# Patient Record
Sex: FEMALE | Race: WHITE | Employment: PART TIME | URBAN - METROPOLITAN AREA
[De-identification: names, ages, dates, MRNs, and addresses within clinical notes are randomized per-mention and may not be internally consistent; named-entity substitution may affect disease eponyms.]

---

## 2017-04-17 PROBLEM — R41.3 MEMORY CHANGE: Status: ACTIVE | Noted: 2017-04-17

## 2017-04-17 PROBLEM — M19.041 OA (OSTEOARTHRITIS) OF FINGER, RIGHT: Status: ACTIVE | Noted: 2017-04-17

## 2017-04-17 PROBLEM — J44.9 CHRONIC OBSTRUCTIVE PULMONARY DISEASE, UNSPECIFIED COPD TYPE (HCC): Status: ACTIVE | Noted: 2017-04-17

## 2017-04-17 PROCEDURE — 82607 VITAMIN B-12: CPT | Performed by: INTERNAL MEDICINE

## 2017-04-17 PROCEDURE — 82746 ASSAY OF FOLIC ACID SERUM: CPT | Performed by: INTERNAL MEDICINE

## 2017-04-17 PROCEDURE — 86780 TREPONEMA PALLIDUM: CPT | Performed by: INTERNAL MEDICINE

## 2017-04-25 PROBLEM — M19.041 DEGENERATIVE ARTHRITIS OF FINGER, RIGHT: Status: ACTIVE | Noted: 2017-04-25

## 2017-08-22 PROBLEM — R93.89 ABNORMAL CT OF THE CHEST: Status: ACTIVE | Noted: 2017-08-22

## 2017-08-22 PROBLEM — J18.1 LOBAR PNEUMONIA (HCC): Status: ACTIVE | Noted: 2017-08-22

## 2017-11-09 PROCEDURE — 84165 PROTEIN E-PHORESIS SERUM: CPT | Performed by: OTHER

## 2017-11-09 PROCEDURE — 82746 ASSAY OF FOLIC ACID SERUM: CPT | Performed by: OTHER

## 2017-11-09 PROCEDURE — 82607 VITAMIN B-12: CPT | Performed by: OTHER

## 2017-11-09 PROCEDURE — 86334 IMMUNOFIX E-PHORESIS SERUM: CPT | Performed by: OTHER

## 2017-11-09 PROCEDURE — 83883 ASSAY NEPHELOMETRY NOT SPEC: CPT | Performed by: OTHER

## 2017-11-09 PROCEDURE — 83090 ASSAY OF HOMOCYSTEINE: CPT | Performed by: OTHER

## 2017-11-09 PROCEDURE — 83921 ORGANIC ACID SINGLE QUANT: CPT | Performed by: OTHER

## 2017-12-19 PROBLEM — J45.21 MILD INTERMITTENT ASTHMA WITH ACUTE EXACERBATION: Status: ACTIVE | Noted: 2017-12-19

## 2017-12-19 PROBLEM — J45.21 MILD INTERMITTENT ASTHMA WITH ACUTE EXACERBATION (HCC): Status: ACTIVE | Noted: 2017-12-19

## 2018-01-20 PROCEDURE — 82784 ASSAY IGA/IGD/IGG/IGM EACH: CPT | Performed by: ALLERGY & IMMUNOLOGY

## 2018-01-20 PROCEDURE — 86317 IMMUNOASSAY INFECTIOUS AGENT: CPT | Performed by: ALLERGY & IMMUNOLOGY

## 2018-01-20 PROCEDURE — 36415 COLL VENOUS BLD VENIPUNCTURE: CPT | Performed by: ALLERGY & IMMUNOLOGY

## 2018-04-16 PROBLEM — I70.0 AORTIC ATHEROSCLEROSIS (HCC): Status: ACTIVE | Noted: 2018-04-16

## 2018-04-16 PROBLEM — D80.2 IMMUNOGLOBULIN A DEFICIENCY (HCC): Status: ACTIVE | Noted: 2018-04-16

## 2018-04-17 PROBLEM — J18.9 PNEUMONIA OF RIGHT MIDDLE LOBE DUE TO INFECTIOUS ORGANISM: Status: ACTIVE | Noted: 2018-04-17

## 2018-04-17 PROBLEM — M19.041 DEGENERATIVE ARTHRITIS OF FINGER, RIGHT: Status: RESOLVED | Noted: 2017-04-25 | Resolved: 2018-04-17

## 2018-04-17 PROBLEM — R93.89 ABNORMAL CT OF THE CHEST: Status: RESOLVED | Noted: 2017-08-22 | Resolved: 2018-04-17

## 2018-04-17 PROBLEM — M81.0 AGE-RELATED OSTEOPOROSIS WITHOUT CURRENT PATHOLOGICAL FRACTURE: Status: ACTIVE | Noted: 2018-04-17

## 2018-04-17 PROBLEM — J18.1 LOBAR PNEUMONIA (HCC): Status: RESOLVED | Noted: 2017-08-22 | Resolved: 2018-04-17

## 2018-04-17 PROBLEM — R41.3 MEMORY CHANGE: Status: RESOLVED | Noted: 2017-04-17 | Resolved: 2018-04-17

## 2018-04-20 PROBLEM — E78.5 DYSLIPIDEMIA, GOAL LDL BELOW 160: Status: ACTIVE | Noted: 2018-04-20

## 2018-05-10 PROCEDURE — 88305 TISSUE EXAM BY PATHOLOGIST: CPT | Performed by: INTERNAL MEDICINE

## 2018-05-22 PROBLEM — G25.0 ESSENTIAL TREMOR: Status: ACTIVE | Noted: 2018-05-22

## 2018-06-28 PROBLEM — D50.0 IRON DEFICIENCY ANEMIA DUE TO CHRONIC BLOOD LOSS: Status: ACTIVE | Noted: 2018-06-28

## 2018-06-28 PROCEDURE — 81001 URINALYSIS AUTO W/SCOPE: CPT | Performed by: INTERNAL MEDICINE

## 2018-07-23 PROBLEM — J44.1 ACUTE EXACERBATION OF CHRONIC OBSTRUCTIVE PULMONARY DISEASE (COPD) (HCC): Status: ACTIVE | Noted: 2017-04-17

## 2018-08-22 PROCEDURE — 83883 ASSAY NEPHELOMETRY NOT SPEC: CPT | Performed by: INTERNAL MEDICINE

## 2018-08-22 PROCEDURE — 83010 ASSAY OF HAPTOGLOBIN QUANT: CPT | Performed by: INTERNAL MEDICINE

## 2018-08-22 PROCEDURE — 86334 IMMUNOFIX E-PHORESIS SERUM: CPT | Performed by: INTERNAL MEDICINE

## 2018-08-22 PROCEDURE — 84165 PROTEIN E-PHORESIS SERUM: CPT | Performed by: INTERNAL MEDICINE

## 2018-09-24 PROBLEM — G62.9 NEUROPATHY: Status: ACTIVE | Noted: 2018-09-24

## 2018-09-24 PROBLEM — H81.10 BENIGN PAROXYSMAL POSITIONAL VERTIGO, UNSPECIFIED LATERALITY: Status: ACTIVE | Noted: 2018-09-24

## 2018-09-25 PROBLEM — J44.1 ACUTE EXACERBATION OF CHRONIC OBSTRUCTIVE PULMONARY DISEASE (COPD) (HCC): Status: RESOLVED | Noted: 2017-04-17 | Resolved: 2018-09-25

## 2018-09-25 PROBLEM — D63.8 ANEMIA, CHRONIC DISEASE: Status: ACTIVE | Noted: 2018-09-25

## 2019-05-19 PROBLEM — J18.9 PNEUMONIA OF RIGHT MIDDLE LOBE DUE TO INFECTIOUS ORGANISM: Status: RESOLVED | Noted: 2018-04-17 | Resolved: 2019-05-19

## 2019-05-21 PROCEDURE — 80156 ASSAY CARBAMAZEPINE TOTAL: CPT | Performed by: INTERNAL MEDICINE

## 2019-12-13 PROBLEM — S92.514A CLOSED NONDISPLACED FRACTURE OF PROXIMAL PHALANX OF LESSER TOE OF RIGHT FOOT, INITIAL ENCOUNTER: Status: ACTIVE | Noted: 2019-12-13

## 2020-03-05 PROBLEM — S92.514A CLOSED NONDISPLACED FRACTURE OF PROXIMAL PHALANX OF LESSER TOE OF RIGHT FOOT, INITIAL ENCOUNTER: Status: RESOLVED | Noted: 2019-12-13 | Resolved: 2020-03-05

## 2020-03-05 PROBLEM — J41.8 MIXED SIMPLE AND MUCOPURULENT CHRONIC BRONCHITIS (HCC): Status: ACTIVE | Noted: 2020-03-05

## 2020-03-05 PROBLEM — J45.21 MILD INTERMITTENT ASTHMA WITH ACUTE EXACERBATION: Status: RESOLVED | Noted: 2017-12-19 | Resolved: 2020-03-05

## 2020-03-05 PROBLEM — I47.10 SUPRAVENTRICULAR TACHYCARDIA: Status: ACTIVE | Noted: 2020-03-05

## 2020-03-05 PROBLEM — M19.049 HAND ARTHRITIS: Status: ACTIVE | Noted: 2017-04-17

## 2020-03-05 PROBLEM — H81.10 BENIGN PAROXYSMAL POSITIONAL VERTIGO, UNSPECIFIED LATERALITY: Status: RESOLVED | Noted: 2018-09-24 | Resolved: 2020-03-05

## 2020-03-05 PROBLEM — J45.21 MILD INTERMITTENT ASTHMA WITH ACUTE EXACERBATION (HCC): Status: RESOLVED | Noted: 2017-12-19 | Resolved: 2020-03-05

## 2020-03-05 PROBLEM — I47.1 SUPRAVENTRICULAR TACHYCARDIA (HCC): Status: ACTIVE | Noted: 2020-03-05

## 2020-03-05 PROBLEM — I47.10 SUPRAVENTRICULAR TACHYCARDIA (HCC): Status: ACTIVE | Noted: 2020-03-05

## 2021-04-12 DIAGNOSIS — Z23 NEED FOR VACCINATION: ICD-10-CM

## 2021-06-10 PROBLEM — R63.4 WEIGHT LOSS: Status: ACTIVE | Noted: 2021-06-10

## 2021-06-10 PROBLEM — F51.05 MOOD INSOMNIA (HCC): Status: ACTIVE | Noted: 2021-06-10

## 2021-06-10 PROBLEM — F39 MOOD INSOMNIA (HCC): Status: ACTIVE | Noted: 2021-06-10

## 2021-06-10 PROBLEM — M15.2 BOUCHARD NODES (DJD HAND): Status: ACTIVE | Noted: 2021-06-10

## 2021-07-11 PROBLEM — R53.82 CHRONIC FATIGUE: Status: ACTIVE | Noted: 2021-07-11

## 2024-02-03 ENCOUNTER — APPOINTMENT (OUTPATIENT)
Dept: GENERAL RADIOLOGY | Facility: HOSPITAL | Age: 79
End: 2024-02-03
Attending: NURSE PRACTITIONER
Payer: MEDICARE

## 2024-02-03 ENCOUNTER — ANESTHESIA EVENT (OUTPATIENT)
Dept: SURGERY | Facility: HOSPITAL | Age: 79
End: 2024-02-03
Payer: MEDICARE

## 2024-02-03 ENCOUNTER — HOSPITAL ENCOUNTER (INPATIENT)
Facility: HOSPITAL | Age: 79
LOS: 14 days | Discharge: HOME | End: 2024-02-17
Attending: SURGERY | Admitting: HOSPITALIST
Payer: MEDICARE

## 2024-02-03 ENCOUNTER — ANESTHESIA (OUTPATIENT)
Dept: SURGERY | Facility: HOSPITAL | Age: 79
End: 2024-02-03
Payer: MEDICARE

## 2024-02-03 ENCOUNTER — APPOINTMENT (OUTPATIENT)
Dept: CT IMAGING | Facility: HOSPITAL | Age: 79
End: 2024-02-03
Attending: NURSE PRACTITIONER
Payer: MEDICARE

## 2024-02-03 ENCOUNTER — HOSPITAL ENCOUNTER (INPATIENT)
Facility: HOSPITAL | Age: 79
LOS: 14 days | Discharge: HOME HEALTH | End: 2024-02-17
Attending: SURGERY | Admitting: HOSPITALIST
Payer: MEDICARE

## 2024-02-03 DIAGNOSIS — K56.609 BOWEL OBSTRUCTION (HCC): Primary | ICD-10-CM

## 2024-02-03 DIAGNOSIS — K56.609 BOWEL OBSTRUCTION (HCC): ICD-10-CM

## 2024-02-03 DIAGNOSIS — K56.2 CECAL VOLVULUS (HCC): Primary | ICD-10-CM

## 2024-02-03 LAB
ALBUMIN SERPL-MCNC: 4.5 G/DL (ref 3.2–4.8)
ALBUMIN/GLOB SERPL: 1.7 {RATIO} (ref 1–2)
ALP LIVER SERPL-CCNC: 103 U/L
ALT SERPL-CCNC: 19 U/L
ANION GAP SERPL CALC-SCNC: 10 MMOL/L (ref 0–18)
ANTIBODY SCREEN: NEGATIVE
AST SERPL-CCNC: 21 U/L (ref ?–34)
ATRIAL RATE: 78 BPM
ATRIAL RATE: 80 BPM
BASOPHILS # BLD AUTO: 0.03 X10(3) UL (ref 0–0.2)
BASOPHILS NFR BLD AUTO: 0.2 %
BILIRUB SERPL-MCNC: 0.5 MG/DL (ref 0.2–1.1)
BUN BLD-MCNC: 23 MG/DL (ref 9–23)
BUN/CREAT SERPL: 26.1 (ref 10–20)
CALCIUM BLD-MCNC: 9.7 MG/DL (ref 8.7–10.4)
CHLORIDE SERPL-SCNC: 105 MMOL/L (ref 98–112)
CO2 SERPL-SCNC: 25 MMOL/L (ref 21–32)
CREAT BLD-MCNC: 0.88 MG/DL
DEPRECATED RDW RBC AUTO: 45.7 FL (ref 35.1–46.3)
EGFRCR SERPLBLD CKD-EPI 2021: 67 ML/MIN/1.73M2 (ref 60–?)
EOSINOPHIL # BLD AUTO: 0 X10(3) UL (ref 0–0.7)
EOSINOPHIL NFR BLD AUTO: 0 %
ERYTHROCYTE [DISTWIDTH] IN BLOOD BY AUTOMATED COUNT: 14.1 % (ref 11–15)
FLUAV + FLUBV RNA SPEC NAA+PROBE: NEGATIVE
FLUAV + FLUBV RNA SPEC NAA+PROBE: NEGATIVE
GLOBULIN PLAS-MCNC: 2.6 G/DL (ref 2.8–4.4)
GLUCOSE BLD-MCNC: 163 MG/DL (ref 70–99)
HCT VFR BLD AUTO: 37.7 %
HGB BLD-MCNC: 12.6 G/DL
IMM GRANULOCYTES # BLD AUTO: 0.06 X10(3) UL (ref 0–1)
IMM GRANULOCYTES NFR BLD: 0.5 %
LACTATE SERPL-SCNC: 1.5 MMOL/L (ref 0.5–2)
LIPASE SERPL-CCNC: 29 U/L (ref 13–75)
LYMPHOCYTES # BLD AUTO: 0.61 X10(3) UL (ref 1–4)
LYMPHOCYTES NFR BLD AUTO: 4.6 %
MCH RBC QN AUTO: 29 PG (ref 26–34)
MCHC RBC AUTO-ENTMCNC: 33.4 G/DL (ref 31–37)
MCV RBC AUTO: 86.7 FL
MONOCYTES # BLD AUTO: 0.39 X10(3) UL (ref 0.1–1)
MONOCYTES NFR BLD AUTO: 2.9 %
NEUTROPHILS # BLD AUTO: 12.15 X10 (3) UL (ref 1.5–7.7)
NEUTROPHILS # BLD AUTO: 12.15 X10(3) UL (ref 1.5–7.7)
NEUTROPHILS NFR BLD AUTO: 91.8 %
OSMOLALITY SERPL CALC.SUM OF ELEC: 297 MOSM/KG (ref 275–295)
P AXIS: 75 DEGREES
P-R INTERVAL: 130 MS
P-R INTERVAL: 168 MS
PLATELET # BLD AUTO: 272 10(3)UL (ref 150–450)
POTASSIUM SERPL-SCNC: 4.4 MMOL/L (ref 3.5–5.1)
PROT SERPL-MCNC: 7.1 G/DL (ref 5.7–8.2)
Q-T INTERVAL: 326 MS
Q-T INTERVAL: 372 MS
QRS DURATION: 92 MS
QRS DURATION: 94 MS
QTC CALCULATION (BEZET): 371 MS
QTC CALCULATION (BEZET): 429 MS
R AXIS: -23 DEGREES
R AXIS: -32 DEGREES
RBC # BLD AUTO: 4.35 X10(6)UL
RH BLOOD TYPE: POSITIVE
RH BLOOD TYPE: POSITIVE
RSV RNA SPEC NAA+PROBE: NEGATIVE
SARS-COV-2 RNA RESP QL NAA+PROBE: NOT DETECTED
SODIUM SERPL-SCNC: 140 MMOL/L (ref 136–145)
T AXIS: -7 DEGREES
T AXIS: 60 DEGREES
TROPONIN I SERPL HS-MCNC: 6 NG/L
VENTRICULAR RATE: 78 BPM
VENTRICULAR RATE: 80 BPM
WBC # BLD AUTO: 13.2 X10(3) UL (ref 4–11)

## 2024-02-03 PROCEDURE — 93010 ELECTROCARDIOGRAM REPORT: CPT | Performed by: INTERNAL MEDICINE

## 2024-02-03 PROCEDURE — 99285 EMERGENCY DEPT VISIT HI MDM: CPT

## 2024-02-03 PROCEDURE — 96375 TX/PRO/DX INJ NEW DRUG ADDON: CPT

## 2024-02-03 PROCEDURE — 0WUF07Z SUPPLEMENT ABDOMINAL WALL WITH AUTOLOGOUS TISSUE SUBSTITUTE, OPEN APPROACH: ICD-10-PCS | Performed by: SURGERY

## 2024-02-03 PROCEDURE — 84484 ASSAY OF TROPONIN QUANT: CPT | Performed by: NURSE PRACTITIONER

## 2024-02-03 PROCEDURE — 80053 COMPREHEN METABOLIC PANEL: CPT | Performed by: NURSE PRACTITIONER

## 2024-02-03 PROCEDURE — 83690 ASSAY OF LIPASE: CPT | Performed by: NURSE PRACTITIONER

## 2024-02-03 PROCEDURE — 86900 BLOOD TYPING SEROLOGIC ABO: CPT | Performed by: NURSE PRACTITIONER

## 2024-02-03 PROCEDURE — 96374 THER/PROPH/DIAG INJ IV PUSH: CPT

## 2024-02-03 PROCEDURE — 86850 RBC ANTIBODY SCREEN: CPT | Performed by: NURSE PRACTITIONER

## 2024-02-03 PROCEDURE — 93010 ELECTROCARDIOGRAM REPORT: CPT

## 2024-02-03 PROCEDURE — 0241U SARS-COV-2/FLU A AND B/RSV BY PCR (GENEXPERT): CPT | Performed by: NURSE PRACTITIONER

## 2024-02-03 PROCEDURE — 83605 ASSAY OF LACTIC ACID: CPT | Performed by: NURSE PRACTITIONER

## 2024-02-03 PROCEDURE — 74177 CT ABD & PELVIS W/CONTRAST: CPT | Performed by: NURSE PRACTITIONER

## 2024-02-03 PROCEDURE — 93005 ELECTROCARDIOGRAM TRACING: CPT

## 2024-02-03 PROCEDURE — 96361 HYDRATE IV INFUSION ADD-ON: CPT

## 2024-02-03 PROCEDURE — 0DTF0ZZ RESECTION OF RIGHT LARGE INTESTINE, OPEN APPROACH: ICD-10-PCS | Performed by: SURGERY

## 2024-02-03 PROCEDURE — 85025 COMPLETE CBC W/AUTO DIFF WBC: CPT | Performed by: NURSE PRACTITIONER

## 2024-02-03 PROCEDURE — 86901 BLOOD TYPING SEROLOGIC RH(D): CPT | Performed by: NURSE PRACTITIONER

## 2024-02-03 PROCEDURE — 88307 TISSUE EXAM BY PATHOLOGIST: CPT | Performed by: SURGERY

## 2024-02-03 RX ORDER — MORPHINE SULFATE 10 MG/ML
6 INJECTION, SOLUTION INTRAMUSCULAR; INTRAVENOUS EVERY 10 MIN PRN
Status: DISCONTINUED | OUTPATIENT
Start: 2024-02-03 | End: 2024-02-03 | Stop reason: HOSPADM

## 2024-02-03 RX ORDER — HYDROMORPHONE HYDROCHLORIDE 1 MG/ML
0.4 INJECTION, SOLUTION INTRAMUSCULAR; INTRAVENOUS; SUBCUTANEOUS EVERY 5 MIN PRN
Status: DISCONTINUED | OUTPATIENT
Start: 2024-02-03 | End: 2024-02-03 | Stop reason: HOSPADM

## 2024-02-03 RX ORDER — DIPHENHYDRAMINE HYDROCHLORIDE 50 MG/ML
12.5 INJECTION INTRAMUSCULAR; INTRAVENOUS EVERY 4 HOURS PRN
Status: DISCONTINUED | OUTPATIENT
Start: 2024-02-03 | End: 2024-02-06

## 2024-02-03 RX ORDER — PHENYLEPHRINE HCL 10 MG/ML
VIAL (ML) INJECTION AS NEEDED
Status: DISCONTINUED | OUTPATIENT
Start: 2024-02-03 | End: 2024-02-03 | Stop reason: SURG

## 2024-02-03 RX ORDER — MORPHINE SULFATE 4 MG/ML
4 INJECTION, SOLUTION INTRAMUSCULAR; INTRAVENOUS ONCE
Status: COMPLETED | OUTPATIENT
Start: 2024-02-03 | End: 2024-02-03

## 2024-02-03 RX ORDER — ROCURONIUM BROMIDE 10 MG/ML
INJECTION, SOLUTION INTRAVENOUS AS NEEDED
Status: DISCONTINUED | OUTPATIENT
Start: 2024-02-03 | End: 2024-02-03 | Stop reason: SURG

## 2024-02-03 RX ORDER — NALOXONE HYDROCHLORIDE 0.4 MG/ML
0.08 INJECTION, SOLUTION INTRAMUSCULAR; INTRAVENOUS; SUBCUTANEOUS
Status: DISCONTINUED | OUTPATIENT
Start: 2024-02-03 | End: 2024-02-06

## 2024-02-03 RX ORDER — BUPIVACAINE HYDROCHLORIDE AND EPINEPHRINE 5; 5 MG/ML; UG/ML
INJECTION, SOLUTION PERINEURAL AS NEEDED
Status: DISCONTINUED | OUTPATIENT
Start: 2024-02-03 | End: 2024-02-03 | Stop reason: HOSPADM

## 2024-02-03 RX ORDER — MORPHINE SULFATE 2 MG/ML
1 INJECTION, SOLUTION INTRAMUSCULAR; INTRAVENOUS EVERY 2 HOUR PRN
Status: CANCELLED | OUTPATIENT
Start: 2024-02-03

## 2024-02-03 RX ORDER — MORPHINE SULFATE 4 MG/ML
4 INJECTION, SOLUTION INTRAMUSCULAR; INTRAVENOUS EVERY 2 HOUR PRN
Status: CANCELLED | OUTPATIENT
Start: 2024-02-03

## 2024-02-03 RX ORDER — MORPHINE SULFATE 4 MG/ML
2 INJECTION, SOLUTION INTRAMUSCULAR; INTRAVENOUS EVERY 10 MIN PRN
Status: DISCONTINUED | OUTPATIENT
Start: 2024-02-03 | End: 2024-02-03 | Stop reason: HOSPADM

## 2024-02-03 RX ORDER — MORPHINE SULFATE 4 MG/ML
4 INJECTION, SOLUTION INTRAMUSCULAR; INTRAVENOUS EVERY 10 MIN PRN
Status: DISCONTINUED | OUTPATIENT
Start: 2024-02-03 | End: 2024-02-03 | Stop reason: HOSPADM

## 2024-02-03 RX ORDER — HYDROMORPHONE HYDROCHLORIDE 1 MG/ML
0.6 INJECTION, SOLUTION INTRAMUSCULAR; INTRAVENOUS; SUBCUTANEOUS EVERY 5 MIN PRN
Status: DISCONTINUED | OUTPATIENT
Start: 2024-02-03 | End: 2024-02-03 | Stop reason: HOSPADM

## 2024-02-03 RX ORDER — METOPROLOL TARTRATE 1 MG/ML
5 INJECTION, SOLUTION INTRAVENOUS NIGHTLY
Status: DISCONTINUED | OUTPATIENT
Start: 2024-02-03 | End: 2024-02-04

## 2024-02-03 RX ORDER — MORPHINE SULFATE 2 MG/ML
2 INJECTION, SOLUTION INTRAMUSCULAR; INTRAVENOUS EVERY 2 HOUR PRN
Status: CANCELLED | OUTPATIENT
Start: 2024-02-03

## 2024-02-03 RX ORDER — HYDROMORPHONE HYDROCHLORIDE 1 MG/ML
0.2 INJECTION, SOLUTION INTRAMUSCULAR; INTRAVENOUS; SUBCUTANEOUS EVERY 5 MIN PRN
Status: DISCONTINUED | OUTPATIENT
Start: 2024-02-03 | End: 2024-02-03 | Stop reason: HOSPADM

## 2024-02-03 RX ORDER — ONDANSETRON 2 MG/ML
4 INJECTION INTRAMUSCULAR; INTRAVENOUS EVERY 6 HOURS PRN
Status: DISCONTINUED | OUTPATIENT
Start: 2024-02-03 | End: 2024-02-03 | Stop reason: HOSPADM

## 2024-02-03 RX ORDER — SODIUM CHLORIDE 9 MG/ML
INJECTION, SOLUTION INTRAVENOUS CONTINUOUS
Status: DISCONTINUED | OUTPATIENT
Start: 2024-02-03 | End: 2024-02-05

## 2024-02-03 RX ORDER — SODIUM CHLORIDE 9 MG/ML
INJECTION, SOLUTION INTRAVENOUS CONTINUOUS PRN
Status: DISCONTINUED | OUTPATIENT
Start: 2024-02-03 | End: 2024-02-03 | Stop reason: SURG

## 2024-02-03 RX ORDER — LIDOCAINE HYDROCHLORIDE 10 MG/ML
INJECTION, SOLUTION EPIDURAL; INFILTRATION; INTRACAUDAL; PERINEURAL AS NEEDED
Status: DISCONTINUED | OUTPATIENT
Start: 2024-02-03 | End: 2024-02-03 | Stop reason: SURG

## 2024-02-03 RX ORDER — METOCLOPRAMIDE HYDROCHLORIDE 5 MG/ML
5 INJECTION INTRAMUSCULAR; INTRAVENOUS EVERY 8 HOURS PRN
Status: DISCONTINUED | OUTPATIENT
Start: 2024-02-03 | End: 2024-02-17

## 2024-02-03 RX ORDER — ASPIRIN 81 MG/1
81 TABLET ORAL NIGHTLY
COMMUNITY
Start: 2023-09-03

## 2024-02-03 RX ORDER — ERGOCALCIFEROL 1.25 MG/1
50000 CAPSULE ORAL
COMMUNITY
Start: 2023-03-15 | End: 2024-07-19

## 2024-02-03 RX ORDER — DEXAMETHASONE SODIUM PHOSPHATE 4 MG/ML
VIAL (ML) INJECTION AS NEEDED
Status: DISCONTINUED | OUTPATIENT
Start: 2024-02-03 | End: 2024-02-03 | Stop reason: SURG

## 2024-02-03 RX ORDER — NALOXONE HYDROCHLORIDE 0.4 MG/ML
80 INJECTION, SOLUTION INTRAMUSCULAR; INTRAVENOUS; SUBCUTANEOUS AS NEEDED
Status: DISCONTINUED | OUTPATIENT
Start: 2024-02-03 | End: 2024-02-03 | Stop reason: HOSPADM

## 2024-02-03 RX ORDER — ONDANSETRON 2 MG/ML
4 INJECTION INTRAMUSCULAR; INTRAVENOUS EVERY 6 HOURS PRN
Status: DISCONTINUED | OUTPATIENT
Start: 2024-02-03 | End: 2024-02-17

## 2024-02-03 RX ORDER — SODIUM CHLORIDE, SODIUM LACTATE, POTASSIUM CHLORIDE, CALCIUM CHLORIDE 600; 310; 30; 20 MG/100ML; MG/100ML; MG/100ML; MG/100ML
INJECTION, SOLUTION INTRAVENOUS CONTINUOUS
Status: DISCONTINUED | OUTPATIENT
Start: 2024-02-03 | End: 2024-02-03 | Stop reason: HOSPADM

## 2024-02-03 RX ORDER — SODIUM CHLORIDE, SODIUM LACTATE, POTASSIUM CHLORIDE, CALCIUM CHLORIDE 600; 310; 30; 20 MG/100ML; MG/100ML; MG/100ML; MG/100ML
INJECTION, SOLUTION INTRAVENOUS CONTINUOUS PRN
Status: DISCONTINUED | OUTPATIENT
Start: 2024-02-03 | End: 2024-02-03 | Stop reason: SURG

## 2024-02-03 RX ORDER — METOCLOPRAMIDE HYDROCHLORIDE 5 MG/ML
5 INJECTION INTRAMUSCULAR; INTRAVENOUS EVERY 8 HOURS PRN
Status: DISCONTINUED | OUTPATIENT
Start: 2024-02-03 | End: 2024-02-03 | Stop reason: HOSPADM

## 2024-02-03 RX ADMIN — LIDOCAINE HYDROCHLORIDE 50 MG: 10 INJECTION, SOLUTION EPIDURAL; INFILTRATION; INTRACAUDAL; PERINEURAL at 05:43:00

## 2024-02-03 RX ADMIN — SODIUM CHLORIDE: 9 INJECTION, SOLUTION INTRAVENOUS at 05:46:00

## 2024-02-03 RX ADMIN — ROCURONIUM BROMIDE 60 MG: 10 INJECTION, SOLUTION INTRAVENOUS at 05:43:00

## 2024-02-03 RX ADMIN — PHENYLEPHRINE HCL 300 MCG: 10 MG/ML VIAL (ML) INJECTION at 06:46:00

## 2024-02-03 RX ADMIN — PHENYLEPHRINE HCL 300 MCG: 10 MG/ML VIAL (ML) INJECTION at 05:52:00

## 2024-02-03 RX ADMIN — SODIUM CHLORIDE, SODIUM LACTATE, POTASSIUM CHLORIDE, CALCIUM CHLORIDE: 600; 310; 30; 20 INJECTION, SOLUTION INTRAVENOUS at 05:35:00

## 2024-02-03 RX ADMIN — ROCURONIUM BROMIDE 20 MG: 10 INJECTION, SOLUTION INTRAVENOUS at 06:46:00

## 2024-02-03 RX ADMIN — DEXAMETHASONE SODIUM PHOSPHATE 4 MG: 4 MG/ML VIAL (ML) INJECTION at 05:43:00

## 2024-02-03 NOTE — ANESTHESIA PROCEDURE NOTES
Peripheral IV  Date/Time: 2/3/2024 5:46 AM  Inserted by: Meng Burdick MD    Placement  Needle size: 16 G  Laterality: right  Location: forearm  Site prep: alcohol  Technique: anatomical landmarks  Attempts: 1

## 2024-02-03 NOTE — ANESTHESIA POSTPROCEDURE EVALUATION
Patient: Mayte Lucas    Procedure Summary       Date: 02/03/24 Room / Location: [facility] MAIN OR 01 / [facility] MAIN OR    Anesthesia Start: 0535 Anesthesia Stop:     Procedure: EXPLORATORY LAPAROTOMY, RIGHT HEMICOLECTOMY, omentopexy anastomosis (Right: Abdomen) Diagnosis:       Bowel obstruction (HCC)      (Bowel obstruction (HCC) [K56.609])    Surgeons: Yvan Griggs MD Anesthesiologist: Amee Bullard MD    Anesthesia Type: general ASA Status: 3 - Emergent            Anesthesia Type: general    Vitals Value Taken Time   /68 02/03/24 0726   Temp 97.8 °F (36.6 °C) 02/03/24 0726   Pulse 109 02/03/24 0726   Resp 14 02/03/24 0726   SpO2 93 % 02/03/24 0726   Vitals shown include unfiled device data.    [facility] AN Post Evaluation:   Patient Evaluated in PACU  Patient Participation: complete - patient participated  Level of Consciousness: awake and alert  Pain Score: 0  Pain Management: adequate  Airway Patency:patent  Dental exam unchanged from preop  Yes    Cardiovascular Status: stable  Respiratory Status: room air  Postoperative Hydration stable      AMEE BULLARD MD  2/3/2024 7:26 AM

## 2024-02-03 NOTE — CONSULTS
[facility]                                                                             GENERAL SURGERY CONSULTATION    Mayte Lucas  :1945  CSN:[id number]  LOS:0    Date of Admission:  2/3/2024  Date of Consult:  2/3/2024     Reason for Consultation: cecal volvulus     History of Present Illness:  Mayte Lucas is a a(n) 78 year old female who was well up until 2 PM yesterday when she started developing burning pain in her stomach.  She also developed involuntary twitches throughout her whole body in the evening.  The pain became worse and the patient presented to the emergency department.  She had nausea but no significant emesis.  She threw up a small amount of water that she drank.  The patient had a colonoscopy a couple years ago per the patient and about a week after that has developed constipation and has related that she has been taken over-the-counter stool softener.  She takes baby aspirin last taken about 48 hours ago and also has recently started metoprolol for \"flutter\".  No history of atrial fibrillation.  She is much more comfortable now after taking morphine.  I reviewed CT scan images and indeed looks like cecal volvulus.  There is ascites fluid present.  No evidence of small bowel obstruction or gastric dilatation.     History:  Past Medical History:   Diagnosis Date    Acute exacerbation of chronic obstructive pulmonary disease (COPD) (formerly [facility]) 2017    ALLERGIC RHINITIS     OTHER DISEASES     TMJ      Past Surgical History:   Procedure Laterality Date    CATARACT      COLONOSCOPY  2013    Procedure: COLONOSCOPY, POSSIBLE BIOPSY, POSSIBLE POLYPECTOMY 33471;  Surgeon: Jakub Barr MD;  Location: [facility]    PATIENT DOCUMENTED NOT TO HAVE EXPERIENCED ANY OF THE FOLLOWING EVENTS  2012    Procedure: LEFT PHACOEMULSIFICATION OF CATARACT WITH INTRAOCULAR LENS IMPLANT [id number];  Surgeon: Siddhartha Vinson MD;  Location: [facility]     PATIENT DOCUMENTED NOT TO HAVE EXPERIENCED ANY OF THE FOLLOWING EVENTS  11/28/2012    Procedure: RIGHT PHACOEMULSIFICATION OF CATARACT WITH INTRAOCULAR LENS IMPLANT [id number];  Surgeon: Siddhartha Vinson MD;  Location: [facility]    PATIENT DOCUMENTED NOT TO HAVE EXPERIENCED ANY OF THE FOLLOWING EVENTS  2/12/2013    Procedure: COLONOSCOPY, POSSIBLE BIOPSY, POSSIBLE POLYPECTOMY 68693;  Surgeon: Jakub Barr MD;  Location: [facility]    PATIENT WITHOUGH PREOPERATIVE ORDER FOR IV ANTIBIOTIC SURGICAL SITE INFECTION PROPHYLAXIS.  12/12/2012    Procedure: LEFT PHACOEMULSIFICATION OF CATARACT WITH INTRAOCULAR LENS IMPLANT [id number];  Surgeon: Siddhartha Vinson MD;  Location: [facility]    PATIENT WITHOUGH PREOPERATIVE ORDER FOR IV ANTIBIOTIC SURGICAL SITE INFECTION PROPHYLAXIS.  11/28/2012    Procedure: RIGHT PHACOEMULSIFICATION OF CATARACT WITH INTRAOCULAR LENS IMPLANT [id number];  Surgeon: Siddhartha Vinson MD;  Location: [facility]    PATIENT WITHOUGH PREOPERATIVE ORDER FOR IV ANTIBIOTIC SURGICAL SITE INFECTION PROPHYLAXIS.  2/12/2013    Procedure: COLONOSCOPY, POSSIBLE BIOPSY, POSSIBLE POLYPECTOMY 70663;  Surgeon: Jakub Barr MD;  Location: [facility]    REMV CATARACT EXTRACAP,INSERT LENS  12/12/2012    Procedure: LEFT PHACOEMULSIFICATION OF CATARACT WITH INTRAOCULAR LENS IMPLANT [id number];  Surgeon: Siddhartha Vinson MD;  Location: [facility]    REMV CATARACT EXTRACAP,INSERT LENS  11/28/2012    Procedure: RIGHT PHACOEMULSIFICATION OF CATARACT WITH INTRAOCULAR LENS IMPLANT [id number];  Surgeon: Siddhartha Vinson MD;  Location: [facility]    TONSILLECTOMY        Family History   Problem Relation Age of Onset    Other (Other) Father         PD    Other (Other) Sister         cramps in body    Cancer Sister       reports that she has never smoked. She has never used smokeless tobacco. She reports current alcohol use of about 1.0 standard drink of  alcohol per week. She reports that she does not use drugs.     Allergies:  Allergies   Allergen Reactions    Bactrim [Sulfamethoxazole W/Trimethoprim] FEVER     X 4 days    Bicillin L-A      unknown    Mucinex Coughing       Medications:   No current facility-administered medications for this encounter.    Review of Systems:   Pertinent items are noted in HPI.  Constitutional: positive for malaise  Eyes: negative  Ears, nose, mouth, throat, and face: negative  Respiratory: negative  Cardiovascular: positive for palpitations  Gastrointestinal: positive for abdominal pain and nausea  Genitourinary:negative  Integument/breast: negative  Musculoskeletal:negative  Neurological: negative  Behavioral/Psych: negative    Physical Exam:   Vitals:    02/03/24 0140 02/03/24 0215   BP: 127/84 133/62   Pulse: 85 70   Resp: 16    Temp: 97.7 °F (36.5 °C)    TempSrc: Oral    SpO2: 99% 97%   Weight: 104 lb (47.2 kg)    Height: 5' 5\" (1.651 m)       Body mass index is 17.31 kg/m².    General: no acute distress and comfortable  Pulmonary:lungs clear to auscultation bilaterally  Cardiac: nl S1,S2, no S3, no S4, irregular ectopy, split S2, and  no murmurs  Abdomen: soft, nontender, and moderately distended, no guarding  Rectal exam: deferred  Extremities: calves nontender, no edema, motor intact, and sensory intact    Laboratory Data:  Recent Labs   Lab 02/03/24 0207   RBC 4.35   HGB 12.6   HCT 37.7   MCV 86.7   MCH 29.0   MCHC 33.4   RDW 14.1   NEPRELIM 12.15*   WBC 13.2*   .0       Recent Labs   Lab 02/03/24 0207   *   BUN 23   CREATSERUM 0.88   CA 9.7      K 4.4      CO2 25.0       Lab Results   Component Value Date    WBC 13.2 02/03/2024    HGB 12.6 02/03/2024    HCT 37.7 02/03/2024    .0 02/03/2024     02/03/2024    K 4.4 02/03/2024     02/03/2024    CO2 25.0 02/03/2024    BUN 23 02/03/2024    CREATSERUM 0.88 02/03/2024     02/03/2024    BILT 0.5 02/03/2024    AST 21 02/03/2024     ALT 19 02/03/2024    LIP 29 02/03/2024    CA 9.7 02/03/2024    ALB 4.5 02/03/2024    TP 7.1 02/03/2024       No results found.    Impression and Plan:   Patient Active Problem List   Diagnosis    RLS (restless legs syndrome)    Pseudophakia of both eyes    Hand arthritis    Immunoglobulin A deficiency (HCC)    Aortic atherosclerosis (HCC)    Age-related osteoporosis without current pathological fracture    Dyslipidemia, goal LDL below 160    Essential tremor    Iron deficiency anemia due to chronic blood loss    Neuropathy    Anemia, chronic disease    Mixed simple and mucopurulent chronic bronchitis (HCC)    Supraventricular tachycardia    Mood insomnia (HCC)    Quinton nodes (DJD hand)    Weight loss    Chronic fatigue    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HCC)     Cecal volvulus    I had a detailed discussion with the patient and her .  The patient has cecal volvulus but clinically does not have ischemia.  CT scan shows raises possibility of ischemia due to the presence of ascites and thickening of the cecum.  I recommend that we proceed with exploratory laparotomy possible detorsion of cecum and right colon, possible right hemicolectomy.  The patient and her  understood the indications, details, potential risks and complications including bleeding, infection, anastomotic leak, sepsis, DVT/PE, arrhythmias, cardiac complications, possible need for return to operating room, etc.  They consented to this operation.      Yvan Griggs MD   2/3/2024  4:40 AM                Time spent on counseling/coordination of care:  45 Minutes  Total time spent with patient:  1 Hour 15 Minutes

## 2024-02-03 NOTE — H&P
[facility] Hospitalist H&P       CC:   Chief Complaint   Patient presents with    Abdominal Pain        PCP: Abi Ohara MD    History of Present Illness: Patient is a 78 year old female with PMH sig for SVT, RLS, COPD, chronic pain, HLD, IgA deficiency who presented to the hospital with abdominal pain. Patient says that her abdominal pain had started the day prior. Initially in her epigastric region but started to spread without. She also said she was having whole body muscle spasms from how bad the pain was. She had one episode of associated nausea and vomiting. No fevers or chills. Upon arrival to the ED, initial vitals were stable. Labwork showed a leukocytosis of 13.2 but no lactic acidosis. CT A/P showed cecal volvulus with severe mesenteric ischemia. She was evaluated by general surgery and underwent exploratory laparotomy, R hemicolectomy, omentoplasty of anastomosis. She was seen and examined post operatively. During my examination she was resting comfortably in bed. She was having abdominal pain but tolerable. Some nausea earlier that had resolved. Otherwise no other complaints.      PMH  Past Medical History:   Diagnosis Date    Acute exacerbation of chronic obstructive pulmonary disease (COPD) ([facility]) 4/17/2017    ALLERGIC RHINITIS     OTHER DISEASES     TMJ        PSH  Past Surgical History:   Procedure Laterality Date    CATARACT      COLONOSCOPY  2/12/2013    Procedure: COLONOSCOPY, POSSIBLE BIOPSY, POSSIBLE POLYPECTOMY 94943;  Surgeon: Jakub Barr MD;  Location: [facility]    PATIENT DOCUMENTED NOT TO HAVE EXPERIENCED ANY OF THE FOLLOWING EVENTS  12/12/2012    Procedure: LEFT PHACOEMULSIFICATION OF CATARACT WITH INTRAOCULAR LENS IMPLANT [id number];  Surgeon: Siddhartha Vinson MD;  Location: [facility]    PATIENT DOCUMENTED NOT TO HAVE EXPERIENCED ANY OF THE FOLLOWING EVENTS  11/28/2012    Procedure: RIGHT PHACOEMULSIFICATION OF CATARACT WITH INTRAOCULAR LENS  IMPLANT [id number];  Surgeon: Siddhartha Vinson MD;  Location: [facility]    PATIENT DOCUMENTED NOT TO HAVE EXPERIENCED ANY OF THE FOLLOWING EVENTS  2/12/2013    Procedure: COLONOSCOPY, POSSIBLE BIOPSY, POSSIBLE POLYPECTOMY 03449;  Surgeon: Jakub Barr MD;  Location: [facility]    PATIENT WITHOUGH PREOPERATIVE ORDER FOR IV ANTIBIOTIC SURGICAL SITE INFECTION PROPHYLAXIS.  12/12/2012    Procedure: LEFT PHACOEMULSIFICATION OF CATARACT WITH INTRAOCULAR LENS IMPLANT [id number];  Surgeon: Siddhartha Vinson MD;  Location: [facility]    PATIENT WITHOUGH PREOPERATIVE ORDER FOR IV ANTIBIOTIC SURGICAL SITE INFECTION PROPHYLAXIS.  11/28/2012    Procedure: RIGHT PHACOEMULSIFICATION OF CATARACT WITH INTRAOCULAR LENS IMPLANT [id number];  Surgeon: Siddhartha Vinson MD;  Location: [facility]    PATIENT WITHOUGH PREOPERATIVE ORDER FOR IV ANTIBIOTIC SURGICAL SITE INFECTION PROPHYLAXIS.  2/12/2013    Procedure: COLONOSCOPY, POSSIBLE BIOPSY, POSSIBLE POLYPECTOMY 01043;  Surgeon: Jakub Barr MD;  Location: [facility]    REMV CATARACT EXTRACAP,INSERT LENS  12/12/2012    Procedure: LEFT PHACOEMULSIFICATION OF CATARACT WITH INTRAOCULAR LENS IMPLANT [id number];  Surgeon: Siddhartha Vinson MD;  Location: [facility]    REMV CATARACT EXTRACAP,INSERT LENS  11/28/2012    Procedure: RIGHT PHACOEMULSIFICATION OF CATARACT WITH INTRAOCULAR LENS IMPLANT [id number];  Surgeon: Siddhartha Vinson MD;  Location: [facility]    TONSILLECTOMY          ALL:  Allergies   Allergen Reactions    Bactrim [Sulfamethoxazole W/Trimethoprim] FEVER     X 4 days    Bicillin L-A      unknown    Mucinex Coughing        Home Medications:  Outpatient Medications Marked as Taking for the 2/3/24 encounter (Hospital Encounter)   Medication Sig Dispense Refill    aspirin (ASPIRIN LOW DOSE) 81 MG Oral Tab EC Take 1 tablet (81 mg total) by mouth at bedtime.      metoprolol succinate 12.5 mg Oral Tab Take 1  Partial Tablet (12.5 mg total) by mouth nightly.      ergocalciferol 1.25 MG (69969 UT) Oral Cap Take 1 capsule (50,000 Units total) by mouth every 14 (fourteen) days.      OXcarbazepine 150 MG Oral Tab Take 2 tablets (300 mg total) by mouth 2 (two) times daily. 360 tablet 3         Soc Hx  Social History     Tobacco Use    Smoking status: Never    Smokeless tobacco: Never   Substance Use Topics    Alcohol use: Yes     Alcohol/week: 1.0 standard drink of alcohol     Types: 1 Standard drinks or equivalent per week     Comment: occasionally        Fam Hx  Family History   Problem Relation Age of Onset    Other (Other) Father         PD    Other (Other) Sister         cramps in body    Cancer Sister        Review of Systems  Comprehensive ROS reviewed and negative except for what's stated above.     OBJECTIVE:  /58 (BP Location: Left arm)   Pulse 94   Temp 99.9 °F (37.7 °C) (Oral)   Resp 18   Ht 5' 5\" (1.651 m)   Wt 104 lb (47.2 kg)   SpO2 99%   BMI 17.31 kg/m²   General:  Alert, no distress   Head:  Normocephalic               Neck: Supple   Lungs:   Clear to auscultation bilaterally       Heart:  Regular rate and rhythm, S1, S2 normal   Abdomen:   Soft, appropriately tender   Extremities: Extremities normal             Diagnostic Data:    CBC/Chem  Recent Labs   Lab 02/03/24  0207   WBC 13.2*   HGB 12.6   MCV 86.7   .0       Recent Labs   Lab 02/03/24  0207      K 4.4      CO2 25.0   BUN 23   CREATSERUM 0.88   *   CA 9.7       Recent Labs   Lab 02/03/24  0207   ALT 19   AST 21   ALB 4.5       No results for input(s): \"TROP\" in the last 168 hours.    Radiology: CT ABDOMEN PELVIS IV CONTRAST, NO ORAL (ER)    Result Date: 2/3/2024  CONCLUSION:  1. Imaging findings of cecal volvulus (either type II or type III) with severe associated bowel wall thickening, concerning for bowel ischemia. Surgical consultation and correlation with lactate levels are advised.  2. Severe mesenteric  edema with extensive apparent mesenteric venous congestion.  3. Moderate volume intraperitoneal ascites.  4. Uncomplicated distal colonic diverticulosis.  5. Chronic-appearing mild superior endplate compression fracture deformity of T12.  6. Left-sided calyceal possible staghorn calculus formation conforming to the pelvocalyceal contours, raising the possibility of struvite stone formation in the setting of chronic recurrent urinary tract infections with urease-producing organisms (most typically Proteus, Klebsiella, Pseudomonas, and/or Enterobacter spp.).  7. Lesser incidental findings as above.    A preliminary report was issued by the [facility] teleradiology service. There are no major discrepancies.   Dictated by (CST): Jeremy Covarrubias MD on 2/03/2024 at 6:55 AM     Finalized by (CST): Jeremy Covarrubias MD on 2/03/2024 at 7:09 AM             ASSESSMENT / PLAN:   Patient is a 78 year old female with PMH sig for SVT, RLS, COPD, chronic pain, HLD, IgA deficiency who presented to the hospital with abdominal pain.    Cecal volvulus with extensive mesenteric ischemia  - s/p ex lap, R hemicolectomy, omentoplasty of anastomosis POD # 0   - prn pain medication   - monitor respiratory status  - encouraged IS use  - prn anti emetics  - CBC in the morning  - NPO, diet per general surgery  - on PCA pump per general surgery  - dvt ppx: SCD    Possible afib in PACU?   pSVT  - reported afib in PACU but 12 lead EKG with NSR  - tele monitoring to assess if truly afib as does have a history of SVT  - hold off on AC until/if atrial fibrillation is confirmed  - convert PO metoprolol to IV while NPO    COPD  - resume inhalers    RLS  - unfortunately no IV conversion for patients oxcarbazepine, will resume when able    Chronic pain  - outpatient f/u    IgA deficiency  - outpatient f/u    FN:  - IVF: NS  - Diet: NPO    DVT Prophy: SCD  Lines: PIV    Dispo: pending clinical course    Outpatient records or previous hospital records  reviewed.     Further recommendations pending patient's clinical course.  [facility] hospitalist to continue to follow patient while in house    Patient and/or patient's family given opportunity to ask questions and note understanding and agreeing with therapeutic plan as outlined    Thank You,  Flower Bain DO    Hospitalist with [facility] Service number: [phone]

## 2024-02-03 NOTE — ED PROVIDER NOTES
Patient Seen in: [facility] Emergency Department      History     Chief Complaint   Patient presents with    Abdominal Pain     Stated Complaint: Abdominal pain    Subjective:   77yo/f w hx of COPD reports to the ED w c/o abdominal pain. Sharp, intermittent and in waves. Felt like her abd was a \"rock\" and then resolved on/off since yesterday. Saw Lev at 1230pm for unrelated visit and felt well. Started several hours later. One small, non bloody bowel movement. No urinary symptoms. No rashes. Felt shaky all over.             Objective:   Past Medical History:   Diagnosis Date    Acute exacerbation of chronic obstructive pulmonary disease (COPD) (Formerly [facility]) 4/17/2017    ALLERGIC RHINITIS     OTHER DISEASES     TMJ              Past Surgical History:   Procedure Laterality Date    CATARACT      COLONOSCOPY  2/12/2013    Procedure: COLONOSCOPY, POSSIBLE BIOPSY, POSSIBLE POLYPECTOMY 98218;  Surgeon: Jakub Barr MD;  Location: [facility]    PATIENT DOCUMENTED NOT TO HAVE EXPERIENCED ANY OF THE FOLLOWING EVENTS  12/12/2012    Procedure: LEFT PHACOEMULSIFICATION OF CATARACT WITH INTRAOCULAR LENS IMPLANT [id number];  Surgeon: Siddhartha Vinson MD;  Location: [facility]    PATIENT DOCUMENTED NOT TO HAVE EXPERIENCED ANY OF THE FOLLOWING EVENTS  11/28/2012    Procedure: RIGHT PHACOEMULSIFICATION OF CATARACT WITH INTRAOCULAR LENS IMPLANT 17736;  Surgeon: Siddhartha Vinson MD;  Location: [facility]    PATIENT DOCUMENTED NOT TO HAVE EXPERIENCED ANY OF THE FOLLOWING EVENTS  2/12/2013    Procedure: COLONOSCOPY, POSSIBLE BIOPSY, POSSIBLE POLYPECTOMY 52346;  Surgeon: Jakub Barr MD;  Location: [facility]    PATIENT WITHOUGH PREOPERATIVE ORDER FOR IV ANTIBIOTIC SURGICAL SITE INFECTION PROPHYLAXIS.  12/12/2012    Procedure: LEFT PHACOEMULSIFICATION OF CATARACT WITH INTRAOCULAR LENS IMPLANT [id number];  Surgeon: Siddhartha Vinson MD;  Location: [facility]    PATIENT  WITHOUGH PREOPERATIVE ORDER FOR IV ANTIBIOTIC SURGICAL SITE INFECTION PROPHYLAXIS.  11/28/2012    Procedure: RIGHT PHACOEMULSIFICATION OF CATARACT WITH INTRAOCULAR LENS IMPLANT 29020;  Surgeon: Siddhartha Vinson MD;  Location: [facility]    PATIENT WITHOUGH PREOPERATIVE ORDER FOR IV ANTIBIOTIC SURGICAL SITE INFECTION PROPHYLAXIS.  2/12/2013    Procedure: COLONOSCOPY, POSSIBLE BIOPSY, POSSIBLE POLYPECTOMY 49491;  Surgeon: Jakub Barr MD;  Location: [facility]    REMV CATARACT EXTRACAP,INSERT LENS  12/12/2012    Procedure: LEFT PHACOEMULSIFICATION OF CATARACT WITH INTRAOCULAR LENS IMPLANT 64981;  Surgeon: Siddhartha Vinson MD;  Location: [facility]    REMV CATARACT EXTRACAP,INSERT LENS  11/28/2012    Procedure: RIGHT PHACOEMULSIFICATION OF CATARACT WITH INTRAOCULAR LENS IMPLANT 74315;  Surgeon: Siddhartha Vinson MD;  Location: [facility]    TONSILLECTOMY                  Social History     Socioeconomic History    Marital status:    Tobacco Use    Smoking status: Never    Smokeless tobacco: Never   Substance and Sexual Activity    Alcohol use: Yes     Alcohol/week: 1.0 standard drink of alcohol     Types: 1 Standard drinks or equivalent per week     Comment: occasionally    Drug use: No              Review of Systems   All other systems reviewed and are negative.      Positive for stated complaint: Abdominal pain  Other systems are as noted in HPI.  Constitutional and vital signs reviewed.      All other systems reviewed and negative except as noted above.    Physical Exam     ED Triage Vitals [02/03/24 0140]   /84   Pulse 85   Resp 16   Temp 97.7 °F (36.5 °C)   Temp src Oral   SpO2 99 %   O2 Device None (Room air)       Current:/62   Pulse 70   Temp 97.7 °F (36.5 °C) (Oral)   Resp 16   Ht 165.1 cm (5' 5\")   Wt 47.2 kg   SpO2 97%   BMI 17.31 kg/m²         Physical Exam  Vitals and nursing note reviewed.   Constitutional:       General:  She is not in acute distress.     Appearance: She is well-developed.   HENT:      Head: Normocephalic and atraumatic.      Nose: Nose normal.      Mouth/Throat:      Mouth: Mucous membranes are moist.   Eyes:      Conjunctiva/sclera: Conjunctivae normal.      Pupils: Pupils are equal, round, and reactive to light.   Cardiovascular:      Rate and Rhythm: Normal rate and regular rhythm.      Heart sounds: Normal heart sounds.   Pulmonary:      Effort: Pulmonary effort is normal.      Breath sounds: Normal breath sounds.   Abdominal:      General: Bowel sounds are normal.      Palpations: Abdomen is soft.      Tenderness: There is abdominal tenderness.   Musculoskeletal:         General: No tenderness or deformity. Normal range of motion.      Cervical back: Normal range of motion and neck supple.   Skin:     General: Skin is warm and dry.      Capillary Refill: Capillary refill takes less than 2 seconds.      Findings: No rash.      Comments: Normal color   Neurological:      General: No focal deficit present.      Mental Status: She is alert and oriented to person, place, and time.      GCS: GCS eye subscore is 4. GCS verbal subscore is 5. GCS motor subscore is 6.      Cranial Nerves: No cranial nerve deficit.      Gait: Gait normal.             ED Course     Labs Reviewed   COMP METABOLIC PANEL (14) - Abnormal; Notable for the following components:       Result Value    Glucose 163 (*)     BUN/CREA Ratio 26.1 (*)     Calculated Osmolality 297 (*)     Globulin  2.6 (*)     All other components within normal limits   CBC W/ DIFFERENTIAL - Abnormal; Notable for the following components:    WBC 13.2 (*)     Neutrophil Absolute Prelim 12.15 (*)     Neutrophil Absolute 12.15 (*)     Lymphocyte Absolute 0.61 (*)     All other components within normal limits   LIPASE - Normal   TROPONIN I HIGH SENSITIVITY - Normal   SARS-COV-2/FLU A AND B/RSV BY PCR (GENEXPERT) - Normal    Narrative:     This test is intended for the  qualitative detection and differentiation of SARS-CoV-2, influenza A, influenza B, and respiratory syncytial virus (RSV) viral RNA in nasopharyngeal or nares swabs from individuals suspected of respiratory viral infection consistent with COVID-19 by their healthcare provider. Signs and symptoms of respiratory viral infection due to SARS-CoV-2, influenza, and RSV can be similar.    Test performed using the Xpert Xpress SARS-CoV-2/FLU/RSV (real time RT-PCR)  assay on the GeneXpert instrument, [employer], [place] [address].   This test is being used under the Food and Drug Administration's Emergency Use Authorization.    The authorized Fact Sheet for Healthcare Providers for this assay is available upon request from the laboratory.   CBC WITH DIFFERENTIAL WITH PLATELET    Narrative:     The following orders were created for panel order CBC With Differential With Platelet.  Procedure                               Abnormality         Status                     ---------                               -----------         ------                     CBC W/ DIFFERENTIAL[[id number]]          Abnormal            Final result                 Please view results for these tests on the individual orders.   LACTIC ACID, PLASMA        IMPRESSION:  Findings compatible with cecal volvulus and possible bowel ischemia.  The cecum is abnormally twisted to the left abdomen.  There is extensive bowel wall thickening at the base of the cecum and there is a large amount of mesenteric edema and free fluid.  These findings are worrisome for bowel ischemia.  Recommend surgical consultation and correlation with lactate levels.    Small bowel loops are normal in caliber.    Atherosclerosis.  No AAA.  Chronic appearing mild T12 compression fracture.              MDM             Medical Decision Making  79yo/f w hx and exam as stated, abd pain    Acute onset yesterday  Worsening with time  Nausea, no vomiting, wretching  No flank pain  No back  pain  No no urinary symptoms  No chest pain    CT w large cecal volvulus with ischemia    Discussed with Dr. Griggs who will take to OR, NG placement in ED  Consult with [facility] hospitalist    Amount and/or Complexity of Data Reviewed  Labs:  Decision-making details documented in ED Course.  Radiology:  Decision-making details documented in ED Course.  Discussion of management or test interpretation with external provider(s): Spoke with Dr. Griggs who will consult  Spoke with Dr. Chang who will admit    Risk  OTC drugs.  Prescription drug management.  Parenteral controlled substances.  Decision regarding hospitalization.  Emergency major surgery.    Critical Care  Total time providing critical care: 35 minutes        Disposition and Plan     Clinical Impression:  1. 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HCC)         Disposition:  Admit  2/3/2024  3:58 am    Follow-up:  No follow-up provider specified.  We recommend that you schedule follow up care with a primary care provider within the next three months to obtain basic health screening including reassessment of your blood pressure.      Medications Prescribed:  Current Discharge Medication List                            Hospital Problems       Present on Admission  Date Reviewed: 3/29/2022            ICD-10-CM Noted POA    * (Principal) 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HCC) K56.2 2/3/2024 Unknown

## 2024-02-03 NOTE — ED INITIAL ASSESSMENT (HPI)
Mayte arrives with complaints of abdominal burning that started around 2 pm yesterday. She had a BM around 7 pm and 1 episode of vomiting.   Patient took 2 gas x tablets . Patient feels like vomiting but nothing will come up.

## 2024-02-03 NOTE — ANESTHESIA PROCEDURE NOTES
Airway  Date/Time: 2/3/2024 5:44 AM  Urgency: Elective      General Information and Staff    Patient location during procedure: OR  Anesthesiologist: Meng Burdick MD  Performed: anesthesiologist   Performed by: Meng Burdick MD  Authorized by: Meng Burdick MD      Indications and Patient Condition  Indications for airway management: anesthesia  Sedation level: deep  Preoxygenated: yes  Patient position: sniffing  Mask difficulty assessment: 0 - not attempted    Final Airway Details  Final airway type: endotracheal airway      Successful airway: ETT  Cuffed: yes   Successful intubation technique: Video laryngoscopy  Facilitating devices/methods: intubating stylet  Endotracheal tube insertion site: oral  Blade type: Irizarry 3.  Blade size: #3  ETT size (mm): 6.5    Cormack-Lehane Classification: grade I - full view of glottis  Placement verified by: capnometry   Inital cuff pressure (cm H2O): 6  Measured from: teeth  ETT to teeth (cm): 20  Number of attempts at approach: 1

## 2024-02-03 NOTE — OPERATIVE REPORT
[facility] EMERGENCY DEPARTMENT OPERATIVE REPORT:     PATIENT NAME: Mayte Lucas  : 1945   MRN: [id number]    DATE OF OPERATION:   24    PREOPERATIVE DIAGNOSIS: Cecal volvulus     POSTOPERATIVE DIAGNOSIS: Same     PROCEDURE PERFORMED: Exploratory laparotomy, right hemicolectomy, omentoplasty of anastomosis    SURGEON:  Yvan Griggs MD    ASST: Katarina Cordova CSA (Assistant helped position patient and helped with positioning, intraoperative retraction, suturing, wound closure, etc)    ANESTHESIA: General anesthesia     ESTIMATED BLOOD LOSS:   30 ml     The patient and her  understood the indications, details, potential risks and complications of the procedure including bleeding, infection, hematoma, leak, sepsis, DVT/PE, return to the operating room, etc. The patient and her  consented to the procedure    The patient was brought to the operating room and was induced under anesthesia. The abdomen was prepped and draped in the usual sterile fashion. Ioban was utilized.  A midline incision was made and dissection was carried down to the fascia which was incised.   Findings included cecal volvulus with hemorrhagic serosa of cecum but no full-thickness necrosis.  The cecum right colon was detorsed and there was pulsation of the arteries in the mesentery.  However there was no room in the abdomen to be able to do a cecopexy in the proper location.  Furthermore the cecum was a markedly dilated that it would likely be dysfunctional.  The patient has had recent history of severe constipation.  Therefore, it was decided to do a resection.  The mesentery was scored and vessels were ligated with 2-0 Vicryl.  A right hemicolectomy was performed with an antegrade anastomosis that was stapled side-to-side.  The common channel was stapled with a TA stapler as was the end of the colon.  Then omentoplasty was done of the stapled common channel and the apex and anastomosis also sutured using  2-0 silk interrupted sutures.  The incision was protected using Betadine soaked laps but there was some contamination when the colotomy was first made.  There was no small bowel dilatation. the abdomen was copiously irrigated and suctioned and careful hemostasis was ensured.    The fascia was closed using 0 PDS looped with interrupted #1 Vicryl sutures.  Then the wound was copiously irrigated with saline and careful hemostasis was obtained.   The skin was stapled with interrupted saline soaked reagan  The patient went to recovery room in stable condition.    Yvan Griggs MD

## 2024-02-03 NOTE — ANESTHESIA PREPROCEDURE EVALUATION
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    Weight loss 06/10/2021    Mixed simple and mucopurulent chronic bronchitis (HCC) 03/05/2020    Supraventricular tachycardia 03/05/2020    Anemia, chronic disease 09/25/2018    Neuropathy 09/24/2018    Iron deficiency anemia due to chronic blood loss 06/28/2018    Essential tremor 05/22/2018    Dyslipidemia, goal LDL below 160 04/20/2018    Age-related osteoporosis without current pathological fracture 04/17/2018    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    Aortic atherosclerosis (HCC) 04/16/2018    Hand arthritis 04/17/2017    Pseudophakia of both eyes 03/22/2016    RLS (restless legs syndrome) 12/17/2013       Past Medical History:   Diagnosis Date    Acute exacerbation of chronic obstructive pulmonary disease (COPD) (HCC) 4/17/2017    ALLERGIC RHINITIS     OTHER DISEASES     TMJ       Past Surgical History:   Procedure Laterality Date    CATARACT      COLONOSCOPY  2/12/2013    Procedure: COLONOSCOPY, POSSIBLE BIOPSY, POSSIBLE POLYPECTOMY 94993;  Surgeon: Jakub Barr MD;  Location: [facility]    PATIENT DOCUMENTED NOT TO HAVE EXPERIENCED ANY OF THE FOLLOWING EVENTS  12/12/2012    Procedure: LEFT PHACOEMULSIFICATION OF CATARACT WITH INTRAOCULAR LENS IMPLANT 32144;  Surgeon: Siddhartha Vinson MD;  Location: [facility]    PATIENT DOCUMENTED NOT TO HAVE EXPERIENCED ANY OF THE FOLLOWING EVENTS   11/28/2012    Procedure: RIGHT PHACOEMULSIFICATION OF CATARACT WITH INTRAOCULAR LENS IMPLANT 85002;  Surgeon: Siddhartha Vinson MD;  Location: [facility]    PATIENT DOCUMENTED NOT TO HAVE EXPERIENCED ANY OF THE FOLLOWING EVENTS  2/12/2013    Procedure: COLONOSCOPY, POSSIBLE BIOPSY, POSSIBLE POLYPECTOMY 51528;  Surgeon: Jakub Barr MD;  Location: [facility]    PATIENT WITHOUGH PREOPERATIVE ORDER FOR IV ANTIBIOTIC SURGICAL SITE INFECTION PROPHYLAXIS.  12/12/2012    Procedure: LEFT PHACOEMULSIFICATION OF CATARACT WITH INTRAOCULAR LENS IMPLANT 99921;  Surgeon: Siddhartha Vinson MD;  Location: [facility]    PATIENT WITHOUGH PREOPERATIVE ORDER FOR IV ANTIBIOTIC SURGICAL SITE INFECTION PROPHYLAXIS.  11/28/2012    Procedure: RIGHT PHACOEMULSIFICATION OF CATARACT WITH INTRAOCULAR LENS IMPLANT 68940;  Surgeon: Siddhartha Vinson MD;  Location: [facility]    PATIENT WITHOUGH PREOPERATIVE ORDER FOR IV ANTIBIOTIC SURGICAL SITE INFECTION PROPHYLAXIS.  2/12/2013    Procedure: COLONOSCOPY, POSSIBLE BIOPSY, POSSIBLE POLYPECTOMY 44132;  Surgeon: Jakub Barr MD;  Location: [facility]    REMV CATARACT EXTRACAP,INSERT LENS  12/12/2012    Procedure: LEFT PHACOEMULSIFICATION OF CATARACT WITH INTRAOCULAR LENS IMPLANT 23944;  Surgeon: Siddhartha Vinson MD;  Location: [facility]    REMV CATARACT EXTRACAP,INSERT LENS  11/28/2012    Procedure: RIGHT PHACOEMULSIFICATION OF CATARACT WITH INTRAOCULAR LENS IMPLANT 74971;  Surgeon: Siddhartha Vinson MD;  Location: [facility]    TONSILLECTOMY         Medications Prior to Admission   Medication Sig Dispense Refill Last Dose    clonazePAM 0.5 MG Oral Tab Take 1 tablet (0.5 mg total) by mouth nightly. 90 tablet 0     Azelastine HCl 0.1 % Nasal Solution 2 sprays by Nasal route 2 (two) times daily. 1 each 0     Doxycycline Hyclate 100 MG Oral Tab Take 1 tablet (100 mg total) by mouth 2 (two) times daily. 14  tablet 0     benzonatate (TESSALON PERLES) 100 MG Oral Cap Take 1 capsule (100 mg total) by mouth 3 (three) times daily as needed for cough. 30 capsule 0     Capsaicin-Menthol (SALONPAS GEL-PATCH HOT) 0.025-1.25 % External Patch Apply 1 each topically daily. 10 patch 0     Budesonide-Formoterol Fumarate (SYMBICORT) 80-4.5 MCG/ACT Inhalation Aerosol Inhale 1-2 puffs into the lungs 2 (two) times daily as needed. 3 each 3     OXcarbazepine 150 MG Oral Tab Take 2 tablets (300 mg total) by mouth 2 (two) times daily. 360 tablet 3     Albuterol Sulfate  (90 Base) MCG/ACT Inhalation Aero Soln Inhale 1-2 puffs into the lungs every 6 (six) hours as needed for Wheezing or Shortness of Breath. 3 each 3     Denosumab 60 MG/ML Subcutaneous Solution Prefilled Syringe Inject 1 mL (60 mg total) into the skin once. Historical documentation - see Epic Immunization Activity for administration details 1 mL 0     LORATADINE 10 MG OR CAPS daily        Current Facility-Administered Medications Ordered in Epic   Medication Dose Route Frequency Provider Last Rate Last Admin    meropenem (Merrem) 1 g in sodium chloride 0.9% 100 mL IVPB-MBP  1 g Intravenous Once Jared Ram APRN 200 mL/hr at 02/03/24 0453 1 g at 02/03/24 0453     No current [facility]-ordered outpatient medications on file.       Allergies   Allergen Reactions    Bactrim [Sulfamethoxazole W/Trimethoprim] FEVER     X 4 days    Bicillin L-A      unknown    Mucinex Coughing       Family History   Problem Relation Age of Onset    Other (Other) Father         PD    Other (Other) Sister         cramps in body    Cancer Sister      Social History     Socioeconomic History    Marital status:    Tobacco Use    Smoking status: Never    Smokeless tobacco: Never   Substance and Sexual Activity    Alcohol use: Yes     Alcohol/week: 1.0 standard drink of alcohol     Types: 1 Standard drinks or equivalent per week     Comment: occasionally    Drug use: No       Available  pre-op labs reviewed.  Lab Results   Component Value Date    WBC 13.2 (H) 02/03/2024    RBC 4.35 02/03/2024    HGB 12.6 02/03/2024    HCT 37.7 02/03/2024    MCV 86.7 02/03/2024    MCH 29.0 02/03/2024    MCHC 33.4 02/03/2024    RDW 14.1 02/03/2024    .0 02/03/2024     Lab Results   Component Value Date     02/03/2024    K 4.4 02/03/2024     02/03/2024    CO2 25.0 02/03/2024    BUN 23 02/03/2024    CREATSERUM 0.88 02/03/2024     (H) 02/03/2024    CA 9.7 02/03/2024          Vital Signs:  Body mass index is 17.31 kg/m².   height is 1.651 m (5' 5\") and weight is 47.2 kg (104 lb). Her oral temperature is 97.7 °F (36.5 °C). Her blood pressure is 124/51 and her pulse is 71. Her respiration is 16 and oxygen saturation is 97%.   Vitals:    02/03/24 0140 02/03/24 0215 02/03/24 0500   BP: 127/84 133/62 124/51   Pulse: 85 70 71   Resp: 16  16   Temp: 97.7 °F (36.5 °C)     TempSrc: Oral     SpO2: 99% 97% 97%   Weight: 47.2 kg (104 lb)     Height: 1.651 m (5' 5\")          Anesthesia Evaluation     Patient summary reviewed and Nursing notes reviewed    No history of anesthetic complications   Airway   Mallampati: II  TM distance: >3 FB  Neck ROM: full  Dental      Pulmonary     breath sounds clear to auscultation  (+) COPD mild  (-) asthma, sleep apnea    ROS comment: Endorses PRN inhaler use, has not used recently  Cardiovascular   (-) hypertension, CAD, CHF    Rhythm: regular  Rate: normal    Neuro/Psych - negative ROS   (-) CVA    GI/Hepatic/Renal - negative ROS   (-) GERD, liver disease, renal disease    Endo/Other - negative ROS   (-) diabetes mellitus, hypothyroidism  Abdominal                  Anesthesia Plan:   ASA:  3  Emergent    Plan:   General  Airway:  ETT  Post-op Pain Management: IV analgesics and Oral pain medication  Informed Consent Plan and Risks Discussed With:  Patient      I have informed Mayte Lucas and/or legal guardian or family member of the nature of the anesthetic plan,  benefits, risks including possible dental damage if relevant, major complications, and any alternative forms of anesthetic management.   All of the patient's questions were answered to the best of my ability. The patient desires the anesthetic management as planned.  Meng Burdick MD  2/3/2024 5:17 AM  Present on Admission:  **None**

## 2024-02-03 NOTE — ED QUICK NOTES
Patient reports intermittent \"quivering\" episodes where she randomly \"shakes\". This is new as of yesterday.

## 2024-02-04 LAB
ANION GAP SERPL CALC-SCNC: 7 MMOL/L (ref 0–18)
BASOPHILS # BLD AUTO: 0.02 X10(3) UL (ref 0–0.2)
BASOPHILS NFR BLD AUTO: 0.2 %
BUN BLD-MCNC: 12 MG/DL (ref 9–23)
BUN/CREAT SERPL: 18.2 (ref 10–20)
CALCIUM BLD-MCNC: 8.1 MG/DL (ref 8.7–10.4)
CHLORIDE SERPL-SCNC: 111 MMOL/L (ref 98–112)
CO2 SERPL-SCNC: 24 MMOL/L (ref 21–32)
CREAT BLD-MCNC: 0.66 MG/DL
DEPRECATED RDW RBC AUTO: 47.5 FL (ref 35.1–46.3)
EGFRCR SERPLBLD CKD-EPI 2021: 89 ML/MIN/1.73M2 (ref 60–?)
EOSINOPHIL # BLD AUTO: 0.01 X10(3) UL (ref 0–0.7)
EOSINOPHIL NFR BLD AUTO: 0.1 %
ERYTHROCYTE [DISTWIDTH] IN BLOOD BY AUTOMATED COUNT: 14.8 % (ref 11–15)
GLUCOSE BLD-MCNC: 89 MG/DL (ref 70–99)
HCT VFR BLD AUTO: 30.1 %
HGB BLD-MCNC: 9.6 G/DL
IMM GRANULOCYTES # BLD AUTO: 0.04 X10(3) UL (ref 0–1)
IMM GRANULOCYTES NFR BLD: 0.4 %
LYMPHOCYTES # BLD AUTO: 0.86 X10(3) UL (ref 1–4)
LYMPHOCYTES NFR BLD AUTO: 9.2 %
MAGNESIUM SERPL-MCNC: 1.6 MG/DL (ref 1.6–2.6)
MCH RBC QN AUTO: 28.2 PG (ref 26–34)
MCHC RBC AUTO-ENTMCNC: 31.9 G/DL (ref 31–37)
MCV RBC AUTO: 88.3 FL
MONOCYTES # BLD AUTO: 0.56 X10(3) UL (ref 0.1–1)
MONOCYTES NFR BLD AUTO: 6 %
NEUTROPHILS # BLD AUTO: 7.9 X10 (3) UL (ref 1.5–7.7)
NEUTROPHILS # BLD AUTO: 7.9 X10(3) UL (ref 1.5–7.7)
NEUTROPHILS NFR BLD AUTO: 84.1 %
OSMOLALITY SERPL CALC.SUM OF ELEC: 293 MOSM/KG (ref 275–295)
PLATELET # BLD AUTO: 168 10(3)UL (ref 150–450)
POTASSIUM SERPL-SCNC: 3.6 MMOL/L (ref 3.5–5.1)
RBC # BLD AUTO: 3.41 X10(6)UL
SODIUM SERPL-SCNC: 142 MMOL/L (ref 136–145)
WBC # BLD AUTO: 9.4 X10(3) UL (ref 4–11)

## 2024-02-04 PROCEDURE — 85025 COMPLETE CBC W/AUTO DIFF WBC: CPT | Performed by: HOSPITALIST

## 2024-02-04 PROCEDURE — 80048 BASIC METABOLIC PNL TOTAL CA: CPT | Performed by: HOSPITALIST

## 2024-02-04 PROCEDURE — 83735 ASSAY OF MAGNESIUM: CPT | Performed by: HOSPITALIST

## 2024-02-04 RX ORDER — OXCARBAZEPINE 150 MG/1
300 TABLET, FILM COATED ORAL 2 TIMES DAILY
Status: DISCONTINUED | OUTPATIENT
Start: 2024-02-04 | End: 2024-02-17

## 2024-02-04 RX ORDER — CLONAZEPAM 0.5 MG/1
0.25 TABLET ORAL 2 TIMES DAILY PRN
Status: DISCONTINUED | OUTPATIENT
Start: 2024-02-04 | End: 2024-02-10

## 2024-02-04 NOTE — PROGRESS NOTES
[facility] Hospitalist Progress Note     CC: Hospital Follow up    PCP: Abi Ohara MD       Assessment/Plan:     Principal Problem:    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HCC)    Patient is a 78 year old female with PMH sig for SVT, RLS, COPD, chronic pain, HLD, IgA deficiency who presented to the hospital with abdominal pain.     Cecal volvulus with extensive mesenteric ischemia  - s/p ex lap, R hemicolectomy, omentoplasty of anastomosis POD # 1   - prn pain medication   - monitor respiratory status  - encouraged IS use  - prn anti emetics  - post op hgb of 9.6, was 12.6 prior to surgery  - CLD, diet per general surgery  - on PCA pump per general surgery  - dvt ppx: SCD     Possible afib in PACU?   pSVT  - reported afib in PACU but 12 lead EKG with NSR  - tele monitoring to assess if truly afib as does have a history of SVT  - hold off on AC until/if atrial fibrillation is confirmed  - resume PO metoprolol  - tele monitor reviewed, has PACs/PVCs but no afib     COPD  - resume inhalers     RLS  - resume home medication     Chronic pain  - outpatient f/u     IgA deficiency  - outpatient f/u    Right ear  - resume PRN benzo     FN:  - IVF: NS stopped  - Diet: CLD     DVT Prophy: SCD  Lines: PIV     Dispo: pending clinical course    Thank You,  Flower Bain DO    Hospitalist with [facility] and [facility]     Subjective:     Patient feels better today. Pain more tolerable. On CLD    OBJECTIVE:    Blood pressure 116/48, pulse 97, temperature 99.2 °F (37.3 °C), temperature source Oral, resp. rate 18, height 5' 5\" (1.651 m), weight 104 lb (47.2 kg), SpO2 98%, not currently breastfeeding.    Temp:  [99.2 °F (37.3 °C)-100.5 °F (38.1 °C)] 99.2 °F (37.3 °C)  Pulse:  [96-98] 97  Resp:  [18] 18  BP: (114-123)/(44-48) 116/48  SpO2:  [97 %-98 %] 98 %      Intake/Output:    Intake/Output Summary (Last 24 hours) at 2/4/2024 1300  Last data filed at 2/4/2024 0711  Gross per 24 hour   Intake 8.6 ml   Output 250 ml   Net -241.4 ml        Last 3 Weights   02/03/24 0948 104 lb (47.2 kg)   02/03/24 0140 104 lb (47.2 kg)   01/04/22 1442 105 lb (47.6 kg)   12/10/21 1435 104 lb (47.2 kg)       Exam   Gen: No acute distress  Heent: NC AT, neck supple  Pulm: Lungs clear, normal respiratory effort  CV: Heart with regular rate and rhythm, no peripheral edema  Abd: Abdomen soft, appropriately tender  MSK: no clubbing, no cyanosis  Skin: no rashes or lesions        Data Review:       Labs:     Recent Labs   Lab 02/03/24  0207 02/04/24  0551   RBC 4.35 3.41*   HGB 12.6 9.6*   HCT 37.7 30.1*   MCV 86.7 88.3   MCH 29.0 28.2   MCHC 33.4 31.9   RDW 14.1 14.8   NEPRELIM 12.15* 7.90*   WBC 13.2* 9.4   .0 168.0         Recent Labs   Lab 02/03/24  0207 02/04/24  0551   * 89   BUN 23 12   CREATSERUM 0.88 0.66   EGFRCR 67 89   CA 9.7 8.1*    142   K 4.4 3.6    111   CO2 25.0 24.0       Recent Labs   Lab 02/03/24  0207   ALT 19   AST 21   ALB 4.5         Imaging:  CT ABDOMEN PELVIS IV CONTRAST, NO ORAL (ER)    Result Date: 2/3/2024  CONCLUSION:  1. Imaging findings of cecal volvulus (either type II or type III) with severe associated bowel wall thickening, concerning for bowel ischemia. Surgical consultation and correlation with lactate levels are advised.  2. Severe mesenteric edema with extensive apparent mesenteric venous congestion.  3. Moderate volume intraperitoneal ascites.  4. Uncomplicated distal colonic diverticulosis.  5. Chronic-appearing mild superior endplate compression fracture deformity of T12.  6. Left-sided calyceal possible staghorn calculus formation conforming to the pelvocalyceal contours, raising the possibility of struvite stone formation in the setting of chronic recurrent urinary tract infections with urease-producing organisms (most typically Proteus, Klebsiella, Pseudomonas, and/or Enterobacter spp.).  7. Lesser incidental findings as above.    A preliminary report was issued by the [employer] [facility] [employer]  service. There are no major discrepancies.   Dictated by (CST): Jeremy Covarrubias MD on 2/03/2024 at 6:55 AM     Finalized by (CST): Jeremy Covarrubias MD on 2/03/2024 at 7:09 AM             Meds:      meropenem  1 g Intravenous Q8H    metoprolol succinate  12.5 mg Oral Nightly    OXcarbazepine  300 mg Oral BID      morphINE in sodium chloride 0.9%      sodium chloride 75 mL/hr at 02/03/24 1720     clonazePAM, ondansetron, metoclopramide, naloxone, diphenhydrAMINE, morphINE

## 2024-02-04 NOTE — PROGRESS NOTES
[facility]    General Surgery Progress Note  Mayte Lucas  : 1945  CSN: [id number]  HD# 1    Subjective:   POD#1 right hemicolectomy for acute cecal volvulus  No unusual complaints mild incisional pain as expected and denies N/V  Passing no flatus or BM     Exam:     General: awake and alert, in no acute distress, comfortable, and in good spirits  Pulmonary:lungs clear to auscultation bilaterally  Cardiac: RRR, nl S1,S2, no S3, no S4, and no murmur  Abdomen: normal BS, nondistended, and nontender except mild tenderness at incision   Extremities: calves nontender, no edema, SCD's on, motor intact, and sensory intact  Wounds: clean, dry and intact and partially open with reagan     Assessment and Plan:   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HCC)  S/p right hemicolectomy    Doing well  Diet: Clear liquid diet  IVF: continue current rate  Antibiotics: no need for further antibiotics beyond perioperative doses    Activity: OOB to chair, more ambulation encouraged, and Ambulation in halls at least TID  DVT prophylaxis: SCDs and chemoprophylaxis as ordered    Discharge disposition: pending clinical course       Objective:     Vitals:    24 1900 24 2203 24 0354   BP:  114/44 123/45 116/48   Pulse: 98  96 97   Resp:  18 18    Temp:  100.1 °F (37.8 °C) (!) 100.5 °F (38.1 °C) 99.2 °F (37.3 °C)   TempSrc:  Oral Oral Oral   SpO2:  97% 98% 98%   Weight:       Height:         Body mass index is 17.31 kg/m².    Intake/Output Summary (Last 24 hours) at 2024 0935  Last data filed at 2024 0711  Gross per 24 hour   Intake 208.6 ml   Output 250 ml   Net -41.4 ml       Results:     Recent Labs   Lab 24  0207 24  0551   RBC 4.35 3.41*   HGB 12.6 9.6*   HCT 37.7 30.1*   MCV 86.7 88.3   MCH 29.0 28.2   MCHC 33.4 31.9   RDW 14.1 14.8   NEPRELIM 12.15* 7.90*   WBC 13.2* 9.4   .0 168.0       Recent Labs   Lab 24  0207 24  0551   * 89   BUN 23 12   CREATSERUM 0.88  0.66   CA 9.7 8.1*    142   K 4.4 3.6    111   CO2 25.0 24.0       Lab Results   Component Value Date    WBC 9.4 02/04/2024    HGB 9.6 02/04/2024    HCT 30.1 02/04/2024    .0 02/04/2024     02/04/2024    K 3.6 02/04/2024     02/04/2024    CO2 24.0 02/04/2024    BUN 12 02/04/2024    CREATSERUM 0.66 02/04/2024    GLU 89 02/04/2024    MG 1.6 02/04/2024    CA 8.1 02/04/2024       CT ABDOMEN PELVIS IV CONTRAST, NO ORAL (ER)    Result Date: 2/3/2024  CONCLUSION:  1. Imaging findings of cecal volvulus (either type II or type III) with severe associated bowel wall thickening, concerning for bowel ischemia. Surgical consultation and correlation with lactate levels are advised.  2. Severe mesenteric edema with extensive apparent mesenteric venous congestion.  3. Moderate volume intraperitoneal ascites.  4. Uncomplicated distal colonic diverticulosis.  5. Chronic-appearing mild superior endplate compression fracture deformity of T12.  6. Left-sided calyceal possible staghorn calculus formation conforming to the pelvocalyceal contours, raising the possibility of struvite stone formation in the setting of chronic recurrent urinary tract infections with urease-producing organisms (most typically Proteus, Klebsiella, Pseudomonas, and/or Enterobacter spp.).  7. Lesser incidental findings as above.    A preliminary report was issued by the [facility] service. There are no major discrepancies.   Dictated by (CST): Jeremy Covarrubias MD on 2/03/2024 at 6:55 AM     Finalized by (CST): Jeremy Covarrubias MD on 2/03/2024 at 7:09 AM         EKG 12 Lead    Result Date: 2/3/2024  Sinus rhythm with Premature supraventricular complexes Left axis deviation Nonspecific ST and T wave abnormality Abnormal ECG When compared with ECG of 03-FEB-2024 04:06, QT has shortened Confirmed by VIVEK URIOSTEGUI ([id number]) on 2/3/2024 3:38:41 PM    EKG    Result Date: 2/3/2024  Sinus rhythm with Premature atrial  complexes Otherwise normal ECG No previous ECGs found in Muse Confirmed by VIVEK URIOSTEGUI ([id number]) on 2/3/2024 11:59:15 AM       Yvan Griggs MD [facility]  02/04/24  9:35 AM

## 2024-02-05 ENCOUNTER — APPOINTMENT (OUTPATIENT)
Dept: CT IMAGING | Facility: HOSPITAL | Age: 79
End: 2024-02-05
Attending: STUDENT IN AN ORGANIZED HEALTH CARE EDUCATION/TRAINING PROGRAM
Payer: MEDICARE

## 2024-02-05 LAB
ANION GAP SERPL CALC-SCNC: 10 MMOL/L (ref 0–18)
BASOPHILS # BLD AUTO: 0.05 X10(3) UL (ref 0–0.2)
BASOPHILS NFR BLD AUTO: 0.5 %
BUN BLD-MCNC: 14 MG/DL (ref 9–23)
BUN/CREAT SERPL: 18.9 (ref 10–20)
CALCIUM BLD-MCNC: 8.2 MG/DL (ref 8.7–10.4)
CHLORIDE SERPL-SCNC: 111 MMOL/L (ref 98–112)
CO2 SERPL-SCNC: 21 MMOL/L (ref 21–32)
CREAT BLD-MCNC: 0.74 MG/DL
DEPRECATED RDW RBC AUTO: 47.2 FL (ref 35.1–46.3)
EGFRCR SERPLBLD CKD-EPI 2021: 82 ML/MIN/1.73M2 (ref 60–?)
EOSINOPHIL # BLD AUTO: 0.06 X10(3) UL (ref 0–0.7)
EOSINOPHIL NFR BLD AUTO: 0.6 %
ERYTHROCYTE [DISTWIDTH] IN BLOOD BY AUTOMATED COUNT: 14.6 % (ref 11–15)
GLUCOSE BLD-MCNC: 69 MG/DL (ref 70–99)
HCT VFR BLD AUTO: 28.6 %
HGB BLD-MCNC: 9.6 G/DL
IMM GRANULOCYTES # BLD AUTO: 0.07 X10(3) UL (ref 0–1)
IMM GRANULOCYTES NFR BLD: 0.7 %
LYMPHOCYTES # BLD AUTO: 0.8 X10(3) UL (ref 1–4)
LYMPHOCYTES NFR BLD AUTO: 7.7 %
MAGNESIUM SERPL-MCNC: 1.6 MG/DL (ref 1.6–2.6)
MCH RBC QN AUTO: 29.4 PG (ref 26–34)
MCHC RBC AUTO-ENTMCNC: 33.6 G/DL (ref 31–37)
MCV RBC AUTO: 87.5 FL
MONOCYTES # BLD AUTO: 0.54 X10(3) UL (ref 0.1–1)
MONOCYTES NFR BLD AUTO: 5.2 %
NEUTROPHILS # BLD AUTO: 8.93 X10 (3) UL (ref 1.5–7.7)
NEUTROPHILS # BLD AUTO: 8.93 X10(3) UL (ref 1.5–7.7)
NEUTROPHILS NFR BLD AUTO: 85.3 %
OSMOLALITY SERPL CALC.SUM OF ELEC: 293 MOSM/KG (ref 275–295)
PHOSPHATE SERPL-MCNC: 2.1 MG/DL (ref 2.4–5.1)
PLATELET # BLD AUTO: 159 10(3)UL (ref 150–450)
POTASSIUM SERPL-SCNC: 3.1 MMOL/L (ref 3.5–5.1)
POTASSIUM SERPL-SCNC: 3.9 MMOL/L (ref 3.5–5.1)
RBC # BLD AUTO: 3.27 X10(6)UL
SODIUM SERPL-SCNC: 142 MMOL/L (ref 136–145)
WBC # BLD AUTO: 10.5 X10(3) UL (ref 4–11)

## 2024-02-05 PROCEDURE — 83735 ASSAY OF MAGNESIUM: CPT | Performed by: SURGERY

## 2024-02-05 PROCEDURE — 84132 ASSAY OF SERUM POTASSIUM: CPT | Performed by: STUDENT IN AN ORGANIZED HEALTH CARE EDUCATION/TRAINING PROGRAM

## 2024-02-05 PROCEDURE — 74177 CT ABD & PELVIS W/CONTRAST: CPT | Performed by: STUDENT IN AN ORGANIZED HEALTH CARE EDUCATION/TRAINING PROGRAM

## 2024-02-05 PROCEDURE — 80048 BASIC METABOLIC PNL TOTAL CA: CPT | Performed by: SURGERY

## 2024-02-05 PROCEDURE — 84100 ASSAY OF PHOSPHORUS: CPT | Performed by: SURGERY

## 2024-02-05 PROCEDURE — 85025 COMPLETE CBC W/AUTO DIFF WBC: CPT | Performed by: SURGERY

## 2024-02-05 RX ORDER — HEPARIN SODIUM 5000 [USP'U]/ML
5000 INJECTION, SOLUTION INTRAVENOUS; SUBCUTANEOUS EVERY 12 HOURS
Status: DISCONTINUED | OUTPATIENT
Start: 2024-02-05 | End: 2024-02-17

## 2024-02-05 RX ORDER — SODIUM CHLORIDE 9 MG/ML
INJECTION, SOLUTION INTRAVENOUS CONTINUOUS
Status: DISCONTINUED | OUTPATIENT
Start: 2024-02-05 | End: 2024-02-06

## 2024-02-05 RX ORDER — ALVIMOPAN 12 MG/1
12 CAPSULE ORAL 2 TIMES DAILY
Status: DISCONTINUED | OUTPATIENT
Start: 2024-02-05 | End: 2024-02-06

## 2024-02-05 RX ORDER — BISACODYL 10 MG
10 SUPPOSITORY, RECTAL RECTAL ONCE
Status: COMPLETED | OUTPATIENT
Start: 2024-02-05 | End: 2024-02-05

## 2024-02-05 RX ORDER — MAGNESIUM OXIDE 400 MG/1
400 TABLET ORAL ONCE
Status: COMPLETED | OUTPATIENT
Start: 2024-02-05 | End: 2024-02-05

## 2024-02-05 NOTE — PAYOR COMM NOTE
--------------  ADMISSION REVIEW     Payor: [employer]  Subscriber #:  [id number]  Authorization Number: [id number]    Admit date: 2/3/24  Admit time:  9:42 AM       Patient Seen in: [facility] Emergency Department    History   Stated Complaint: Abdominal pain    77yo/f w hx of COPD reports to the ED w c/o abdominal pain. Sharp, intermittent and in waves. Felt like her abd was a \"rock\" and then resolved on/off since yesterday. Saw Lev at 1230pm for unrelated visit and felt well. Started several hours later. One small, non bloody bowel movement. No urinary symptoms. No rashes. Felt shaky all over.   Objective:   Past Medical History:   Diagnosis Date    Acute exacerbation of chronic obstructive pulmonary disease (COPD) ([facility]) 4/17/2017    ALLERGIC RHINITIS     OTHER DISEASES     TMJ     Past Surgical History:   Procedure Laterality Date    CATARACT      COLONOSCOPY  2/12/2013    Procedure: COLONOSCOPY, POSSIBLE BIOPSY, POSSIBLE POLYPECTOMY 03940;  Surgeon: Jakub Barr MD;  Location: [facility]    PATIENT DOCUMENTED NOT TO HAVE EXPERIENCED ANY OF THE FOLLOWING EVENTS  12/12/2012    Procedure: LEFT PHACOEMULSIFICATION OF CATARACT WITH INTRAOCULAR LENS IMPLANT [id number];  Surgeon: Siddhartha Vinson MD;  Location: [facility]    PATIENT DOCUMENTED NOT TO HAVE EXPERIENCED ANY OF THE FOLLOWING EVENTS  11/28/2012    Procedure: RIGHT PHACOEMULSIFICATION OF CATARACT WITH INTRAOCULAR LENS IMPLANT [id number];  Surgeon: Siddhartha Vinson MD;  Location: [facility]    PATIENT DOCUMENTED NOT TO HAVE EXPERIENCED ANY OF THE FOLLOWING EVENTS  2/12/2013    Procedure: COLONOSCOPY, POSSIBLE BIOPSY, POSSIBLE POLYPECTOMY 07272;  Surgeon: Jakub Barr MD;  Location: [facility]    PATIENT WITHOUGH PREOPERATIVE ORDER FOR IV ANTIBIOTIC SURGICAL SITE INFECTION PROPHYLAXIS.  12/12/2012    Procedure: LEFT PHACOEMULSIFICATION OF CATARACT WITH INTRAOCULAR LENS IMPLANT [id number];   Surgeon: Siddhartha Vinson MD;  Location: [facility]    PATIENT WITHOUGH PREOPERATIVE ORDER FOR IV ANTIBIOTIC SURGICAL SITE INFECTION PROPHYLAXIS.  11/28/2012    Procedure: RIGHT PHACOEMULSIFICATION OF CATARACT WITH INTRAOCULAR LENS IMPLANT [id number];  Surgeon: Siddhartha Vinson MD;  Location: [facility]    PATIENT WITHOUGH PREOPERATIVE ORDER FOR IV ANTIBIOTIC SURGICAL SITE INFECTION PROPHYLAXIS.  2/12/2013    Procedure: COLONOSCOPY, POSSIBLE BIOPSY, POSSIBLE POLYPECTOMY 16135;  Surgeon: Jakub Barr MD;  Location: [facility]       Physical Exam     ED Triage Vitals [02/03/24 0140]   /84   Pulse 85   Resp 16   Temp 97.7 °F (36.5 °C)   Temp src Oral   SpO2 99 %   O2 Device None (Room air)     Current:/62   Pulse 70   Temp 97.7 °F (36.5 °C) (Oral)   Resp 16   Ht 165.1 cm (5' 5\")   Wt 47.2 kg   SpO2 97%   BMI 17.31 kg/m²     Physical Exam  Vitals and nursing note reviewed.   Constitutional:       General: She is not in acute distress.     Appearance: She is well-developed.   HENT:      Head: Normocephalic and atraumatic.      Nose: Nose normal.      Mouth/Throat:      Mouth: Mucous membranes are moist.   Eyes:      Conjunctiva/sclera: Conjunctivae normal.      Pupils: Pupils are equal, round, and reactive to light.   Cardiovascular:      Rate and Rhythm: Normal rate and regular rhythm.      Heart sounds: Normal heart sounds.   Pulmonary:      Effort: Pulmonary effort is normal.      Breath sounds: Normal breath sounds.   Abdominal:      General: Bowel sounds are normal.      Palpations: Abdomen is soft.      Tenderness: There is abdominal tenderness.     Labs Reviewed   COMP METABOLIC PANEL (14) - Abnormal; Notable for the following components:       Result Value    Glucose 163 (*)     BUN/CREA Ratio 26.1 (*)     Calculated Osmolality 297 (*)     Globulin  2.6 (*)     All other components within normal limits   CBC W/ DIFFERENTIAL - Abnormal; Notable for the  following components:    WBC 13.2 (*)     Neutrophil Absolute Prelim 12.15 (*)     Neutrophil Absolute 12.15 (*)     Lymphocyte Absolute 0.61 (*)     All other components within normal limits   LIPASE - Normal   TROPONIN I HIGH SENSITIVITY - Normal   SARS-COV-2/FLU A AND B/RSV BY PCR (GENEXPERT) - Normal      IMPRESSION:  Findings compatible with cecal volvulus and possible bowel ischemia.  The cecum is abnormally twisted to the left abdomen.  There is extensive bowel wall thickening at the base of the cecum and there is a large amount of mesenteric edema and free fluid.  These findings are worrisome for bowel ischemia.  Recommend surgical consultation and correlation with lactate levels.    Small bowel loops are normal in caliber.    Medical Decision Making  77yo/f w hx and exam as stated, abd pain    Acute onset yesterday  Worsening with time  Nausea, no vomiting, wretching  No flank pain  No back pain  No no urinary symptoms  No chest pain    CT w large cecal volvulus with ischemia    Discussed with Dr. Griggs who will take to OR, NG placement in ED  Consult with [facility] hospitalist    Disposition and Plan     Clinical Impression:  1. 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HCC)       Hospital Problems       Present on Admission  Date Reviewed: 3/29/2022            ICD-10-CM Noted POA    * (Principal) 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HCC) K56.2 2/3/2024 Unknown           OPERATIVE REPORT:       DATE OF OPERATION:   02/03/24     PREOPERATIVE DIAGNOSIS: Cecal volvulus     POSTOPERATIVE DIAGNOSIS: Same     PROCEDURE PERFORMED: Exploratory laparotomy, right hemicolectomy, omentoplasty of anastomosis     ANESTHESIA: General anesthesia      The patient was brought to the operating room and was induced under anesthesia. The abdomen was prepped and draped in the usual sterile fashion. Ioban was utilized.  A midline incision was made and dissection was carried down to the fascia which was incised.   Findings included cecal volvulus with hemorrhagic serosa of  cecum but no full-thickness necrosis.  The cecum right colon was detorsed and there was pulsation of the arteries in the mesentery.  However there was no room in the abdomen to be able to do a cecopexy in the proper location.  Furthermore the cecum was a markedly dilated that it would likely be dysfunctional.  The patient has had recent history of severe constipation.  Therefore, it was decided to do a resection.  The mesentery was scored and vessels were ligated with 2-0 Vicryl.  A right hemicolectomy was performed with an antegrade anastomosis that was stapled side-to-side.  The common channel was stapled with a TA stapler as was the end of the colon.  Then omentoplasty was done of the stapled common channel and the apex and anastomosis also sutured using 2-0 silk interrupted sutures.  The incision was protected using Betadine soaked laps but there was some contamination when the colotomy was first made.  There was no small bowel dilatation. the abdomen was copiously irrigated and suctioned and careful hemostasis was ensured.     The fascia was closed using 0 PDS looped with interrupted #1 Vicryl sutures.  Then the wound was copiously irrigated with saline and careful hemostasis was obtained.   The skin was stapled with interrupted saline soaked reagan  The patient went to recovery room in stable condition.      2/4:    SURGERY:  POD#1 right hemicolectomy for acute cecal volvulus  No unusual complaints mild incisional pain as expected and denies N/V  Passing no flatus or BM      Exam:      General: awake and alert, in no acute distress, comfortable, and in good spirits  Pulmonary:lungs clear to auscultation bilaterally  Cardiac: RRR, nl S1,S2, no S3, no S4, and no murmur  Abdomen: normal BS, nondistended, and nontender except mild tenderness at incision   Extremities: calves nontender, no edema, SCD's on, motor intact, and sensory intact  Wounds: clean, dry and intact and partially open with reagan      Assessment  and Plan:   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HCC)  S/p right hemicolectomy     Doing well  Diet: Clear liquid diet  IVF: continue current rate  Antibiotics: no need for further antibiotics beyond perioperative doses     Activity: OOB to chair, more ambulation encouraged, and Ambulation in halls at least TID  DVT prophylaxis: SCDs and chemoprophylaxis as ordered     Discharge disposition: pending clinical course      Vitals:     02/03/24 1900 02/03/24 2011 02/03/24 2203 02/04/24 0354   BP:   114/44 123/45 116/48   Pulse: 98   96 97   Resp:   18 18     Temp:   100.1 °F (37.8 °C) (!) 100.5 °F (38.1 °C) 99.2 °F (37.3 °C)   TempSrc:   Oral Oral Oral   SpO2:   97% 98% 98%   Weight:           Height:                 Lab 02/03/24  0207 02/04/24  0551   RBC 4.35 3.41*   HGB 12.6 9.6*   HCT 37.7 30.1*   MCV 86.7 88.3   MCH 29.0 28.2   MCHC 33.4 31.9   RDW 14.1 14.8   NEPRELIM 12.15* 7.90*   WBC 13.2* 9.4   .0 168.0      * 89   BUN 23 12   CREATSERUM 0.88 0.66   CA 9.7 8.1*    142   K 4.4 3.6    111   CO2 25.0 24.0         MEDICATIONS ADMINISTERED IN LAST 1 DAY:    meropenem (Merrem) 1 g in sodium chloride 0.9% 100 mL IVPB-MBP       Date Action Dose Route User    2/4/2024 1818 New Bag 1 g Intravenous Mackenzie Granado RN          potassium phosphate dibasic 15 mmol in sodium chloride 0.9% 250 mL IVPB       Date Action Dose Route User    2/5/2024 0730 New Bag 15 mmol Intravenous Jakub Gray, TL

## 2024-02-05 NOTE — PLAN OF CARE
Problem: Patient Centered Care  Goal: Patient preferences are identified and integrated in the patient's plan of care  Description: Interventions:  - What would you like us to know as we care for you?   - Provide timely, complete, and accurate information to patient/family  - Incorporate patient and family knowledge, values, beliefs, and cultural backgrounds into the planning and delivery of care  - Encourage patient/family to participate in care and decision-making at the level they choose  - Honor patient and family perspectives and choices  Outcome: Progressing     Problem: Patient/Family Goals  Goal: Patient/Family Long Term Goal  Description: Patient's Long Term Goal:     Interventions:  -   - See additional Care Plan goals for specific interventions  Outcome: Progressing  Goal: Patient/Family Short Term Goal  Description: Patient's Short Term Goal:     Interventions:   -   - See additional Care Plan goals for specific interventions  Outcome: Progressing     Problem: PAIN - ADULT  Goal: Verbalizes/displays adequate comfort level or patient's stated pain goal  Description: INTERVENTIONS:  - Encourage pt to monitor pain and request assistance  - Assess pain using appropriate pain scale  - Administer analgesics based on type and severity of pain and evaluate response  - Implement non-pharmacological measures as appropriate and evaluate response  - Consider cultural and social influences on pain and pain management  - Manage/alleviate anxiety  - Utilize distraction and/or relaxation techniques  - Monitor for opioid side effects  - Notify MD/LIP if interventions unsuccessful or patient reports new pain  - Anticipate increased pain with activity and pre-medicate as appropriate  Outcome: Progressing     Alert and oriented x4, anxious at times. Reports severe pain to surgical abdomen. Abdominal CT this afternoon, results pending. Pain managed with PCA morphine. Concepcion cath discontinued this morning, voiding freely  now. Tolerating full-liquids. Poor appetite. Electrolytes covered per protocol. Discharge plans pending medical/surgical clearance

## 2024-02-05 NOTE — PROGRESS NOTES
[facility]    General Surgery Progress Note  Mayte Lucas  : 1945  CSN: [id number]  HD# 2    Subjective:   POD#2 right hemicolectomy for acute cecal volvulus  Complains of  bloating and LLQ abdominal pain and denies N/V  Passing flatus, no BM     Exam:     General: awake and alert, in no acute distress, comfortable, and in good spirits  Pulmonary:lungs clear to auscultation bilaterally  Cardiac: RRR, nl S1,S2, no S3, no S4, and no murmur  Abdomen: normal BS, moderately distended, and moderate tenderness diffusely but mostly LLQ, no guarding    Extremities: calves nontender, no edema, SCD's on, motor intact, and sensory intact  Wounds: clean, dry and intact and partially open with reagan removed    Assessment and Plan:   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HCC)  S/p right hemicolectomy    Stable  Diet: cont NPO  IVF: continue current rate  Antibiotics: cont Meropenem for now due to abdominal pain  Dulcolax supp  Entereg    CT abdomen images reviewed - ileus but no evidence of leak    Activity: OOB to chair, more ambulation encouraged, and Ambulation in halls at least TID  DVT prophylaxis: SCDs and chemoprophylaxis as ordered    Discharge disposition: pending clinical course       Objective:     Vitals:    24 1900 24 0122 24 0556 24 1111   BP:  110/43 98/46 110/56   Pulse: 100 104 80 82   Resp:  17 18 16   Temp:  98.2 °F (36.8 °C) 98.7 °F (37.1 °C) 97.9 °F (36.6 °C)   TempSrc:  Oral Oral Oral   SpO2:  96% 96% 99%   Weight:       Height:         Body mass index is 17.31 kg/m².    Intake/Output Summary (Last 24 hours) at 2024 1617  Last data filed at 2024 1440  Gross per 24 hour   Intake 10 ml   Output 550 ml   Net -540 ml       Results:     Recent Labs   Lab 24  0207 24  0551 24  0541   RBC 4.35 3.41* 3.27*   HGB 12.6 9.6* 9.6*   HCT 37.7 30.1* 28.6*   MCV 86.7 88.3 87.5   MCH 29.0 28.2 29.4   MCHC 33.4 31.9 33.6   RDW 14.1 14.8 14.6   NEPRELIM 12.15* 7.90* 8.93*    WBC 13.2* 9.4 10.5   .0 168.0 159.0       Recent Labs   Lab 02/03/24  0207 02/04/24  0551 02/05/24  0541   * 89 69*   BUN 23 12 14   CREATSERUM 0.88 0.66 0.74   CA 9.7 8.1* 8.2*    142 142   K 4.4 3.6 3.1*    111 111   CO2 25.0 24.0 21.0       Lab Results   Component Value Date    WBC 10.5 02/05/2024    HGB 9.6 02/05/2024    HCT 28.6 02/05/2024    .0 02/05/2024     02/05/2024    K 3.1 02/05/2024     02/05/2024    CO2 21.0 02/05/2024    BUN 14 02/05/2024    CREATSERUM 0.74 02/05/2024    GLU 69 02/05/2024    MG 1.6 02/05/2024    PHOS 2.1 02/05/2024    CA 8.2 02/05/2024       CT ABDOMEN+PELVIS(CONTRAST ONLY)(CPT=74177)    Result Date: 2/5/2024  CONCLUSION:   Diffuse small bowel dilation, without a transition point.  This is likely postoperative ileus  No evidence of bowel ischemia.  No abscess.    Dictated by (CST): Macho Garcia MD on 2/05/2024 at 2:18 PM     Finalized by (CST): Macho Garcia MD on 2/05/2024 at 2:32 PM                 Yvan Griggs MD [facility]  02/05/24  4:21 PM

## 2024-02-05 NOTE — PROGRESS NOTES
[facility] Hospitalist Progress Note     CC: Hospital Follow up    PCP: Abi Ohara MD       Assessment/Plan:     Principal Problem:    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HCC)    Patient is a 78 year old female with PMH sig for SVT, RLS, COPD, chronic pain, HLD, IgA deficiency who presented to the hospital with abdominal pain.     Cecal volvulus with extensive mesenteric ischemia  - s/p ex lap, R hemicolectomy, omentoplasty of anastomosis POD # 1   - prn pain medication   - monitor respiratory status  - encouraged IS use  - prn anti emetics  - post op hgb of 9.6, was 12.6 prior to surgery  - FLD, diet per general surgery  - on PCA pump per general surgery  - dvt ppx: SCD  - severe LLQ ABP today, STAT CT A/P ordered, general surgery notified     Possible afib in PACU?   pSVT  - reported afib in PACU but 12 lead EKG with NSR  - tele monitoring to assess if truly afib as does have a history of SVT  - hold off on AC until/if atrial fibrillation is confirmed  - resume PO metoprolol  - tele monitor reviewed, has PACs/PVCs but no afib     COPD  - resume inhalers     RLS  - resume home medication     Chronic pain  - outpatient f/u     IgA deficiency  - outpatient f/u    Right ear  - resume PRN benzo     FN:  - IVF: NS stopped  - Diet: FLD     DVT Prophy: SCD  Lines: PIV     Dispo: pending clinical course    Thank You,  Flower Bain DO    Hospitalist with [facility]     Subjective:     Having a lof of LLQ abdominal pain. Morphine not helping    OBJECTIVE:    Blood pressure 98/46, pulse 80, temperature 98.7 °F (37.1 °C), temperature source Oral, resp. rate 18, height 5' 5\" (1.651 m), weight 104 lb (47.2 kg), SpO2 96%, not currently breastfeeding.    Temp:  [98.2 °F (36.8 °C)-99.5 °F (37.5 °C)] 98.7 °F (37.1 °C)  Pulse:  [] 80  Resp:  [17-18] 18  BP: ()/(43-65) 98/46  SpO2:  [96 %-97 %] 96 %      Intake/Output:    Intake/Output Summary (Last 24 hours) at 2/5/2024 1107  Last data filed at 2/5/2024  0735  Gross per 24 hour   Intake 10 ml   Output 450 ml   Net -440 ml       Last 3 Weights   02/03/24 0948 104 lb (47.2 kg)   02/03/24 0140 104 lb (47.2 kg)   01/04/22 1442 105 lb (47.6 kg)   12/10/21 1435 104 lb (47.2 kg)       Exam   Gen: No acute distress  Heent: NC AT, neck supple  Pulm: Lungs clear, normal respiratory effort  CV: Heart with regular rate and rhythm, no peripheral edema  Abd: Abdomen soft, tenderness to palpation diffusely most severe in LLQ  MSK: no clubbing, no cyanosis    Data Review:       Labs:     Recent Labs   Lab 02/03/24 0207 02/04/24  0551 02/05/24  0541   RBC 4.35 3.41* 3.27*   HGB 12.6 9.6* 9.6*   HCT 37.7 30.1* 28.6*   MCV 86.7 88.3 87.5   MCH 29.0 28.2 29.4   MCHC 33.4 31.9 33.6   RDW 14.1 14.8 14.6   NEPRELIM 12.15* 7.90* 8.93*   WBC 13.2* 9.4 10.5   .0 168.0 159.0         Recent Labs   Lab 02/03/24 0207 02/04/24  0551 02/05/24  0541   * 89 69*   BUN 23 12 14   CREATSERUM 0.88 0.66 0.74   EGFRCR 67 89 82   CA 9.7 8.1* 8.2*    142 142   K 4.4 3.6 3.1*    111 111   CO2 25.0 24.0 21.0       Recent Labs   Lab 02/03/24 0207   ALT 19   AST 21   ALB 4.5         Imaging:  CT ABDOMEN PELVIS IV CONTRAST, NO ORAL (ER)    Result Date: 2/3/2024  CONCLUSION:  1. Imaging findings of cecal volvulus (either type II or type III) with severe associated bowel wall thickening, concerning for bowel ischemia. Surgical consultation and correlation with lactate levels are advised.  2. Severe mesenteric edema with extensive apparent mesenteric venous congestion.  3. Moderate volume intraperitoneal ascites.  4. Uncomplicated distal colonic diverticulosis.  5. Chronic-appearing mild superior endplate compression fracture deformity of T12.  6. Left-sided calyceal possible staghorn calculus formation conforming to the pelvocalyceal contours, raising the possibility of struvite stone formation in the setting of chronic recurrent urinary tract infections with urease-producing organisms  (most typically Proteus, Klebsiella, Pseudomonas, and/or Enterobacter spp.).  7. Lesser incidental findings as above.    A preliminary report was issued by the [facility] teleradiology service. There are no major discrepancies.   Dictated by (CST): Jeremy Covarrubias MD on 2/03/2024 at 6:55 AM     Finalized by (CST): Jeremy Covarrubias MD on 2/03/2024 at 7:09 AM             Meds:      potassium phosphate dibasic 15 mmol in sodium chloride 0.9% 250 mL IVPB  15 mmol Intravenous Once    Followed by    potassium chloride  40 mEq Intravenous Once    metoprolol succinate  12.5 mg Oral Nightly    OXcarbazepine  300 mg Oral BID      morphINE in sodium chloride 0.9%       clonazePAM, ondansetron, metoclopramide, naloxone, diphenhydrAMINE, morphINE

## 2024-02-05 NOTE — PLAN OF CARE
Patient alert and orientated by 4.  Patient post surgery has midline incision, PCA pump for pain.  Patient up to chair and ambulating today.  Patient on clears, states she feels much better this evening.  Patient has personal items and call light within reach.  Safety measures in place.  Problem: Patient Centered Care  Goal: Patient preferences are identified and integrated in the patient's plan of care  Description: Interventions:  - What would you like us to know as we care for you? Today was my birthday  - Provide timely, complete, and accurate information to patient/family  - Incorporate patient and family knowledge, values, beliefs, and cultural backgrounds into the planning and delivery of care  - Encourage patient/family to participate in care and decision-making at the level they choose  - Honor patient and family perspectives and choices  Outcome: Progressing     Problem: Patient/Family Goals  Goal: Patient/Family Long Term Goal  Description: Patient's Long Term Goal: To go back home with     Interventions:  - Continue plan of care  - See additional Care Plan goals for specific interventions  Outcome: Progressing  Goal: Patient/Family Short Term Goal  Description: Patient's Short Term Goal: To get moving again    Interventions:   -   - See additional Care Plan goals for specific interventions  Outcome: Progressing     Problem: PAIN - ADULT  Goal: Verbalizes/displays adequate comfort level or patient's stated pain goal  Description: INTERVENTIONS:  - Encourage pt to monitor pain and request assistance  - Assess pain using appropriate pain scale  - Administer analgesics based on type and severity of pain and evaluate response  - Implement non-pharmacological measures as appropriate and evaluate response  - Consider cultural and social influences on pain and pain management  - Manage/alleviate anxiety  - Utilize distraction and/or relaxation techniques  - Monitor for opioid side effects  - Notify MD/LIP  if interventions unsuccessful or patient reports new pain  - Anticipate increased pain with activity and pre-medicate as appropriate  Outcome: Progressing

## 2024-02-06 ENCOUNTER — APPOINTMENT (OUTPATIENT)
Dept: GENERAL RADIOLOGY | Facility: HOSPITAL | Age: 79
End: 2024-02-06
Attending: SURGERY
Payer: MEDICARE

## 2024-02-06 LAB
ANION GAP SERPL CALC-SCNC: 11 MMOL/L (ref 0–18)
BASOPHILS # BLD AUTO: 0.03 X10(3) UL (ref 0–0.2)
BASOPHILS NFR BLD AUTO: 0.3 %
BUN BLD-MCNC: 17 MG/DL (ref 9–23)
BUN/CREAT SERPL: 24.3 (ref 10–20)
CALCIUM BLD-MCNC: 8.5 MG/DL (ref 8.7–10.4)
CHLORIDE SERPL-SCNC: 111 MMOL/L (ref 98–112)
CO2 SERPL-SCNC: 18 MMOL/L (ref 21–32)
CREAT BLD-MCNC: 0.7 MG/DL
DEPRECATED RDW RBC AUTO: 47.2 FL (ref 35.1–46.3)
EGFRCR SERPLBLD CKD-EPI 2021: 87 ML/MIN/1.73M2 (ref 60–?)
EOSINOPHIL # BLD AUTO: 0.04 X10(3) UL (ref 0–0.7)
EOSINOPHIL NFR BLD AUTO: 0.4 %
ERYTHROCYTE [DISTWIDTH] IN BLOOD BY AUTOMATED COUNT: 14.7 % (ref 11–15)
GLUCOSE BLD-MCNC: 94 MG/DL (ref 70–99)
HCT VFR BLD AUTO: 34 %
HGB BLD-MCNC: 11.4 G/DL
IMM GRANULOCYTES # BLD AUTO: 0.06 X10(3) UL (ref 0–1)
IMM GRANULOCYTES NFR BLD: 0.6 %
LYMPHOCYTES # BLD AUTO: 0.68 X10(3) UL (ref 1–4)
LYMPHOCYTES NFR BLD AUTO: 7.3 %
MAGNESIUM SERPL-MCNC: 1.8 MG/DL (ref 1.6–2.6)
MCH RBC QN AUTO: 29.1 PG (ref 26–34)
MCHC RBC AUTO-ENTMCNC: 33.5 G/DL (ref 31–37)
MCV RBC AUTO: 86.7 FL
MONOCYTES # BLD AUTO: 0.58 X10(3) UL (ref 0.1–1)
MONOCYTES NFR BLD AUTO: 6.2 %
NEUTROPHILS # BLD AUTO: 7.97 X10 (3) UL (ref 1.5–7.7)
NEUTROPHILS # BLD AUTO: 7.97 X10(3) UL (ref 1.5–7.7)
NEUTROPHILS NFR BLD AUTO: 85.2 %
OSMOLALITY SERPL CALC.SUM OF ELEC: 291 MOSM/KG (ref 275–295)
PHOSPHATE SERPL-MCNC: 2.7 MG/DL (ref 2.4–5.1)
PLATELET # BLD AUTO: 239 10(3)UL (ref 150–450)
POTASSIUM SERPL-SCNC: 3.6 MMOL/L (ref 3.5–5.1)
RBC # BLD AUTO: 3.92 X10(6)UL
SODIUM SERPL-SCNC: 140 MMOL/L (ref 136–145)
WBC # BLD AUTO: 9.4 X10(3) UL (ref 4–11)

## 2024-02-06 PROCEDURE — 85025 COMPLETE CBC W/AUTO DIFF WBC: CPT | Performed by: SURGERY

## 2024-02-06 PROCEDURE — 74021 RADEX ABDOMEN 3+ VIEWS: CPT | Performed by: SURGERY

## 2024-02-06 PROCEDURE — 83735 ASSAY OF MAGNESIUM: CPT | Performed by: STUDENT IN AN ORGANIZED HEALTH CARE EDUCATION/TRAINING PROGRAM

## 2024-02-06 PROCEDURE — 80048 BASIC METABOLIC PNL TOTAL CA: CPT | Performed by: SURGERY

## 2024-02-06 PROCEDURE — 84100 ASSAY OF PHOSPHORUS: CPT | Performed by: STUDENT IN AN ORGANIZED HEALTH CARE EDUCATION/TRAINING PROGRAM

## 2024-02-06 PROCEDURE — 71045 X-RAY EXAM CHEST 1 VIEW: CPT | Performed by: SURGERY

## 2024-02-06 PROCEDURE — 0D9670Z DRAINAGE OF STOMACH WITH DRAINAGE DEVICE, VIA NATURAL OR ARTIFICIAL OPENING: ICD-10-PCS | Performed by: INTERNAL MEDICINE

## 2024-02-06 RX ORDER — SODIUM CHLORIDE 450 MG/100ML
INJECTION, SOLUTION INTRAVENOUS CONTINUOUS
Status: DISCONTINUED | OUTPATIENT
Start: 2024-02-06 | End: 2024-02-07

## 2024-02-06 RX ORDER — MAGNESIUM OXIDE 400 MG/1
400 TABLET ORAL ONCE
Status: COMPLETED | OUTPATIENT
Start: 2024-02-06 | End: 2024-02-06

## 2024-02-06 RX ORDER — ACETAMINOPHEN 650 MG/1
650 SUPPOSITORY RECTAL EVERY 6 HOURS PRN
Status: DISCONTINUED | OUTPATIENT
Start: 2024-02-06 | End: 2024-02-07

## 2024-02-06 RX ORDER — ALVIMOPAN 12 MG/1
12 CAPSULE ORAL 2 TIMES DAILY
Status: DISCONTINUED | OUTPATIENT
Start: 2024-02-06 | End: 2024-02-16

## 2024-02-06 RX ORDER — POTASSIUM CHLORIDE 20 MEQ/1
40 TABLET, EXTENDED RELEASE ORAL DAILY
Status: DISCONTINUED | OUTPATIENT
Start: 2024-02-06 | End: 2024-02-07

## 2024-02-06 RX ORDER — HYDROMORPHONE HYDROCHLORIDE 1 MG/ML
0.2 INJECTION, SOLUTION INTRAMUSCULAR; INTRAVENOUS; SUBCUTANEOUS EVERY 2 HOUR PRN
Status: DISCONTINUED | OUTPATIENT
Start: 2024-02-06 | End: 2024-02-07

## 2024-02-06 RX ORDER — SIMETHICONE 80 MG
80 TABLET,CHEWABLE ORAL 3 TIMES DAILY
Status: DISCONTINUED | OUTPATIENT
Start: 2024-02-06 | End: 2024-02-17

## 2024-02-06 NOTE — DIETARY NOTE
ADULT NUTRITION INITIAL ASSESSMENT    Pt is at moderate nutrition risk.  Pt does not meet malnutrition criteria.      RECOMMENDATIONS TO MD:  Consider nutrition support via PN feeds if prolonged NPO anticipated (>5-7 days).    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 Pt presented to hospital with c/o abdominal pain. POD#3 s/p right hemicolectomy for acute cecal volvulus. S/p obstructive series this AM - dilated small bowel loops with differential fluid levels c/w early SBO. Pt sitting up in bed at time of visit awaiting NGT reinsertion - first attempt coiled. Pt reports wt stable prior to admission with normal appetite/intake. Typically consumes 2 meals per day. Pt and  share meal prep responsibility however pt prefers to \"graze\". Pt reports she is very active, walking at least 10,000 steps daily.    FOOD/NUTRITION RELATED HISTORY:  Appetite: NPO  Intake:  NPO/Clear liquid x3 days  Intake Meeting Needs: NPO  Percent Meals Eaten (last 3 days)       None        Food Allergies: No Known Food Allergies (NKFA)  Cultural/Ethnic/Buddhist Preferences: Not Obtained    GASTROINTESTINAL: +BM hard, green with blood in toilet x2 / 24 hrs, +flatus , bloating, NGT to LIS, and abdomen rounded, tender, hypoactive bowel sounds    MEDICATIONS: reviewed; Noted cardiac electrolyte replacement protocol ordered;IVF provides 1800 ml daily;     sodium chloride 75 mL/hr at 02/06/24 0941      potassium chloride  40 mEq Oral Daily    simethicone  80 mg Oral TID    alvimopan  12 mg Oral BID    heparin  5,000 Units Subcutaneous Q12H    metoprolol succinate  12.5 mg Oral Nightly    OXcarbazepine  300 mg Oral BID     LABS: reviewed;  noted low-normal potasium and magnesium with replacement ordered today per protocol  Recent Labs     02/04/24  0551 02/05/24  0541 02/05/24  1916 02/06/24  0643   GLU 89 69*  --  94   BUN 12 14  --  17   CREATSERUM 0.66 0.74  --  0.70   CA 8.1* 8.2*  --  8.5*   MG 1.6 1.6  --  1.8    142  --  140   K 3.6 3.1* 3.9 3.6    111  --  111   CO2 24.0 21.0  --  18.0*   PHOS  --  2.1*  --  2.7   OSMOCALC 293 293  --  291       NUTRITION RELATED PHYSICAL FINDINGS:  - Nutrition Focused Physical Exam (NFPE): no wasting noted and appears well nourished  per visual exam; not appropriate at this time for full NFPE as awaiting NGT replacement   - Fluid Accumulation: none  see RN documentation for details  - Skin Integrity: surgical wound(s) see RN documentation for details    ANTHROPOMETRICS:  HT: 165.1 cm (5' 5\")  WT: 47.2 kg (104 lb)   BMI: Body mass index is 17.31 kg/m².  BMI CLASSIFICATION: <22 considered underweight for advanced age  IBW: 125 lbs        83% IBW  Usual Body Wt: 105 lbs per pt report      99% UBW    WEIGHT HISTORY:  Patient Weight(s) for the past 336 hrs:   Weight   02/03/24 0948 47.2 kg (104 lb)   02/03/24 0140 47.2 kg (104 lb)     Wt Readings from Last 10 Encounters:   02/03/24 47.2 kg (104 lb)   01/04/22 47.6 kg (105 lb)   12/10/21 47.2 kg (104 lb)   12/01/21 48.5 kg (107 lb)   08/05/21 47.3 kg (104 lb 4 oz)   06/24/21 46.7 kg (103 lb)   06/10/21 47.2 kg (104 lb)   10/15/20 47.6 kg (105 lb)   10/08/20 47.6 kg (105 lb)   03/05/20 49.4 kg (109 lb)     NUTRITION DIAGNOSIS/PROBLEM:   Inadequate protein energy intake related to Decreased ability to consume sufficient energy  as evidenced by NPO/CL day 3.     NUTRITION INTERVENTION:     NUTRITION PRESCRIPTION:   Estimated Nutrition needs: --dosing wt of 47 kg - wt taken on 2/3/24  Calories: 8584-4647 calories/day (MSJ REE = 950 kcal x 1.1(AF) x1.2-1.3(AF) or 27-29 calories per kg Dosing wt)  Protein: 56-71 g protein/day (1.2-1.5 g protein/kg Dosing  wt)  Fluid Needs: 6657-7371 ml/day (1 ml/kcal)    - Diet:       Procedures    NPO      - RD Care Plan: Monitor need for ONS (oral nutritional supplements) and recommend nutrition support via PN if prolonged NPO anticipated (>5-7 days)  - Meals and snacks: NPO  - Medical Food Supplements: RD added None at this time due to NPO.  - Vitamin and mineral supplements: NPO  - Feeding assistance: NPO  - Nutrition education: assess education needs   - Coordination of nutrition care: collaboration with other providers - discussed with RN on unit  - Discharge and transfer of nutrition care to new setting or provider: monitor plans - from home with spouse    MONITOR AND EVALUATE/NUTRITION GOALS:  - Food and Nutrient Intake:      Monitor: for PO initiation and for PO diet advancement  - Food and Nutrient Administration:      Monitor: need for temporary nutrition support  - Anthropometric Measurement:    Monitor weight  - Nutrition Goals:      diet initiation vs nutrition support within 2-4 days hrs, labs within acceptable limits, minimize lean body mass loss, maintain true wt within 5%, and improved GI status    DIETITIAN FOLLOW UP: RD to follow and monitor nutrition status    Abida De Santiago MS, RD, LDN, [facility]  [id number]

## 2024-02-06 NOTE — PLAN OF CARE
Patient alert and orientated by 4.  Patient has no pain, CT indicates a possible illeus.  Patient was given a suppository today and medication to help with passing stool.  Patient voiding in toilet and had a small BM this evening.  Patient continues to be NPO ice chips, sips with med's.  Patient has personal items and call light within reach.  Safety measures in place.  Problem: Patient Centered Care  Goal: Patient preferences are identified and integrated in the patient's plan of care  Description: Interventions:  - What would you like us to know as we care for you? I life at home with my .  - Provide timely, complete, and accurate information to patient/family  - Incorporate patient and family knowledge, values, beliefs, and cultural backgrounds into the planning and delivery of care  - Encourage patient/family to participate in care and decision-making at the level they choose  - Honor patient and family perspectives and choices  Outcome: Progressing     Problem: Patient/Family Goals  Goal: Patient/Family Long Term Goal  Description: Patient's Long Term Goal: To Go home.    Interventions:  -   - See additional Care Plan goals for specific interventions  Outcome: Progressing  Goal: Patient/Family Short Term Goal  Description: Patient's Short Term Goal:     Interventions:   -   - See additional Care Plan goals for specific interventions  Outcome: Progressing     Problem: PAIN - ADULT  Goal: Verbalizes/displays adequate comfort level or patient's stated pain goal  Description: INTERVENTIONS:  - Encourage pt to monitor pain and request assistance  - Assess pain using appropriate pain scale  - Administer analgesics based on type and severity of pain and evaluate response  - Implement non-pharmacological measures as appropriate and evaluate response  - Consider cultural and social influences on pain and pain management  - Manage/alleviate anxiety  - Utilize distraction and/or relaxation techniques  - Monitor for  opioid side effects  - Notify MD/LIP if interventions unsuccessful or patient reports new pain  - Anticipate increased pain with activity and pre-medicate as appropriate  Outcome: Progressing     Problem: SAFETY ADULT - FALL  Goal: Free from fall injury  Description: INTERVENTIONS:  - Assess pt frequently for physical needs  - Identify cognitive and physical deficits and behaviors that affect risk of falls.  - [place] fall precautions as indicated by assessment.  - Educate pt/family on patient safety including physical limitations  - Instruct pt to call for assistance with activity based on assessment  - Modify environment to reduce risk of injury  - Provide assistive devices as appropriate  - Consider OT/PT consult to assist with strengthening/mobility  - Encourage toileting schedule  Outcome: Progressing

## 2024-02-06 NOTE — PROGRESS NOTES
[facility]    General Surgery Progress Note  Mayte Lucas  : 1945  CSN: [id number]  HD# 3    Subjective:   POD#3 right hemicolectomy for acute cecal volvulus  Complains of  bloating and intermittent  abdominal pain and denies N/V  LLQ and lower abdominal pain markedly improved  Passing flatus and BM(s)     Exam:     General: awake and alert, in no acute distress, comfortable, and in good spirits  Pulmonary:lungs clear to auscultation bilaterally  Cardiac: RRR, nl S1,S2, no S3, no S4, and no murmur  Abdomen:  hypoactive, moderately distended, and mild tenderness diffusely, no guarding    Extremities: calves nontender, no edema, SCD's on, motor intact, and sensory intact  Wounds: clean, dry and intact and partially open with reagan removed    Assessment and Plan:   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HCC)  S/p right hemicolectomy    CT abdomen images reviewed - ileus but no evidence of leak  Obstructive series 24 shows dilated small bowel loops w differential fluid levels c/w early sbo.     Stable  Diet: NGT/NPO x ice chips and sips of liquids  IVF: continue current rate  Antibiotics: cont Meropenem for now due to abdominal pain  Cont Entereg  Stop PCA - morphine prn  Tylenol    Activity: OOB to chair, more ambulation encouraged, and Ambulation in halls at least TID  DVT prophylaxis: SCDs and chemoprophylaxis as ordered    Discharge disposition: pending clinical course       Objective:     Vitals:    24 1111 24 0210 24 0700   BP: 110/56 120/53 123/57    Pulse: 82 96  96   Resp: 16 19 18    Temp: 97.9 °F (36.6 °C) 98.4 °F (36.9 °C) 98.3 °F (36.8 °C)    TempSrc: Oral Oral Oral    SpO2: 99% 100% 98%    Weight:       Height:         Body mass index is 17.31 kg/m².    Intake/Output Summary (Last 24 hours) at 2024 1055  Last data filed at 2024 1000  Gross per 24 hour   Intake 181 ml   Output 450 ml   Net -269 ml       Results:     Recent Labs   Lab 24  0551 24  0541  02/06/24  0643   RBC 3.41* 3.27* 3.92   HGB 9.6* 9.6* 11.4*   HCT 30.1* 28.6* 34.0*   MCV 88.3 87.5 86.7   MCH 28.2 29.4 29.1   MCHC 31.9 33.6 33.5   RDW 14.8 14.6 14.7   NEPRELIM 7.90* 8.93* 7.97*   WBC 9.4 10.5 9.4   .0 159.0 239.0       Recent Labs   Lab 02/04/24  0551 02/05/24  0541 02/05/24  1916 02/06/24  0643   GLU 89 69*  --  94   BUN 12 14  --  17   CREATSERUM 0.66 0.74  --  0.70   CA 8.1* 8.2*  --  8.5*    142  --  140   K 3.6 3.1* 3.9 3.6    111  --  111   CO2 24.0 21.0  --  18.0*       Lab Results   Component Value Date    WBC 9.4 02/06/2024    HGB 11.4 02/06/2024    HCT 34.0 02/06/2024    .0 02/06/2024     02/06/2024    K 3.6 02/06/2024     02/06/2024    CO2 18.0 02/06/2024    BUN 17 02/06/2024    CREATSERUM 0.70 02/06/2024    GLU 94 02/06/2024    MG 1.8 02/06/2024    PHOS 2.7 02/06/2024    CA 8.5 02/06/2024       XR ABDOMEN, OBSTRUCTIVE SERIES 3 VIEWS(CPT=74021)    Result Date: 2/6/2024  CONCLUSION: Diffuse small bowel dilation with differential air-fluid levels     Dictated by (CST): Macho Garcia MD on 2/06/2024 at 9:45 AM     Finalized by (CST): Macho Garcia MD on 2/06/2024 at 9:48 AM          CT ABDOMEN+PELVIS(CONTRAST ONLY)(CPT=74177)    Result Date: 2/5/2024  CONCLUSION:   Diffuse small bowel dilation, without a transition point.  This is likely postoperative ileus  No evidence of bowel ischemia.  No abscess.    Dictated by (CST): Macho Garcia MD on 2/05/2024 at 2:18 PM     Finalized by (CST): Macho Garcia MD on 2/05/2024 at 2:32 PM                 At all points during my encounter with the patient my collaborating physician Dr. Yvan Griggs  was available to answer questions and update the plan as necessary. The plan as stated previously is in agreement with the team.       Darin Jaimes PA-C  General Surgery  [facility]  02/06/24  10:55 AM    The patient was seen and examined by myself.  The above note was modified accordingly    Yvan  MD [facility]  02/06/24  2:39 PM

## 2024-02-06 NOTE — PROGRESS NOTES
[facility] Hospitalist Progress Note     CC: Hospital Follow up    PCP: Abi Ohara MD       Assessment/Plan:     Principal Problem:    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HCC)    Patient is a 78 year old female with PMH sig for SVT, RLS, COPD, chronic pain, HLD, IgA deficiency who presented to the hospital with abdominal pain.     Cecal volvulus with extensive mesenteric ischemia  - s/p ex lap, R hemicolectomy, omentoplasty of anastomosis POD # 1   - prn pain medication   - monitor respiratory status  - encouraged IS use  - prn anti emetics  - post op hgb of 9.6, was 12.6 prior to surgery  - FLD, diet per general surgery  - on PCA pump per general surgery  - dvt ppx: SCD  - CT A/P on 2/5 for severe LLQ pain showed ileus, no anastomic leak  - having BM this morning but streak with blood. Abdominal pain improved, hgb is stable. Colonoscopy report from 2018 reviewed and showed diverticulosis. Will hold SQH and monitor, Dr. Griggs notified     Possible afib in PACU?   pSVT  - reported afib in PACU but 12 lead EKG with NSR  - tele monitoring to assess if truly afib as does have a history of SVT  - hold off on AC until/if atrial fibrillation is confirmed  - resume PO metoprolol  - tele monitor reviewed, has PACs/PVCs but no afib     COPD  - resume inhalers     RLS  - resume home medication     Chronic pain  - outpatient f/u     IgA deficiency  - outpatient f/u    Right ear  - resume PRN benzo     FN:  - IVF: .45  - Diet: NPO     DVT Prophy: SCD  Lines: PIV     Dispo: pending clinical course    Thank You,  Flower Bain, DO    Hospitalist with [facility]     Subjective:     LLQ pain better. Having a lot of burping. Had Bms this morning with red streaks    OBJECTIVE:    Blood pressure 123/57, pulse 96, temperature 98.3 °F (36.8 °C), temperature source Oral, resp. rate 18, height 5' 5\" (1.651 m), weight 104 lb (47.2 kg), SpO2 98%, not currently breastfeeding.    Temp:  [97.9 °F (36.6 °C)-98.4 °F (36.9 °C)] 98.3  °F (36.8 °C)  Pulse:  [82-96] 96  Resp:  [16-19] 18  BP: (110-123)/(53-57) 123/57  SpO2:  [98 %-100 %] 98 %      Intake/Output:    Intake/Output Summary (Last 24 hours) at 2/6/2024 1009  Last data filed at 2/6/2024 0808  Gross per 24 hour   Intake 6 ml   Output 450 ml   Net -444 ml       Last 3 Weights   02/03/24 0948 104 lb (47.2 kg)   02/03/24 0140 104 lb (47.2 kg)   01/04/22 1442 105 lb (47.6 kg)   12/10/21 1435 104 lb (47.2 kg)       Exam   Gen: No acute distress  Heent: NC AT, neck supple  Pulm: Lungs clear, normal respiratory effort  CV: Heart with regular rate and rhythm, no peripheral edema  Abd: Abdomen soft, tenderness to palpation diffusely improved from yesterday  MSK: no clubbing, no cyanosis    Data Review:       Labs:     Recent Labs   Lab 02/04/24  0551 02/05/24  0541 02/06/24  0643   RBC 3.41* 3.27* 3.92   HGB 9.6* 9.6* 11.4*   HCT 30.1* 28.6* 34.0*   MCV 88.3 87.5 86.7   MCH 28.2 29.4 29.1   MCHC 31.9 33.6 33.5   RDW 14.8 14.6 14.7   NEPRELIM 7.90* 8.93* 7.97*   WBC 9.4 10.5 9.4   .0 159.0 239.0         Recent Labs   Lab 02/04/24  0551 02/05/24  0541 02/05/24  1916 02/06/24  0643   GLU 89 69*  --  94   BUN 12 14  --  17   CREATSERUM 0.66 0.74  --  0.70   EGFRCR 89 82  --  87   CA 8.1* 8.2*  --  8.5*    142  --  140   K 3.6 3.1* 3.9 3.6    111  --  111   CO2 24.0 21.0  --  18.0*       Recent Labs   Lab 02/03/24  0207   ALT 19   AST 21   ALB 4.5         Imaging:  XR ABDOMEN, OBSTRUCTIVE SERIES 3 VIEWS(CPT=74021)    Result Date: 2/6/2024  CONCLUSION: Diffuse small bowel dilation with differential air-fluid levels     Dictated by (CST): Macho Garcia MD on 2/06/2024 at 9:45 AM     Finalized by (CST): Macho Garcia MD on 2/06/2024 at 9:48 AM          CT ABDOMEN+PELVIS(CONTRAST ONLY)(CPT=74177)    Result Date: 2/5/2024  CONCLUSION:   Diffuse small bowel dilation, without a transition point.  This is likely postoperative ileus  No evidence of bowel ischemia.  No abscess.    Dictated by  (CST): Macho Garcia MD on 2/05/2024 at 2:18 PM     Finalized by (CST): Macho Garcia MD on 2/05/2024 at 2:32 PM             Meds:      potassium chloride  40 mEq Oral Daily    alvimopan  12 mg Oral BID    heparin  5,000 Units Subcutaneous Q12H    metoprolol succinate  12.5 mg Oral Nightly    OXcarbazepine  300 mg Oral BID      sodium chloride 75 mL/hr at 02/06/24 0941    morphINE in sodium chloride 0.9%       clonazePAM, ondansetron, metoclopramide, naloxone, diphenhydrAMINE, morphINE

## 2024-02-06 NOTE — PROGRESS NOTES
NGT placed at bedside by Dr. Griggs. CXR verification placement showing coil/incorrect placement. MD aware. NG replaced 18F and secured at 65cm. Second CXR ordered to verify placement. Tube to LIS per order once verified.

## 2024-02-07 LAB
ANION GAP SERPL CALC-SCNC: 10 MMOL/L (ref 0–18)
BASOPHILS # BLD AUTO: 0.02 X10(3) UL (ref 0–0.2)
BASOPHILS NFR BLD AUTO: 0.3 %
BUN BLD-MCNC: 18 MG/DL (ref 9–23)
BUN/CREAT SERPL: 30 (ref 10–20)
CALCIUM BLD-MCNC: 8.1 MG/DL (ref 8.7–10.4)
CHLORIDE SERPL-SCNC: 109 MMOL/L (ref 98–112)
CO2 SERPL-SCNC: 21 MMOL/L (ref 21–32)
CREAT BLD-MCNC: 0.6 MG/DL
DEPRECATED RDW RBC AUTO: 45.5 FL (ref 35.1–46.3)
EGFRCR SERPLBLD CKD-EPI 2021: 91 ML/MIN/1.73M2 (ref 60–?)
EOSINOPHIL # BLD AUTO: 0.1 X10(3) UL (ref 0–0.7)
EOSINOPHIL NFR BLD AUTO: 1.4 %
ERYTHROCYTE [DISTWIDTH] IN BLOOD BY AUTOMATED COUNT: 14.5 % (ref 11–15)
GLUCOSE BLD-MCNC: 89 MG/DL (ref 70–99)
HCT VFR BLD AUTO: 29.6 %
HGB BLD-MCNC: 9.9 G/DL
IMM GRANULOCYTES # BLD AUTO: 0.03 X10(3) UL (ref 0–1)
IMM GRANULOCYTES NFR BLD: 0.4 %
LYMPHOCYTES # BLD AUTO: 0.98 X10(3) UL (ref 1–4)
LYMPHOCYTES NFR BLD AUTO: 14.1 %
MAGNESIUM SERPL-MCNC: 1.8 MG/DL (ref 1.6–2.6)
MCH RBC QN AUTO: 28.8 PG (ref 26–34)
MCHC RBC AUTO-ENTMCNC: 33.4 G/DL (ref 31–37)
MCV RBC AUTO: 86 FL
MONOCYTES # BLD AUTO: 0.71 X10(3) UL (ref 0.1–1)
MONOCYTES NFR BLD AUTO: 10.2 %
NEUTROPHILS # BLD AUTO: 5.12 X10 (3) UL (ref 1.5–7.7)
NEUTROPHILS # BLD AUTO: 5.12 X10(3) UL (ref 1.5–7.7)
NEUTROPHILS NFR BLD AUTO: 73.6 %
OSMOLALITY SERPL CALC.SUM OF ELEC: 291 MOSM/KG (ref 275–295)
PLATELET # BLD AUTO: 241 10(3)UL (ref 150–450)
POTASSIUM SERPL-SCNC: 3.8 MMOL/L (ref 3.5–5.1)
POTASSIUM SERPL-SCNC: 3.8 MMOL/L (ref 3.5–5.1)
RBC # BLD AUTO: 3.44 X10(6)UL
SODIUM SERPL-SCNC: 140 MMOL/L (ref 136–145)
WBC # BLD AUTO: 7 X10(3) UL (ref 4–11)

## 2024-02-07 PROCEDURE — 3E0336Z INTRODUCTION OF NUTRITIONAL SUBSTANCE INTO PERIPHERAL VEIN, PERCUTANEOUS APPROACH: ICD-10-PCS | Performed by: INTERNAL MEDICINE

## 2024-02-07 PROCEDURE — 83735 ASSAY OF MAGNESIUM: CPT | Performed by: NURSE PRACTITIONER

## 2024-02-07 PROCEDURE — 85025 COMPLETE CBC W/AUTO DIFF WBC: CPT | Performed by: SURGERY

## 2024-02-07 PROCEDURE — 84132 ASSAY OF SERUM POTASSIUM: CPT | Performed by: NURSE PRACTITIONER

## 2024-02-07 PROCEDURE — 80048 BASIC METABOLIC PNL TOTAL CA: CPT | Performed by: SURGERY

## 2024-02-07 RX ORDER — ACETAMINOPHEN 10 MG/ML
750 INJECTION, SOLUTION INTRAVENOUS EVERY 6 HOURS PRN
Status: DISCONTINUED | OUTPATIENT
Start: 2024-02-07 | End: 2024-02-15

## 2024-02-07 RX ORDER — POTASSIUM CHLORIDE 14.9 MG/ML
20 INJECTION INTRAVENOUS ONCE
Status: COMPLETED | OUTPATIENT
Start: 2024-02-07 | End: 2024-02-07

## 2024-02-07 RX ORDER — MAGNESIUM OXIDE 400 MG/1
400 TABLET ORAL ONCE
Status: COMPLETED | OUTPATIENT
Start: 2024-02-07 | End: 2024-02-07

## 2024-02-07 RX ORDER — HYDROMORPHONE HYDROCHLORIDE 1 MG/ML
0.2 INJECTION, SOLUTION INTRAMUSCULAR; INTRAVENOUS; SUBCUTANEOUS EVERY 2 HOUR PRN
Status: DISCONTINUED | OUTPATIENT
Start: 2024-02-07 | End: 2024-02-15

## 2024-02-07 NOTE — PLAN OF CARE
Problem: Patient Centered Care  Goal: Patient preferences are identified and integrated in the patient's plan of care  Description: Interventions:  - What would you like us to know as we care for you?   - Provide timely, complete, and accurate information to patient/family  - Incorporate patient and family knowledge, values, beliefs, and cultural backgrounds into the planning and delivery of care  - Encourage patient/family to participate in care and decision-making at the level they choose  - Honor patient and family perspectives and choices  Outcome: Progressing     Problem: Patient/Family Goals  Goal: Patient/Family Long Term Goal  Description: Patient's Long Term Goal:     Interventions:  -   - See additional Care Plan goals for specific interventions  Outcome: Progressing  Goal: Patient/Family Short Term Goal  Description: Patient's Short Term Goal:     Interventions:   -   - See additional Care Plan goals for specific interventions  Outcome: Progressing     Problem: PAIN - ADULT  Goal: Verbalizes/displays adequate comfort level or patient's stated pain goal  Description: INTERVENTIONS:  - Encourage pt to monitor pain and request assistance  - Assess pain using appropriate pain scale  - Administer analgesics based on type and severity of pain and evaluate response  - Implement non-pharmacological measures as appropriate and evaluate response  - Consider cultural and social influences on pain and pain management  - Manage/alleviate anxiety  - Utilize distraction and/or relaxation techniques  - Monitor for opioid side effects  - Notify MD/LIP if interventions unsuccessful or patient reports new pain  - Anticipate increased pain with activity and pre-medicate as appropriate  Outcome: Progressing     Problem: SAFETY ADULT - FALL  Goal: Free from fall injury  Description: INTERVENTIONS:  - Assess pt frequently for physical needs  - Identify cognitive and physical deficits and behaviors that affect risk of  falls.  - [place] fall precautions as indicated by assessment.  - Educate pt/family on patient safety including physical limitations  - Instruct pt to call for assistance with activity based on assessment  - Modify environment to reduce risk of injury  - Provide assistive devices as appropriate  - Consider OT/PT consult to assist with strengthening/mobility  - Encourage toileting schedule  Outcome: Progressing     Problem: GASTROINTESTINAL - ADULT  Goal: Minimal or absence of nausea and vomiting  Description: INTERVENTIONS:  - Maintain adequate hydration with IV or PO as ordered and tolerated  - Nasogastric tube to low intermittent suction as ordered  - Evaluate effectiveness of ordered antiemetic medications  - Provide nonpharmacologic comfort measures as appropriate  - Advance diet as tolerated, if ordered  - Obtain nutritional consult as needed  - Evaluate fluid balance  Outcome: Progressing  Goal: Maintains or returns to baseline bowel function  Description: INTERVENTIONS:  - Assess bowel function  - Maintain adequate hydration with IV or PO as ordered and tolerated  - Evaluate effectiveness of GI medications  - Encourage mobilization and activity  - Obtain nutritional consult as needed  - Establish a toileting routine/schedule  - Consider collaborating with pharmacy to review patient's medication profile  Outcome: Progressing     Problem: SKIN/TISSUE INTEGRITY - ADULT  Goal: Skin integrity remains intact  Description: INTERVENTIONS  - Assess and document risk factors for pressure ulcer development  - Assess and document skin integrity  - Monitor for areas of redness and/or skin breakdown  - Initiate interventions, skin care algorithm/standards of care as needed  Outcome: Progressing  Goal: Incision(s), wounds(s) or drain site(s) healing without S/S of infection  Description: INTERVENTIONS:  - Assess and document risk factors for pressure ulcer development  - Assess and document skin integrity  - Assess and  document dressing/incision, wound bed, drain sites and surrounding tissue  - Implement wound care per orders  - Initiate isolation precautions as appropriate  - Initiate Pressure Ulcer prevention bundle as indicated  Outcome: Progressing     Alert and oriented x4, anxious at times. Reports mild surgical abdominal pain. Denies medication. NG to LIS, + bile output. Denies nausea/vomiting. BM this morning. Hypoactive bowel sounds. IV fluids infusing @ 75 ml/hr. Per surgery will begin PPN tonight. Plan for abdominal obstructive series tomorrow.

## 2024-02-07 NOTE — PROGRESS NOTES
[facility]    General Surgery Progress Note  Mayte Lucas  : 1945  CSN: [id number]  HD# 4    Subjective:   POD#4 right hemicolectomy for acute cecal volvulus  Feels well mild incisional pain as expected, denies abdominal pain, and denies N/V - LLQ and lower abdominal pain markedly improved  Passing flatus and BM(s)     Exam:     General: awake and alert, in no acute distress, comfortable, and in good spirits  Pulmonary:lungs clear to auscultation bilaterally  Cardiac: RRR, nl S1,S2, no S3, no S4, and no murmur  Abdomen:  hypoactive, moderately distended, and mild tenderness diffusely, no guarding    Extremities: calves nontender, no edema, SCD's on, motor intact, and sensory intact  Wounds: clean, dry and intact and partially open with reagan removed    Assessment and Plan:   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HCC)  S/p right hemicolectomy    CT abdomen images reviewed - ileus but no evidence of leak  Obstructive series . shows dilated small bowel loops w differential fluid levels c/w early sbo.     Stable  Diet: NGT/NPO x ice chips and sips of liquids  IVF: continue current rate  Antibiotics: no need for further antibiotics beyond perioperative doses  Cont Entereg  Cont Dilaudid for breakthrough pain prn  Tylenol IV    Activity: OOB to chair, more ambulation encouraged, and Ambulation in halls at least TID  DVT prophylaxis: SCDs and chemoprophylaxis as ordered    Discharge disposition: pending clinical course       Objective:     Vitals:    24 1150 24 1730 24 2035 24 0436   BP: 122/63  138/67 132/56   Pulse: 81 84 89 62   Resp: 18  18 18   Temp: 97.7 °F (36.5 °C)  99.8 °F (37.7 °C)    TempSrc: Oral  Oral Oral   SpO2: 90%  98% 97%   Weight:       Height:         Body mass index is 17.31 kg/m².    Intake/Output Summary (Last 24 hours) at 2024 1013  Last data filed at 2024 0749  Gross per 24 hour   Intake 1526.95 ml   Output 825 ml   Net 701.95 ml       Results:     Recent Labs    Lab 02/05/24  0541 02/06/24  0643 02/07/24 0615   RBC 3.27* 3.92 3.44*   HGB 9.6* 11.4* 9.9*   HCT 28.6* 34.0* 29.6*   MCV 87.5 86.7 86.0   MCH 29.4 29.1 28.8   MCHC 33.6 33.5 33.4   RDW 14.6 14.7 14.5   NEPRELIM 8.93* 7.97* 5.12   WBC 10.5 9.4 7.0   .0 239.0 241.0       Recent Labs   Lab 02/05/24  0541 02/05/24  1916 02/06/24  0643 02/07/24  0615   GLU 69*  --  94 89   BUN 14  --  17 18   CREATSERUM 0.74  --  0.70 0.60   CA 8.2*  --  8.5* 8.1*     --  140 140   K 3.1* 3.9 3.6 3.8  3.8     --  111 109   CO2 21.0  --  18.0* 21.0       Lab Results   Component Value Date    WBC 7.0 02/07/2024    HGB 9.9 02/07/2024    HCT 29.6 02/07/2024    .0 02/07/2024     02/07/2024    K 3.8 02/07/2024    K 3.8 02/07/2024     02/07/2024    CO2 21.0 02/07/2024    BUN 18 02/07/2024    CREATSERUM 0.60 02/07/2024    GLU 89 02/07/2024    MG 1.8 02/07/2024    CA 8.1 02/07/2024       XR CHEST AP PORTABLE  (CPT=71045)    Result Date: 2/6/2024  CONCLUSION:  1. Feeding tube tip in gastric fundus. 2. Biapical pleural parenchymal scarring with apical pleural calcification.    Dictated by (CST): Victor Manuel Freire MD on 2/06/2024 at 3:16 PM     Finalized by (CST): Victor Manuel Freire MD on 2/06/2024 at 3:18 PM          XR CHEST AP PORTABLE  (CPT=71045)    Result Date: 2/6/2024  CONCLUSION:   Malpositioned enteric tube, which is coiled in the distal esophagus with tip directed upward at the level of the thoracic inlet.  Repositioning advised.  Findings discussed with the patient's RN by Dr. Willson at the time of dictation (tube has been replaced and repeat radiograph is being performed).  Biapical pleural-parenchymal scarring.  No other focal airspace disease or significant pleural effusion.   elm-remote  Dictated by (CST): Osmani Willson MD on 2/06/2024 at 2:47 PM     Finalized by (CST): Osmani Willson MD on 2/06/2024 at 2:52 PM          XR ABDOMEN, OBSTRUCTIVE SERIES 3 VIEWS(CPT=74021)    Result Date:  2/6/2024  CONCLUSION: Diffuse small bowel dilation with differential air-fluid levels     Dictated by (CST): Macho Garcia MD on 2/06/2024 at 9:45 AM     Finalized by (CST): Macho Garcia MD on 2/06/2024 at 9:48 AM          CT ABDOMEN+PELVIS(CONTRAST ONLY)(CPT=74177)    Result Date: 2/5/2024  CONCLUSION:   Diffuse small bowel dilation, without a transition point.  This is likely postoperative ileus  No evidence of bowel ischemia.  No abscess.    Dictated by (CST): Macho Garcia MD on 2/05/2024 at 2:18 PM     Finalized by (CST): Macho Garcia MD on 2/05/2024 at 2:32 PM               Yavn Griggs MD [facility]  02/07/24  10:14 AM

## 2024-02-07 NOTE — PROGRESS NOTES
[facility] Hospitalist Progress Note     CC: Hospital Follow up    PCP: Abi Ohara MD       Assessment/Plan:     Principal Problem:    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HCC)    Patient is a 78 year old female with PMH sig for SVT, RLS, COPD, chronic pain, HLD, IgA deficiency who presented to the hospital with abdominal pain.     Cecal volvulus with extensive mesenteric ischemia  - s/p ex lap, R hemicolectomy, omentoplasty of anastomosis  - prn pain medication   - monitor respiratory status  - encouraged IS use  - prn anti emetics  - post op hgb of 9.6, was 12.6 prior to surgery  - FLD, diet per general surgery  - on PCA pump per general surgery  - dvt ppx: SCD  - CT A/P on 2/5 for severe LLQ pain showed ileus, no anastomic leak  - had 1 BM with streak blood, now normal  - now with NG to LIS, feeling better     Possible afib in PACU?   pSVT  - reported afib in PACU but 12 lead EKG with NSR  - tele monitoring to assess if truly afib as does have a history of SVT  - hold off on AC until/if atrial fibrillation is confirmed  - resume PO metoprolol  - tele monitor reviewed, has PACs/PVCs but no afib     COPD  - resume inhalers     RLS  - resume home medication     Chronic pain  - outpatient f/u     IgA deficiency  - outpatient f/u    QUE  - resume PRN benzo     FN:  - IVF: .45  - Diet: NPO     DVT Prophy: SCD  Lines: PIV     Dispo: pending clinical course    Thank You,  Flower Bain,     Hospitalist with [facility]     Subjective:   Pain improving. Family at beside. Having BM with no blood    OBJECTIVE:    Blood pressure 147/72, pulse 62, temperature 97.9 °F (36.6 °C), temperature source Oral, resp. rate 18, height 5' 5\" (1.651 m), weight 104 lb (47.2 kg), SpO2 93%, not currently breastfeeding.    Temp:  [97.9 °F (36.6 °C)-99.8 °F (37.7 °C)] 97.9 °F (36.6 °C)  Pulse:  [62-89] 62  Resp:  [18] 18  BP: (132-147)/(56-72) 147/72  SpO2:  [93 %-98 %] 93 %      Intake/Output:    Intake/Output Summary (Last 24  hours) at 2/7/2024 1317  Last data filed at 2/7/2024 0749  Gross per 24 hour   Intake 1526.95 ml   Output 825 ml   Net 701.95 ml       Last 3 Weights   02/03/24 0948 104 lb (47.2 kg)   02/03/24 0140 104 lb (47.2 kg)   01/04/22 1442 105 lb (47.6 kg)   12/10/21 1435 104 lb (47.2 kg)       Exam   Gen: No acute distress  Heent: NC AT, neck supple  Pulm: Lungs clear, normal respiratory effort  CV: Heart with regular rate and rhythm, no peripheral edema  Abd: Abdomen soft, tenderness to palpation diffusely improved from yesterday  MSK: no clubbing, no cyanosis    Data Review:       Labs:     Recent Labs   Lab 02/05/24  0541 02/06/24  0643 02/07/24  0615   RBC 3.27* 3.92 3.44*   HGB 9.6* 11.4* 9.9*   HCT 28.6* 34.0* 29.6*   MCV 87.5 86.7 86.0   MCH 29.4 29.1 28.8   MCHC 33.6 33.5 33.4   RDW 14.6 14.7 14.5   NEPRELIM 8.93* 7.97* 5.12   WBC 10.5 9.4 7.0   .0 239.0 241.0         Recent Labs   Lab 02/05/24  0541 02/05/24  1916 02/06/24  0643 02/07/24  0615   GLU 69*  --  94 89   BUN 14  --  17 18   CREATSERUM 0.74  --  0.70 0.60   EGFRCR 82  --  87 91   CA 8.2*  --  8.5* 8.1*     --  140 140   K 3.1* 3.9 3.6 3.8  3.8     --  111 109   CO2 21.0  --  18.0* 21.0       Recent Labs   Lab 02/03/24  0207   ALT 19   AST 21   ALB 4.5         Imaging:  XR CHEST AP PORTABLE  (CPT=71045)    Result Date: 2/6/2024  CONCLUSION:  1. Feeding tube tip in gastric fundus. 2. Biapical pleural parenchymal scarring with apical pleural calcification.    Dictated by (CST): Victor Manuel Freire MD on 2/06/2024 at 3:16 PM     Finalized by (CST): Victor Manuel Freire MD on 2/06/2024 at 3:18 PM          XR CHEST AP PORTABLE  (CPT=71045)    Result Date: 2/6/2024  CONCLUSION:   Malpositioned enteric tube, which is coiled in the distal esophagus with tip directed upward at the level of the thoracic inlet.  Repositioning advised.  Findings discussed with the patient's RN by Dr. Willson at the time of dictation (tube has been replaced and repeat  radiograph is being performed).  Biapical pleural-parenchymal scarring.  No other focal airspace disease or significant pleural effusion.   elm-remote  Dictated by (CST): Osmani Willson MD on 2/06/2024 at 2:47 PM     Finalized by (CST): Osmani Willson MD on 2/06/2024 at 2:52 PM          XR ABDOMEN, OBSTRUCTIVE SERIES 3 VIEWS(CPT=74021)    Result Date: 2/6/2024  CONCLUSION: Diffuse small bowel dilation with differential air-fluid levels     Dictated by (CST): Macho Garcia MD on 2/06/2024 at 9:45 AM     Finalized by (CST): Macho Garcia MD on 2/06/2024 at 9:48 AM          CT ABDOMEN+PELVIS(CONTRAST ONLY)(CPT=74177)    Result Date: 2/5/2024  CONCLUSION:   Diffuse small bowel dilation, without a transition point.  This is likely postoperative ileus  No evidence of bowel ischemia.  No abscess.    Dictated by (CST): Macho Garcia MD on 2/05/2024 at 2:18 PM     Finalized by (CST): Macho Garcia MD on 2/05/2024 at 2:32 PM             Meds:      potassium chloride  20 mEq Intravenous Once    potassium chloride  40 mEq Intravenous Once    simethicone  80 mg Oral TID    alvimopan  12 mg Oral BID    heparin  5,000 Units Subcutaneous Q12H    metoprolol succinate  12.5 mg Oral Nightly    OXcarbazepine  300 mg Oral BID      dextrose 10%      adult 3 in 1 TPN      sodium chloride 75 mL/hr at 02/06/24 0941     HYDROmorphone, acetaminophen, dextrose 10%, clonazePAM, ondansetron, metoclopramide

## 2024-02-07 NOTE — PLAN OF CARE
NG tube placed today; to LIS. Pt reports feeling better after tube placement. Morphine PCA was discontinued. Remains NPO. Walking halls today. Voiding freely. Reports of BMs having red streaks. MD notified. IVF as ordered.    Problem: Patient Centered Care  Goal: Patient preferences are identified and integrated in the patient's plan of care  Description: Interventions:  - Provide timely, complete, and accurate information to patient/family  - Incorporate patient and family knowledge, values, beliefs, and cultural backgrounds into the planning and delivery of care  - Encourage patient/family to participate in care and decision-making at the level they choose  - Honor patient and family perspectives and choices  2/6/2024 1815 by Thomas Lin RN  Outcome: Progressing  2/6/2024 1814 by Thomas Lin RN  Outcome: Progressing     Problem: PAIN - ADULT  Goal: Verbalizes/displays adequate comfort level or patient's stated pain goal  Description: INTERVENTIONS:  - Encourage pt to monitor pain and request assistance  - Assess pain using appropriate pain scale  - Administer analgesics based on type and severity of pain and evaluate response  - Implement non-pharmacological measures as appropriate and evaluate response  - Consider cultural and social influences on pain and pain management  - Manage/alleviate anxiety  - Utilize distraction and/or relaxation techniques  - Monitor for opioid side effects  - Notify MD/LIP if interventions unsuccessful or patient reports new pain  - Anticipate increased pain with activity and pre-medicate as appropriate  2/6/2024 1815 by Thomas Lin RN  Outcome: Progressing  2/6/2024 1814 by Thomas Lin RN  Outcome: Progressing     Problem: SAFETY ADULT - FALL  Goal: Free from fall injury  Description: INTERVENTIONS:  - Assess pt frequently for physical needs  - Identify cognitive and physical deficits and behaviors that affect risk of falls.  - [place] fall precautions as indicated  by assessment.  - Educate pt/family on patient safety including physical limitations  - Instruct pt to call for assistance with activity based on assessment  - Modify environment to reduce risk of injury  - Provide assistive devices as appropriate  - Consider OT/PT consult to assist with strengthening/mobility  - Encourage toileting schedule  2/6/2024 1815 by Thomas Lin, RN  Outcome: Progressing  2/6/2024 1814 by Thomas Lin, RN  Outcome: Progressing     Problem: GASTROINTESTINAL - ADULT  Goal: Minimal or absence of nausea and vomiting  Description: INTERVENTIONS:  - Maintain adequate hydration with IV or PO as ordered and tolerated  - Nasogastric tube to low intermittent suction as ordered  - Evaluate effectiveness of ordered antiemetic medications  - Provide nonpharmacologic comfort measures as appropriate  - Advance diet as tolerated, if ordered  - Obtain nutritional consult as needed  - Evaluate fluid balance  Outcome: Progressing  Goal: Maintains or returns to baseline bowel function  Description: INTERVENTIONS:  - Assess bowel function  - Maintain adequate hydration with IV or PO as ordered and tolerated  - Evaluate effectiveness of GI medications  - Encourage mobilization and activity  - Obtain nutritional consult as needed  - Establish a toileting routine/schedule  - Consider collaborating with pharmacy to review patient's medication profile  Outcome: Progressing     Problem: SKIN/TISSUE INTEGRITY - ADULT  Goal: Skin integrity remains intact  Description: INTERVENTIONS  - Assess and document risk factors for pressure ulcer development  - Assess and document skin integrity  - Monitor for areas of redness and/or skin breakdown  - Initiate interventions, skin care algorithm/standards of care as needed  Outcome: Progressing  Goal: Incision(s), wounds(s) or drain site(s) healing without S/S of infection  Description: INTERVENTIONS:  - Assess and document risk factors for pressure ulcer development  -  Assess and document skin integrity  - Assess and document dressing/incision, wound bed, drain sites and surrounding tissue  - Implement wound care per orders  - Initiate isolation precautions as appropriate  - Initiate Pressure Ulcer prevention bundle as indicated  Outcome: Progressing

## 2024-02-07 NOTE — DIETARY NOTE
ADULT NUTRITION REASSESSMENT    Pt is at high nutrition risk.  Pt does not meet malnutrition criteria.      RECOMMENDATIONS TO MD:  Received MD order to initiate PPN. PPN Ordered.   RN made aware to request orders to discontinue IVF once PPN begins.   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 Pt presented to hospital with c/o abdominal pain. POD#3 s/p right hemicolectomy for acute cecal volvulus. S/p obstructive series this AM - dilated small bowel loops with differential fluid levels c/w early SBO. Pt sitting up in bed at time of visit awaiting NGT reinsertion - first attempt coiled. Pt reports wt stable prior to admission with normal appetite/intake. Typically consumes 2 meals per day. Pt and  share meal prep responsibility however pt prefers to \"graze\". Pt reports she is very active, walking at least 10,000 steps daily.    2/7/24: MD order to initiate PPN. PPN ordered and discussed with RN and patient.   Patient with NGT + NPO (Ice Chips + Sips of Liquids).     FOOD/NUTRITION RELATED HISTORY:  Appetite: NPO  Intake:  NPO/Clear liquid x3 days  Intake Meeting Needs: NPO  Percent Meals Eaten (last 3 days)       None        Food Allergies: No Known Food Allergies (NKFA)  Cultural/Ethnic/Restorationist Preferences: None    GASTROINTESTINAL: +BM hard, green with blood in toilet x2 / 24 hrs, +flatus , bloating, NGT to LIS, and abdomen rounded, tender, hypoactive bowel sounds    2/7 Small, Loose Stool Documented.     MEDICATIONS: reviewed; Noted cardiac electrolyte replacement protocol ordered;IVF provides 1800 ml daily.     dextrose 10%      adult 3 in 1 TPN      sodium chloride 75 mL/hr at 02/06/24 0999       potassium chloride  20 mEq Intravenous Once    potassium chloride  40 mEq Intravenous Once    simethicone  80 mg Oral TID    alvimopan  12 mg Oral BID    heparin  5,000 Units Subcutaneous Q12H    metoprolol succinate  12.5 mg Oral Nightly    OXcarbazepine  300 mg Oral BID     LABS: reviewed; noted low-normal potasium and magnesium with replacement ordered today per protocol  Recent Labs     02/05/24  0541 02/05/24  1916 02/06/24  0643 02/07/24  0615   GLU 69*  --  94 89   BUN 14  --  17 18   CREATSERUM 0.74  --  0.70 0.60   CA 8.2*  --  8.5* 8.1*   MG 1.6  --  1.8 1.8     --  140 140   K 3.1* 3.9 3.6 3.8  3.8     --  111 109   CO2 21.0  --  18.0* 21.0   PHOS 2.1*  --  2.7  --    OSMOCALC 293  --  291 291     NUTRITION RELATED PHYSICAL FINDINGS:  - Nutrition Focused Physical Exam (NFPE): no wasting noted and appears well nourished  per visual exam.   2/7: NFPE at visit. Appears to have mild muscle mass depletion to dorsal vasques region, clavicle and shoulder regions + thigh region.   - Fluid Accumulation: none  see RN documentation for details  - Skin Integrity: surgical wound(s) see RN documentation for details    ANTHROPOMETRICS:  HT: 165.1 cm (5' 5\")  WT: 47.2 kg (104 lb)   BMI: Body mass index is 17.31 kg/m².  BMI CLASSIFICATION: <22 considered underweight for advanced age  IBW: 125 lbs        83% IBW  Usual Body Wt: 105 lbs per pt report      99% UBW    WEIGHT HISTORY:  Patient Weight(s) for the past 336 hrs:   Weight   02/03/24 0948 47.2 kg (104 lb)   02/03/24 0140 47.2 kg (104 lb)     Wt Readings from Last 10 Encounters:   02/03/24 47.2 kg (104 lb)   01/04/22 47.6 kg (105 lb)   12/10/21 47.2 kg (104 lb)   12/01/21 48.5 kg (107 lb)   08/05/21 47.3 kg (104 lb 4 oz)   06/24/21 46.7 kg (103 lb)   06/10/21 47.2 kg (104 lb)   10/15/20 47.6 kg (105 lb)   10/08/20 47.6 kg (105 lb)   03/05/20 49.4 kg (109 lb)     NUTRITION DIAGNOSIS/PROBLEM:   Inadequate protein energy intake related to Decreased ability  to consume sufficient energy  as evidenced by NPO/CL day 3.     Nutrition Diagnosis Progress: PPN Initiated for Nutrition. (Making Progress).     NUTRITION INTERVENTION:     NUTRITION PRESCRIPTION:   Estimated Nutrition needs: --dosing wt of 47 kg - wt taken on 2/3/24  Calories: 4981-4292 calories/day (MSJ REE = 950 kcal x 1.1(AF) x1.2-1.3(AF) or 27-29 calories per kg Dosing wt)  Protein: 56-71 g protein/day (1.2-1.5 g protein/kg Dosing wt)  Fluid Needs: 6954-1168 ml/day (1 ml/kcal)    - Diet:       Procedures    NPO      PPN Bag #1 (2/7): 1500 ml, 40 g protein, 255 dextrose calories, 470 lipid calories.   Provides 885 total calories (70% calorie and 71% protein needs).     - RD Care Plan: Monitor need for ONS (oral nutritional supplements). PPN Initiated.   - Meals and snacks: NPO  - Medical Food Supplements: RD added None at this time due to NPO.  - Vitamin and mineral supplements: NPO  - Feeding assistance: NPO  - Nutrition education: assess education needs   - Coordination of nutrition care: collaboration with other providers - discussed with RN on unit  - Discharge and transfer of nutrition care to new setting or provider: monitor plans - from home with spouse    MONITOR AND EVALUATE/NUTRITION GOALS:  - Food and Nutrient Intake:      Monitor: for PO initiation and for PO diet advancement  - Food and Nutrient Administration:      Monitor: need for temporary nutrition support  PPN to start tonight 2/7.   - Anthropometric Measurement:    Monitor weight  - Nutrition Goals:      labs within acceptable limits, minimize lean body mass loss, maintain true wt within 5%, and improved GI status  PPN to start tonight 2/7 for Nutrition. Patient to tolerate PPN.     DIETITIAN FOLLOW UP: RD to follow and monitor nutrition status    Hawa White RDN, LDN, CDE   Clinical Nutrition  Ext [phone]

## 2024-02-08 ENCOUNTER — APPOINTMENT (OUTPATIENT)
Dept: PICC SERVICES | Facility: HOSPITAL | Age: 79
End: 2024-02-08
Attending: SURGERY
Payer: MEDICARE

## 2024-02-08 ENCOUNTER — APPOINTMENT (OUTPATIENT)
Dept: GENERAL RADIOLOGY | Facility: HOSPITAL | Age: 79
End: 2024-02-08
Attending: SURGERY
Payer: MEDICARE

## 2024-02-08 LAB
ALBUMIN SERPL-MCNC: 3 G/DL (ref 3.2–4.8)
ALBUMIN/GLOB SERPL: 1.7 {RATIO} (ref 1–2)
ALP LIVER SERPL-CCNC: 51 U/L
ALT SERPL-CCNC: 11 U/L
ANION GAP SERPL CALC-SCNC: 6 MMOL/L (ref 0–18)
AST SERPL-CCNC: 17 U/L (ref ?–34)
BILIRUB SERPL-MCNC: 0.3 MG/DL (ref 0.2–1.1)
BUN BLD-MCNC: 13 MG/DL (ref 9–23)
BUN/CREAT SERPL: 27.1 (ref 10–20)
CALCIUM BLD-MCNC: 7.9 MG/DL (ref 8.7–10.4)
CHLORIDE SERPL-SCNC: 107 MMOL/L (ref 98–112)
CO2 SERPL-SCNC: 24 MMOL/L (ref 21–32)
CREAT BLD-MCNC: 0.48 MG/DL
EGFRCR SERPLBLD CKD-EPI 2021: 96 ML/MIN/1.73M2 (ref 60–?)
GLOBULIN PLAS-MCNC: 1.8 G/DL (ref 2.8–4.4)
GLUCOSE BLD-MCNC: 109 MG/DL (ref 70–99)
GLUCOSE BLDC GLUCOMTR-MCNC: 110 MG/DL (ref 70–99)
GLUCOSE BLDC GLUCOMTR-MCNC: 120 MG/DL (ref 70–99)
GLUCOSE BLDC GLUCOMTR-MCNC: 129 MG/DL (ref 70–99)
GLUCOSE BLDC GLUCOMTR-MCNC: 89 MG/DL (ref 70–99)
MAGNESIUM SERPL-MCNC: 1.8 MG/DL (ref 1.6–2.6)
OSMOLALITY SERPL CALC.SUM OF ELEC: 285 MOSM/KG (ref 275–295)
PHOSPHATE SERPL-MCNC: 1.9 MG/DL (ref 2.4–5.1)
POTASSIUM SERPL-SCNC: 3.6 MMOL/L (ref 3.5–5.1)
POTASSIUM SERPL-SCNC: 3.6 MMOL/L (ref 3.5–5.1)
PROT SERPL-MCNC: 4.8 G/DL (ref 5.7–8.2)
SODIUM SERPL-SCNC: 137 MMOL/L (ref 136–145)
TRIGL SERPL-MCNC: 117 MG/DL (ref 30–149)

## 2024-02-08 PROCEDURE — 74021 RADEX ABDOMEN 3+ VIEWS: CPT | Performed by: SURGERY

## 2024-02-08 PROCEDURE — 84100 ASSAY OF PHOSPHORUS: CPT | Performed by: SURGERY

## 2024-02-08 PROCEDURE — 36569 INSJ PICC 5 YR+ W/O IMAGING: CPT

## 2024-02-08 PROCEDURE — C9113 INJ PANTOPRAZOLE SODIUM, VIA: HCPCS | Performed by: SURGERY

## 2024-02-08 PROCEDURE — 84132 ASSAY OF SERUM POTASSIUM: CPT | Performed by: STUDENT IN AN ORGANIZED HEALTH CARE EDUCATION/TRAINING PROGRAM

## 2024-02-08 PROCEDURE — 80053 COMPREHEN METABOLIC PANEL: CPT | Performed by: SURGERY

## 2024-02-08 PROCEDURE — 84478 ASSAY OF TRIGLYCERIDES: CPT | Performed by: SURGERY

## 2024-02-08 PROCEDURE — 82962 GLUCOSE BLOOD TEST: CPT

## 2024-02-08 PROCEDURE — 02HV33Z INSERTION OF INFUSION DEVICE INTO SUPERIOR VENA CAVA, PERCUTANEOUS APPROACH: ICD-10-PCS | Performed by: INTERNAL MEDICINE

## 2024-02-08 PROCEDURE — 83735 ASSAY OF MAGNESIUM: CPT | Performed by: SURGERY

## 2024-02-08 RX ORDER — POTASSIUM CHLORIDE 14.9 MG/ML
20 INJECTION INTRAVENOUS ONCE
Status: DISCONTINUED | OUTPATIENT
Start: 2024-02-08 | End: 2024-02-08

## 2024-02-08 RX ORDER — LIDOCAINE HYDROCHLORIDE 10 MG/ML
5 INJECTION, SOLUTION EPIDURAL; INFILTRATION; INTRACAUDAL; PERINEURAL
Status: COMPLETED | OUTPATIENT
Start: 2024-02-08 | End: 2024-02-08

## 2024-02-08 RX ORDER — MAGNESIUM OXIDE 400 MG/1
400 TABLET ORAL ONCE
Status: COMPLETED | OUTPATIENT
Start: 2024-02-08 | End: 2024-02-08

## 2024-02-08 RX ORDER — POTASSIUM CHLORIDE 14.9 MG/ML
20 INJECTION INTRAVENOUS ONCE
Status: COMPLETED | OUTPATIENT
Start: 2024-02-08 | End: 2024-02-08

## 2024-02-08 NOTE — PROGRESS NOTES
[facility] Hospitalist Progress Note     CC: Hospital Follow up    PCP: Abi Ohara MD       Assessment/Plan:     Principal Problem:    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HCC)    Patient is a 78 year old female with PMH sig for SVT, RLS, COPD, chronic pain, HLD, IgA deficiency who presented to the hospital with abdominal pain.     Cecal volvulus with extensive mesenteric ischemia  - s/p ex lap, R hemicolectomy, omentoplasty of anastomosis  - prn pain medication   - monitor respiratory status  - encouraged IS use  - prn anti emetics  - post op hgb of 9.6, was 12.6 prior to surgery  - FLD, diet per general surgery  - on PCA pump per general surgery  - dvt ppx: SCD  - CT A/P on 2/5 for severe LLQ pain showed ileus, no anastomic leak  - had 1 BM with streak blood, now normal  - now with NG to LIS, feeling better  - obstructive series pending  - on PPN     Possible afib in PACU?   pSVT  - reported afib in PACU but 12 lead EKG with NSR  - tele monitoring to assess if truly afib as does have a history of SVT  - hold off on AC until/if atrial fibrillation is confirmed  - resume PO metoprolol  - tele monitor reviewed, has PACs/PVCs but no afib     COPD  - resume inhalers     RLS  - resume home medication     Chronic pain  - outpatient f/u     IgA deficiency  - outpatient f/u    QUE  - resume PRN benzo     FN:  - IVF: .45  - Diet: NPO     DVT Prophy: SCD  Lines: PIV     Dispo: pending clinical course    Thank You,  Flower Bain, DO    Hospitalist with [facility]     Subjective:   Pain improving. Family at beside. Ambulating more    OBJECTIVE:    Blood pressure 145/71, pulse 97, temperature 99.6 °F (37.6 °C), temperature source Oral, resp. rate 18, height 5' 5\" (1.651 m), weight 104 lb (47.2 kg), SpO2 96%, not currently breastfeeding.    Temp:  [99.2 °F (37.3 °C)-99.6 °F (37.6 °C)] 99.6 °F (37.6 °C)  Pulse:  [80-98] 97  Resp:  [18-19] 18  BP: (133-145)/(58-72) 145/71  SpO2:  [96 %-100 %] 96  %      Intake/Output:    Intake/Output Summary (Last 24 hours) at 2/8/2024 1254  Last data filed at 2/8/2024 1142  Gross per 24 hour   Intake --   Output 1525 ml   Net -1525 ml       Last 3 Weights   02/03/24 0948 104 lb (47.2 kg)   02/03/24 0140 104 lb (47.2 kg)   01/04/22 1442 105 lb (47.6 kg)   12/10/21 1435 104 lb (47.2 kg)       Exam   Gen: No acute distress  Heent: NG in place  Pulm: Lungs clear, normal respiratory effort  CV: Heart with regular rate and rhythm, no peripheral edema  Abd: Abdomen soft, mild TTP  MSK: no clubbing, no cyanosis    Data Review:       Labs:     Recent Labs   Lab 02/05/24  0541 02/06/24  0643 02/07/24  0615   RBC 3.27* 3.92 3.44*   HGB 9.6* 11.4* 9.9*   HCT 28.6* 34.0* 29.6*   MCV 87.5 86.7 86.0   MCH 29.4 29.1 28.8   MCHC 33.6 33.5 33.4   RDW 14.6 14.7 14.5   NEPRELIM 8.93* 7.97* 5.12   WBC 10.5 9.4 7.0   .0 239.0 241.0         Recent Labs   Lab 02/06/24  0643 02/07/24  0615 02/08/24  0620   GLU 94 89 109*   BUN 17 18 13   CREATSERUM 0.70 0.60 0.48*   EGFRCR 87 91 96   CA 8.5* 8.1* 7.9*    140 137   K 3.6 3.8  3.8 3.6  3.6    109 107   CO2 18.0* 21.0 24.0       Recent Labs   Lab 02/03/24  0207 02/08/24  0620   ALT 19 11   AST 21 17   ALB 4.5 3.0*         Imaging:  XR ABDOMEN, OBSTRUCTIVE SERIES 3 VIEWS(CPT=74021)    Result Date: 2/8/2024  CONCLUSION: Increasing diffuse small bowel dilation now measuring up to 5.8 centimeter.  This could be obstruction or ileus     Dictated by (CST): Macho Garcia MD on 2/08/2024 at 11:23 AM     Finalized by (CST): Macho Garcia MD on 2/08/2024 at 11:25 AM          XR CHEST AP PORTABLE  (CPT=71045)    Result Date: 2/6/2024  CONCLUSION:  1. Feeding tube tip in gastric fundus. 2. Biapical pleural parenchymal scarring with apical pleural calcification.    Dictated by (CST): Victor Manuel Freire MD on 2/06/2024 at 3:16 PM     Finalized by (CST): Victor Manuel Freire MD on 2/06/2024 at 3:18 PM          XR CHEST AP PORTABLE  (CPT=71045)    Result  Date: 2/6/2024  CONCLUSION:   Malpositioned enteric tube, which is coiled in the distal esophagus with tip directed upward at the level of the thoracic inlet.  Repositioning advised.  Findings discussed with the patient's RN by Dr. Willson at the time of dictation (tube has been replaced and repeat radiograph is being performed).  Biapical pleural-parenchymal scarring.  No other focal airspace disease or significant pleural effusion.   elm-remote  Dictated by (CST): Osmani Willson MD on 2/06/2024 at 2:47 PM     Finalized by (CST): Osmani Willson MD on 2/06/2024 at 2:52 PM          XR ABDOMEN, OBSTRUCTIVE SERIES 3 VIEWS(CPT=74021)    Result Date: 2/6/2024  CONCLUSION: Diffuse small bowel dilation with differential air-fluid levels     Dictated by (CST): Macho Garcia MD on 2/06/2024 at 9:45 AM     Finalized by (CST): Macho Garcia MD on 2/06/2024 at 9:48 AM          CT ABDOMEN+PELVIS(CONTRAST ONLY)(CPT=74177)    Result Date: 2/5/2024  CONCLUSION:   Diffuse small bowel dilation, without a transition point.  This is likely postoperative ileus  No evidence of bowel ischemia.  No abscess.    Dictated by (CST): Macho Garcia MD on 2/05/2024 at 2:18 PM     Finalized by (CST): Macho Garcia MD on 2/05/2024 at 2:32 PM             Meds:      potassium phosphate dibasic 15 mmol in sodium chloride 0.9% 250 mL IVPB  15 mmol Intravenous Once    Followed by    potassium chloride  20 mEq Intravenous Once    magnesium oxide  400 mg Oral Once    pantoprazole  40 mg Intravenous Daily    simethicone  80 mg Oral TID    alvimopan  12 mg Oral BID    heparin  5,000 Units Subcutaneous Q12H    metoprolol succinate  12.5 mg Oral Nightly    OXcarbazepine  300 mg Oral BID      adult 3 in 1 TPN      dextrose 10%      adult 3 in 1 TPN 62.5 mL/hr at 02/07/24 2221     HYDROmorphone, acetaminophen, dextrose 10%, phenol, clonazePAM, ondansetron, metoclopramide

## 2024-02-08 NOTE — PROGRESS NOTES
[facility]    General Surgery Progress Note  Mayte Lucas  : 1945  CSN: [id number]  HD# [id number]    Subjective:   POD#5 right hemicolectomy for acute cecal volvulus  Feels well denies abdominal pain and denies N/V - LLQ and lower abdominal pain basically resolved  Passing flatus and BM(s) sm amt    Exam:     General: awake and alert, in no acute distress, comfortable, and in good spirits  Pulmonary:lungs clear to auscultation bilaterally  Cardiac: RRR, nl S1,S2, no S3, no S4, and no murmur  Abdomen:  hypoactive, moderately distended, and mild tenderness diffusely, no guarding    Extremities: calves nontender, no edema, SCD's on, motor intact, and sensory intact  Wounds: clean, dry and intact     Assessment and Plan:   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HCC)  S/p right hemicolectomy    CT abdomen images reviewed - ileus but no evidence of leak  Obstructive series . and today shows dilated small bowel loops w differential fluid levels c/w early sbo vs ileus     Stable  Diet: NGT/NPO x ice chips and sips of liquids  IVF: continue current rate  Antibiotics: no need for further antibiotics beyond perioperative doses  Cont Entereg  Cont Dilaudid for breakthrough pain prn  Tylenol IV  Cont PPN  PICC line for TPN    Activity: OOB to chair, more ambulation encouraged, and Ambulation in halls at least TID  DVT prophylaxis: SCDs and chemoprophylaxis as ordered    Discharge disposition: pending clinical course       Objective:     Vitals:    24 1140 24 0321 24 1148   BP: 147/72 133/58 145/72 145/71   Pulse:  80 98 97   Resp: 18 19 18 18   Temp: 97.9 °F (36.6 °C) 99.2 °F (37.3 °C) 99.6 °F (37.6 °C)    TempSrc: Oral Oral Oral Oral   SpO2: 93% 100% 96% 96%   Weight:       Height:         Body mass index is 17.31 kg/m².    Intake/Output Summary (Last 24 hours) at 2024 1245  Last data filed at 2024 1142  Gross per 24 hour   Intake --   Output 1525 ml   Net -1525 ml       Results:     Recent  Labs   Lab 02/05/24  0541 02/06/24  0643 02/07/24  0615   RBC 3.27* 3.92 3.44*   HGB 9.6* 11.4* 9.9*   HCT 28.6* 34.0* 29.6*   MCV 87.5 86.7 86.0   MCH 29.4 29.1 28.8   MCHC 33.6 33.5 33.4   RDW 14.6 14.7 14.5   NEPRELIM 8.93* 7.97* 5.12   WBC 10.5 9.4 7.0   .0 239.0 241.0       Recent Labs   Lab 02/06/24  0643 02/07/24  0615 02/08/24  0620   GLU 94 89 109*   BUN 17 18 13   CREATSERUM 0.70 0.60 0.48*   CA 8.5* 8.1* 7.9*    140 137   K 3.6 3.8  3.8 3.6  3.6    109 107   CO2 18.0* 21.0 24.0       Lab Results   Component Value Date     02/08/2024    K 3.6 02/08/2024    K 3.6 02/08/2024     02/08/2024    CO2 24.0 02/08/2024    BUN 13 02/08/2024    CREATSERUM 0.48 02/08/2024     02/08/2024    MG 1.8 02/08/2024    PHOS 1.9 02/08/2024    BILT 0.3 02/08/2024    AST 17 02/08/2024    ALT 11 02/08/2024    CA 7.9 02/08/2024    ALB 3.0 02/08/2024    TP 4.8 02/08/2024       XR ABDOMEN, OBSTRUCTIVE SERIES 3 VIEWS(CPT=74021)    Result Date: 2/8/2024  CONCLUSION: Increasing diffuse small bowel dilation now measuring up to 5.8 centimeter.  This could be obstruction or ileus     Dictated by (CST): Macho Garcia MD on 2/08/2024 at 11:23 AM     Finalized by (CST): Macho Garcia MD on 2/08/2024 at 11:25 AM          XR CHEST AP PORTABLE  (CPT=71045)    Result Date: 2/6/2024  CONCLUSION:  1. Feeding tube tip in gastric fundus. 2. Biapical pleural parenchymal scarring with apical pleural calcification.    Dictated by (CST): Victor Manuel Freire MD on 2/06/2024 at 3:16 PM     Finalized by (CST): Victor Manuel Freire MD on 2/06/2024 at 3:18 PM          XR CHEST AP PORTABLE  (CPT=71045)    Result Date: 2/6/2024  CONCLUSION:   Malpositioned enteric tube, which is coiled in the distal esophagus with tip directed upward at the level of the thoracic inlet.  Repositioning advised.  Findings discussed with the patient's RN by Dr. Willson at the time of dictation (tube has been replaced and repeat radiograph is being  performed).  Biapical pleural-parenchymal scarring.  No other focal airspace disease or significant pleural effusion.   elm-remote  Dictated by (CST): Osmani Willson MD on 2/06/2024 at 2:47 PM     Finalized by (CST): Osmani Willson MD on 2/06/2024 at 2:52 PM               Yvan Griggs MD [facility]  02/08/24  12:46 PM

## 2024-02-08 NOTE — PLAN OF CARE
Problem: Patient Centered Care  Goal: Patient preferences are identified and integrated in the patient's plan of care  Description: Interventions:  - What would you like us to know as we care for you?   - Provide timely, complete, and accurate information to patient/family  - Incorporate patient and family knowledge, values, beliefs, and cultural backgrounds into the planning and delivery of care  - Encourage patient/family to participate in care and decision-making at the level they choose  - Honor patient and family perspectives and choices  Outcome: Progressing     Problem: Patient/Family Goals  Goal: Patient/Family Long Term Goal  Description: Patient's Long Term Goal:    Interventions:  - See additional Care Plan goals for specific interventions  Outcome: Progressing  Goal: Patient/Family Short Term Goal  Description: Patient's Short Term Goal:     Interventions:   - See additional Care Plan goals for specific interventions  Outcome: Progressing     Problem: PAIN - ADULT  Goal: Verbalizes/displays adequate comfort level or patient's stated pain goal  Description: INTERVENTIONS:  - Encourage pt to monitor pain and request assistance  - Assess pain using appropriate pain scale  - Administer analgesics based on type and severity of pain and evaluate response  - Implement non-pharmacological measures as appropriate and evaluate response  - Consider cultural and social influences on pain and pain management  - Manage/alleviate anxiety  - Utilize distraction and/or relaxation techniques  - Monitor for opioid side effects  - Notify MD/LIP if interventions unsuccessful or patient reports new pain  - Anticipate increased pain with activity and pre-medicate as appropriate  Outcome: Progressing     Problem: SAFETY ADULT - FALL  Goal: Free from fall injury  Description: INTERVENTIONS:  - Assess pt frequently for physical needs  - Identify cognitive and physical deficits and behaviors that affect risk of falls.  -  [place] fall precautions as indicated by assessment.  - Educate pt/family on patient safety including physical limitations  - Instruct pt to call for assistance with activity based on assessment  - Modify environment to reduce risk of injury  - Provide assistive devices as appropriate  - Consider OT/PT consult to assist with strengthening/mobility  - Encourage toileting schedule  Outcome: Progressing     Problem: GASTROINTESTINAL - ADULT  Goal: Minimal or absence of nausea and vomiting  Description: INTERVENTIONS:  - Maintain adequate hydration with IV or PO as ordered and tolerated  - Nasogastric tube to low intermittent suction as ordered  - Evaluate effectiveness of ordered antiemetic medications  - Provide nonpharmacologic comfort measures as appropriate  - Advance diet as tolerated, if ordered  - Obtain nutritional consult as needed  - Evaluate fluid balance  Outcome: Progressing  Goal: Maintains or returns to baseline bowel function  Description: INTERVENTIONS:  - Assess bowel function  - Maintain adequate hydration with IV or PO as ordered and tolerated  - Evaluate effectiveness of GI medications  - Encourage mobilization and activity  - Obtain nutritional consult as needed  - Establish a toileting routine/schedule  - Consider collaborating with pharmacy to review patient's medication profile  Outcome: Progressing     Problem: SKIN/TISSUE INTEGRITY - ADULT  Goal: Skin integrity remains intact  Description: INTERVENTIONS  - Assess and document risk factors for pressure ulcer development  - Assess and document skin integrity  - Monitor for areas of redness and/or skin breakdown  - Initiate interventions, skin care algorithm/standards of care as needed  Outcome: Progressing  Goal: Incision(s), wounds(s) or drain site(s) healing without S/S of infection  Description: INTERVENTIONS:  - Assess and document risk factors for pressure ulcer development  - Assess and document skin integrity  - Assess and document  dressing/incision, wound bed, drain sites and surrounding tissue  - Implement wound care per orders  - Initiate isolation precautions as appropriate  - Initiate Pressure Ulcer prevention bundle as indicated  Outcome: Progressing  No acute changes overnight.  Mayte is A/Ox4. VSS on room air. Remote tele in place. Heparin subcutaneous for DVT prophylaxis. NG to LIS, NPO. PPN infusing, accucheck Q6. Denies nausea/vomiting. Tylenol for pain management. Ambulating with 1 assist and a walker. Surgical incision C/D/I. Plan for obstructive series in the morning. Fall and safety precautions maintained. Bed locked in the lowest position, call light and personal belongings within reach, frequent rounding done.

## 2024-02-09 LAB
ANION GAP SERPL CALC-SCNC: 3 MMOL/L (ref 0–18)
BASOPHILS # BLD AUTO: 0.04 X10(3) UL (ref 0–0.2)
BASOPHILS NFR BLD AUTO: 0.5 %
BUN BLD-MCNC: 10 MG/DL (ref 9–23)
BUN/CREAT SERPL: 22.7 (ref 10–20)
CALCIUM BLD-MCNC: 8.1 MG/DL (ref 8.7–10.4)
CHLORIDE SERPL-SCNC: 105 MMOL/L (ref 98–112)
CO2 SERPL-SCNC: 30 MMOL/L (ref 21–32)
CREAT BLD-MCNC: 0.44 MG/DL
DEPRECATED RDW RBC AUTO: 44.5 FL (ref 35.1–46.3)
EGFRCR SERPLBLD CKD-EPI 2021: 98 ML/MIN/1.73M2 (ref 60–?)
EOSINOPHIL # BLD AUTO: 0.22 X10(3) UL (ref 0–0.7)
EOSINOPHIL NFR BLD AUTO: 2.7 %
ERYTHROCYTE [DISTWIDTH] IN BLOOD BY AUTOMATED COUNT: 14.4 % (ref 11–15)
GLUCOSE BLD-MCNC: 115 MG/DL (ref 70–99)
GLUCOSE BLDC GLUCOMTR-MCNC: 112 MG/DL (ref 70–99)
GLUCOSE BLDC GLUCOMTR-MCNC: 118 MG/DL (ref 70–99)
GLUCOSE BLDC GLUCOMTR-MCNC: 119 MG/DL (ref 70–99)
GLUCOSE BLDC GLUCOMTR-MCNC: 121 MG/DL (ref 70–99)
HCT VFR BLD AUTO: 25.2 %
HGB BLD-MCNC: 8.8 G/DL
IMM GRANULOCYTES # BLD AUTO: 0.14 X10(3) UL (ref 0–1)
IMM GRANULOCYTES NFR BLD: 1.7 %
LYMPHOCYTES # BLD AUTO: 1.07 X10(3) UL (ref 1–4)
LYMPHOCYTES NFR BLD AUTO: 13.1 %
MAGNESIUM SERPL-MCNC: 1.9 MG/DL (ref 1.6–2.6)
MCH RBC QN AUTO: 29.3 PG (ref 26–34)
MCHC RBC AUTO-ENTMCNC: 34.9 G/DL (ref 31–37)
MCV RBC AUTO: 84 FL
MONOCYTES # BLD AUTO: 0.94 X10(3) UL (ref 0.1–1)
MONOCYTES NFR BLD AUTO: 11.5 %
NEUTROPHILS # BLD AUTO: 5.76 X10 (3) UL (ref 1.5–7.7)
NEUTROPHILS # BLD AUTO: 5.76 X10(3) UL (ref 1.5–7.7)
NEUTROPHILS NFR BLD AUTO: 70.5 %
OSMOLALITY SERPL CALC.SUM OF ELEC: 286 MOSM/KG (ref 275–295)
PHOSPHATE SERPL-MCNC: 3.1 MG/DL (ref 2.4–5.1)
PHOSPHATE SERPL-MCNC: 3.1 MG/DL (ref 2.4–5.1)
PLATELET # BLD AUTO: 206 10(3)UL (ref 150–450)
POTASSIUM SERPL-SCNC: 4 MMOL/L (ref 3.5–5.1)
POTASSIUM SERPL-SCNC: 4 MMOL/L (ref 3.5–5.1)
RBC # BLD AUTO: 3 X10(6)UL
SODIUM SERPL-SCNC: 138 MMOL/L (ref 136–145)
WBC # BLD AUTO: 8.2 X10(3) UL (ref 4–11)

## 2024-02-09 PROCEDURE — 82962 GLUCOSE BLOOD TEST: CPT

## 2024-02-09 PROCEDURE — 80048 BASIC METABOLIC PNL TOTAL CA: CPT | Performed by: SURGERY

## 2024-02-09 PROCEDURE — 84100 ASSAY OF PHOSPHORUS: CPT | Performed by: STUDENT IN AN ORGANIZED HEALTH CARE EDUCATION/TRAINING PROGRAM

## 2024-02-09 PROCEDURE — C9113 INJ PANTOPRAZOLE SODIUM, VIA: HCPCS | Performed by: SURGERY

## 2024-02-09 PROCEDURE — 84132 ASSAY OF SERUM POTASSIUM: CPT | Performed by: STUDENT IN AN ORGANIZED HEALTH CARE EDUCATION/TRAINING PROGRAM

## 2024-02-09 PROCEDURE — 85025 COMPLETE CBC W/AUTO DIFF WBC: CPT | Performed by: SURGERY

## 2024-02-09 PROCEDURE — 83735 ASSAY OF MAGNESIUM: CPT | Performed by: SURGERY

## 2024-02-09 PROCEDURE — 84100 ASSAY OF PHOSPHORUS: CPT | Performed by: SURGERY

## 2024-02-09 NOTE — PROGRESS NOTES
[facility] Hospitalist Progress Note     CC: Hospital Follow up    PCP: Abi Ohara MD       Assessment/Plan:     Principal Problem:    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HCC)    Patient is a 78 year old female with PMH sig for SVT, RLS, COPD, chronic pain, HLD, IgA deficiency who presented to the hospital with abdominal pain.     Cecal volvulus with extensive mesenteric ischemia  - s/p ex lap, R hemicolectomy, omentoplasty of anastomosis  - prn pain medication   - monitor respiratory status  - encouraged IS use  - prn anti emetics  - post op hgb of 9.6, was 12.6 prior to surgery  - on PCA pump per general surgery, now off  - dvt ppx: SCD  - CT A/P on 2/5 for severe LLQ pain showed ileus, no anastomic leak  - had 1 BM with streak blood, now normal  - now with NG to LIS and on CLD per general surgery  - obstructive series showed SI is more distended  - on PPN now to TPN     Possible afib in PACU?   pSVT  - reported afib in PACU but 12 lead EKG with NSR  - tele monitoring to assess if truly afib as does have a history of SVT  - hold off on AC until/if atrial fibrillation is confirmed  - resume PO metoprolol  - tele monitor reviewed, has PACs/PVCs but no afib     COPD  - resume inhalers     RLS  - resume home medication     Chronic pain  - outpatient f/u     IgA deficiency  - outpatient f/u    QUE  - resume PRN benzo     FN:  - IVF: .45  - Diet: NPO     DVT Prophy: SCD  Lines: PIV     Dispo: pending clinical course    Thank You,  Flower Bain,     Hospitalist with [facility]     Subjective:   Had bowel movement and a lot of gas yesterday. Otherwise no other big change    OBJECTIVE:    Blood pressure 110/60, pulse 92, temperature 99 °F (37.2 °C), temperature source Oral, resp. rate 18, height 5' 5\" (1.651 m), weight 104 lb (47.2 kg), SpO2 96%, not currently breastfeeding.    Temp:  [99 °F (37.2 °C)-99.7 °F (37.6 °C)] 99 °F (37.2 °C)  Pulse:  [] 92  Resp:  [16-18] 18  BP: (110-146)/(60-70)  110/60  SpO2:  [95 %-96 %] 96 %      Intake/Output:    Intake/Output Summary (Last 24 hours) at 2/9/2024 1324  Last data filed at 2/9/2024 1117  Gross per 24 hour   Intake 480 ml   Output 2800 ml   Net -2320 ml       Last 3 Weights   02/03/24 0948 104 lb (47.2 kg)   02/03/24 0140 104 lb (47.2 kg)   01/04/22 1442 105 lb (47.6 kg)   12/10/21 1435 104 lb (47.2 kg)       Exam   Gen: No acute distress  Heent: NG in place  Pulm: Lungs clear, normal respiratory effort  CV: Heart with regular rate and rhythm, no peripheral edema  Abd: Abdomen soft, mild TTP  MSK: no clubbing, no cyanosis    Data Review:       Labs:     Recent Labs   Lab 02/06/24  0643 02/07/24  0615 02/09/24  0630   RBC 3.92 3.44* 3.00*   HGB 11.4* 9.9* 8.8*   HCT 34.0* 29.6* 25.2*   MCV 86.7 86.0 84.0   MCH 29.1 28.8 29.3   MCHC 33.5 33.4 34.9   RDW 14.7 14.5 14.4   NEPRELIM 7.97* 5.12 5.76   WBC 9.4 7.0 8.2   .0 241.0 206.0         Recent Labs   Lab 02/07/24  0615 02/08/24  0620 02/09/24  0630   GLU 89 109* 115*   BUN 18 13 10   CREATSERUM 0.60 0.48* 0.44*   EGFRCR 91 96 98   CA 8.1* 7.9* 8.1*    137 138   K 3.8  3.8 3.6  3.6 4.0  4.0    107 105   CO2 21.0 24.0 30.0       Recent Labs   Lab 02/03/24  0207 02/08/24  0620   ALT 19 11   AST 21 17   ALB 4.5 3.0*         Imaging:  XR ABDOMEN, OBSTRUCTIVE SERIES 3 VIEWS(CPT=74021)    Result Date: 2/8/2024  CONCLUSION: Increasing diffuse small bowel dilation now measuring up to 5.8 centimeter.  This could be obstruction or ileus     Dictated by (CST): Macho Garcia MD on 2/08/2024 at 11:23 AM     Finalized by (CST): Macho Garcia MD on 2/08/2024 at 11:25 AM          XR CHEST AP PORTABLE  (CPT=71045)    Result Date: 2/6/2024  CONCLUSION:  1. Feeding tube tip in gastric fundus. 2. Biapical pleural parenchymal scarring with apical pleural calcification.    Dictated by (CST): Victor Manuel Freire MD on 2/06/2024 at 3:16 PM     Finalized by (CST): Victor Manuel Freire MD on 2/06/2024 at 3:18 PM          XR  CHEST AP PORTABLE  (CPT=71045)    Result Date: 2/6/2024  CONCLUSION:   Malpositioned enteric tube, which is coiled in the distal esophagus with tip directed upward at the level of the thoracic inlet.  Repositioning advised.  Findings discussed with the patient's RN by Dr. Willson at the time of dictation (tube has been replaced and repeat radiograph is being performed).  Biapical pleural-parenchymal scarring.  No other focal airspace disease or significant pleural effusion.   elm-remote  Dictated by (CST): Osmani Willson MD on 2/06/2024 at 2:47 PM     Finalized by (CST): Osmani Willson MD on 2/06/2024 at 2:52 PM             Meds:      pantoprazole  40 mg Intravenous Daily    simethicone  80 mg Oral TID    alvimopan  12 mg Oral BID    heparin  5,000 Units Subcutaneous Q12H    metoprolol succinate  12.5 mg Oral Nightly    OXcarbazepine  300 mg Oral BID      adult 3 in 1 TPN      adult 3 in 1 TPN 62.5 mL/hr at 02/08/24 2034    dextrose 10%       HYDROmorphone, acetaminophen, dextrose 10%, phenol, clonazePAM, ondansetron, metoclopramide

## 2024-02-09 NOTE — PLAN OF CARE
Problem: Patient Centered Care  Goal: Patient preferences are identified and integrated in the patient's plan of care  Description: Interventions:  - What would you like us to know as we care for you?  - Provide timely, complete, and accurate information to patient/family  - Incorporate patient and family knowledge, values, beliefs, and cultural backgrounds into the planning and delivery of care  - Encourage patient/family to participate in care and decision-making at the level they choose  - Honor patient and family perspectives and choices  Outcome: Progressing     Problem: Patient/Family Goals  Goal: Patient/Family Long Term Goal  Description: Patient's Long Term Goal:     Interventions:  - See additional Care Plan goals for specific interventions  Outcome: Progressing  Goal: Patient/Family Short Term Goal  Description: Patient's Short Term Goal:     Interventions:   - See additional Care Plan goals for specific interventions  Outcome: Progressing     Problem: PAIN - ADULT  Goal: Verbalizes/displays adequate comfort level or patient's stated pain goal  Description: INTERVENTIONS:  - Encourage pt to monitor pain and request assistance  - Assess pain using appropriate pain scale  - Administer analgesics based on type and severity of pain and evaluate response  - Implement non-pharmacological measures as appropriate and evaluate response  - Consider cultural and social influences on pain and pain management  - Manage/alleviate anxiety  - Utilize distraction and/or relaxation techniques  - Monitor for opioid side effects  - Notify MD/LIP if interventions unsuccessful or patient reports new pain  - Anticipate increased pain with activity and pre-medicate as appropriate  Outcome: Progressing     Problem: SAFETY ADULT - FALL  Goal: Free from fall injury  Description: INTERVENTIONS:  - Assess pt frequently for physical needs  - Identify cognitive and physical deficits and behaviors that affect risk of falls.  -  [place] fall precautions as indicated by assessment.  - Educate pt/family on patient safety including physical limitations  - Instruct pt to call for assistance with activity based on assessment  - Modify environment to reduce risk of injury  - Provide assistive devices as appropriate  - Consider OT/PT consult to assist with strengthening/mobility  - Encourage toileting schedule  Outcome: Progressing     Problem: GASTROINTESTINAL - ADULT  Goal: Minimal or absence of nausea and vomiting  Description: INTERVENTIONS:  - Maintain adequate hydration with IV or PO as ordered and tolerated  - Nasogastric tube to low intermittent suction as ordered  - Evaluate effectiveness of ordered antiemetic medications  - Provide nonpharmacologic comfort measures as appropriate  - Advance diet as tolerated, if ordered  - Obtain nutritional consult as needed  - Evaluate fluid balance  Outcome: Progressing  Goal: Maintains or returns to baseline bowel function  Description: INTERVENTIONS:  - Assess bowel function  - Maintain adequate hydration with IV or PO as ordered and tolerated  - Evaluate effectiveness of GI medications  - Encourage mobilization and activity  - Obtain nutritional consult as needed  - Establish a toileting routine/schedule  - Consider collaborating with pharmacy to review patient's medication profile  Outcome: Progressing     Problem: SKIN/TISSUE INTEGRITY - ADULT  Goal: Skin integrity remains intact  Description: INTERVENTIONS  - Assess and document risk factors for pressure ulcer development  - Assess and document skin integrity  - Monitor for areas of redness and/or skin breakdown  - Initiate interventions, skin care algorithm/standards of care as needed  Outcome: Progressing  Goal: Incision(s), wounds(s) or drain site(s) healing without S/S of infection  Description: INTERVENTIONS:  - Assess and document risk factors for pressure ulcer development  - Assess and document skin integrity  - Assess and document  dressing/incision, wound bed, drain sites and surrounding tissue  - Implement wound care per orders  - Initiate isolation precautions as appropriate  - Initiate Pressure Ulcer prevention bundle as indicated  Outcome: Progressing   No acute changes overnight. A/Ox4, VSS on room air. Remote tele in place. NG to LIS, NPO. Denies nausea. PPN infusing, accucheck Q6. Midline surgical dressing C/D/I. Ambulating with 1 assist and a walker. Heparin subcutaneous for dvt prophylaxis. Fall and safety precautions maintained. Bed locked in the lowest position. Call light and personal belongings within reach, frequent rounding done.

## 2024-02-09 NOTE — PLAN OF CARE
Problem: Patient Centered Care  Goal: Patient preferences are identified and integrated in the patient's plan of care  Description: Interventions:  - What would you like us to know as we care for you?   - Provide timely, complete, and accurate information to patient/family  - Incorporate patient and family knowledge, values, beliefs, and cultural backgrounds into the planning and delivery of care  - Encourage patient/family to participate in care and decision-making at the level they choose  - Honor patient and family perspectives and choices  Outcome: Progressing     Problem: Patient/Family Goals  Goal: Patient/Family Long Term Goal  Description: Patient's Long Term Goal:     Interventions:  - See additional Care Plan goals for specific interventions  Outcome: Progressing  Goal: Patient/Family Short Term Goal  Description: Patient's Short Term Goal:     Interventions:   - See additional Care Plan goals for specific interventions  Outcome: Progressing     Problem: PAIN - ADULT  Goal: Verbalizes/displays adequate comfort level or patient's stated pain goal  Description: INTERVENTIONS:  - Encourage pt to monitor pain and request assistance  - Assess pain using appropriate pain scale  - Administer analgesics based on type and severity of pain and evaluate response  - Implement non-pharmacological measures as appropriate and evaluate response  - Consider cultural and social influences on pain and pain management  - Manage/alleviate anxiety  - Utilize distraction and/or relaxation techniques  - Monitor for opioid side effects  - Notify MD/LIP if interventions unsuccessful or patient reports new pain  - Anticipate increased pain with activity and pre-medicate as appropriate  Outcome: Progressing     Problem: SAFETY ADULT - FALL  Goal: Free from fall injury  Description: INTERVENTIONS:  - Assess pt frequently for physical needs  - Identify cognitive and physical deficits and behaviors that affect risk of falls.  -  [place] fall precautions as indicated by assessment.  - Educate pt/family on patient safety including physical limitations  - Instruct pt to call for assistance with activity based on assessment  - Modify environment to reduce risk of injury  - Provide assistive devices as appropriate  - Consider OT/PT consult to assist with strengthening/mobility  - Encourage toileting schedule  Outcome: Progressing     Problem: GASTROINTESTINAL - ADULT  Goal: Minimal or absence of nausea and vomiting  Description: INTERVENTIONS:  - Maintain adequate hydration with IV or PO as ordered and tolerated  - Nasogastric tube to low intermittent suction as ordered  - Evaluate effectiveness of ordered antiemetic medications  - Provide nonpharmacologic comfort measures as appropriate  - Advance diet as tolerated, if ordered  - Obtain nutritional consult as needed  - Evaluate fluid balance  Outcome: Progressing  Goal: Maintains or returns to baseline bowel function  Description: INTERVENTIONS:  - Assess bowel function  - Maintain adequate hydration with IV or PO as ordered and tolerated  - Evaluate effectiveness of GI medications  - Encourage mobilization and activity  - Obtain nutritional consult as needed  - Establish a toileting routine/schedule  - Consider collaborating with pharmacy to review patient's medication profile  Outcome: Progressing     Problem: SKIN/TISSUE INTEGRITY - ADULT  Goal: Skin integrity remains intact  Description: INTERVENTIONS  - Assess and document risk factors for pressure ulcer development  - Assess and document skin integrity  - Monitor for areas of redness and/or skin breakdown  - Initiate interventions, skin care algorithm/standards of care as needed  Outcome: Progressing  Goal: Incision(s), wounds(s) or drain site(s) healing without S/S of infection  Description: INTERVENTIONS:  - Assess and document risk factors for pressure ulcer development  - Assess and document skin integrity  - Assess and document  dressing/incision, wound bed, drain sites and surrounding tissue  - Implement wound care per orders  - Initiate isolation precautions as appropriate  - Initiate Pressure Ulcer prevention bundle as indicated  Outcome: Progressing     No acute changes today. NG to LIS, tolerating ice chips and sips with meds. Reports having bms. Went down for obstructive series this morning. Abdominal dressing c/d/I. No calls from tele. PICC placed by vascular team, plans to transition to tpn tomorrow. Dilaudid x1 for complaints of severe abdominal pain. Electrolytes replaced per protocol. Call light within reach, frequent rounding.

## 2024-02-09 NOTE — PROGRESS NOTES
[facility]    General Surgery Progress Note  Mayte Lucas  : 1945  CSN: [id number]  HD# [id number]    Subjective:   POD#6 right hemicolectomy for acute cecal volvulus  Feels well denies abdominal pain and denies N/V - LLQ and lower abdominal pain resolved  Passing flatus and BM(s)     Exam:     General: awake and alert, in no acute distress, comfortable, and in good spirits  Pulmonary:lungs clear to auscultation bilaterally  Cardiac: RRR, nl S1,S2, no S3, no S4, and no murmur  Abdomen:  hypoactive, nontender except mild tenderness at incision, and mildly distended   Extremities: calves nontender, no edema, SCD's on, motor intact, and sensory intact  Wounds: clean, dry and intact     Assessment and Plan:   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HCC)  S/p right hemicolectomy    CT abdomen images reviewed - ileus but no evidence of leak  Obstructive series . and today shows dilated small bowel loops w differential fluid levels c/w early sbo vs ileus     Stable  Diet: NGT/NPO x ice chips and clear liquids  IVF: continue current rate  Antibiotics: no need for further antibiotics beyond perioperative doses  Cont Entereg  Cont Dilaudid for breakthrough pain prn  Cont Tylenol IV  Cont PPN  PICC line for TPN    Activity: OOB to chair, more ambulation encouraged, and Ambulation in halls at least TID  DVT prophylaxis: SCDs and chemoprophylaxis as ordered    Discharge disposition: pending clinical course       Objective:     Vitals:    24 1944 24 0317 24 0329 24 1205   BP: 146/69  133/70 110/60   Pulse:    92   Resp: 18  16 18   Temp: 99.7 °F (37.6 °C)  99.3 °F (37.4 °C) 99 °F (37.2 °C)   TempSrc: Oral  Axillary Oral   SpO2: 96% 96% 95% 96%   Weight:       Height:         Body mass index is 17.31 kg/m².    Intake/Output Summary (Last 24 hours) at 2024 1301  Last data filed at 2024 1117  Gross per 24 hour   Intake 480 ml   Output 2800 ml   Net -2320 ml       Results:     Recent Labs   Lab  02/06/24  0643 02/07/24  0615 02/09/24  0630   RBC 3.92 3.44* 3.00*   HGB 11.4* 9.9* 8.8*   HCT 34.0* 29.6* 25.2*   MCV 86.7 86.0 84.0   MCH 29.1 28.8 29.3   MCHC 33.5 33.4 34.9   RDW 14.7 14.5 14.4   NEPRELIM 7.97* 5.12 5.76   WBC 9.4 7.0 8.2   .0 241.0 206.0       Recent Labs   Lab 02/07/24  0615 02/08/24  0620 02/09/24  0630   GLU 89 109* 115*   BUN 18 13 10   CREATSERUM 0.60 0.48* 0.44*   CA 8.1* 7.9* 8.1*    137 138   K 3.8  3.8 3.6  3.6 4.0  4.0    107 105   CO2 21.0 24.0 30.0       Lab Results   Component Value Date    WBC 8.2 02/09/2024    HGB 8.8 02/09/2024    HCT 25.2 02/09/2024    .0 02/09/2024     02/09/2024    K 4.0 02/09/2024    K 4.0 02/09/2024     02/09/2024    CO2 30.0 02/09/2024    BUN 10 02/09/2024    CREATSERUM 0.44 02/09/2024     02/09/2024    MG 1.9 02/09/2024    PHOS 3.1 02/09/2024    PHOS 3.1 02/09/2024    CA 8.1 02/09/2024       XR ABDOMEN, OBSTRUCTIVE SERIES 3 VIEWS(CPT=74021)    Result Date: 2/8/2024  CONCLUSION: Increasing diffuse small bowel dilation now measuring up to 5.8 centimeter.  This could be obstruction or ileus     Dictated by (CST): Macho Garcia MD on 2/08/2024 at 11:23 AM     Finalized by (CST): Macho Garcia MD on 2/08/2024 at 11:25 AM               Yvan Griggs MD [facility]  02/09/24  1:03 PM

## 2024-02-09 NOTE — DIETARY NOTE
ADULT NUTRITION UPDATE NOTE    Pt is at high nutrition risk.  Pt does not meet malnutrition criteria.      RECOMMENDATIONS TO MD:  Received MD order to switch from PPN to TPN. PICC Line Placed and Verified per RN.     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 Pt presented to hospital with c/o abdominal pain. POD#3 s/p right hemicolectomy for acute cecal volvulus. S/p obstructive series this AM - dilated small bowel loops with differential fluid levels c/w early SBO. Pt sitting up in bed at time of visit awaiting NGT reinsertion - first attempt coiled. Pt reports wt stable prior to admission with normal appetite/intake. Typically consumes 2 meals per day. Pt and  share meal prep responsibility however pt prefers to \"graze\". Pt reports she is very active, walking at least 10,000 steps daily.    2/7/24: MD order to initiate PPN. PPN ordered and discussed with RN and patient.   Patient with NGT + NPO (Ice Chips + Sips of Liquids).     2/9/24: PICC Line Placement. MD order to start TPN. NG to LIS. Taking some ice chips and sips of clear liquids from floor stock. Minimal intake per RN. Passing flatus + small amount of BM.   Discussed Abdomen Obstructive Series (2/8) with RN. Noted increasing diffuse small bowel dilation (could be obstruction or ileus). Monitor medical plans.     FOOD/NUTRITION RELATED HISTORY:  Appetite: NPO  Intake:  NPO/Clear liquid x3 days  Intake Meeting Needs: NPO  Percent Meals Eaten (last 3 days)       None        Food Allergies: No Known Food Allergies (NKFA)  Cultural/Ethnic/Latter-day Preferences: None    GASTROINTESTINAL: +BM hard, green with blood in toilet x2 / 24 hrs, +flatus ,  bloating, NGT to LIS, and abdomen rounded, tender, hypoactive bowel sounds    2/7 Small, Loose Stool Documented.     MEDICATIONS: reviewed; Noted cardiac electrolyte replacement protocol ordered;IVF provides 1800 ml daily.     adult 3 in 1 TPN 62.5 mL/hr at 02/08/24 2034    dextrose 10%        pantoprazole  40 mg Intravenous Daily    simethicone  80 mg Oral TID    alvimopan  12 mg Oral BID    heparin  5,000 Units Subcutaneous Q12H    metoprolol succinate  12.5 mg Oral Nightly    OXcarbazepine  300 mg Oral BID     LABS: reviewed; noted low-normal potasium and magnesium with replacement ordered today per protocol  Recent Labs     02/06/24  0643 02/07/24  0615 02/08/24  0620 02/09/24  0630   GLU 94 89 109* 115*   BUN 17 18 13 10   CREATSERUM 0.70 0.60 0.48* 0.44*   CA 8.5* 8.1* 7.9* 8.1*   MG 1.8 1.8 1.8 1.9    140 137 138   K 3.6 3.8  3.8 3.6  3.6 4.0  4.0    109 107 105   CO2 18.0* 21.0 24.0 30.0   PHOS 2.7  --  1.9* 3.1  3.1   OSMOCALC 291 291 285 286     NUTRITION RELATED PHYSICAL FINDINGS:  - Nutrition Focused Physical Exam (NFPE): no wasting noted and appears well nourished  per visual exam.   2/7: NFPE at visit. Appears to have mild muscle mass depletion to dorsal vasques region, clavicle and shoulder regions + thigh region.   - Fluid Accumulation: none  see RN documentation for details  - Skin Integrity: surgical wound(s) see RN documentation for details    ANTHROPOMETRICS:  HT: 165.1 cm (5' 5\")  WT: 47.2 kg (104 lb)   BMI: Body mass index is 17.31 kg/m².  BMI CLASSIFICATION: <22 considered underweight for advanced age  IBW: 125 lbs        83% IBW  Usual Body Wt: 105 lbs per pt report      99% UBW    WEIGHT HISTORY:  Patient Weight(s) for the past 336 hrs:   Weight   02/03/24 0948 47.2 kg (104 lb)   02/03/24 0140 47.2 kg (104 lb)     Wt Readings from Last 10 Encounters:   02/03/24 47.2 kg (104 lb)   01/04/22 47.6 kg (105 lb)   12/10/21 47.2 kg (104 lb)   12/01/21 48.5 kg (107 lb)   08/05/21 47.3  kg (104 lb 4 oz)   06/24/21 46.7 kg (103 lb)   06/10/21 47.2 kg (104 lb)   10/15/20 47.6 kg (105 lb)   10/08/20 47.6 kg (105 lb)   03/05/20 49.4 kg (109 lb)     NUTRITION DIAGNOSIS/PROBLEM:   Inadequate protein energy intake related to Decreased ability to consume sufficient energy  as evidenced by NPO/CL day 3.     Nutrition Diagnosis Progress: TPN Initiated for Nutrition. (Making Progress).     NUTRITION INTERVENTION:     NUTRITION PRESCRIPTION:   Estimated Nutrition needs: --dosing wt of 47 kg - wt taken on 2/3/24  Calories: 0073-1849 calories/day (MSJ REE = 950 kcal x 1.1(AF) x1.2-1.3(AF) or 27-29 calories per kg Dosing wt)  Protein: 56-71 g protein/day (1.2-1.5 g protein/kg Dosing wt)  Fluid Needs: 6412-2326 ml/day (1 ml/kcal)    - Diet:       Procedures    NPO      TPN Bag #1 (2/9): 1500 ml, 60 g protein, 500 dextrose calories, 450 lipid calories.   Provides 1190 total calories (95% calorie and 100% protein needs).     - RD Care Plan: Monitor need for ONS (oral nutritional supplements). PPN Initiated.   - Meals and snacks: NPO  - Medical Food Supplements: RD added None at this time due to NPO.  - Vitamin and mineral supplements: NPO  - Feeding assistance: NPO  - Nutrition education: assess education needs   - Coordination of nutrition care: collaboration with other providers - discussed with RN on unit  - Discharge and transfer of nutrition care to new setting or provider: monitor plans - from home with spouse    MONITOR AND EVALUATE/NUTRITION GOALS:  - Food and Nutrient Intake:      Monitor: for PO initiation and for PO diet advancement  - Food and Nutrient Administration:      Monitor: need for temporary nutrition support  TPN to start tonight 2/9.   - Anthropometric Measurement:    Monitor weight  - Nutrition Goals:      labs within acceptable limits, minimize lean body mass loss, maintain true wt within 5%, and improved GI status  TPN to start tonight 2/9 for Nutrition. Patient to tolerate TPN.      DIETITIAN FOLLOW UP: RD to follow and monitor nutrition status    Hawa White RDN, LDN, CDE   Clinical Nutrition  Ext [phone]

## 2024-02-10 ENCOUNTER — APPOINTMENT (OUTPATIENT)
Dept: GENERAL RADIOLOGY | Facility: HOSPITAL | Age: 79
End: 2024-02-10
Attending: SURGERY
Payer: MEDICARE

## 2024-02-10 ENCOUNTER — APPOINTMENT (OUTPATIENT)
Dept: ULTRASOUND IMAGING | Facility: HOSPITAL | Age: 79
End: 2024-02-10
Attending: STUDENT IN AN ORGANIZED HEALTH CARE EDUCATION/TRAINING PROGRAM
Payer: MEDICARE

## 2024-02-10 ENCOUNTER — APPOINTMENT (OUTPATIENT)
Dept: ULTRASOUND IMAGING | Facility: HOSPITAL | Age: 79
End: 2024-02-10
Attending: HOSPITALIST
Payer: MEDICARE

## 2024-02-10 LAB
ANION GAP SERPL CALC-SCNC: 6 MMOL/L (ref 0–18)
BUN BLD-MCNC: 13 MG/DL (ref 9–23)
BUN/CREAT SERPL: 28.3 (ref 10–20)
CALCIUM BLD-MCNC: 8.3 MG/DL (ref 8.7–10.4)
CHLORIDE SERPL-SCNC: 105 MMOL/L (ref 98–112)
CO2 SERPL-SCNC: 30 MMOL/L (ref 21–32)
CREAT BLD-MCNC: 0.46 MG/DL
EGFRCR SERPLBLD CKD-EPI 2021: 97 ML/MIN/1.73M2 (ref 60–?)
GLUCOSE BLD-MCNC: 109 MG/DL (ref 70–99)
GLUCOSE BLDC GLUCOMTR-MCNC: 119 MG/DL (ref 70–99)
GLUCOSE BLDC GLUCOMTR-MCNC: 120 MG/DL (ref 70–99)
GLUCOSE BLDC GLUCOMTR-MCNC: 143 MG/DL (ref 70–99)
GLUCOSE BLDC GLUCOMTR-MCNC: 95 MG/DL (ref 70–99)
MAGNESIUM SERPL-MCNC: 2 MG/DL (ref 1.6–2.6)
OSMOLALITY SERPL CALC.SUM OF ELEC: 293 MOSM/KG (ref 275–295)
PHOSPHATE SERPL-MCNC: 4.2 MG/DL (ref 2.4–5.1)
POTASSIUM SERPL-SCNC: 4.1 MMOL/L (ref 3.5–5.1)
SODIUM SERPL-SCNC: 141 MMOL/L (ref 136–145)

## 2024-02-10 PROCEDURE — 80048 BASIC METABOLIC PNL TOTAL CA: CPT | Performed by: SURGERY

## 2024-02-10 PROCEDURE — 82962 GLUCOSE BLOOD TEST: CPT

## 2024-02-10 PROCEDURE — 84100 ASSAY OF PHOSPHORUS: CPT | Performed by: SURGERY

## 2024-02-10 PROCEDURE — 93970 EXTREMITY STUDY: CPT | Performed by: HOSPITALIST

## 2024-02-10 PROCEDURE — 74021 RADEX ABDOMEN 3+ VIEWS: CPT | Performed by: SURGERY

## 2024-02-10 PROCEDURE — 83735 ASSAY OF MAGNESIUM: CPT | Performed by: SURGERY

## 2024-02-10 PROCEDURE — C9113 INJ PANTOPRAZOLE SODIUM, VIA: HCPCS | Performed by: SURGERY

## 2024-02-10 RX ORDER — CLONAZEPAM 0.25 MG/1
0.25 TABLET, ORALLY DISINTEGRATING ORAL NIGHTLY PRN
Status: DISCONTINUED | OUTPATIENT
Start: 2024-02-10 | End: 2024-02-10

## 2024-02-10 RX ORDER — CLONAZEPAM 0.25 MG/1
0.5 TABLET, ORALLY DISINTEGRATING ORAL NIGHTLY PRN
Status: DISCONTINUED | OUTPATIENT
Start: 2024-02-10 | End: 2024-02-10

## 2024-02-10 RX ORDER — CLONAZEPAM 0.5 MG/1
0.5 TABLET ORAL NIGHTLY PRN
Status: DISCONTINUED | OUTPATIENT
Start: 2024-02-10 | End: 2024-02-10

## 2024-02-10 RX ORDER — CLONAZEPAM 0.25 MG/1
0.25 TABLET, ORALLY DISINTEGRATING ORAL EVERY 6 HOURS PRN
Status: DISCONTINUED | OUTPATIENT
Start: 2024-02-10 | End: 2024-02-17

## 2024-02-10 NOTE — PLAN OF CARE
Mayte is alert/oriented. Vitals stable. Incision with dressing clean/dry/intact. Ambulating in halls with standby assist - up in chair frequently. TPN infusing. Q6 accuchecks stable. Pain control with tylenol for back pain. Heparin for DVT prophylaxis. Voiding adequately. Passing gas, 1 loose BM this afternoon. Clamp trial this afternoon. No nausea or vomiting. Leave clamped overnight. US BLE negative for DVT, swelling present. Bed low, locked, all safety measures in place.    Problem: Patient Centered Care  Goal: Patient preferences are identified and integrated in the patient's plan of care  Description: Interventions:  - What would you like us to know as we care for you?   - Provide timely, complete, and accurate information to patient/family  - Incorporate patient and family knowledge, values, beliefs, and cultural backgrounds into the planning and delivery of care  - Encourage patient/family to participate in care and decision-making at the level they choose  - Honor patient and family perspectives and choices  Outcome: Progressing     Problem: Patient/Family Goals  Goal: Patient/Family Long Term Goal  Description: Patient's Long Term Goal:     Interventions:  -   - See additional Care Plan goals for specific interventions  Outcome: Progressing  Goal: Patient/Family Short Term Goal  Description: Patient's Short Term Goal:     Interventions:   -   - See additional Care Plan goals for specific interventions  Outcome: Progressing     Problem: PAIN - ADULT  Goal: Verbalizes/displays adequate comfort level or patient's stated pain goal  Description: INTERVENTIONS:  - Encourage pt to monitor pain and request assistance  - Assess pain using appropriate pain scale  - Administer analgesics based on type and severity of pain and evaluate response  - Implement non-pharmacological measures as appropriate and evaluate response  - Consider cultural and social influences on pain and pain management  - Manage/alleviate  anxiety  - Utilize distraction and/or relaxation techniques  - Monitor for opioid side effects  - Notify MD/LIP if interventions unsuccessful or patient reports new pain  - Anticipate increased pain with activity and pre-medicate as appropriate  Outcome: Progressing     Problem: SAFETY ADULT - FALL  Goal: Free from fall injury  Description: INTERVENTIONS:  - Assess pt frequently for physical needs  - Identify cognitive and physical deficits and behaviors that affect risk of falls.  - [place] fall precautions as indicated by assessment.  - Educate pt/family on patient safety including physical limitations  - Instruct pt to call for assistance with activity based on assessment  - Modify environment to reduce risk of injury  - Provide assistive devices as appropriate  - Consider OT/PT consult to assist with strengthening/mobility  - Encourage toileting schedule  Outcome: Progressing     Problem: GASTROINTESTINAL - ADULT  Goal: Minimal or absence of nausea and vomiting  Description: INTERVENTIONS:  - Maintain adequate hydration with IV or PO as ordered and tolerated  - Nasogastric tube to low intermittent suction as ordered  - Evaluate effectiveness of ordered antiemetic medications  - Provide nonpharmacologic comfort measures as appropriate  - Advance diet as tolerated, if ordered  - Obtain nutritional consult as needed  - Evaluate fluid balance  Outcome: Progressing  Goal: Maintains or returns to baseline bowel function  Description: INTERVENTIONS:  - Assess bowel function  - Maintain adequate hydration with IV or PO as ordered and tolerated  - Evaluate effectiveness of GI medications  - Encourage mobilization and activity  - Obtain nutritional consult as needed  - Establish a toileting routine/schedule  - Consider collaborating with pharmacy to review patient's medication profile  Outcome: Progressing     Problem: SKIN/TISSUE INTEGRITY - ADULT  Goal: Skin integrity remains intact  Description: INTERVENTIONS  -  Assess and document risk factors for pressure ulcer development  - Assess and document skin integrity  - Monitor for areas of redness and/or skin breakdown  - Initiate interventions, skin care algorithm/standards of care as needed  Outcome: Progressing  Goal: Incision(s), wounds(s) or drain site(s) healing without S/S of infection  Description: INTERVENTIONS:  - Assess and document risk factors for pressure ulcer development  - Assess and document skin integrity  - Assess and document dressing/incision, wound bed, drain sites and surrounding tissue  - Implement wound care per orders  - Initiate isolation precautions as appropriate  - Initiate Pressure Ulcer prevention bundle as indicated  Outcome: Progressing      71 yo male, Pmhx Htn, Hld, Dm, Alcoholic cirrhosis, Esrd on HD(tu,th,sat), Hx gi bleed s/p  avm cauterized, followed with Dr. Amin of nephrology, had HD yesterday, presents to ed for sob. per ems, pt acutely woke up this am sob with + productive cough, change from yesterday. Per ems, no vomiting, falls.

## 2024-02-10 NOTE — PLAN OF CARE
No acute changes over night. Pt is alert and oriented on RA. Remote tele in place. Pt is having BM and passing gas. NG to LIS. TPN running as ordered. Q6 accu checks. Tolerating CLD. Pain controlled with IV Tylenol and IV dilaudid. Pt is up with a standby assist with walker. Plan for repeat of obstructive series in the morning. Call light is within reach and safety measures are in place.    Problem: PAIN - ADULT  Goal: Verbalizes/displays adequate comfort level or patient's stated pain goal  Description: INTERVENTIONS:  - Encourage pt to monitor pain and request assistance  - Assess pain using appropriate pain scale  - Administer analgesics based on type and severity of pain and evaluate response  - Implement non-pharmacological measures as appropriate and evaluate response  - Consider cultural and social influences on pain and pain management  - Manage/alleviate anxiety  - Utilize distraction and/or relaxation techniques  - Monitor for opioid side effects  - Notify MD/LIP if interventions unsuccessful or patient reports new pain  - Anticipate increased pain with activity and pre-medicate as appropriate  Outcome: Progressing     Problem: SAFETY ADULT - FALL  Goal: Free from fall injury  Description: INTERVENTIONS:  - Assess pt frequently for physical needs  - Identify cognitive and physical deficits and behaviors that affect risk of falls.  - [place] fall precautions as indicated by assessment.  - Educate pt/family on patient safety including physical limitations  - Instruct pt to call for assistance with activity based on assessment  - Modify environment to reduce risk of injury  - Provide assistive devices as appropriate  - Consider OT/PT consult to assist with strengthening/mobility  - Encourage toileting schedule  Outcome: Progressing     Problem: GASTROINTESTINAL - ADULT  Goal: Minimal or absence of nausea and vomiting  Description: INTERVENTIONS:  - Maintain adequate hydration with IV or PO as ordered and  tolerated  - Nasogastric tube to low intermittent suction as ordered  - Evaluate effectiveness of ordered antiemetic medications  - Provide nonpharmacologic comfort measures as appropriate  - Advance diet as tolerated, if ordered  - Obtain nutritional consult as needed  - Evaluate fluid balance  Outcome: Progressing  Goal: Maintains or returns to baseline bowel function  Description: INTERVENTIONS:  - Assess bowel function  - Maintain adequate hydration with IV or PO as ordered and tolerated  - Evaluate effectiveness of GI medications  - Encourage mobilization and activity  - Obtain nutritional consult as needed  - Establish a toileting routine/schedule  - Consider collaborating with pharmacy to review patient's medication profile  Outcome: Progressing     Problem: SKIN/TISSUE INTEGRITY - ADULT  Goal: Skin integrity remains intact  Description: INTERVENTIONS  - Assess and document risk factors for pressure ulcer development  - Assess and document skin integrity  - Monitor for areas of redness and/or skin breakdown  - Initiate interventions, skin care algorithm/standards of care as needed  Outcome: Progressing  Goal: Incision(s), wounds(s) or drain site(s) healing without S/S of infection  Description: INTERVENTIONS:  - Assess and document risk factors for pressure ulcer development  - Assess and document skin integrity  - Assess and document dressing/incision, wound bed, drain sites and surrounding tissue  - Implement wound care per orders  - Initiate isolation precautions as appropriate  - Initiate Pressure Ulcer prevention bundle as indicated  Outcome: Progressing

## 2024-02-10 NOTE — PROGRESS NOTES
[facility] Hospitalist Progress Note     CC: Hospital Follow up    PCP: Abi Ohara MD       Assessment/Plan:     Principal Problem:    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HCC)    Patient is a 78 year old female with PMH sig for SVT, RLS, COPD, chronic pain, HLD, IgA deficiency who presented to the hospital with abdominal pain.     Cecal volvulus with extensive mesenteric ischemia  - s/p ex lap, R hemicolectomy, omentoplasty of anastomosis  - prn pain medication   - monitor respiratory status  - encouraged IS use  - prn anti emetics  - post op hgb of 9.6, was 12.6 prior to surgery  - on PCA pump per general surgery, now off  - dvt ppx: SCD  - CT A/P on 2/5 for severe LLQ pain showed ileus, no anastomic leak  - had 1 BM with streak blood, now normal  - now with NG to LIS and on CLD per general surgery  - obstructive series showed SI is more distended  - on PPN now to TPN     Possible afib in PACU?   pSVT  - reported afib in PACU but 12 lead EKG with NSR  - tele monitoring to assess if truly afib as does have a history of SVT  - hold off on AC until/if atrial fibrillation is confirmed  - resume PO metoprolol  - tele monitor reviewed, has PACs/PVCs but no afib     COPD  - resume inhalers     RLS  - resume home medication     Chronic pain  - outpatient f/u     IgA deficiency  - outpatient f/u    QUE  - resume PRN benzo     FN:  - IVF: .45  - Diet: NPO     DVT Prophy: SCD  Lines: PIV     Dispo: pending clinical course    Thank You,  Rebecca Boyce MD  Hospitalist  [facility]   Answering service: [phone]       Subjective:   Clinically tolerating clears, denies any nausea vomiting, NG tube currently clamped, discussed with patient and she would like the ability to be able to do another dose of 0.25 of clonazepam in the early morning if she wakes up and cannot go back to sleep.  Meds modified    OBJECTIVE:    Blood pressure 118/69, pulse 95, temperature 98.2 °F (36.8 °C), temperature source Oral, resp. rate 18,  height 5' 5\" (1.651 m), weight 104 lb (47.2 kg), SpO2 98%, not currently breastfeeding.    Temp:  [98.2 °F (36.8 °C)-98.8 °F (37.1 °C)] 98.2 °F (36.8 °C)  Pulse:  [94-95] 95  Resp:  [18] 18  BP: (118-136)/(56-70) 118/69  SpO2:  [96 %-98 %] 98 %      Intake/Output:    Intake/Output Summary (Last 24 hours) at 2/10/2024 1402  Last data filed at 2/10/2024 1345  Gross per 24 hour   Intake 662.77 ml   Output 1850 ml   Net -1187.23 ml       Last 3 Weights   02/03/24 0948 104 lb (47.2 kg)   02/03/24 0140 104 lb (47.2 kg)   01/04/22 1442 105 lb (47.6 kg)   12/10/21 1435 104 lb (47.2 kg)       Exam   Gen: No acute distress  Heent: NG in place  Pulm: Lungs clear, normal respiratory effort  CV: Heart with regular rate and rhythm, no peripheral edema  -Patient with pain around the lead underneath her breasts, no erythema warmth or other visible irritation noted.  Pain is superficial and not within the abdomen but on the skin, continue to monitor, no signs of shingles  Abd: Abdomen soft, NT, hypoactive BS   MSK: no clubbing, no cyanosis    Data Review:       Labs:     Recent Labs   Lab 02/06/24  0643 02/07/24  0615 02/09/24  0630   RBC 3.92 3.44* 3.00*   HGB 11.4* 9.9* 8.8*   HCT 34.0* 29.6* 25.2*   MCV 86.7 86.0 84.0   MCH 29.1 28.8 29.3   MCHC 33.5 33.4 34.9   RDW 14.7 14.5 14.4   NEPRELIM 7.97* 5.12 5.76   WBC 9.4 7.0 8.2   .0 241.0 206.0         Recent Labs   Lab 02/08/24  0620 02/09/24  0630 02/10/24  0646   * 115* 109*   BUN 13 10 13   CREATSERUM 0.48* 0.44* 0.46*   EGFRCR 96 98 97   CA 7.9* 8.1* 8.3*    138 141   K 3.6  3.6 4.0  4.0 4.1    105 105   CO2 24.0 30.0 30.0       Recent Labs   Lab 02/08/24  0620   ALT 11   AST 17   ALB 3.0*         Imaging:  US VENOUS DOPPLER LEG BILAT - DIAG IMG (CPT=93970)    Result Date: 2/10/2024  CONCLUSION:   No bilateral lower extremity DVT.    Dictated by (CST): Colby Nuno MD on 2/10/2024 at 11:37 AM     Finalized by (CST): Colby Nuno MD on  2/10/2024 at 11:38 AM          XR ABDOMEN, OBSTRUCTIVE SERIES 3 VIEWS(CPT=74021)    Result Date: 2/10/2024  CONCLUSION:  Multiple air-filled dilated small bowel loops measuring up to 4.9 cm, previously 5.9 cm.  Finding is suspicious for ongoing small bowel obstruction or ileus.  Continued serial radiographic and clinical abdominal examinations is recommended.  Esophagogastric tube tip terminates at the expected location of the gastric antrum.     Dictated by (CST): Shannon Bui MD on 2/10/2024 at 11:26 AM     Finalized by (CST): Shannon Bui MD on 2/10/2024 at 11:29 AM          XR ABDOMEN, OBSTRUCTIVE SERIES 3 VIEWS(CPT=74021)    Result Date: 2/8/2024  CONCLUSION: Increasing diffuse small bowel dilation now measuring up to 5.8 centimeter.  This could be obstruction or ileus     Dictated by (CST): Macho Garcia MD on 2/08/2024 at 11:23 AM     Finalized by (CST): Macho Garcia MD on 2/08/2024 at 11:25 AM             Meds:      pantoprazole  40 mg Intravenous Daily    simethicone  80 mg Oral TID    alvimopan  12 mg Oral BID    heparin  5,000 Units Subcutaneous Q12H    metoprolol succinate  12.5 mg Oral Nightly    OXcarbazepine  300 mg Oral BID      adult 3 in 1 TPN      adult 3 in 1 TPN 62.5 mL/hr at 02/10/24 1200    dextrose 10%       clonazePAM, HYDROmorphone, acetaminophen, dextrose 10%, phenol, ondansetron, metoclopramide

## 2024-02-10 NOTE — PLAN OF CARE
Problem: Patient Centered Care  Goal: Patient preferences are identified and integrated in the patient's plan of care  Description: Interventions:  - What would you like us to know as we care for you?   - Provide timely, complete, and accurate information to patient/family  - Incorporate patient and family knowledge, values, beliefs, and cultural backgrounds into the planning and delivery of care  - Encourage patient/family to participate in care and decision-making at the level they choose  - Honor patient and family perspectives and choices  Outcome: Progressing     Problem: Patient/Family Goals  Goal: Patient/Family Long Term Goal  Description: Patient's Long Term Goal:    Interventions:    - See additional Care Plan goals for specific interventions  Outcome: Progressing  Goal: Patient/Family Short Term Goal  Description: Patient's Short Term Goal:     Interventions:   - See additional Care Plan goals for specific interventions  Outcome: Progressing     Problem: PAIN - ADULT  Goal: Verbalizes/displays adequate comfort level or patient's stated pain goal  Description: INTERVENTIONS:  - Encourage pt to monitor pain and request assistance  - Assess pain using appropriate pain scale  - Administer analgesics based on type and severity of pain and evaluate response  - Implement non-pharmacological measures as appropriate and evaluate response  - Consider cultural and social influences on pain and pain management  - Manage/alleviate anxiety  - Utilize distraction and/or relaxation techniques  - Monitor for opioid side effects  - Notify MD/LIP if interventions unsuccessful or patient reports new pain  - Anticipate increased pain with activity and pre-medicate as appropriate  Outcome: Progressing     Problem: SAFETY ADULT - FALL  Goal: Free from fall injury  Description: INTERVENTIONS:  - Assess pt frequently for physical needs  - Identify cognitive and physical deficits and behaviors that affect risk of falls.  -  [place] fall precautions as indicated by assessment.  - Educate pt/family on patient safety including physical limitations  - Instruct pt to call for assistance with activity based on assessment  - Modify environment to reduce risk of injury  - Provide assistive devices as appropriate  - Consider OT/PT consult to assist with strengthening/mobility  - Encourage toileting schedule  Outcome: Progressing     Problem: GASTROINTESTINAL - ADULT  Goal: Minimal or absence of nausea and vomiting  Description: INTERVENTIONS:  - Maintain adequate hydration with IV or PO as ordered and tolerated  - Nasogastric tube to low intermittent suction as ordered  - Evaluate effectiveness of ordered antiemetic medications  - Provide nonpharmacologic comfort measures as appropriate  - Advance diet as tolerated, if ordered  - Obtain nutritional consult as needed  - Evaluate fluid balance  Outcome: Progressing  Goal: Maintains or returns to baseline bowel function  Description: INTERVENTIONS:  - Assess bowel function  - Maintain adequate hydration with IV or PO as ordered and tolerated  - Evaluate effectiveness of GI medications  - Encourage mobilization and activity  - Obtain nutritional consult as needed  - Establish a toileting routine/schedule  - Consider collaborating with pharmacy to review patient's medication profile  Outcome: Progressing     Problem: SKIN/TISSUE INTEGRITY - ADULT  Goal: Skin integrity remains intact  Description: INTERVENTIONS  - Assess and document risk factors for pressure ulcer development  - Assess and document skin integrity  - Monitor for areas of redness and/or skin breakdown  - Initiate interventions, skin care algorithm/standards of care as needed  Outcome: Progressing  Goal: Incision(s), wounds(s) or drain site(s) healing without S/S of infection  Description: INTERVENTIONS:  - Assess and document risk factors for pressure ulcer development  - Assess and document skin integrity  - Assess and document  dressing/incision, wound bed, drain sites and surrounding tissue  - Implement wound care per orders  - Initiate isolation precautions as appropriate  - Initiate Pressure Ulcer prevention bundle as indicated  Outcome: Progressing     No acute changes today. Surgical dressing c/d/I. NG to LIS. Started on minimal amounts of clears per surgery. On tele no calls. Ofirmev managing pain. TPN to start tonight. Complaints of  increased leg swelling, R leg noted to be slightly more swollen than L. No redness, pt reports baseline numbness. MD paged with no response as of yet. Heparin given for dvt prophylaxis. Up ambulating throughout the halls with a walker. Plans for repeat obstructive series tomorrow. Call light within reach, frequent rounding.

## 2024-02-10 NOTE — PROGRESS NOTES
[facility]    General Surgery Progress Note  Mayte Lucas  : 1945  CSN: [id number]  HD# [id number]    Subjective:   POD#7 right hemicolectomy for acute cecal volvulus  Feels well denies abdominal pain and denies N/V   Passing flatus and BM(s) - last BM day before yesterday    Exam:     General: awake and alert, in no acute distress, comfortable, and in good spirits  Pulmonary:lungs clear to auscultation bilaterally  Cardiac: RRR, nl S1,S2, no S3, no S4, and no murmur  Abdomen:  hypoactive, nontender except mild tenderness at incision, and moderately distended   Extremities: calves nontender, no edema, SCD's on, motor intact, and sensory intact  Wounds: clean, dry and intact     Assessment and Plan:   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HCC)  S/p right hemicolectomy    CT abdomen images reviewed - ileus but no evidence of leak  Obstructive series . and today shows dilated small bowel loops w differential fluid levels c/w early sbo vs ileus   Repeat obstr series today    Stable  Diet: NGT/NPO x ice chips and clear liquids, clamp NGT today  IVF: continue current rate  Antibiotics: no need for further antibiotics beyond perioperative doses  Cont Entereg    Cont Tylenol IV - Dilaudid for breakthrough pain only  Cont TPN    Activity: OOB to chair, more ambulation encouraged, and Ambulation in halls at least TID  DVT prophylaxis: SCDs and chemoprophylaxis as ordered    Discharge disposition: pending clinical course       Objective:     Vitals:    24 1205 24 1903 02/09/24 2021 02/10/24 0624   BP: 110/60  136/70 123/56   Pulse: 92 94     Resp: 18  18 18   Temp: 99 °F (37.2 °C)  98.8 °F (37.1 °C) 98.8 °F (37.1 °C)   TempSrc: Oral  Oral Oral   SpO2: 96%  97% 96%   Weight:       Height:         Body mass index is 17.31 kg/m².    Intake/Output Summary (Last 24 hours) at 2/10/2024 0832  Last data filed at 2/10/2024 0300  Gross per 24 hour   Intake 1142.77 ml   Output 2050 ml   Net -907.23 ml       Results:     Recent  Labs   Lab 02/06/24  0643 02/07/24  0615 02/09/24  0630   RBC 3.92 3.44* 3.00*   HGB 11.4* 9.9* 8.8*   HCT 34.0* 29.6* 25.2*   MCV 86.7 86.0 84.0   MCH 29.1 28.8 29.3   MCHC 33.5 33.4 34.9   RDW 14.7 14.5 14.4   NEPRELIM 7.97* 5.12 5.76   WBC 9.4 7.0 8.2   .0 241.0 206.0       Recent Labs   Lab 02/08/24  0620 02/09/24  0630 02/10/24  0646   * 115* 109*   BUN 13 10 13   CREATSERUM 0.48* 0.44* 0.46*   CA 7.9* 8.1* 8.3*    138 141   K 3.6  3.6 4.0  4.0 4.1    105 105   CO2 24.0 30.0 30.0       Lab Results   Component Value Date     02/10/2024    K 4.1 02/10/2024     02/10/2024    CO2 30.0 02/10/2024    BUN 13 02/10/2024    CREATSERUM 0.46 02/10/2024     02/10/2024    MG 2.0 02/10/2024    PHOS 4.2 02/10/2024    CA 8.3 02/10/2024       XR ABDOMEN, OBSTRUCTIVE SERIES 3 VIEWS(CPT=74021)    Result Date: 2/8/2024  CONCLUSION: Increasing diffuse small bowel dilation now measuring up to 5.8 centimeter.  This could be obstruction or ileus     Dictated by (CST): Macho Garcia MD on 2/08/2024 at 11:23 AM     Finalized by (CST): Macho Garcia MD on 2/08/2024 at 11:25 AM               Yvan Griggs MD [facility]  02/10/24  8:34 AM

## 2024-02-11 ENCOUNTER — APPOINTMENT (OUTPATIENT)
Dept: GENERAL RADIOLOGY | Facility: HOSPITAL | Age: 79
End: 2024-02-11
Attending: HOSPITALIST
Payer: MEDICARE

## 2024-02-11 LAB
ANION GAP SERPL CALC-SCNC: 4 MMOL/L (ref 0–18)
BASOPHILS # BLD AUTO: 0.06 X10(3) UL (ref 0–0.2)
BASOPHILS NFR BLD AUTO: 0.7 %
BUN BLD-MCNC: 15 MG/DL (ref 9–23)
BUN/CREAT SERPL: 29.4 (ref 10–20)
CALCIUM BLD-MCNC: 8.8 MG/DL (ref 8.7–10.4)
CHLORIDE SERPL-SCNC: 105 MMOL/L (ref 98–112)
CO2 SERPL-SCNC: 28 MMOL/L (ref 21–32)
CREAT BLD-MCNC: 0.51 MG/DL
DEPRECATED RDW RBC AUTO: 45.8 FL (ref 35.1–46.3)
EGFRCR SERPLBLD CKD-EPI 2021: 94 ML/MIN/1.73M2 (ref 60–?)
EOSINOPHIL # BLD AUTO: 0.2 X10(3) UL (ref 0–0.7)
EOSINOPHIL NFR BLD AUTO: 2.3 %
ERYTHROCYTE [DISTWIDTH] IN BLOOD BY AUTOMATED COUNT: 14.7 % (ref 11–15)
GLUCOSE BLD-MCNC: 107 MG/DL (ref 70–99)
GLUCOSE BLDC GLUCOMTR-MCNC: 116 MG/DL (ref 70–99)
GLUCOSE BLDC GLUCOMTR-MCNC: 134 MG/DL (ref 70–99)
GLUCOSE BLDC GLUCOMTR-MCNC: 137 MG/DL (ref 70–99)
HCT VFR BLD AUTO: 27.5 %
HGB BLD-MCNC: 9.3 G/DL
IMM GRANULOCYTES # BLD AUTO: 0.2 X10(3) UL (ref 0–1)
IMM GRANULOCYTES NFR BLD: 2.3 %
LYMPHOCYTES # BLD AUTO: 1.14 X10(3) UL (ref 1–4)
LYMPHOCYTES NFR BLD AUTO: 12.8 %
MAGNESIUM SERPL-MCNC: 2 MG/DL (ref 1.6–2.6)
MCH RBC QN AUTO: 28.7 PG (ref 26–34)
MCHC RBC AUTO-ENTMCNC: 33.8 G/DL (ref 31–37)
MCV RBC AUTO: 84.9 FL
MONOCYTES # BLD AUTO: 0.91 X10(3) UL (ref 0.1–1)
MONOCYTES NFR BLD AUTO: 10.2 %
NEUTROPHILS # BLD AUTO: 6.37 X10 (3) UL (ref 1.5–7.7)
NEUTROPHILS # BLD AUTO: 6.37 X10(3) UL (ref 1.5–7.7)
NEUTROPHILS NFR BLD AUTO: 71.7 %
OSMOLALITY SERPL CALC.SUM OF ELEC: 285 MOSM/KG (ref 275–295)
PHOSPHATE SERPL-MCNC: 4.3 MG/DL (ref 2.4–5.1)
PLATELET # BLD AUTO: 334 10(3)UL (ref 150–450)
POTASSIUM SERPL-SCNC: 4.2 MMOL/L (ref 3.5–5.1)
RBC # BLD AUTO: 3.24 X10(6)UL
SODIUM SERPL-SCNC: 137 MMOL/L (ref 136–145)
WBC # BLD AUTO: 8.9 X10(3) UL (ref 4–11)

## 2024-02-11 PROCEDURE — 80048 BASIC METABOLIC PNL TOTAL CA: CPT | Performed by: SURGERY

## 2024-02-11 PROCEDURE — 85025 COMPLETE CBC W/AUTO DIFF WBC: CPT | Performed by: HOSPITALIST

## 2024-02-11 PROCEDURE — 83735 ASSAY OF MAGNESIUM: CPT | Performed by: SURGERY

## 2024-02-11 PROCEDURE — 82962 GLUCOSE BLOOD TEST: CPT

## 2024-02-11 PROCEDURE — C9113 INJ PANTOPRAZOLE SODIUM, VIA: HCPCS | Performed by: SURGERY

## 2024-02-11 PROCEDURE — 74019 RADEX ABDOMEN 2 VIEWS: CPT | Performed by: HOSPITALIST

## 2024-02-11 PROCEDURE — 84100 ASSAY OF PHOSPHORUS: CPT | Performed by: SURGERY

## 2024-02-11 RX ORDER — BISACODYL 10 MG
10 SUPPOSITORY, RECTAL RECTAL
Status: DISCONTINUED | OUTPATIENT
Start: 2024-02-11 | End: 2024-02-17

## 2024-02-11 NOTE — PROGRESS NOTES
[facility] Hospitalist Progress Note     CC: Hospital Follow up    PCP: Abi Ohara MD       Assessment/Plan:     Principal Problem:    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HCC)    Patient is a 78 year old female with PMH sig for SVT, RLS, COPD, chronic pain, HLD, IgA deficiency who presented to the hospital with abdominal pain.  NG tube clamped on 2/10/2024.  However on 2/11/2024 with increased bloating and nausea.  NG tube placed back on suction and will get obstructive series.     Cecal volvulus with extensive mesenteric ischemia  - s/p ex lap, R hemicolectomy, omentoplasty of anastomosis  - prn pain medication   - monitor respiratory status  - encouraged IS use  - prn anti emetics  - post op hgb of 9.6, was 12.6 prior to surgery  - on PCA pump per general surgery, now off  - dvt ppx: SCD  - CT A/P on 2/5 for severe LLQ pain showed ileus, no anastomic leak  - had 1 BM with streak blood, now normal  - NG to LIS and on CLD per general surgery-> NG tube was clamped later on to 2/10/24-> increase bloating and nausea, NG tube unclamped, plan for obstructive series  - on PPN now to TPN  -Discussed with surgery, see plan above     Possible afib in PACU?   pSVT  - reported afib in PACU but 12 lead EKG with NSR  - tele monitoring to assess if truly afib as does have a history of SVT  - hold off on AC until/if atrial fibrillation is confirmed  - resume PO metoprolol  - tele monitor reviewed, has PACs/PVCs but no afib     COPD  - resume inhalers     RLS  - resume home medication     Chronic pain  - outpatient f/u     IgA deficiency  - outpatient f/u    QUE  - resume PRN benzo     FN:  - IVF: 0.45  - Diet: NPO     DVT Prophy: SCD  Lines: PIV     Dispo: pending clinical course    Note: This chart was prepared using voice recognition software and may contain unintended word substitution errors.     Discussed with family members at bedside    Discussed with surgery, x-ray, unclamp NG tube, monitor clinical course, continue TPN and  repeat labs in the morning    Thank You,  Rebecca Boyce MD 2/11/24  Hospitalist  [facility]   Answering service: [phone]       Subjective:   G-tube was clamped overnight, patient now much more distended and feels unwell.  She states been a rough morning and just feels off.  Upon further evaluation she does admit to feeling nauseous.  No vomiting at this point but does appear like she may need to in the near future.  We discussed either unclamping the NG tube and placing back on suction versus trying a suppository.  She states that she would like NG tube to be placed back on suction.  Advised that she take her walk after she feels better and not before the NG tube was placed back on suction    OBJECTIVE:    Blood pressure 137/67, pulse 76, temperature 98.3 °F (36.8 °C), temperature source Oral, resp. rate 18, height 5' 5\" (1.651 m), weight 104 lb (47.2 kg), SpO2 95%, not currently breastfeeding.    Temp:  [98.2 °F (36.8 °C)-98.6 °F (37 °C)] 98.3 °F (36.8 °C)  Pulse:  [76-95] 76  Resp:  [18] 18  BP: (113-137)/(63-69) 137/67  SpO2:  [95 %-98 %] 95 %      Intake/Output:    Intake/Output Summary (Last 24 hours) at 2/11/2024 0941  Last data filed at 2/11/2024 0533  Gross per 24 hour   Intake 502.08 ml   Output 420 ml   Net 82.08 ml       Last 3 Weights   02/03/24 0948 104 lb (47.2 kg)   02/03/24 0140 104 lb (47.2 kg)   01/04/22 1442 105 lb (47.6 kg)   12/10/21 1435 104 lb (47.2 kg)       Exam   Gen: appears nauseated   Heent: NG in place  Pulm: Lungs clear, normal respiratory effort  CV: Heart with regular rate and rhythm, no peripheral edema  Abd: Abdomen soft, tender as expected post op but much more distended than yesterday , hypoactive BS   MSK: no clubbing, no cyanosis    Data Review:       Labs:     Recent Labs   Lab 02/07/24  0615 02/09/24  0630 02/11/24  0636   RBC 3.44* 3.00* 3.24*   HGB 9.9* 8.8* 9.3*   HCT 29.6* 25.2* 27.5*   MCV 86.0 84.0 84.9   MCH 28.8 29.3 28.7   MCHC 33.4 34.9 33.8   RDW  14.5 14.4 14.7   NEPRELIM 5.12 5.76 6.37   WBC 7.0 8.2 8.9   .0 206.0 334.0         Recent Labs   Lab 02/09/24  0630 02/10/24  0646 02/11/24  0636   * 109* 107*   BUN 10 13 15   CREATSERUM 0.44* 0.46* 0.51*   EGFRCR 98 97 94   CA 8.1* 8.3* 8.8    141 137   K 4.0  4.0 4.1 4.2    105 105   CO2 30.0 30.0 28.0       Recent Labs   Lab 02/08/24  0620   ALT 11   AST 17   ALB 3.0*         Imaging:  US VENOUS DOPPLER LEG BILAT - DIAG IMG (CPT=93970)    Result Date: 2/10/2024  CONCLUSION:   No bilateral lower extremity DVT.    Dictated by (CST): Colby Nuno MD on 2/10/2024 at 11:37 AM     Finalized by (CST): Colby Nuno MD on 2/10/2024 at 11:38 AM          XR ABDOMEN, OBSTRUCTIVE SERIES 3 VIEWS(CPT=74021)    Result Date: 2/10/2024  CONCLUSION:  Multiple air-filled dilated small bowel loops measuring up to 4.9 cm, previously 5.9 cm.  Finding is suspicious for ongoing small bowel obstruction or ileus.  Continued serial radiographic and clinical abdominal examinations is recommended.  Esophagogastric tube tip terminates at the expected location of the gastric antrum.     Dictated by (CST): Shannon Bui MD on 2/10/2024 at 11:26 AM     Finalized by (CST): Shannon Bui MD on 2/10/2024 at 11:29 AM          XR ABDOMEN, OBSTRUCTIVE SERIES 3 VIEWS(CPT=74021)    Result Date: 2/8/2024  CONCLUSION: Increasing diffuse small bowel dilation now measuring up to 5.8 centimeter.  This could be obstruction or ileus     Dictated by (CST): Macho Garcia MD on 2/08/2024 at 11:23 AM     Finalized by (CST): Macho Garcia MD on 2/08/2024 at 11:25 AM             Meds:      pantoprazole  40 mg Intravenous Daily    simethicone  80 mg Oral TID    alvimopan  12 mg Oral BID    heparin  5,000 Units Subcutaneous Q12H    metoprolol succinate  12.5 mg Oral Nightly    OXcarbazepine  300 mg Oral BID      adult 3 in 1 TPN      adult 3 in 1 TPN 62.5 mL/hr at 02/10/24 2110    dextrose 10%       clonazePAM,  HYDROmorphone, acetaminophen, dextrose 10%, phenol, ondansetron, metoclopramide

## 2024-02-11 NOTE — PROGRESS NOTES
[facility]    General Surgery Progress Note  Mayte Lucas  : 1945  CSN: [id number]  HD# [id number]    Subjective:   POD#9 right hemicolectomy for acute cecal volvulus  Patient with nausea      Exam:     General: awake and alert, in no acute distress, comfortable, and in good spirits  Pulmonary:lungs clear to auscultation bilaterally  Cardiac: RRR, nl S1,S2, no S3, no S4, and no murmur  Abdomen:  hypoactive, nontender except mild tenderness at incision, and moderately distended   Extremities: calves nontender, no edema, SCD's on, motor intact, and sensory intact  Wounds: clean, dry and intact     Assessment and Plan:   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HCC)  S/p right hemicolectomy    CT abdomen images reviewed - ileus but no evidence of leak  Obstructive series 24 shows dilated small bowel loops unchanged.  Repeat obstr series tomorrow  Patient improved with NG unclamped  Continue NG to low intermittent suction    Stable  Diet: NGT/NPO x ice chips and clear liquids, NGT LIS  IVF: continue current rate on hyperalimentation  Antibiotics: no need for further antibiotics beyond perioperative doses  Cont Entereg    Cont Tylenol IV - Dilaudid for breakthrough pain only  Cont TPN    Activity: OOB to chair, more ambulation encouraged, and Ambulation in halls at least TID  DVT prophylaxis: SCDs and chemoprophylaxis as ordered    Discharge disposition: pending clinical course       Objective:     Vitals:    02/10/24 1216 02/10/24 1813 02/10/24 2138 24 0533   BP: 118/69 127/69 113/63 137/67   Pulse: 95 76     Resp: 18  18 18   Temp: 98.2 °F (36.8 °C)  98.6 °F (37 °C) 98.3 °F (36.8 °C)   TempSrc: Oral  Oral Oral   SpO2: 98% 96% 97% 95%   Weight:       Height:         Body mass index is 17.31 kg/m².    Intake/Output Summary (Last 24 hours) at 2024 1416  Last data filed at 2024 1100  Gross per 24 hour   Intake 502.08 ml   Output 1145 ml   Net -642.92 ml       Results:     Recent Labs   Lab 24  0615  02/09/24  0630 02/11/24  0636   RBC 3.44* 3.00* 3.24*   HGB 9.9* 8.8* 9.3*   HCT 29.6* 25.2* 27.5*   MCV 86.0 84.0 84.9   MCH 28.8 29.3 28.7   MCHC 33.4 34.9 33.8   RDW 14.5 14.4 14.7   NEPRELIM 5.12 5.76 6.37   WBC 7.0 8.2 8.9   .0 206.0 334.0       Recent Labs   Lab 02/09/24  0630 02/10/24  0646 02/11/24  0636   * 109* 107*   BUN 10 13 15   CREATSERUM 0.44* 0.46* 0.51*   CA 8.1* 8.3* 8.8    141 137   K 4.0  4.0 4.1 4.2    105 105   CO2 30.0 30.0 28.0       Lab Results   Component Value Date    WBC 8.9 02/11/2024    HGB 9.3 02/11/2024    HCT 27.5 02/11/2024    .0 02/11/2024     02/11/2024    K 4.2 02/11/2024     02/11/2024    CO2 28.0 02/11/2024    BUN 15 02/11/2024    CREATSERUM 0.51 02/11/2024     02/11/2024    MG 2.0 02/11/2024    PHOS 4.3 02/11/2024    CA 8.8 02/11/2024       XR ABDOMEN 2 VIEWS(CPT=74019)    Result Date: 2/11/2024  CONCLUSION:   Multiple dilated small bowel loops grossly unchanged since the prior study which again could relate to obstruction or possibly ileus.  NG tube within stomach.    Dictated by (CST): Colby Nuno MD on 2/11/2024 at 12:10 PM     Finalized by (CST): Colby Nuno MD on 2/11/2024 at 12:12 PM          US VENOUS DOPPLER LEG BILAT - DIAG IMG (CPT=93970)    Result Date: 2/10/2024  CONCLUSION:   No bilateral lower extremity DVT.    Dictated by (CST): Colby Nuno MD on 2/10/2024 at 11:37 AM     Finalized by (CST): Colby Nuno MD on 2/10/2024 at 11:38 AM          XR ABDOMEN, OBSTRUCTIVE SERIES 3 VIEWS(CPT=74021)    Result Date: 2/10/2024  CONCLUSION:  Multiple air-filled dilated small bowel loops measuring up to 4.9 cm, previously 5.9 cm.  Finding is suspicious for ongoing small bowel obstruction or ileus.  Continued serial radiographic and clinical abdominal examinations is recommended.  Esophagogastric tube tip terminates at the expected location of the gastric antrum.     Dictated by (CST): Hao  MD Shannon on 2/10/2024 at 11:26 AM     Finalized by (CST): Shannon Bui MD on 2/10/2024 at 11:29 AM               TWAN PENNY JR., MD. [facility]  GENERAL SURGERY  [facility]    2/11/2024  2:16 PM

## 2024-02-11 NOTE — PLAN OF CARE
No acute changes over night. Pt is alert and oriented on RA. Remote tele in place. Pt is having BM and passing gas. NG clamped with no complaints of nausea. TPN running as ordered. Q6 accu checks. Tolerating CLD. Pain controlled with IV Tylenol. Pt is up with a standby assist with walker. Call light is within reach and safety measures are in place.     Problem: PAIN - ADULT  Goal: Verbalizes/displays adequate comfort level or patient's stated pain goal  Description: INTERVENTIONS:  - Encourage pt to monitor pain and request assistance  - Assess pain using appropriate pain scale  - Administer analgesics based on type and severity of pain and evaluate response  - Implement non-pharmacological measures as appropriate and evaluate response  - Consider cultural and social influences on pain and pain management  - Manage/alleviate anxiety  - Utilize distraction and/or relaxation techniques  - Monitor for opioid side effects  - Notify MD/LIP if interventions unsuccessful or patient reports new pain  - Anticipate increased pain with activity and pre-medicate as appropriate  Outcome: Progressing     Problem: SAFETY ADULT - FALL  Goal: Free from fall injury  Description: INTERVENTIONS:  - Assess pt frequently for physical needs  - Identify cognitive and physical deficits and behaviors that affect risk of falls.  - Taberg fall precautions as indicated by assessment.  - Educate pt/family on patient safety including physical limitations  - Instruct pt to call for assistance with activity based on assessment  - Modify environment to reduce risk of injury  - Provide assistive devices as appropriate  - Consider OT/PT consult to assist with strengthening/mobility  - Encourage toileting schedule  Outcome: Progressing     Problem: GASTROINTESTINAL - ADULT  Goal: Minimal or absence of nausea and vomiting  Description: INTERVENTIONS:  - Maintain adequate hydration with IV or PO as ordered and tolerated  - Nasogastric tube to low  intermittent suction as ordered  - Evaluate effectiveness of ordered antiemetic medications  - Provide nonpharmacologic comfort measures as appropriate  - Advance diet as tolerated, if ordered  - Obtain nutritional consult as needed  - Evaluate fluid balance  Outcome: Progressing  Goal: Maintains or returns to baseline bowel function  Description: INTERVENTIONS:  - Assess bowel function  - Maintain adequate hydration with IV or PO as ordered and tolerated  - Evaluate effectiveness of GI medications  - Encourage mobilization and activity  - Obtain nutritional consult as needed  - Establish a toileting routine/schedule  - Consider collaborating with pharmacy to review patient's medication profile  Outcome: Progressing     Problem: SKIN/TISSUE INTEGRITY - ADULT  Goal: Skin integrity remains intact  Description: INTERVENTIONS  - Assess and document risk factors for pressure ulcer development  - Assess and document skin integrity  - Monitor for areas of redness and/or skin breakdown  - Initiate interventions, skin care algorithm/standards of care as needed  Outcome: Progressing  Goal: Incision(s), wounds(s) or drain site(s) healing without S/S of infection  Description: INTERVENTIONS:  - Assess and document risk factors for pressure ulcer development  - Assess and document skin integrity  - Assess and document dressing/incision, wound bed, drain sites and surrounding tissue  - Implement wound care per orders  - Initiate isolation precautions as appropriate  - Initiate Pressure Ulcer prevention bundle as indicated  Outcome: Progressing

## 2024-02-12 ENCOUNTER — APPOINTMENT (OUTPATIENT)
Dept: GENERAL RADIOLOGY | Facility: HOSPITAL | Age: 79
End: 2024-02-12
Attending: SURGERY
Payer: MEDICARE

## 2024-02-12 LAB
ANION GAP SERPL CALC-SCNC: 3 MMOL/L (ref 0–18)
BASOPHILS # BLD AUTO: 0.06 X10(3) UL (ref 0–0.2)
BASOPHILS NFR BLD AUTO: 0.6 %
BUN BLD-MCNC: 20 MG/DL (ref 9–23)
BUN/CREAT SERPL: 36.4 (ref 10–20)
CALCIUM BLD-MCNC: 9 MG/DL (ref 8.7–10.4)
CHLORIDE SERPL-SCNC: 106 MMOL/L (ref 98–112)
CO2 SERPL-SCNC: 29 MMOL/L (ref 21–32)
CREAT BLD-MCNC: 0.55 MG/DL
DEPRECATED RDW RBC AUTO: 45.4 FL (ref 35.1–46.3)
EGFRCR SERPLBLD CKD-EPI 2021: 93 ML/MIN/1.73M2 (ref 60–?)
EOSINOPHIL # BLD AUTO: 0.15 X10(3) UL (ref 0–0.7)
EOSINOPHIL NFR BLD AUTO: 1.4 %
ERYTHROCYTE [DISTWIDTH] IN BLOOD BY AUTOMATED COUNT: 14.7 % (ref 11–15)
GLUCOSE BLD-MCNC: 92 MG/DL (ref 70–99)
HCT VFR BLD AUTO: 26.9 %
HGB BLD-MCNC: 9.1 G/DL
IMM GRANULOCYTES # BLD AUTO: 0.11 X10(3) UL (ref 0–1)
IMM GRANULOCYTES NFR BLD: 1.1 %
LYMPHOCYTES # BLD AUTO: 1.45 X10(3) UL (ref 1–4)
LYMPHOCYTES NFR BLD AUTO: 14 %
MAGNESIUM SERPL-MCNC: 2 MG/DL (ref 1.6–2.6)
MCH RBC QN AUTO: 28.7 PG (ref 26–34)
MCHC RBC AUTO-ENTMCNC: 33.8 G/DL (ref 31–37)
MCV RBC AUTO: 84.9 FL
MONOCYTES # BLD AUTO: 1.13 X10(3) UL (ref 0.1–1)
MONOCYTES NFR BLD AUTO: 10.9 %
NEUTROPHILS # BLD AUTO: 7.48 X10 (3) UL (ref 1.5–7.7)
NEUTROPHILS # BLD AUTO: 7.48 X10(3) UL (ref 1.5–7.7)
NEUTROPHILS NFR BLD AUTO: 72 %
OSMOLALITY SERPL CALC.SUM OF ELEC: 288 MOSM/KG (ref 275–295)
PHOSPHATE SERPL-MCNC: 4.3 MG/DL (ref 2.4–5.1)
PLATELET # BLD AUTO: 392 10(3)UL (ref 150–450)
POTASSIUM SERPL-SCNC: 4.4 MMOL/L (ref 3.5–5.1)
RBC # BLD AUTO: 3.17 X10(6)UL
SODIUM SERPL-SCNC: 138 MMOL/L (ref 136–145)
WBC # BLD AUTO: 10.4 X10(3) UL (ref 4–11)

## 2024-02-12 PROCEDURE — 74019 RADEX ABDOMEN 2 VIEWS: CPT | Performed by: SURGERY

## 2024-02-12 PROCEDURE — 84100 ASSAY OF PHOSPHORUS: CPT | Performed by: SURGERY

## 2024-02-12 PROCEDURE — 85025 COMPLETE CBC W/AUTO DIFF WBC: CPT | Performed by: SURGERY

## 2024-02-12 PROCEDURE — 83735 ASSAY OF MAGNESIUM: CPT | Performed by: SURGERY

## 2024-02-12 PROCEDURE — 80048 BASIC METABOLIC PNL TOTAL CA: CPT | Performed by: SURGERY

## 2024-02-12 PROCEDURE — C9113 INJ PANTOPRAZOLE SODIUM, VIA: HCPCS | Performed by: SURGERY

## 2024-02-12 RX ORDER — LANSOPRAZOLE 30 MG/1
30 TABLET, ORALLY DISINTEGRATING, DELAYED RELEASE ORAL
Status: DISCONTINUED | OUTPATIENT
Start: 2024-02-13 | End: 2024-02-17

## 2024-02-12 NOTE — PROGRESS NOTES
[facility] Hospitalist Progress Note     CC: Hospital Follow up    PCP: Abi Ohara MD       Assessment/Plan:     Principal Problem:    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HCC)    Patient is a 78 year old female with PMH sig for SVT, RLS, COPD, chronic pain, HLD, IgA deficiency who presented to the hospital with abdominal pain.  NG tube clamped on 2/10/2024.  However on 2/11/2024 with increased bloating and nausea.  NG tube placed back on suction and will get obstructive series.     Cecal volvulus with extensive mesenteric ischemia  - s/p ex lap, R hemicolectomy, omentoplasty of anastomosis  - prn pain medication   - monitor respiratory status  - encouraged IS use  - prn anti emetics  - post op hgb of 9.6, was 12.6 prior to surgery  - on PCA pump per general surgery, now off  - dvt ppx: SCD  - CT A/P on 2/5 for severe LLQ pain showed ileus, no anastomic leak  - had 1 BM with streak blood, now normal  - NG to LIS and on CLD per general surgery-> NG tube was clamped later on to 2/10/24-> increase bloating and nausea, NG tube unclamped,  repeat imaging with dilated loops   - on PPN now to TPN  -Discussed with surgery, see plan above     Possible afib in PACU?   pSVT  - reported afib in PACU but 12 lead EKG with NSR  - tele monitoring to assess if truly afib as does have a history of SVT  - hold off on AC until/if atrial fibrillation is confirmed  - resume PO metoprolol  - tele monitor reviewed, has PACs/PVCs but no afib     COPD  - resume inhalers     RLS  - resume home medication     Chronic pain  - outpatient f/u     IgA deficiency  - outpatient f/u    QUE  - resume PRN benzo     FN:  - IVF: 0.45  - Diet: NPO     DVT Prophy: SCD  Lines: PIV     Dispo: pending clinical course    Note: This chart was prepared using voice recognition software and may contain unintended word substitution errors.     Discussed with family members at bedside    Discussed with surgery, x-ray, unclamp NG tube, monitor clinical course, continue TPN  and repeat labs in the morning    Thank You,  Rebecca Boyce MD 2/12/24  Hospitalist  [facility]   Answering service: [phone]       Subjective:   Feels better now that NGT is back to suction.  No gas. No CP or SOB     OBJECTIVE:    Blood pressure 120/60, pulse 92, temperature 98 °F (36.7 °C), temperature source Oral, resp. rate 18, height 5' 5\" (1.651 m), weight 104 lb (47.2 kg), SpO2 93%, not currently breastfeeding.    Temp:  [98 °F (36.7 °C)-99 °F (37.2 °C)] 98 °F (36.7 °C)  Pulse:  [80-92] 92  Resp:  [18] 18  BP: (120-131)/(50-66) 120/60  SpO2:  [93 %-98 %] 93 %      Intake/Output:    Intake/Output Summary (Last 24 hours) at 2/12/2024 1350  Last data filed at 2/12/2024 0600  Gross per 24 hour   Intake 232.29 ml   Output 2020 ml   Net -1787.71 ml       Last 3 Weights   02/03/24 0948 104 lb (47.2 kg)   02/03/24 0140 104 lb (47.2 kg)   01/04/22 1442 105 lb (47.6 kg)   12/10/21 1435 104 lb (47.2 kg)       Exam   Gen: appears A&Ox 3  Heent: NG in place  Pulm: Lungs clear, normal respiratory effort  CV: Heart with regular rate and rhythm, no peripheral edema  Abd: Abdomen soft, tender as expected post op but much more distended than yesterday , hypoactive BS   MSK: no clubbing, no cyanosis    Data Review:       Labs:     Recent Labs   Lab 02/09/24  0630 02/11/24  0636 02/12/24  0524   RBC 3.00* 3.24* 3.17*   HGB 8.8* 9.3* 9.1*   HCT 25.2* 27.5* 26.9*   MCV 84.0 84.9 84.9   MCH 29.3 28.7 28.7   MCHC 34.9 33.8 33.8   RDW 14.4 14.7 14.7   NEPRELIM 5.76 6.37 7.48   WBC 8.2 8.9 10.4   .0 334.0 392.0         Recent Labs   Lab 02/10/24  0646 02/11/24  0636 02/12/24  0524   * 107* 92   BUN 13 15 20   CREATSERUM 0.46* 0.51* 0.55   EGFRCR 97 94 93   CA 8.3* 8.8 9.0    137 138   K 4.1 4.2 4.4    105 106   CO2 30.0 28.0 29.0       Recent Labs   Lab 02/08/24  0620   ALT 11   AST 17   ALB 3.0*         Imaging:  XR ABDOMEN OBSTRUCTIVE SERIES ROUTINE(2 VW)(CPT=74019)    Result Date:  2/12/2024  CONCLUSION:   Persistent markedly dilated small bowel loops, slightly less extensive than on the prior exam.  Minimal air in the ascending colon.  Findings again suggest obstruction or less likely ileus.    Dictated by (CST): Luther Ruffin MD on 2/12/2024 at 10:00 AM     Finalized by (CST): Luther Ruffin MD on 2/12/2024 at 10:02 AM          XR ABDOMEN 2 VIEWS(CPT=74019)    Result Date: 2/11/2024  CONCLUSION:   Multiple dilated small bowel loops grossly unchanged since the prior study which again could relate to obstruction or possibly ileus.  NG tube within stomach.    Dictated by (CST): Colby Nuno MD on 2/11/2024 at 12:10 PM     Finalized by (CST): Colby Nuno MD on 2/11/2024 at 12:12 PM          US VENOUS DOPPLER LEG BILAT - DIAG IMG (CPT=93970)    Result Date: 2/10/2024  CONCLUSION:   No bilateral lower extremity DVT.    Dictated by (CST): Colby Nuno MD on 2/10/2024 at 11:37 AM     Finalized by (CST): Colby Nuno MD on 2/10/2024 at 11:38 AM          XR ABDOMEN, OBSTRUCTIVE SERIES 3 VIEWS(CPT=74021)    Result Date: 2/10/2024  CONCLUSION:  Multiple air-filled dilated small bowel loops measuring up to 4.9 cm, previously 5.9 cm.  Finding is suspicious for ongoing small bowel obstruction or ileus.  Continued serial radiographic and clinical abdominal examinations is recommended.  Esophagogastric tube tip terminates at the expected location of the gastric antrum.     Dictated by (CST): Shannon Bui MD on 2/10/2024 at 11:26 AM     Finalized by (CST): Shannon Bui MD on 2/10/2024 at 11:29 AM             Meds:      [START ON 2/13/2024] lansoprazole  30 mg Oral QAM AC    simethicone  80 mg Oral TID    alvimopan  12 mg Oral BID    heparin  5,000 Units Subcutaneous Q12H    metoprolol succinate  12.5 mg Oral Nightly    OXcarbazepine  300 mg Oral BID      adult 3 in 1 TPN      adult 3 in 1 TPN 62.5 mL/hr at 02/11/24 2229    dextrose 10%       bisacodyl, clonazePAM, HYDROmorphone,  acetaminophen, dextrose 10%, phenol, ondansetron, metoclopramide

## 2024-02-12 NOTE — DIETARY NOTE
ADULT NUTRITION REASSESSMENT    Pt is at high nutrition risk.  Pt does not meet malnutrition criteria.      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 Pt presented to hospital with c/o abdominal pain. POD#3 s/p right hemicolectomy for acute cecal volvulus. S/p obstructive series this AM - dilated small bowel loops with differential fluid levels c/w early SBO. Pt sitting up in bed at time of visit awaiting NGT reinsertion - first attempt coiled. Pt reports wt stable prior to admission with normal appetite/intake. Typically consumes 2 meals per day. Pt and  share meal prep responsibility however pt prefers to \"graze\". Pt reports she is very active, walking at least 10,000 steps daily.    2/7/24: MD order to initiate PPN. PPN ordered and discussed with RN and patient.   Patient with NGT + NPO (Ice Chips + Sips of Liquids).     2/9/24: PICC Line Placement. MD order to start TPN. NG to LIS. Taking some ice chips and sips of clear liquids from floor stock. Minimal intake per RN. Passing flatus + small amount of BM.   Discussed Abdomen Obstructive Series (2/8) with RN. Noted increasing diffuse small bowel dilation (could be obstruction or ileus). Monitor medical plans.     2/12/24: POD #9 (R) Hemicolectomy for acute cecal volvulus.   Noted repeat obstructive series today. Pending results. NG to LIS continues.   TPN infusing (Meeting needs). Tolerating sips of Clear Liquid Diet.   Continue to monitor medical plans.     FOOD/NUTRITION RELATED HISTORY:  Appetite:  Taking sips of Clear Liquids  Intake:  Taking sips of Clear Liquids  Intake Meeting Needs: TPN meeting needs  Percent Meals Eaten (last 3 days)        Date/Time Percent Meals Eaten (%)    02/09/24 1049 100 %        Food Allergies: No Known Food Allergies (NKFA)  Cultural/Ethnic/Confucianism Preferences: None    GASTROINTESTINAL: +BM hard, green with blood in toilet x2 / 24 hrs, +flatus , bloating, NGT to LIS, and abdomen rounded, tender, hypoactive bowel sounds    2/7 Small, Loose Stool Documented.     BM documented on 2/10.     MEDICATIONS: reviewed      adult 3 in 1 TPN 62.5 mL/hr at 02/11/24 2229    dextrose 10%        pantoprazole  40 mg Intravenous Daily    simethicone  80 mg Oral TID    alvimopan  12 mg Oral BID    heparin  5,000 Units Subcutaneous Q12H    metoprolol succinate  12.5 mg Oral Nightly    OXcarbazepine  300 mg Oral BID     LABS: reviewed    Recent Labs     02/10/24  0646 02/11/24  0636 02/12/24  0524   * 107* 92   BUN 13 15 20   CREATSERUM 0.46* 0.51* 0.55   CA 8.3* 8.8 9.0   MG 2.0 2.0 2.0    137 138   K 4.1 4.2 4.4    105 106   CO2 30.0 28.0 29.0   PHOS 4.2 4.3 4.3   OSMOCALC 293 285 288     NUTRITION RELATED PHYSICAL FINDINGS:  - Nutrition Focused Physical Exam (NFPE): no wasting noted and appears well nourished  per visual exam.   2/7: NFPE at visit. Appears to have mild muscle mass depletion to dorsal vasques region, clavicle and shoulder regions + thigh region.   - Fluid Accumulation:  Bilateral LE/Feet 1-2+ Edema documented    See RN documentation for details  - Skin Integrity: surgical wound(s) see RN documentation for details    ANTHROPOMETRICS:  HT: 165.1 cm (5' 5\")  WT: 47.2 kg (104 lb) Most recent weight. Requested weight.  BMI: Body mass index is 17.31 kg/m².  BMI CLASSIFICATION: <22 considered underweight for advanced age  IBW: 125 lbs        83% IBW  Usual Body Wt: 105 lbs per pt report      99% UBW    WEIGHT HISTORY:  Patient Weight(s) for the past 336 hrs:   Weight   02/03/24 0948 47.2 kg (104 lb)   02/03/24 0140 47.2 kg (104 lb)     Wt Readings from Last 10 Encounters:   02/03/24 47.2 kg (104 lb)   01/04/22 47.6  kg (105 lb)   12/10/21 47.2 kg (104 lb)   12/01/21 48.5 kg (107 lb)   08/05/21 47.3 kg (104 lb 4 oz)   06/24/21 46.7 kg (103 lb)   06/10/21 47.2 kg (104 lb)   10/15/20 47.6 kg (105 lb)   10/08/20 47.6 kg (105 lb)   03/05/20 49.4 kg (109 lb)     NUTRITION DIAGNOSIS/PROBLEM:   Inadequate protein energy intake related to Decreased ability to consume sufficient energy  as evidenced by NPO/CL day 3.     Nutrition Diagnosis Progress: TPN for Nutrition. (Making Progress).     NUTRITION INTERVENTION:     NUTRITION PRESCRIPTION:   Estimated Nutrition needs: --dosing wt of 47 kg - wt taken on 2/3/24  Calories: 8129-4623 calories/day (MSJ REE = 950 kcal x 1.1(AF) x1.2-1.3(AF) or 27-29 calories per kg Dosing wt)  Protein: 56-71 g protein/day (1.2-1.5 g protein/kg Dosing wt)  Fluid Needs: 3876-7536 ml/day (1 ml/kcal)    - Diet:       Procedures    Clear liquid diet Is Patient on Accuchecks? Yes; Misc Restriction: ERAS      TPN: 1500 ml, 65 g protein, 700 dextrose calories, 350 lipid calories.   Provides 1310 total calories (100% calorie and 100% protein needs).     - RD Care Plan: Monitor need for ONS (oral nutritional supplements). TPN Infusing.   - Meals and snacks:  Clear Liquid Diet  - Medical Food Supplements: RD added None at this time   - Vitamin and mineral supplements:  Receiving MVI + Trace Minerals in TPN  - Feeding assistance: meal set up  - Nutrition education: assess education needs   - Coordination of nutrition care: collaboration with other providers - discussed with RN on unit  - Discharge and transfer of nutrition care to new setting or provider: monitor plans - from home with spouse    MONITOR AND EVALUATE/NUTRITION GOALS:  - Food and Nutrient Intake:      Monitor: for PO diet advancement  - Food and Nutrient Administration:      Monitor: TPN tolerance, adequacy of TPN, and for TPN adjustment  - Anthropometric Measurement:    Monitor weight  - Nutrition Goals:      labs within acceptable limits, minimize lean  body mass loss, maintain true wt within 5%, and improved GI status  Eventual wean from TPN as GI status improves/PO Intake improves/Diet advances.     DIETITIAN FOLLOW UP: RD to follow and monitor nutrition status    Hawa White RDN, LDN, CDE   Clinical Nutrition  Ext [phone]

## 2024-02-12 NOTE — PLAN OF CARE
Mayte is alert/oriented. Vitals stable. Incision with dressing clean/dry/intact. Ambulating in halls with standby assist - up in chair frequently. TPN infusing. Q6 accuchecks stable. Pain control with tylenol for back pain. Heparin for DVT prophylaxis. Voiding adequately. Passing less gas today, more discomfort this morning. NG back to LIS. Bed low, locked, all safety measures in place.     Problem: Patient Centered Care  Goal: Patient preferences are identified and integrated in the patient's plan of care  Description: Interventions:  - What would you like us to know as we care for you?   - Provide timely, complete, and accurate information to patient/family  - Incorporate patient and family knowledge, values, beliefs, and cultural backgrounds into the planning and delivery of care  - Encourage patient/family to participate in care and decision-making at the level they choose  - Honor patient and family perspectives and choices  Outcome: Progressing     Problem: Patient/Family Goals  Goal: Patient/Family Long Term Goal  Description: Patient's Long Term Goal:     Interventions:  -   - See additional Care Plan goals for specific interventions  Outcome: Progressing  Goal: Patient/Family Short Term Goal  Description: Patient's Short Term Goal:     Interventions:   -   - See additional Care Plan goals for specific interventions  Outcome: Progressing     Problem: PAIN - ADULT  Goal: Verbalizes/displays adequate comfort level or patient's stated pain goal  Description: INTERVENTIONS:  - Encourage pt to monitor pain and request assistance  - Assess pain using appropriate pain scale  - Administer analgesics based on type and severity of pain and evaluate response  - Implement non-pharmacological measures as appropriate and evaluate response  - Consider cultural and social influences on pain and pain management  - Manage/alleviate anxiety  - Utilize distraction and/or relaxation techniques  - Monitor for opioid side  effects  - Notify MD/LIP if interventions unsuccessful or patient reports new pain  - Anticipate increased pain with activity and pre-medicate as appropriate  Outcome: Progressing     Problem: SAFETY ADULT - FALL  Goal: Free from fall injury  Description: INTERVENTIONS:  - Assess pt frequently for physical needs  - Identify cognitive and physical deficits and behaviors that affect risk of falls.  - [place] fall precautions as indicated by assessment.  - Educate pt/family on patient safety including physical limitations  - Instruct pt to call for assistance with activity based on assessment  - Modify environment to reduce risk of injury  - Provide assistive devices as appropriate  - Consider OT/PT consult to assist with strengthening/mobility  - Encourage toileting schedule  Outcome: Progressing     Problem: GASTROINTESTINAL - ADULT  Goal: Minimal or absence of nausea and vomiting  Description: INTERVENTIONS:  - Maintain adequate hydration with IV or PO as ordered and tolerated  - Nasogastric tube to low intermittent suction as ordered  - Evaluate effectiveness of ordered antiemetic medications  - Provide nonpharmacologic comfort measures as appropriate  - Advance diet as tolerated, if ordered  - Obtain nutritional consult as needed  - Evaluate fluid balance  Outcome: Progressing     Problem: SKIN/TISSUE INTEGRITY - ADULT  Goal: Skin integrity remains intact  Description: INTERVENTIONS  - Assess and document risk factors for pressure ulcer development  - Assess and document skin integrity  - Monitor for areas of redness and/or skin breakdown  - Initiate interventions, skin care algorithm/standards of care as needed  Outcome: Progressing  Goal: Incision(s), wounds(s) or drain site(s) healing without S/S of infection  Description: INTERVENTIONS:  - Assess and document risk factors for pressure ulcer development  - Assess and document skin integrity  - Assess and document dressing/incision, wound bed, drain sites and  surrounding tissue  - Implement wound care per orders  - Initiate isolation precautions as appropriate  - Initiate Pressure Ulcer prevention bundle as indicated  Outcome: Progressing

## 2024-02-12 NOTE — PLAN OF CARE
No acute changes over night. Pt is alert and oriented on RA. Remote tele in place. Pt is having BM and is not passing gas. NG to LIS. TPN running as ordered. Tolerating CLD. Pain controlled with IV Tylenol. Pt is up with a standby assist with walker. Call light is within reach and safety measures are in place.      Problem: PAIN - ADULT  Goal: Verbalizes/displays adequate comfort level or patient's stated pain goal  Description: INTERVENTIONS:  - Encourage pt to monitor pain and request assistance  - Assess pain using appropriate pain scale  - Administer analgesics based on type and severity of pain and evaluate response  - Implement non-pharmacological measures as appropriate and evaluate response  - Consider cultural and social influences on pain and pain management  - Manage/alleviate anxiety  - Utilize distraction and/or relaxation techniques  - Monitor for opioid side effects  - Notify MD/LIP if interventions unsuccessful or patient reports new pain  - Anticipate increased pain with activity and pre-medicate as appropriate  Outcome: Progressing     Problem: GASTROINTESTINAL - ADULT  Goal: Minimal or absence of nausea and vomiting  Description: INTERVENTIONS:  - Maintain adequate hydration with IV or PO as ordered and tolerated  - Nasogastric tube to low intermittent suction as ordered  - Evaluate effectiveness of ordered antiemetic medications  - Provide nonpharmacologic comfort measures as appropriate  - Advance diet as tolerated, if ordered  - Obtain nutritional consult as needed  - Evaluate fluid balance  Outcome: Progressing  Goal: Maintains or returns to baseline bowel function  Description: INTERVENTIONS:  - Assess bowel function  - Maintain adequate hydration with IV or PO as ordered and tolerated  - Evaluate effectiveness of GI medications  - Encourage mobilization and activity  - Obtain nutritional consult as needed  - Establish a toileting routine/schedule  - Consider collaborating with pharmacy to  review patient's medication profile  Outcome: Progressing     Problem: SAFETY ADULT - FALL  Goal: Free from fall injury  Description: INTERVENTIONS:  - Assess pt frequently for physical needs  - Identify cognitive and physical deficits and behaviors that affect risk of falls.  - [place] fall precautions as indicated by assessment.  - Educate pt/family on patient safety including physical limitations  - Instruct pt to call for assistance with activity based on assessment  - Modify environment to reduce risk of injury  - Provide assistive devices as appropriate  - Consider OT/PT consult to assist with strengthening/mobility  - Encourage toileting schedule  Outcome: Progressing     Problem: SKIN/TISSUE INTEGRITY - ADULT  Goal: Skin integrity remains intact  Description: INTERVENTIONS  - Assess and document risk factors for pressure ulcer development  - Assess and document skin integrity  - Monitor for areas of redness and/or skin breakdown  - Initiate interventions, skin care algorithm/standards of care as needed  Outcome: Progressing  Goal: Incision(s), wounds(s) or drain site(s) healing without S/S of infection  Description: INTERVENTIONS:  - Assess and document risk factors for pressure ulcer development  - Assess and document skin integrity  - Assess and document dressing/incision, wound bed, drain sites and surrounding tissue  - Implement wound care per orders  - Initiate isolation precautions as appropriate  - Initiate Pressure Ulcer prevention bundle as indicated  Outcome: Progressing

## 2024-02-12 NOTE — PROGRESS NOTES
[facility]    General Surgery Progress Note  Mayte Lucas  : 1945  CSN: [id number]  HD# [id number]    Subjective:   POD#9 right hemicolectomy for acute cecal volvulus  Feels well sometimes has crampy gas pain but otw denies abdominal pain and denies N/V   Passing flatus and BM(s) - last BM yesterday induced by suppository    Exam:     General: awake and alert, in no acute distress, comfortable, and in good spirits  Pulmonary:lungs clear to auscultation bilaterally  Cardiac: RRR, nl S1,S2, no S3, no S4, and no murmur  Abdomen:  hyperactive high pitched BS, moderately distended, and minimal tenderness lower abdomen, no guarding    Extremities: calves nontender, no edema, SCD's on, motor intact, and sensory intact  Wounds: clean, dry and intact     Assessment and Plan:   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HCC)  S/p right hemicolectomy  Early postop SBO    CT abdomen images reviewed - ileus but no evidence of leak  Obstructive series 2. and today shows dilated small bowel loops w differential fluid levels c/w early sbo vs ileus   Repeat obstr series shows persistent dilated sb w AF levels    Stable  Diet: NGT/NPO x ice chips and clear liquids  IVF: continue current rate  Antibiotics: no need for further antibiotics beyond perioperative doses  Cont Entereg  Cont Tylenol IV - Dilaudid for breakthrough pain only  Cont TPN    Activity: OOB to chair, more ambulation encouraged, and Ambulation in halls at least TID  DVT prophylaxis: SCDs and chemoprophylaxis as ordered    Discharge disposition: pending clinical course       Objective:     Vitals:    24 1758 24 1930 24 1955 24 0451   BP: 121/62  131/66 130/50   Pulse: 80 92     Resp: 18  18 18   Temp: 98.7 °F (37.1 °C)  99 °F (37.2 °C) 99 °F (37.2 °C)   TempSrc: Oral  Oral Oral   SpO2: 98%  96% 95%   Weight:       Height:         Body mass index is 17.31 kg/m².    Intake/Output Summary (Last 24 hours) at 2024 1112  Last data filed at 2024  0600  Gross per 24 hour   Intake 232.29 ml   Output 2020 ml   Net -1787.71 ml       Results:     Recent Labs   Lab 02/09/24  0630 02/11/24  0636 02/12/24 0524   RBC 3.00* 3.24* 3.17*   HGB 8.8* 9.3* 9.1*   HCT 25.2* 27.5* 26.9*   MCV 84.0 84.9 84.9   MCH 29.3 28.7 28.7   MCHC 34.9 33.8 33.8   RDW 14.4 14.7 14.7   NEPRELIM 5.76 6.37 7.48   WBC 8.2 8.9 10.4   .0 334.0 392.0       Recent Labs   Lab 02/10/24  0646 02/11/24  0636 02/12/24 0524   * 107* 92   BUN 13 15 20   CREATSERUM 0.46* 0.51* 0.55   CA 8.3* 8.8 9.0    137 138   K 4.1 4.2 4.4    105 106   CO2 30.0 28.0 29.0       Lab Results   Component Value Date    WBC 10.4 02/12/2024    HGB 9.1 02/12/2024    HCT 26.9 02/12/2024    .0 02/12/2024     02/12/2024    K 4.4 02/12/2024     02/12/2024    CO2 29.0 02/12/2024    BUN 20 02/12/2024    CREATSERUM 0.55 02/12/2024    GLU 92 02/12/2024    MG 2.0 02/12/2024    PHOS 4.3 02/12/2024    CA 9.0 02/12/2024       XR ABDOMEN OBSTRUCTIVE SERIES ROUTINE(2 VW)(CPT=74019)    Result Date: 2/12/2024  CONCLUSION:   Persistent markedly dilated small bowel loops, slightly less extensive than on the prior exam.  Minimal air in the ascending colon.  Findings again suggest obstruction or less likely ileus.    Dictated by (CST): Luther Ruffin MD on 2/12/2024 at 10:00 AM     Finalized by (CST): Luther Ruffin MD on 2/12/2024 at 10:02 AM          XR ABDOMEN 2 VIEWS(CPT=74019)    Result Date: 2/11/2024  CONCLUSION:   Multiple dilated small bowel loops grossly unchanged since the prior study which again could relate to obstruction or possibly ileus.  NG tube within stomach.    Dictated by (CST): Colby Nuno MD on 2/11/2024 at 12:10 PM     Finalized by (CST): Colby Nuno MD on 2/11/2024 at 12:12 PM          US VENOUS DOPPLER LEG BILAT - DIAG IMG (CPT=93970)    Result Date: 2/10/2024  CONCLUSION:   No bilateral lower extremity DVT.    Dictated by (CST): Colby Nuno MD on 2/10/2024  at 11:37 AM     Finalized by (CST): Colby Nuno MD on 2/10/2024 at 11:38 AM               Yvan Griggs MD [facility]  02/12/24  11:12 AM

## 2024-02-12 NOTE — PLAN OF CARE
Problem: Patient Centered Care  Goal: Patient preferences are identified and integrated in the patient's plan of care  Description: Interventions:  - What would you like us to know as we care for you?   - Provide timely, complete, and accurate information to patient/family  - Incorporate patient and family knowledge, values, beliefs, and cultural backgrounds into the planning and delivery of care  - Encourage patient/family to participate in care and decision-making at the level they choose  - Honor patient and family perspectives and choices  Outcome: Progressing     Problem: Patient/Family Goals  Goal: Patient/Family Long Term Goal  Description: Patient's Long Term Goal:     Interventions:  -   - See additional Care Plan goals for specific interventions  Outcome: Progressing  Goal: Patient/Family Short Term Goal  Description: Patient's Short Term Goal    Interventions:   - See additional Care Plan goals for specific interventions  Outcome: Progressing     Problem: PAIN - ADULT  Goal: Verbalizes/displays adequate comfort level or patient's stated pain goal  Description: INTERVENTIONS:  - Encourage pt to monitor pain and request assistance  - Assess pain using appropriate pain scale  - Administer analgesics based on type and severity of pain and evaluate response  - Implement non-pharmacological measures as appropriate and evaluate response  - Consider cultural and social influences on pain and pain management  - Manage/alleviate anxiety  - Utilize distraction and/or relaxation techniques  - Monitor for opioid side effects  - Notify MD/LIP if interventions unsuccessful or patient reports new pain  - Anticipate increased pain with activity and pre-medicate as appropriate  Outcome: Progressing     Problem: SAFETY ADULT - FALL  Goal: Free from fall injury  Description: INTERVENTIONS:  - Assess pt frequently for physical needs  - Identify cognitive and physical deficits and behaviors that affect risk of falls.  -  [place] fall precautions as indicated by assessment.  - Educate pt/family on patient safety including physical limitations  - Instruct pt to call for assistance with activity based on assessment  - Modify environment to reduce risk of injury  - Provide assistive devices as appropriate  - Consider OT/PT consult to assist with strengthening/mobility  - Encourage toileting schedule  Outcome: Progressing     Problem: GASTROINTESTINAL - ADULT  Goal: Minimal or absence of nausea and vomiting  Description: INTERVENTIONS:  - Maintain adequate hydration with IV or PO as ordered and tolerated  - Nasogastric tube to low intermittent suction as ordered  - Evaluate effectiveness of ordered antiemetic medications  - Provide nonpharmacologic comfort measures as appropriate  - Advance diet as tolerated, if ordered  - Obtain nutritional consult as needed  - Evaluate fluid balance  Outcome: Progressing  Goal: Maintains or returns to baseline bowel function  Description: INTERVENTIONS:  - Assess bowel function  - Maintain adequate hydration with IV or PO as ordered and tolerated  - Evaluate effectiveness of GI medications  - Encourage mobilization and activity  - Obtain nutritional consult as needed  - Establish a toileting routine/schedule  - Consider collaborating with pharmacy to review patient's medication profile  Outcome: Progressing     Problem: SKIN/TISSUE INTEGRITY - ADULT  Goal: Skin integrity remains intact  Description: INTERVENTIONS  - Assess and document risk factors for pressure ulcer development  - Assess and document skin integrity  - Monitor for areas of redness and/or skin breakdown  - Initiate interventions, skin care algorithm/standards of care as needed  Outcome: Progressing  Goal: Incision(s), wounds(s) or drain site(s) healing without S/S of infection  Description: INTERVENTIONS:  - Assess and document risk factors for pressure ulcer development  - Assess and document skin integrity  - Assess and document  dressing/incision, wound bed, drain sites and surrounding tissue  - Implement wound care per orders  - Initiate isolation precautions as appropriate  - Initiate Pressure Ulcer prevention bundle as indicated  Outcome: Progressing    No acute changes today. NG to LIS. Repeat obstructive series completed. No gas or bm. Per surg, restart entereg tonight. Abdominal incision with staples c/d/I. TPN infusing through R PICC. Up sba/walker. Call light within reach, frequent rounding.

## 2024-02-13 LAB
ANION GAP SERPL CALC-SCNC: 5 MMOL/L (ref 0–18)
BASOPHILS # BLD AUTO: 0.04 X10(3) UL (ref 0–0.2)
BASOPHILS NFR BLD AUTO: 0.4 %
BUN BLD-MCNC: 21 MG/DL (ref 9–23)
BUN/CREAT SERPL: 42 (ref 10–20)
CALCIUM BLD-MCNC: 8.5 MG/DL (ref 8.7–10.4)
CHLORIDE SERPL-SCNC: 107 MMOL/L (ref 98–112)
CO2 SERPL-SCNC: 28 MMOL/L (ref 21–32)
CREAT BLD-MCNC: 0.5 MG/DL
DEPRECATED RDW RBC AUTO: 47.4 FL (ref 35.1–46.3)
EGFRCR SERPLBLD CKD-EPI 2021: 95 ML/MIN/1.73M2 (ref 60–?)
EOSINOPHIL # BLD AUTO: 0.14 X10(3) UL (ref 0–0.7)
EOSINOPHIL NFR BLD AUTO: 1.5 %
ERYTHROCYTE [DISTWIDTH] IN BLOOD BY AUTOMATED COUNT: 15.1 % (ref 11–15)
GLUCOSE BLD-MCNC: 104 MG/DL (ref 70–99)
HCT VFR BLD AUTO: 29.7 %
HGB BLD-MCNC: 9.4 G/DL
IMM GRANULOCYTES # BLD AUTO: 0.1 X10(3) UL (ref 0–1)
IMM GRANULOCYTES NFR BLD: 1.1 %
LYMPHOCYTES # BLD AUTO: 0.91 X10(3) UL (ref 1–4)
LYMPHOCYTES NFR BLD AUTO: 9.9 %
MAGNESIUM SERPL-MCNC: 1.9 MG/DL (ref 1.6–2.6)
MCH RBC QN AUTO: 27.2 PG (ref 26–34)
MCHC RBC AUTO-ENTMCNC: 31.6 G/DL (ref 31–37)
MCV RBC AUTO: 86.1 FL
MONOCYTES # BLD AUTO: 0.85 X10(3) UL (ref 0.1–1)
MONOCYTES NFR BLD AUTO: 9.3 %
NEUTROPHILS # BLD AUTO: 7.14 X10 (3) UL (ref 1.5–7.7)
NEUTROPHILS # BLD AUTO: 7.14 X10(3) UL (ref 1.5–7.7)
NEUTROPHILS NFR BLD AUTO: 77.8 %
OSMOLALITY SERPL CALC.SUM OF ELEC: 293 MOSM/KG (ref 275–295)
PHOSPHATE SERPL-MCNC: 4.2 MG/DL (ref 2.4–5.1)
PLATELET # BLD AUTO: 331 10(3)UL (ref 150–450)
POTASSIUM SERPL-SCNC: 4.2 MMOL/L (ref 3.5–5.1)
RBC # BLD AUTO: 3.45 X10(6)UL
SODIUM SERPL-SCNC: 140 MMOL/L (ref 136–145)
WBC # BLD AUTO: 9.2 X10(3) UL (ref 4–11)

## 2024-02-13 PROCEDURE — 85025 COMPLETE CBC W/AUTO DIFF WBC: CPT | Performed by: SURGERY

## 2024-02-13 PROCEDURE — 80048 BASIC METABOLIC PNL TOTAL CA: CPT | Performed by: SURGERY

## 2024-02-13 PROCEDURE — 83735 ASSAY OF MAGNESIUM: CPT | Performed by: SURGERY

## 2024-02-13 PROCEDURE — 84100 ASSAY OF PHOSPHORUS: CPT | Performed by: SURGERY

## 2024-02-13 NOTE — SPIRITUAL CARE NOTE
Spiritual Care Visit Note    Patient Name: Mayte Lucas Date of Spiritual Care Visit: 24   : 1945 Primary Dx: 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HCC)       Referred By: Referral From: Nurse    Spiritual Care Taxonomy:    Intended Effects: Demonstrate caring and concern    Methods: Offer spiritual/Bahai support    Interventions: [place]    Visit Type/Summary:     - Spiritual Care: Offered empathic listening and emotional support. Provided support for Patient's spiritual/Bahai requests. Offered prayer.    Spiritual Care support can be requested via an Epic consult. For urgent/immediate needs, please contact the On Call  at: [place]: ext [phone]

## 2024-02-13 NOTE — PLAN OF CARE
Problem: Patient Centered Care  Goal: Patient preferences are identified and integrated in the patient's plan of care  Description: Interventions:  - What would you like us to know as we care for you?   - Provide timely, complete, and accurate information to patient/family  - Incorporate patient and family knowledge, values, beliefs, and cultural backgrounds into the planning and delivery of care  - Encourage patient/family to participate in care and decision-making at the level they choose  - Honor patient and family perspectives and choices  Outcome: Progressing     Problem: Patient/Family Goals  Goal: Patient/Family Long Term Goal  Description: Patient's Long Term Goal:     Interventions:  - See additional Care Plan goals for specific interventions  Outcome: Progressing  Goal: Patient/Family Short Term Goal  Description: Patient's Short Term Goal:     Interventions:   - See additional Care Plan goals for specific interventions  Outcome: Progressing     Problem: PAIN - ADULT  Goal: Verbalizes/displays adequate comfort level or patient's stated pain goal  Description: INTERVENTIONS:  - Encourage pt to monitor pain and request assistance  - Assess pain using appropriate pain scale  - Administer analgesics based on type and severity of pain and evaluate response  - Implement non-pharmacological measures as appropriate and evaluate response  - Consider cultural and social influences on pain and pain management  - Manage/alleviate anxiety  - Utilize distraction and/or relaxation techniques  - Monitor for opioid side effects  - Notify MD/LIP if interventions unsuccessful or patient reports new pain  - Anticipate increased pain with activity and pre-medicate as appropriate  Outcome: Progressing     Problem: SAFETY ADULT - FALL  Goal: Free from fall injury  Description: INTERVENTIONS:  - Assess pt frequently for physical needs  - Identify cognitive and physical deficits and behaviors that affect risk of falls.  -  [place] fall precautions as indicated by assessment.  - Educate pt/family on patient safety including physical limitations  - Instruct pt to call for assistance with activity based on assessment  - Modify environment to reduce risk of injury  - Provide assistive devices as appropriate  - Consider OT/PT consult to assist with strengthening/mobility  - Encourage toileting schedule  Outcome: Progressing     Problem: GASTROINTESTINAL - ADULT  Goal: Minimal or absence of nausea and vomiting  Description: INTERVENTIONS:  - Maintain adequate hydration with IV or PO as ordered and tolerated  - Nasogastric tube to low intermittent suction as ordered  - Evaluate effectiveness of ordered antiemetic medications  - Provide nonpharmacologic comfort measures as appropriate  - Advance diet as tolerated, if ordered  - Obtain nutritional consult as needed  - Evaluate fluid balance  Outcome: Progressing  Goal: Maintains or returns to baseline bowel function  Description: INTERVENTIONS:  - Assess bowel function  - Maintain adequate hydration with IV or PO as ordered and tolerated  - Evaluate effectiveness of GI medications  - Encourage mobilization and activity  - Obtain nutritional consult as needed  - Establish a toileting routine/schedule  - Consider collaborating with pharmacy to review patient's medication profile  Outcome: Progressing     Problem: SKIN/TISSUE INTEGRITY - ADULT  Goal: Skin integrity remains intact  Description: INTERVENTIONS  - Assess and document risk factors for pressure ulcer development  - Assess and document skin integrity  - Monitor for areas of redness and/or skin breakdown  - Initiate interventions, skin care algorithm/standards of care as needed  Outcome: Progressing  Goal: Incision(s), wounds(s) or drain site(s) healing without S/S of infection  Description: INTERVENTIONS:  - Assess and document risk factors for pressure ulcer development  - Assess and document skin integrity  - Assess and document  dressing/incision, wound bed, drain sites and surrounding tissue  - Implement wound care per orders  - Initiate isolation precautions as appropriate  - Initiate Pressure Ulcer prevention bundle as indicated  Outcome: Progressing   No acute changes overnight. VSS on room air. Remote tele in place. NG to LIS. Tolerating clears, TPN infusing via R PICC, R arm precautions maintained. Abd incision C/D/I. Ambulating with SBA and a walker. Entereg restarted. Pain managed with PRN IV tylenl. Fall and safety precautions maintained. Bed locked in the lowest position. Call light and personal belongings within [facility]. Frequent rounding done.

## 2024-02-13 NOTE — PROGRESS NOTES
[facility] Hospitalist Progress Note     CC: Hospital Follow up    PCP: Abi Ohara MD       Assessment/Plan:     Principal Problem:    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HCC)    Patient is a 79 year old female with PMH sig for SVT, RLS, COPD, chronic pain, HLD, IgA deficiency who presented to the hospital with abdominal pain.  NG tube clamped on 2/10/2024.  However on 2/11/2024 with increased bloating and nausea.  NG tube placed back on suction 2/11/24.  Repeat imaging with persistent obstruction versus ileus.  Continue conservative management and monitor for clinical improvement.     Cecal volvulus with extensive mesenteric ischemia  - s/p ex lap, R hemicolectomy, omentoplasty of anastomosis  - prn pain medication   - monitor respiratory status  - encouraged IS use  - prn anti emetics  - post op hgb of 9.6, was 12.6 prior to surgery  - on PCA pump per general surgery, now off  - dvt ppx: SCD  - CT A/P on 2/5 for severe LLQ pain showed ileus, no anastomic leak  - had 1 BM with streak blood, now normal  - NG to LIS and on CLD per general surgery-> NG tube was clamped later on to 2/10/24-> increase bloating and nausea, NG tube unclamped,  repeat imaging with dilated loops   - on PPN now to TPN  -Discussed with surgery, see plan above    Bilateral lower extreme edema, intermittent  - Likely dependent edema secondary to IV fluids and poor nutritional status currently  - Dopplers negative for DVT, AGUSTINA hose as tolerated, encourage patient to keep legs elevated     Possible afib in PACU?   pSVT  - reported afib in PACU but 12 lead EKG with NSR  - tele monitoring to assess if truly afib as does have a history of SVT  - hold off on AC until/if atrial fibrillation is confirmed  - resume PO metoprolol  - tele monitor reviewed, has PACs/PVCs but no afib     COPD  - resume inhalers     RLS  - resume home medication     Chronic pain  - outpatient f/u     IgA deficiency  - outpatient f/u    QUE  - resume PRN benzo     FN:  - IVF:  0.45  - Diet: NPO     DVT Prophy: SCD  Lines: PIV     Dispo: pending clinical course    Note: This chart was prepared using voice recognition software and may contain unintended word substitution errors.     Discussed with family members at bedside    Discussed with surgery, tube to suction, monitor clinical course, continue TPN and repeat labs in the morning    Thank You,  Rebecca Boyce MD 2/13/24  Hospitalist  [facility]   Answering service: [phone]       Subjective:   Wu gas and a small BM, still feels bloated, NG tube with nonbilious output.. No CP or SOB     OBJECTIVE:    Blood pressure 121/57, pulse 79, temperature 97.7 °F (36.5 °C), temperature source Oral, resp. rate 18, height 5' 5\" (1.651 m), weight 104 lb (47.2 kg), SpO2 98%, not currently breastfeeding.    Temp:  [97.7 °F (36.5 °C)-98.3 °F (36.8 °C)] 97.7 °F (36.5 °C)  Pulse:  [79-88] 79  Resp:  [18] 18  BP: (121-136)/(50-61) 121/57  SpO2:  [98 %-99 %] 98 %      Intake/Output:    Intake/Output Summary (Last 24 hours) at 2/13/2024 1320  Last data filed at 2/13/2024 0600  Gross per 24 hour   Intake --   Output 2040 ml   Net -2040 ml       Last 3 Weights   02/03/24 0948 104 lb (47.2 kg)   02/03/24 0140 104 lb (47.2 kg)   01/04/22 1442 105 lb (47.6 kg)   12/10/21 1435 104 lb (47.2 kg)       Exam   Gen: appears A&Ox 3  Heent: NG in place  Pulm: Lungs clear, normal respiratory effort  CV: Heart with regular rate and rhythm, no peripheral edema  Abd: Abdomen soft, tender as expected post op, less distended since NG tube was placed back on suction  MSK: no clubbing, no cyanosis, extreme bilateral lower extremity edema, negative Dopplers during admission, no erythema or warmth, likely due to current  nutritional status on TPN    Data Review:       Labs:     Recent Labs   Lab 02/11/24  0636 02/12/24  0524 02/13/24  0549   RBC 3.24* 3.17* 3.45*   HGB 9.3* 9.1* 9.4*   HCT 27.5* 26.9* 29.7*   MCV 84.9 84.9 86.1   MCH 28.7 28.7 27.2   MCHC 33.8  33.8 31.6   RDW 14.7 14.7 15.1*   NEPRELIM 6.37 7.48 7.14   WBC 8.9 10.4 9.2   .0 392.0 331.0         Recent Labs   Lab 02/11/24  0636 02/12/24  0524 02/13/24  0549   * 92 104*   BUN 15 20 21   CREATSERUM 0.51* 0.55 0.50*   EGFRCR 94 93 95   CA 8.8 9.0 8.5*    138 140   K 4.2 4.4 4.2    106 107   CO2 28.0 29.0 28.0       Recent Labs   Lab 02/08/24  0620   ALT 11   AST 17   ALB 3.0*         Imaging:  XR ABDOMEN OBSTRUCTIVE SERIES ROUTINE(2 VW)(CPT=74019)    Result Date: 2/12/2024  CONCLUSION:   Persistent markedly dilated small bowel loops, slightly less extensive than on the prior exam.  Minimal air in the ascending colon.  Findings again suggest obstruction or less likely ileus.    Dictated by (CST): Luther Ruffin MD on 2/12/2024 at 10:00 AM     Finalized by (CST): Luther Ruffin MD on 2/12/2024 at 10:02 AM          XR ABDOMEN 2 VIEWS(CPT=74019)    Result Date: 2/11/2024  CONCLUSION:   Multiple dilated small bowel loops grossly unchanged since the prior study which again could relate to obstruction or possibly ileus.  NG tube within stomach.    Dictated by (CST): Colby Nuno MD on 2/11/2024 at 12:10 PM     Finalized by (CST): Colby Nuno MD on 2/11/2024 at 12:12 PM             Meds:      lansoprazole  30 mg Oral QAM AC    simethicone  80 mg Oral TID    alvimopan  12 mg Oral BID    heparin  5,000 Units Subcutaneous Q12H    metoprolol succinate  12.5 mg Oral Nightly    OXcarbazepine  300 mg Oral BID      adult 3 in 1 TPN      adult 3 in 1 TPN 62.5 mL/hr at 02/12/24 0782    dextrose 10%       bisacodyl, clonazePAM, HYDROmorphone, acetaminophen, dextrose 10%, phenol, ondansetron, metoclopramide

## 2024-02-13 NOTE — PROGRESS NOTES
[facility]    General Surgery Progress Note  Mayte Lucas  : 1945  CSN: [id number]  HD# 10    Subjective:   POD#10 right hemicolectomy for acute cecal volvulus  Feels well sometimes has crampy gas pain but otw denies abdominal pain and denies N/V   Passing flatus and BM(s)    Exam:     General: awake and alert, in no acute distress, comfortable, and in good spirits  Pulmonary:lungs clear to auscultation bilaterally  Cardiac: RRR, nl S1,S2, no S3, no S4, and no murmur  Abdomen:  normal BS, moderately distended, and minimal tenderness lower abdomen, no guarding    Extremities: calves nontender, no edema, SCD's on, motor intact, and sensory intact  Wounds: clean, dry and intact     Assessment and Plan:   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HCC)  S/p right hemicolectomy  Early postop SBO    CT abdomen images reviewed - ileus but no evidence of leak  Obstructive series 2. and today shows dilated small bowel loops w differential fluid levels c/w early sbo vs ileus   Repeat obstr series shows persistent dilated sb w AF levels    Stable  Diet: NGT/NPO x ice chips and clear liquids  IVF: continue current rate  Antibiotics: no need for further antibiotics beyond perioperative doses  Cont Entereg  Cont Tylenol IV - Dilaudid for breakthrough pain only  Cont TPN  Obstructive series in AM    Activity: OOB to chair, more ambulation encouraged, and Ambulation in halls at least TID  DVT prophylaxis: SCDs and chemoprophylaxis as ordered    Discharge disposition: pending clinical course       Objective:     Vitals:    245 24 0606 24 0700 24 1213   BP: 129/61 136/50  121/57   Pulse:  88 79    Resp: 18 18  18   Temp: 98.3 °F (36.8 °C) 98 °F (36.7 °C)  97.7 °F (36.5 °C)   TempSrc: Oral Oral  Oral   SpO2: 99% 99%  98%   Weight:       Height:         Body mass index is 17.31 kg/m².    Intake/Output Summary (Last 24 hours) at 2024 1236  Last data filed at 2024 0600  Gross per 24 hour   Intake --    Output 2040 ml   Net -2040 ml       Results:     Recent Labs   Lab 02/11/24  0636 02/12/24  0524 02/13/24  0549   RBC 3.24* 3.17* 3.45*   HGB 9.3* 9.1* 9.4*   HCT 27.5* 26.9* 29.7*   MCV 84.9 84.9 86.1   MCH 28.7 28.7 27.2   MCHC 33.8 33.8 31.6   RDW 14.7 14.7 15.1*   NEPRELIM 6.37 7.48 7.14   WBC 8.9 10.4 9.2   .0 392.0 331.0       Recent Labs   Lab 02/11/24  0636 02/12/24  0524 02/13/24  0549   * 92 104*   BUN 15 20 21   CREATSERUM 0.51* 0.55 0.50*   CA 8.8 9.0 8.5*    138 140   K 4.2 4.4 4.2    106 107   CO2 28.0 29.0 28.0       Lab Results   Component Value Date    WBC 9.2 02/13/2024    HGB 9.4 02/13/2024    HCT 29.7 02/13/2024    .0 02/13/2024     02/13/2024    K 4.2 02/13/2024     02/13/2024    CO2 28.0 02/13/2024    BUN 21 02/13/2024    CREATSERUM 0.50 02/13/2024     02/13/2024    MG 1.9 02/13/2024    PHOS 4.2 02/13/2024    CA 8.5 02/13/2024       XR ABDOMEN OBSTRUCTIVE SERIES ROUTINE(2 VW)(CPT=74019)    Result Date: 2/12/2024  CONCLUSION:   Persistent markedly dilated small bowel loops, slightly less extensive than on the prior exam.  Minimal air in the ascending colon.  Findings again suggest obstruction or less likely ileus.    Dictated by (CST): Luther Ruffin MD on 2/12/2024 at 10:00 AM     Finalized by (CST): Luther Ruffin MD on 2/12/2024 at 10:02 AM               Yvan Griggs MD [facility]  02/13/24  12:37 PM

## 2024-02-14 ENCOUNTER — APPOINTMENT (OUTPATIENT)
Dept: GENERAL RADIOLOGY | Facility: HOSPITAL | Age: 79
End: 2024-02-14
Attending: SURGERY
Payer: MEDICARE

## 2024-02-14 LAB
ANION GAP SERPL CALC-SCNC: 5 MMOL/L (ref 0–18)
BASOPHILS # BLD AUTO: 0.03 X10(3) UL (ref 0–0.2)
BASOPHILS NFR BLD AUTO: 0.3 %
BUN BLD-MCNC: 20 MG/DL (ref 9–23)
BUN/CREAT SERPL: 41.7 (ref 10–20)
CALCIUM BLD-MCNC: 8.5 MG/DL (ref 8.7–10.4)
CHLORIDE SERPL-SCNC: 110 MMOL/L (ref 98–112)
CO2 SERPL-SCNC: 28 MMOL/L (ref 21–32)
CREAT BLD-MCNC: 0.48 MG/DL
DEPRECATED RDW RBC AUTO: 48.1 FL (ref 35.1–46.3)
EGFRCR SERPLBLD CKD-EPI 2021: 96 ML/MIN/1.73M2 (ref 60–?)
EOSINOPHIL # BLD AUTO: 0.2 X10(3) UL (ref 0–0.7)
EOSINOPHIL NFR BLD AUTO: 2 %
ERYTHROCYTE [DISTWIDTH] IN BLOOD BY AUTOMATED COUNT: 15.2 % (ref 11–15)
GLUCOSE BLD-MCNC: 97 MG/DL (ref 70–99)
HCT VFR BLD AUTO: 24.7 %
HGB BLD-MCNC: 7.9 G/DL
IMM GRANULOCYTES # BLD AUTO: 0.1 X10(3) UL (ref 0–1)
IMM GRANULOCYTES NFR BLD: 1 %
LYMPHOCYTES # BLD AUTO: 1.24 X10(3) UL (ref 1–4)
LYMPHOCYTES NFR BLD AUTO: 12.2 %
MAGNESIUM SERPL-MCNC: 1.9 MG/DL (ref 1.6–2.6)
MCH RBC QN AUTO: 27.9 PG (ref 26–34)
MCHC RBC AUTO-ENTMCNC: 32 G/DL (ref 31–37)
MCV RBC AUTO: 87.3 FL
MONOCYTES # BLD AUTO: 1 X10(3) UL (ref 0.1–1)
MONOCYTES NFR BLD AUTO: 9.8 %
NEUTROPHILS # BLD AUTO: 7.62 X10 (3) UL (ref 1.5–7.7)
NEUTROPHILS # BLD AUTO: 7.62 X10(3) UL (ref 1.5–7.7)
NEUTROPHILS NFR BLD AUTO: 74.7 %
OSMOLALITY SERPL CALC.SUM OF ELEC: 299 MOSM/KG (ref 275–295)
PHOSPHATE SERPL-MCNC: 3.9 MG/DL (ref 2.4–5.1)
PLATELET # BLD AUTO: 388 10(3)UL (ref 150–450)
POTASSIUM SERPL-SCNC: 4 MMOL/L (ref 3.5–5.1)
RBC # BLD AUTO: 2.83 X10(6)UL
SODIUM SERPL-SCNC: 143 MMOL/L (ref 136–145)
TRIGL SERPL-MCNC: 89 MG/DL (ref 30–149)
WBC # BLD AUTO: 10.2 X10(3) UL (ref 4–11)

## 2024-02-14 PROCEDURE — 84100 ASSAY OF PHOSPHORUS: CPT | Performed by: SURGERY

## 2024-02-14 PROCEDURE — 85025 COMPLETE CBC W/AUTO DIFF WBC: CPT | Performed by: SURGERY

## 2024-02-14 PROCEDURE — 80048 BASIC METABOLIC PNL TOTAL CA: CPT | Performed by: SURGERY

## 2024-02-14 PROCEDURE — 74019 RADEX ABDOMEN 2 VIEWS: CPT | Performed by: SURGERY

## 2024-02-14 PROCEDURE — 84478 ASSAY OF TRIGLYCERIDES: CPT | Performed by: SURGERY

## 2024-02-14 PROCEDURE — 83735 ASSAY OF MAGNESIUM: CPT | Performed by: SURGERY

## 2024-02-14 NOTE — PLAN OF CARE
No acute changes today. TPN continues via R PICC. CLD; tolerating. NGT remains to LIS. Ambulating halls numerous times today with assist. Reports passing gas and belching. Family at bedside during the day today. Tylenol IV given for c/o back pain. Plan for repeat XR in AM.     Problem: Patient Centered Care  Goal: Patient preferences are identified and integrated in the patient's plan of care  Description: Interventions:  - Provide timely, complete, and accurate information to patient/family  - Incorporate patient and family knowledge, values, beliefs, and cultural backgrounds into the planning and delivery of care  - Encourage patient/family to participate in care and decision-making at the level they choose  - Honor patient and family perspectives and choices  Outcome: Progressing     Problem: PAIN - ADULT  Goal: Verbalizes/displays adequate comfort level or patient's stated pain goal  Description: INTERVENTIONS:  - Encourage pt to monitor pain and request assistance  - Assess pain using appropriate pain scale  - Administer analgesics based on type and severity of pain and evaluate response  - Implement non-pharmacological measures as appropriate and evaluate response  - Consider cultural and social influences on pain and pain management  - Manage/alleviate anxiety  - Utilize distraction and/or relaxation techniques  - Monitor for opioid side effects  - Notify MD/LIP if interventions unsuccessful or patient reports new pain  - Anticipate increased pain with activity and pre-medicate as appropriate  Outcome: Progressing     Problem: SAFETY ADULT - FALL  Goal: Free from fall injury  Description: INTERVENTIONS:  - Assess pt frequently for physical needs  - Identify cognitive and physical deficits and behaviors that affect risk of falls.  - [place] fall precautions as indicated by assessment.  - Educate pt/family on patient safety including physical limitations  - Instruct pt to call for assistance with activity  based on assessment  - Modify environment to reduce risk of injury  - Provide assistive devices as appropriate  - Consider OT/PT consult to assist with strengthening/mobility  - Encourage toileting schedule  Outcome: Progressing     Problem: GASTROINTESTINAL - ADULT  Goal: Minimal or absence of nausea and vomiting  Description: INTERVENTIONS:  - Maintain adequate hydration with IV or PO as ordered and tolerated  - Nasogastric tube to low intermittent suction as ordered  - Evaluate effectiveness of ordered antiemetic medications  - Provide nonpharmacologic comfort measures as appropriate  - Advance diet as tolerated, if ordered  - Obtain nutritional consult as needed  - Evaluate fluid balance  Outcome: Progressing  Goal: Maintains or returns to baseline bowel function  Description: INTERVENTIONS:  - Assess bowel function  - Maintain adequate hydration with IV or PO as ordered and tolerated  - Evaluate effectiveness of GI medications  - Encourage mobilization and activity  - Obtain nutritional consult as needed  - Establish a toileting routine/schedule  - Consider collaborating with pharmacy to review patient's medication profile  Outcome: Progressing     Problem: SKIN/TISSUE INTEGRITY - ADULT  Goal: Skin integrity remains intact  Description: INTERVENTIONS  - Assess and document risk factors for pressure ulcer development  - Assess and document skin integrity  - Monitor for areas of redness and/or skin breakdown  - Initiate interventions, skin care algorithm/standards of care as needed  Outcome: Progressing  Goal: Incision(s), wounds(s) or drain site(s) healing without S/S of infection  Description: INTERVENTIONS:  - Assess and document risk factors for pressure ulcer development  - Assess and document skin integrity  - Assess and document dressing/incision, wound bed, drain sites and surrounding tissue  - Implement wound care per orders  - Initiate isolation precautions as appropriate  - Initiate Pressure Ulcer  prevention bundle as indicated  Outcome: Progressing

## 2024-02-14 NOTE — PLAN OF CARE
Alert and oriented. Vitals stable. Abdominal xray completed this morning showing improvement in ileus. NGT clamped until this evening, if less than 100cc, ok to pull NGT. TPN infusing via right picc line. Ambulating with 1 and walker. Voiding freely, passing gas and having small BM's. Denies pain. Reports a bit of nausea, no medication needed at this time. Abdominal staples removed. Neck biopsy dressing changed. Patient calls appropriately. All needs met at this time.   Problem: Patient Centered Care  Goal: Patient preferences are identified and integrated in the patient's plan of care  Description: Interventions:  - What would you like us to know as we care for you?   - Provide timely, complete, and accurate information to patient/family  - Incorporate patient and family knowledge, values, beliefs, and cultural backgrounds into the planning and delivery of care  - Encourage patient/family to participate in care and decision-making at the level they choose  - Honor patient and family perspectives and choices  Outcome: Progressing     Problem: Patient/Family Goals  Goal: Patient/Family Long Term Goal  Description: Patient's Long Term Goal:     Interventions:  -   - See additional Care Plan goals for specific interventions  Outcome: Progressing  Goal: Patient/Family Short Term Goal  Description: Patient's Short Term Goal:     Interventions:   -   - See additional Care Plan goals for specific interventions  Outcome: Progressing     Problem: PAIN - ADULT  Goal: Verbalizes/displays adequate comfort level or patient's stated pain goal  Description: INTERVENTIONS:  - Encourage pt to monitor pain and request assistance  - Assess pain using appropriate pain scale  - Administer analgesics based on type and severity of pain and evaluate response  - Implement non-pharmacological measures as appropriate and evaluate response  - Consider cultural and social influences on pain and pain management  - Manage/alleviate anxiety  -  Utilize distraction and/or relaxation techniques  - Monitor for opioid side effects  - Notify MD/LIP if interventions unsuccessful or patient reports new pain  - Anticipate increased pain with activity and pre-medicate as appropriate  Outcome: Progressing     Problem: SAFETY ADULT - FALL  Goal: Free from fall injury  Description: INTERVENTIONS:  - Assess pt frequently for physical needs  - Identify cognitive and physical deficits and behaviors that affect risk of falls.  - [place] fall precautions as indicated by assessment.  - Educate pt/family on patient safety including physical limitations  - Instruct pt to call for assistance with activity based on assessment  - Modify environment to reduce risk of injury  - Provide assistive devices as appropriate  - Consider OT/PT consult to assist with strengthening/mobility  - Encourage toileting schedule  Outcome: Progressing     Problem: GASTROINTESTINAL - ADULT  Goal: Minimal or absence of nausea and vomiting  Description: INTERVENTIONS:  - Maintain adequate hydration with IV or PO as ordered and tolerated  - Nasogastric tube to low intermittent suction as ordered  - Evaluate effectiveness of ordered antiemetic medications  - Provide nonpharmacologic comfort measures as appropriate  - Advance diet as tolerated, if ordered  - Obtain nutritional consult as needed  - Evaluate fluid balance  Outcome: Progressing  Goal: Maintains or returns to baseline bowel function  Description: INTERVENTIONS:  - Assess bowel function  - Maintain adequate hydration with IV or PO as ordered and tolerated  - Evaluate effectiveness of GI medications  - Encourage mobilization and activity  - Obtain nutritional consult as needed  - Establish a toileting routine/schedule  - Consider collaborating with pharmacy to review patient's medication profile  Outcome: Progressing     Problem: SKIN/TISSUE INTEGRITY - ADULT  Goal: Skin integrity remains intact  Description: INTERVENTIONS  - Assess and  document risk factors for pressure ulcer development  - Assess and document skin integrity  - Monitor for areas of redness and/or skin breakdown  - Initiate interventions, skin care algorithm/standards of care as needed  Outcome: Progressing  Goal: Incision(s), wounds(s) or drain site(s) healing without S/S of infection  Description: INTERVENTIONS:  - Assess and document risk factors for pressure ulcer development  - Assess and document skin integrity  - Assess and document dressing/incision, wound bed, drain sites and surrounding tissue  - Implement wound care per orders  - Initiate isolation precautions as appropriate  - Initiate Pressure Ulcer prevention bundle as indicated  Outcome: Progressing

## 2024-02-14 NOTE — PROGRESS NOTES
[facility]    General Surgery Progress Note  Mayte Lucas  : 1945  CSN: [id number]  HD# [id number]    Subjective:   POD#11 right hemicolectomy for acute cecal volvulus  Feels well, denies abdominal pain and denies N/V   Passing flatus and BM(s) - multiple liquid BMs    Exam:     General: awake and alert, in no acute distress, comfortable, and in good spirits  Pulmonary:lungs clear to auscultation bilaterally  Cardiac: RRR, nl S1,S2, no S3, no S4, and no murmur  Abdomen: normal BS, nontender, and mildly distended   Extremities: calves nontender, no edema, SCD's on, motor intact, and sensory intact  Wounds: clean, dry and intact, staples removed at bedside and Steri Strips applied    Assessment and Plan:   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HCC)  S/p right hemicolectomy  Early postop SBO    CT abdomen images reviewed - ileus but no evidence of leak  Obstructive series 24 and today shows dilated small bowel loops w differential fluid levels c/w early sbo vs ileus   Repeat obstr series today showed non dilated sb loops without AF levels    Stable  Diet: NGT/NPO x ice chips - clamp and remove later today, advance diet tomorrow  IVF: cont TPN  Antibiotics: no need for further antibiotics beyond perioperative doses  Cont Entereg x 1 more day  Cont Tylenol IV PRN  Cont TPN    Activity: OOB to chair, more ambulation encouraged, and Ambulation in halls at least TID  DVT prophylaxis: SCDs and chemoprophylaxis as ordered    Discharge disposition: pending clinical course       Objective:     Vitals:    24 1811 24 0423 24 1202   BP:  116/49 146/63 129/57   Pulse: 98      Resp:  18 18 18   Temp:  99.5 °F (37.5 °C) 97.5 °F (36.4 °C) 98.6 °F (37 °C)   TempSrc:  Oral Oral Oral   SpO2:  98% 100% 99%   Weight:       Height:         Body mass index is 17.31 kg/m².    Intake/Output Summary (Last 24 hours) at 2024 1244  Last data filed at 2024 0423  Gross per 24 hour   Intake 787.5 ml   Output  1350 ml   Net -562.5 ml       Results:     Recent Labs   Lab 02/12/24  0524 02/13/24  0549 02/14/24  0544   RBC 3.17* 3.45* 2.83*   HGB 9.1* 9.4* 7.9*   HCT 26.9* 29.7* 24.7*   MCV 84.9 86.1 87.3   MCH 28.7 27.2 27.9   MCHC 33.8 31.6 32.0   RDW 14.7 15.1* 15.2*   NEPRELIM 7.48 7.14 7.62   WBC 10.4 9.2 10.2   .0 331.0 388.0       Recent Labs   Lab 02/12/24  0524 02/13/24  0549 02/14/24  0544   GLU 92 104* 97   BUN 20 21 20   CREATSERUM 0.55 0.50* 0.48*   CA 9.0 8.5* 8.5*    140 143   K 4.4 4.2 4.0    107 110   CO2 29.0 28.0 28.0       Lab Results   Component Value Date    WBC 10.2 02/14/2024    HGB 7.9 02/14/2024    HCT 24.7 02/14/2024    .0 02/14/2024     02/14/2024    K 4.0 02/14/2024     02/14/2024    CO2 28.0 02/14/2024    BUN 20 02/14/2024    CREATSERUM 0.48 02/14/2024    GLU 97 02/14/2024    MG 1.9 02/14/2024    PHOS 3.9 02/14/2024    CA 8.5 02/14/2024       XR ABDOMEN OBSTRUCTIVE SERIES ROUTINE(2 VW)(CPT=74019)    Result Date: 2/14/2024  CONCLUSION:   Marked interval improvement in the dilated loops of small bowel throughout the abdomen, which now measure up to 2.3 cm in diameter.  These findings likely reflect a resolving ileus.  Well-positioned enteric tube.  Remaining findings are described above.     Dictated by (CST): Daron Rinaldi MD on 2/14/2024 at 9:24 AM     Finalized by (CST): Daron Rinaldi MD on 2/14/2024 at 9:30 AM               Yvan Griggs MD [facility]  02/14/24  12:46 PM

## 2024-02-14 NOTE — PROGRESS NOTES
[facility] Hospitalist Progress Note     CC: Hospital Follow up    PCP: Abi Ohara MD       Assessment/Plan:     Principal Problem:    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HCC)    Patient is a 79 year old female with PMH sig for SVT, RLS, COPD, chronic pain, HLD, IgA deficiency who presented to the hospital with abdominal pain.  NG tube clamped on 2/10/2024.  However on 2/11/2024 with increased bloating and nausea.  NG tube placed back on suction 2/11/24.  Repeat imaging with persistent obstruction versus ileus.  Obstructive series on 2/24/24 with interval improvement.  Plans for clamp trial.  Continue conservative management and monitor for clinical improvement.     Cecal volvulus with extensive mesenteric ischemia  - s/p ex lap, R hemicolectomy, omentoplasty of anastomosis  - prn pain medication   - monitor respiratory status  - encouraged IS use  - prn anti emetics  - post op hgb of 9.6, was 12.6 prior to surgery  - on PCA pump per general surgery, now off  - dvt ppx: SCD  - CT A/P on 2/5 for severe LLQ pain showed ileus, no anastomic leak  - had 1 BM with streak blood, now normal  - NG to LIS and on CLD per general surgery-> NG tube was clamped later on to 2/10/24-> increase bloating and nausea, NG tube unclamped,  repeat imaging with dilated loops   - on PPN now to TPN  - Obstructive series on 2/24/24 with interval improvement.    - Plans for clamp trial.      Bilateral lower extreme edema, intermittent  - Likely dependent edema secondary to IV fluids and poor nutritional status currently  - Dopplers negative for DVT, AGUSTINA hose as tolerated, encourage patient to keep legs elevated     Possible afib in PACU?   pSVT  - reported afib in PACU but 12 lead EKG with NSR  - tele monitoring to assess if truly afib as does have a history of SVT  - hold off on AC until/if atrial fibrillation is confirmed  - resume PO metoprolol  - tele monitor reviewed, has PACs/PVCs but no afib     COPD  - resume inhalers     RLS  - trileptal       Chronic pain  - outpatient f/u     IgA deficiency  - outpatient f/u    QUE  - resume PRN benzo     FN:  - IVF: TPN   - Diet: CLD     DVT Prophy: SCD HSQ   Lines: PIV     Dispo: pending clinical course    Discussed with family members at bedside    Thank You,  Oz Olivares MD   Hospitalist  [facility]   Answering service: [phone]       Subjective:   Feels better today.  Ambulating more than 3x per day.   Still gets nauseated but does not want meds.     OBJECTIVE:    Blood pressure 129/57, pulse 98, temperature 98.6 °F (37 °C), temperature source Oral, resp. rate 18, height 5' 5\" (1.651 m), weight 104 lb (47.2 kg), SpO2 99%, not currently breastfeeding.    Temp:  [97.5 °F (36.4 °C)-99.5 °F (37.5 °C)] 98.6 °F (37 °C)  Pulse:  [89-98] 98  Resp:  [18] 18  BP: (116-146)/(49-83) 129/57  SpO2:  [98 %-100 %] 99 %      Intake/Output:    Intake/Output Summary (Last 24 hours) at 2/14/2024 1302  Last data filed at 2/14/2024 0423  Gross per 24 hour   Intake 787.5 ml   Output 1350 ml   Net -562.5 ml       Last 3 Weights   02/03/24 0948 104 lb (47.2 kg)   02/03/24 0140 104 lb (47.2 kg)   01/04/22 1442 105 lb (47.6 kg)   12/10/21 1435 104 lb (47.2 kg)       Exam   Gen: appears A&Ox 3  Heent: NG in place  Pulm: Lungs clear, normal respiratory effort  CV: Heart with regular rate and rhythm, no peripheral edema  Abd: Abdomen soft, tender as expected post op, less distended since NG tube was placed back on suction. Midline staples in place   MSK: extreme bilateral lower extremity edema, negative Dopplers during admission, no erythema or warmth, likely due to current nutritional status on TPN    Data Review:       Labs:     Recent Labs   Lab 02/12/24  0524 02/13/24  0549 02/14/24  0544   RBC 3.17* 3.45* 2.83*   HGB 9.1* 9.4* 7.9*   HCT 26.9* 29.7* 24.7*   MCV 84.9 86.1 87.3   MCH 28.7 27.2 27.9   MCHC 33.8 31.6 32.0   RDW 14.7 15.1* 15.2*   NEPRELIM 7.48 7.14 7.62   WBC 10.4 9.2 10.2   .0 331.0 388.0          Recent Labs   Lab 02/12/24  0524 02/13/24  0549 02/14/24  0544   GLU 92 104* 97   BUN 20 21 20   CREATSERUM 0.55 0.50* 0.48*   EGFRCR 93 95 96   CA 9.0 8.5* 8.5*    140 143   K 4.4 4.2 4.0    107 110   CO2 29.0 28.0 28.0       Recent Labs   Lab 02/08/24  0620   ALT 11   AST 17   ALB 3.0*         Imaging:  XR ABDOMEN OBSTRUCTIVE SERIES ROUTINE(2 VW)(CPT=74019)    Result Date: 2/14/2024  CONCLUSION:   Marked interval improvement in the dilated loops of small bowel throughout the abdomen, which now measure up to 2.3 cm in diameter.  These findings likely reflect a resolving ileus.  Well-positioned enteric tube.  Remaining findings are described above.     Dictated by (CST): Daron Rinaldi MD on 2/14/2024 at 9:24 AM     Finalized by (CST): Daron Rinaldi MD on 2/14/2024 at 9:30 AM          XR ABDOMEN OBSTRUCTIVE SERIES ROUTINE(2 VW)(CPT=74019)    Result Date: 2/12/2024  CONCLUSION:   Persistent markedly dilated small bowel loops, slightly less extensive than on the prior exam.  Minimal air in the ascending colon.  Findings again suggest obstruction or less likely ileus.    Dictated by (CST): Luther Ruffin MD on 2/12/2024 at 10:00 AM     Finalized by (CST): Luther Ruffin MD on 2/12/2024 at 10:02 AM             Meds:      lansoprazole  30 mg Oral QAM AC    simethicone  80 mg Oral TID    alvimopan  12 mg Oral BID    heparin  5,000 Units Subcutaneous Q12H    metoprolol succinate  12.5 mg Oral Nightly    OXcarbazepine  300 mg Oral BID      adult 3 in 1 TPN      adult 3 in 1 TPN 62.5 mL/hr at 02/13/24 2146    dextrose 10%       bisacodyl, clonazePAM, HYDROmorphone, acetaminophen, dextrose 10%, phenol, ondansetron, metoclopramide

## 2024-02-14 NOTE — PLAN OF CARE
Pt A&Ox4. Room air. Tele monitor in place. Passing gas & belching, x1 small BM overnight. NG to LIS. TPN infusing. Pain controlled with PRN tylenol. Plan for xray this morning.     Problem: Patient Centered Care  Goal: Patient preferences are identified and integrated in the patient's plan of care  Description: Interventions:  - What would you like us to know as we care for you? From home with    - Provide timely, complete, and accurate information to patient/family  - Incorporate patient and family knowledge, values, beliefs, and cultural backgrounds into the planning and delivery of care  - Encourage patient/family to participate in care and decision-making at the level they choose  - Honor patient and family perspectives and choices  Outcome: Progressing     Problem: Patient/Family Goals  Goal: Patient/Family Long Term Goal  Description: Patient's Long Term Goal:     Interventions:  - See additional Care Plan goals for specific interventions  Outcome: Progressing  Goal: Patient/Family Short Term Goal  Description: Patient's Short Term Goal:     Interventions:   - See additional Care Plan goals for specific interventions  Outcome: Progressing     Problem: PAIN - ADULT  Goal: Verbalizes/displays adequate comfort level or patient's stated pain goal  Description: INTERVENTIONS:  - Encourage pt to monitor pain and request assistance  - Assess pain using appropriate pain scale  - Administer analgesics based on type and severity of pain and evaluate response  - Implement non-pharmacological measures as appropriate and evaluate response  - Consider cultural and social influences on pain and pain management  - Manage/alleviate anxiety  - Utilize distraction and/or relaxation techniques  - Monitor for opioid side effects  - Notify MD/LIP if interventions unsuccessful or patient reports new pain  - Anticipate increased pain with activity and pre-medicate as appropriate  Outcome: Progressing     Problem: SAFETY ADULT  - FALL  Goal: Free from fall injury  Description: INTERVENTIONS:  - Assess pt frequently for physical needs  - Identify cognitive and physical deficits and behaviors that affect risk of falls.  - [place] fall precautions as indicated by assessment.  - Educate pt/family on patient safety including physical limitations  - Instruct pt to call for assistance with activity based on assessment  - Modify environment to reduce risk of injury  - Provide assistive devices as appropriate  - Consider OT/PT consult to assist with strengthening/mobility  - Encourage toileting schedule  Outcome: Progressing     Problem: GASTROINTESTINAL - ADULT  Goal: Minimal or absence of nausea and vomiting  Description: INTERVENTIONS:  - Maintain adequate hydration with IV or PO as ordered and tolerated  - Nasogastric tube to low intermittent suction as ordered  - Evaluate effectiveness of ordered antiemetic medications  - Provide nonpharmacologic comfort measures as appropriate  - Advance diet as tolerated, if ordered  - Obtain nutritional consult as needed  - Evaluate fluid balance  Outcome: Progressing  Goal: Maintains or returns to baseline bowel function  Description: INTERVENTIONS:  - Assess bowel function  - Maintain adequate hydration with IV or PO as ordered and tolerated  - Evaluate effectiveness of GI medications  - Encourage mobilization and activity  - Obtain nutritional consult as needed  - Establish a toileting routine/schedule  - Consider collaborating with pharmacy to review patient's medication profile  Outcome: Progressing     Problem: SKIN/TISSUE INTEGRITY - ADULT  Goal: Skin integrity remains intact  Description: INTERVENTIONS  - Assess and document risk factors for pressure ulcer development  - Assess and document skin integrity  - Monitor for areas of redness and/or skin breakdown  - Initiate interventions, skin care algorithm/standards of care as needed  Outcome: Progressing  Goal: Incision(s), wounds(s) or drain  site(s) healing without S/S of infection  Description: INTERVENTIONS:  - Assess and document risk factors for pressure ulcer development  - Assess and document skin integrity  - Assess and document dressing/incision, wound bed, drain sites and surrounding tissue  - Implement wound care per orders  - Initiate isolation precautions as appropriate  - Initiate Pressure Ulcer prevention bundle as indicated  Outcome: Progressing

## 2024-02-15 LAB
ALBUMIN SERPL-MCNC: 3.5 G/DL (ref 3.2–4.8)
ALBUMIN/GLOB SERPL: 1.8 {RATIO} (ref 1–2)
ALP LIVER SERPL-CCNC: 92 U/L
ALT SERPL-CCNC: 35 U/L
ANION GAP SERPL CALC-SCNC: 7 MMOL/L (ref 0–18)
AST SERPL-CCNC: 19 U/L (ref ?–34)
BASOPHILS # BLD AUTO: 0.03 X10(3) UL (ref 0–0.2)
BASOPHILS NFR BLD AUTO: 0.3 %
BILIRUB SERPL-MCNC: 0.2 MG/DL (ref 0.2–1.1)
BUN BLD-MCNC: 17 MG/DL (ref 9–23)
BUN/CREAT SERPL: 31.5 (ref 10–20)
CALCIUM BLD-MCNC: 8.8 MG/DL (ref 8.7–10.4)
CHLORIDE SERPL-SCNC: 110 MMOL/L (ref 98–112)
CO2 SERPL-SCNC: 26 MMOL/L (ref 21–32)
CREAT BLD-MCNC: 0.54 MG/DL
DEPRECATED RDW RBC AUTO: 47.8 FL (ref 35.1–46.3)
EGFRCR SERPLBLD CKD-EPI 2021: 93 ML/MIN/1.73M2 (ref 60–?)
EOSINOPHIL # BLD AUTO: 0.26 X10(3) UL (ref 0–0.7)
EOSINOPHIL NFR BLD AUTO: 2.5 %
ERYTHROCYTE [DISTWIDTH] IN BLOOD BY AUTOMATED COUNT: 15.2 % (ref 11–15)
GLOBULIN PLAS-MCNC: 2 G/DL (ref 2.8–4.4)
GLUCOSE BLD-MCNC: 90 MG/DL (ref 70–99)
HCT VFR BLD AUTO: 25.6 %
HGB BLD-MCNC: 8.2 G/DL
IMM GRANULOCYTES # BLD AUTO: 0.14 X10(3) UL (ref 0–1)
IMM GRANULOCYTES NFR BLD: 1.3 %
LYMPHOCYTES # BLD AUTO: 1.5 X10(3) UL (ref 1–4)
LYMPHOCYTES NFR BLD AUTO: 14.3 %
MAGNESIUM SERPL-MCNC: 2 MG/DL (ref 1.6–2.6)
MCH RBC QN AUTO: 27.6 PG (ref 26–34)
MCHC RBC AUTO-ENTMCNC: 32 G/DL (ref 31–37)
MCV RBC AUTO: 86.2 FL
MONOCYTES # BLD AUTO: 1.18 X10(3) UL (ref 0.1–1)
MONOCYTES NFR BLD AUTO: 11.3 %
NEUTROPHILS # BLD AUTO: 7.35 X10 (3) UL (ref 1.5–7.7)
NEUTROPHILS # BLD AUTO: 7.35 X10(3) UL (ref 1.5–7.7)
NEUTROPHILS NFR BLD AUTO: 70.3 %
OSMOLALITY SERPL CALC.SUM OF ELEC: 297 MOSM/KG (ref 275–295)
PHOSPHATE SERPL-MCNC: 4.6 MG/DL (ref 2.4–5.1)
PLATELET # BLD AUTO: 468 10(3)UL (ref 150–450)
POTASSIUM SERPL-SCNC: 4.4 MMOL/L (ref 3.5–5.1)
PROT SERPL-MCNC: 5.5 G/DL (ref 5.7–8.2)
RBC # BLD AUTO: 2.97 X10(6)UL
SODIUM SERPL-SCNC: 143 MMOL/L (ref 136–145)
TRIGL SERPL-MCNC: 104 MG/DL (ref 30–149)
WBC # BLD AUTO: 10.5 X10(3) UL (ref 4–11)

## 2024-02-15 PROCEDURE — 84478 ASSAY OF TRIGLYCERIDES: CPT | Performed by: SURGERY

## 2024-02-15 PROCEDURE — 83735 ASSAY OF MAGNESIUM: CPT | Performed by: INTERNAL MEDICINE

## 2024-02-15 PROCEDURE — 80053 COMPREHEN METABOLIC PANEL: CPT | Performed by: INTERNAL MEDICINE

## 2024-02-15 PROCEDURE — 84100 ASSAY OF PHOSPHORUS: CPT | Performed by: SURGERY

## 2024-02-15 PROCEDURE — 85025 COMPLETE CBC W/AUTO DIFF WBC: CPT | Performed by: INTERNAL MEDICINE

## 2024-02-15 RX ORDER — ACETAMINOPHEN 325 MG/1
650 TABLET ORAL EVERY 6 HOURS PRN
Status: DISCONTINUED | OUTPATIENT
Start: 2024-02-15 | End: 2024-02-17

## 2024-02-15 NOTE — PROGRESS NOTES
[facility] Hospitalist Progress Note     CC: Hospital Follow up    PCP: Abi Ohara MD       Assessment/Plan:     Principal Problem:    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HCC)    Patient is a 79 year old female with PMH sig for SVT, RLS, COPD, chronic pain, HLD, IgA deficiency who presented to the hospital with abdominal pain.  NG tube clamped on 2/10/2024.  However on 2/11/2024 with increased bloating and nausea.  NG tube placed back on suction 2/11/24.  Repeat imaging with persistent obstruction versus ileus.  Obstructive series on 2/14/24 with interval improvement.  Passed clamp trial.  Continue conservative management and monitor for clinical improvement.     Cecal volvulus with extensive mesenteric ischemia  - s/p ex lap, R hemicolectomy, omentoplasty of anastomosis  - prn pain medication   - monitor respiratory status  - encouraged IS use  - prn anti emetics  - post op hgb of 9.6, was 12.6 prior to surgery  - on PCA pump per general surgery, now off  - dvt ppx: SCD  - CT A/P on 2/5 for severe LLQ pain showed ileus, no anastomic leak  - had 1 BM with streak blood, now normal  - NG to LIS and on CLD per general surgery-> NG tube was clamped later on to 2/10/24-> increase bloating and nausea, NG tube unclamped,  repeat imaging with dilated loops   - on PPN now to TPN  - Obstructive series on 2/24/24 with interval improvement.    - Passed clamp trial, NGT removed  - advance diet per surgery      Bilateral lower extreme edema, intermittent  - Likely dependent edema secondary to IV fluids and poor nutritional status currently  - Dopplers negative for DVT, AGUSTINA hose as tolerated, encourage patient to keep legs elevated     Possible afib in PACU?   pSVT  - reported afib in PACU but 12 lead EKG with NSR  - tele monitoring to assess if truly afib as does have a history of SVT  - hold off on AC until/if atrial fibrillation is confirmed  - resume PO metoprolol  - tele monitor reviewed, has PACs/PVCs but no afib     COPD  -  resume inhalers     RLS  - trileptal      Chronic pain  - outpatient f/u     IgA deficiency  - outpatient f/u    QUE  - resume PRN benzo     FN:  - IVF: TPN   - Diet: CLD     DVT Prophy: SCD HSQ   Lines: PIV     Dispo: pending clinical course    Discussed with sister at bedside    Thank You,  Oz Olivares MD   Hospitalist  [facility]   Answering service: [phone]       Subjective:   Feels better today.  Ambulating more than 3x per day.   Tolerating diet.      OBJECTIVE:    Blood pressure 118/56, pulse 88, temperature 98.2 °F (36.8 °C), temperature source Oral, resp. rate 18, height 5' 5\" (1.651 m), weight 104 lb (47.2 kg), SpO2 96%, not currently breastfeeding.    Temp:  [98.2 °F (36.8 °C)-98.6 °F (37 °C)] 98.2 °F (36.8 °C)  Pulse:  [88] 88  Resp:  [18] 18  BP: (118-133)/(56-60) 118/56  SpO2:  [96 %-99 %] 96 %      Intake/Output:    Intake/Output Summary (Last 24 hours) at 2/15/2024 1113  Last data filed at 2/15/2024 0839  Gross per 24 hour   Intake 200 ml   Output 200 ml   Net 0 ml       Last 3 Weights   02/03/24 0948 104 lb (47.2 kg)   02/03/24 0140 104 lb (47.2 kg)   01/04/22 1442 105 lb (47.6 kg)   12/10/21 1435 104 lb (47.2 kg)       Exam   Gen: appears A&Ox 3  Heent: NG in place  Pulm: Lungs clear, normal respiratory effort  CV: Heart with regular rate and rhythm, no peripheral edema  Abd: Abdomen soft, tender as expected post op, less distended since NG tube was placed back on suction.    MSK: extreme bilateral lower extremity edema, negative Dopplers during admission, no erythema or warmth, likely due to current nutritional status on TPN    Data Review:       Labs:     Recent Labs   Lab 02/13/24  0549 02/14/24  0544 02/15/24  0556   RBC 3.45* 2.83* 2.97*   HGB 9.4* 7.9* 8.2*   HCT 29.7* 24.7* 25.6*   MCV 86.1 87.3 86.2   MCH 27.2 27.9 27.6   MCHC 31.6 32.0 32.0   RDW 15.1* 15.2* 15.2*   NEPRELIM 7.14 7.62 7.35   WBC 9.2 10.2 10.5   .0 388.0 468.0*         Recent Labs   Lab  02/13/24  0549 02/14/24  0544 02/15/24  0556   * 97 90   BUN 21 20 17   CREATSERUM 0.50* 0.48* 0.54*   EGFRCR 95 96 93   CA 8.5* 8.5* 8.8    143 143   K 4.2 4.0 4.4    110 110   CO2 28.0 28.0 26.0       Recent Labs   Lab 02/15/24  0556   ALT 35   AST 19   ALB 3.5         Imaging:  XR ABDOMEN OBSTRUCTIVE SERIES ROUTINE(2 VW)(CPT=74019)    Result Date: 2/14/2024  CONCLUSION:   Marked interval improvement in the dilated loops of small bowel throughout the abdomen, which now measure up to 2.3 cm in diameter.  These findings likely reflect a resolving ileus.  Well-positioned enteric tube.  Remaining findings are described above.     Dictated by (CST): Daron Rinaldi MD on 2/14/2024 at 9:24 AM     Finalized by (CST): Daron Rinaldi MD on 2/14/2024 at 9:30 AM             Meds:      lansoprazole  30 mg Oral QAM AC    simethicone  80 mg Oral TID    alvimopan  12 mg Oral BID    heparin  5,000 Units Subcutaneous Q12H    metoprolol succinate  12.5 mg Oral Nightly    OXcarbazepine  300 mg Oral BID      adult 3 in 1 TPN      adult 3 in 1 TPN 62.5 mL/hr at 02/14/24 2113    dextrose 10%       bisacodyl, clonazePAM, HYDROmorphone, acetaminophen, dextrose 10%, phenol, ondansetron, metoclopramide

## 2024-02-15 NOTE — PLAN OF CARE
Pt A&Ox4. Room air. Tele monitor discontinued overnight. Passing gas & belching, no BM overnight. NG discontinued at shift change last night, no n/v. TPN infusing. Pain controlled with PRN tylenol. Call light within reach.    Problem: Patient Centered Care  Goal: Patient preferences are identified and integrated in the patient's plan of care  Description: Interventions:  - What would you like us to know as we care for you? From home with   - Provide timely, complete, and accurate information to patient/family  - Incorporate patient and family knowledge, values, beliefs, and cultural backgrounds into the planning and delivery of care  - Encourage patient/family to participate in care and decision-making at the level they choose  - Honor patient and family perspectives and choices  Outcome: Progressing     Problem: Patient/Family Goals  Goal: Patient/Family Long Term Goal  Description: Patient's Long Term Goal:     Interventions:  - See additional Care Plan goals for specific interventions  Outcome: Progressing  Goal: Patient/Family Short Term Goal  Description: Patient's Short Term Goal:     Interventions:   - See additional Care Plan goals for specific interventions  Outcome: Progressing     Problem: PAIN - ADULT  Goal: Verbalizes/displays adequate comfort level or patient's stated pain goal  Description: INTERVENTIONS:  - Encourage pt to monitor pain and request assistance  - Assess pain using appropriate pain scale  - Administer analgesics based on type and severity of pain and evaluate response  - Implement non-pharmacological measures as appropriate and evaluate response  - Consider cultural and social influences on pain and pain management  - Manage/alleviate anxiety  - Utilize distraction and/or relaxation techniques  - Monitor for opioid side effects  - Notify MD/LIP if interventions unsuccessful or patient reports new pain  - Anticipate increased pain with activity and pre-medicate as  appropriate  Outcome: Progressing     Problem: SAFETY ADULT - FALL  Goal: Free from fall injury  Description: INTERVENTIONS:  - Assess pt frequently for physical needs  - Identify cognitive and physical deficits and behaviors that affect risk of falls.  - [place] fall precautions as indicated by assessment.  - Educate pt/family on patient safety including physical limitations  - Instruct pt to call for assistance with activity based on assessment  - Modify environment to reduce risk of injury  - Provide assistive devices as appropriate  - Consider OT/PT consult to assist with strengthening/mobility  - Encourage toileting schedule  Outcome: Progressing     Problem: GASTROINTESTINAL - ADULT  Goal: Minimal or absence of nausea and vomiting  Description: INTERVENTIONS:  - Maintain adequate hydration with IV or PO as ordered and tolerated  - Nasogastric tube to low intermittent suction as ordered  - Evaluate effectiveness of ordered antiemetic medications  - Provide nonpharmacologic comfort measures as appropriate  - Advance diet as tolerated, if ordered  - Obtain nutritional consult as needed  - Evaluate fluid balance  Outcome: Progressing  Goal: Maintains or returns to baseline bowel function  Description: INTERVENTIONS:  - Assess bowel function  - Maintain adequate hydration with IV or PO as ordered and tolerated  - Evaluate effectiveness of GI medications  - Encourage mobilization and activity  - Obtain nutritional consult as needed  - Establish a toileting routine/schedule  - Consider collaborating with pharmacy to review patient's medication profile  Outcome: Progressing     Problem: SKIN/TISSUE INTEGRITY - ADULT  Goal: Skin integrity remains intact  Description: INTERVENTIONS  - Assess and document risk factors for pressure ulcer development  - Assess and document skin integrity  - Monitor for areas of redness and/or skin breakdown  - Initiate interventions, skin care algorithm/standards of care as  needed  Outcome: Progressing  Goal: Incision(s), wounds(s) or drain site(s) healing without S/S of infection  Description: INTERVENTIONS:  - Assess and document risk factors for pressure ulcer development  - Assess and document skin integrity  - Assess and document dressing/incision, wound bed, drain sites and surrounding tissue  - Implement wound care per orders  - Initiate isolation precautions as appropriate  - Initiate Pressure Ulcer prevention bundle as indicated  Outcome: Progressing

## 2024-02-15 NOTE — PLAN OF CARE
No acute changes throughout shift. Vitals stable. Tolerating full liquid diet. Denies pain or nausea. TPN continued, will stop tonight. Voiding freely. Having bowel movements. Plan to trial low fiber diet tomorrow. Possible discharge home on Saturday. Calls appropriately. All needs met at this time.   Problem: Patient Centered Care  Goal: Patient preferences are identified and integrated in the patient's plan of care  Description: Interventions:  - What would you like us to know as we care for you?   - Provide timely, complete, and accurate information to patient/family  - Incorporate patient and family knowledge, values, beliefs, and cultural backgrounds into the planning and delivery of care  - Encourage patient/family to participate in care and decision-making at the level they choose  - Honor patient and family perspectives and choices  Outcome: Progressing     Problem: Patient/Family Goals  Goal: Patient/Family Long Term Goal  Description: Patient's Long Term Goal:     Interventions:  -   - See additional Care Plan goals for specific interventions  Outcome: Progressing  Goal: Patient/Family Short Term Goal  Description: Patient's Short Term Goal:     Interventions:   -   - See additional Care Plan goals for specific interventions  Outcome: Progressing     Problem: PAIN - ADULT  Goal: Verbalizes/displays adequate comfort level or patient's stated pain goal  Description: INTERVENTIONS:  - Encourage pt to monitor pain and request assistance  - Assess pain using appropriate pain scale  - Administer analgesics based on type and severity of pain and evaluate response  - Implement non-pharmacological measures as appropriate and evaluate response  - Consider cultural and social influences on pain and pain management  - Manage/alleviate anxiety  - Utilize distraction and/or relaxation techniques  - Monitor for opioid side effects  - Notify MD/LIP if interventions unsuccessful or patient reports new pain  - Anticipate  increased pain with activity and pre-medicate as appropriate  Outcome: Progressing     Problem: SAFETY ADULT - FALL  Goal: Free from fall injury  Description: INTERVENTIONS:  - Assess pt frequently for physical needs  - Identify cognitive and physical deficits and behaviors that affect risk of falls.  - [place] fall precautions as indicated by assessment.  - Educate pt/family on patient safety including physical limitations  - Instruct pt to call for assistance with activity based on assessment  - Modify environment to reduce risk of injury  - Provide assistive devices as appropriate  - Consider OT/PT consult to assist with strengthening/mobility  - Encourage toileting schedule  Outcome: Progressing     Problem: GASTROINTESTINAL - ADULT  Goal: Minimal or absence of nausea and vomiting  Description: INTERVENTIONS:  - Maintain adequate hydration with IV or PO as ordered and tolerated  - Nasogastric tube to low intermittent suction as ordered  - Evaluate effectiveness of ordered antiemetic medications  - Provide nonpharmacologic comfort measures as appropriate  - Advance diet as tolerated, if ordered  - Obtain nutritional consult as needed  - Evaluate fluid balance  Outcome: Progressing  Goal: Maintains or returns to baseline bowel function  Description: INTERVENTIONS:  - Assess bowel function  - Maintain adequate hydration with IV or PO as ordered and tolerated  - Evaluate effectiveness of GI medications  - Encourage mobilization and activity  - Obtain nutritional consult as needed  - Establish a toileting routine/schedule  - Consider collaborating with pharmacy to review patient's medication profile  Outcome: Progressing     Problem: SKIN/TISSUE INTEGRITY - ADULT  Goal: Skin integrity remains intact  Description: INTERVENTIONS  - Assess and document risk factors for pressure ulcer development  - Assess and document skin integrity  - Monitor for areas of redness and/or skin breakdown  - Initiate interventions, skin  care algorithm/standards of care as needed  Outcome: Progressing  Goal: Incision(s), wounds(s) or drain site(s) healing without S/S of infection  Description: INTERVENTIONS:  - Assess and document risk factors for pressure ulcer development  - Assess and document skin integrity  - Assess and document dressing/incision, wound bed, drain sites and surrounding tissue  - Implement wound care per orders  - Initiate isolation precautions as appropriate  - Initiate Pressure Ulcer prevention bundle as indicated  Outcome: Progressing

## 2024-02-15 NOTE — PROGRESS NOTES
[facility]    General Surgery Progress Note  Mayte Lucas  : 1945  CSN: [id number]  HD# 12    Subjective:   POD# 10 right hemicolectomy for acute cecal volvulus  Patient with nausea      Exam:     General: awake and alert, in no acute distress, comfortable, and in good spirits  Pulmonary:lungs clear to auscultation bilaterally  Cardiac: RRR, nl S1,S2, no S3, no S4, and no murmur  Abdomen: Soft less distended positive BM wound clean dry and intact no evidence of infection  Extremities: calves nontender, no edema, SCD's on, motor intact, and sensory intact  Wounds: clean, dry and intact     Assessment and Plan:   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HCC)  S/p right hemicolectomy    CT abdomen images reviewed - ileus but no evidence of leak  Tolerating full with diet.  Will advance to low residual diet in a.m.   for discharge planning  Hopefully home in 24 to 48 hours.  Discussed in detail with the patient as well as her .  DC hydraulic to the back.    Activity: OOB to chair, more ambulation encouraged, and Ambulation in halls at least TID  DVT prophylaxis: SCDs and chemoprophylaxis as ordered    Discharge disposition: pending clinical course       Objective:     Vitals:    24 1202 24 2020 02/15/24 0426 02/15/24 1249   BP: 129/57 133/60 118/56 111/67   Pulse:   88 88   Resp: 18 18 18 18   Temp: 98.6 °F (37 °C) 98.3 °F (36.8 °C) 98.2 °F (36.8 °C) 98 °F (36.7 °C)   TempSrc: Oral Oral Oral Oral   SpO2: 99% 99% 96% 99%   Weight:       Height:         Body mass index is 17.31 kg/m².    Intake/Output Summary (Last 24 hours) at 2/15/2024 1418  Last data filed at 2/15/2024 0839  Gross per 24 hour   Intake 200 ml   Output 0 ml   Net 200 ml       Results:     Recent Labs   Lab 24  0549 24  0544 02/15/24  0556   RBC 3.45* 2.83* 2.97*   HGB 9.4* 7.9* 8.2*   HCT 29.7* 24.7* 25.6*   MCV 86.1 87.3 86.2   MCH 27.2 27.9 27.6   MCHC 31.6 32.0 32.0   RDW 15.1* 15.2* 15.2*   NEPRELIM 7.14  7.62 7.35   WBC 9.2 10.2 10.5   .0 388.0 468.0*       Recent Labs   Lab 02/13/24  0549 02/14/24  0544 02/15/24  0556   * 97 90   BUN 21 20 17   CREATSERUM 0.50* 0.48* 0.54*   CA 8.5* 8.5* 8.8    143 143   K 4.2 4.0 4.4    110 110   CO2 28.0 28.0 26.0       Lab Results   Component Value Date    WBC 10.5 02/15/2024    HGB 8.2 02/15/2024    HCT 25.6 02/15/2024    .0 02/15/2024     02/15/2024    K 4.4 02/15/2024     02/15/2024    CO2 26.0 02/15/2024    BUN 17 02/15/2024    CREATSERUM 0.54 02/15/2024    GLU 90 02/15/2024    MG 2.0 02/15/2024    PHOS 4.6 02/15/2024    BILT 0.2 02/15/2024    AST 19 02/15/2024    ALT 35 02/15/2024    CA 8.8 02/15/2024    ALB 3.5 02/15/2024    TP 5.5 02/15/2024       XR ABDOMEN OBSTRUCTIVE SERIES ROUTINE(2 VW)(CPT=74019)    Result Date: 2/14/2024  CONCLUSION:   Marked interval improvement in the dilated loops of small bowel throughout the abdomen, which now measure up to 2.3 cm in diameter.  These findings likely reflect a resolving ileus.  Well-positioned enteric tube.  Remaining findings are described above.     Dictated by (CST): Daron Rinaldi MD on 2/14/2024 at 9:24 AM     Finalized by (CST): Daron Rinaldi MD on 2/14/2024 at 9:30 AM               TWAN PENNY JR., MD. [facility]    2/15/2024  2:19 PM

## 2024-02-15 NOTE — CM/SW NOTE
02/15/24 1400   CM/SW Screening   Referral Source Physician   Information Source   (MDO)   Patient's Current Mental Status at Time of Assessment Alert;Oriented   Patient's Home Environment House   Patient lives with Spouse/Significant other   Discharge Needs   Anticipated D/C needs Home health care      MDo-Discharge planning possibly home tomorrow evening,m evaluate for home physical therapy    CM called and spoke with pt re above. Pt confirmed address and PCP.    PT sts she was going to [facility] out[facility] PT pta but is not going to be able to do that at [place].    Pt is agreeable to [facility] eval for PT only, does not want an RN visit.    CM sent ref, f2f done    Plan  Home w/ [facility] pending choice    / to remain available for support and/or discharge planning.     Yamel Torres, RN    Ext [phone]

## 2024-02-15 NOTE — CM/SW NOTE
MDO  Discharge planning possibly tomorrow evening evaluate for home physical therapy    Pt ambulatory 15ft with RW    [facility] ref sent, f2f done    Plan  Pending progress     / to remain available for support and/or discharge planning.     Yamel Torres RN    Ext [phone]

## 2024-02-16 LAB
ALBUMIN SERPL-MCNC: 3.4 G/DL (ref 3.2–4.8)
ALBUMIN/GLOB SERPL: 1.7 {RATIO} (ref 1–2)
ALP LIVER SERPL-CCNC: 92 U/L
ALT SERPL-CCNC: 23 U/L
ANION GAP SERPL CALC-SCNC: 8 MMOL/L (ref 0–18)
AST SERPL-CCNC: 17 U/L (ref ?–34)
BASOPHILS # BLD AUTO: 0.07 X10(3) UL (ref 0–0.2)
BASOPHILS NFR BLD AUTO: 0.7 %
BILIRUB SERPL-MCNC: 0.2 MG/DL (ref 0.2–1.1)
BUN BLD-MCNC: 17 MG/DL (ref 9–23)
BUN/CREAT SERPL: 27.9 (ref 10–20)
CALCIUM BLD-MCNC: 9 MG/DL (ref 8.7–10.4)
CHLORIDE SERPL-SCNC: 109 MMOL/L (ref 98–112)
CO2 SERPL-SCNC: 27 MMOL/L (ref 21–32)
CREAT BLD-MCNC: 0.61 MG/DL
DEPRECATED RDW RBC AUTO: 47.8 FL (ref 35.1–46.3)
EGFRCR SERPLBLD CKD-EPI 2021: 90 ML/MIN/1.73M2 (ref 60–?)
EOSINOPHIL # BLD AUTO: 0.18 X10(3) UL (ref 0–0.7)
EOSINOPHIL NFR BLD AUTO: 1.8 %
ERYTHROCYTE [DISTWIDTH] IN BLOOD BY AUTOMATED COUNT: 15.2 % (ref 11–15)
GLOBULIN PLAS-MCNC: 2 G/DL (ref 2.8–4.4)
GLUCOSE BLD-MCNC: 90 MG/DL (ref 70–99)
HCT VFR BLD AUTO: 24.6 %
HGB BLD-MCNC: 8.2 G/DL
IMM GRANULOCYTES # BLD AUTO: 0.16 X10(3) UL (ref 0–1)
IMM GRANULOCYTES NFR BLD: 1.6 %
LYMPHOCYTES # BLD AUTO: 1.7 X10(3) UL (ref 1–4)
LYMPHOCYTES NFR BLD AUTO: 17.1 %
MAGNESIUM SERPL-MCNC: 1.9 MG/DL (ref 1.6–2.6)
MCH RBC QN AUTO: 28.7 PG (ref 26–34)
MCHC RBC AUTO-ENTMCNC: 33.3 G/DL (ref 31–37)
MCV RBC AUTO: 86 FL
MONOCYTES # BLD AUTO: 1.21 X10(3) UL (ref 0.1–1)
MONOCYTES NFR BLD AUTO: 12.1 %
NEUTROPHILS # BLD AUTO: 6.64 X10 (3) UL (ref 1.5–7.7)
NEUTROPHILS # BLD AUTO: 6.64 X10(3) UL (ref 1.5–7.7)
NEUTROPHILS NFR BLD AUTO: 66.7 %
OSMOLALITY SERPL CALC.SUM OF ELEC: 299 MOSM/KG (ref 275–295)
PHOSPHATE SERPL-MCNC: 4.4 MG/DL (ref 2.4–5.1)
PLATELET # BLD AUTO: 459 10(3)UL (ref 150–450)
POTASSIUM SERPL-SCNC: 4.1 MMOL/L (ref 3.5–5.1)
PROT SERPL-MCNC: 5.4 G/DL (ref 5.7–8.2)
RBC # BLD AUTO: 2.86 X10(6)UL
SODIUM SERPL-SCNC: 144 MMOL/L (ref 136–145)
WBC # BLD AUTO: 10 X10(3) UL (ref 4–11)

## 2024-02-16 PROCEDURE — 97162 PT EVAL MOD COMPLEX 30 MIN: CPT

## 2024-02-16 PROCEDURE — 85025 COMPLETE CBC W/AUTO DIFF WBC: CPT | Performed by: INTERNAL MEDICINE

## 2024-02-16 PROCEDURE — 97116 GAIT TRAINING THERAPY: CPT

## 2024-02-16 PROCEDURE — 80053 COMPREHEN METABOLIC PANEL: CPT | Performed by: INTERNAL MEDICINE

## 2024-02-16 PROCEDURE — 84100 ASSAY OF PHOSPHORUS: CPT | Performed by: SURGERY

## 2024-02-16 PROCEDURE — 83735 ASSAY OF MAGNESIUM: CPT | Performed by: INTERNAL MEDICINE

## 2024-02-16 RX ORDER — LANSOPRAZOLE 30 MG/1
30 TABLET, ORALLY DISINTEGRATING, DELAYED RELEASE ORAL
Qty: 30 TABLET | Refills: 0 | Status: SHIPPED | OUTPATIENT
Start: 2024-02-17

## 2024-02-16 RX ORDER — SIMETHICONE 80 MG
80 TABLET,CHEWABLE ORAL 3 TIMES DAILY
Qty: 90 TABLET | Refills: 0 | Status: SHIPPED | OUTPATIENT
Start: 2024-02-16

## 2024-02-16 RX ORDER — BISACODYL 10 MG
10 SUPPOSITORY, RECTAL RECTAL DAILY PRN
Qty: 30 SUPPOSITORY | Refills: 0 | Status: SHIPPED | OUTPATIENT
Start: 2024-02-16

## 2024-02-16 NOTE — PROGRESS NOTES
[facility] Hospitalist Progress Note     CC: Hospital Follow up    PCP: Abi Ohara MD       Assessment/Plan:     Principal Problem:    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HCC)    Patient is a 79 year old female with PMH sig for SVT, RLS, COPD, chronic pain, HLD, IgA deficiency who presented to the hospital with abdominal pain.  NG tube clamped on 2/10/2024.  However on 2/11/2024 with increased bloating and nausea.  NG tube placed back on suction 2/11/24.  Repeat imaging with persistent obstruction versus ileus.  Obstructive series on 2/14/24 with interval improvement.  Passed clamp trial and NGT removed with tolerance of advanced diet and TPN complete.  Plans for dc tomorrow.      Cecal volvulus with extensive mesenteric ischemia  - s/p ex lap, R hemicolectomy, omentoplasty of anastomosis  - prn pain medication   - monitor respiratory status  - encouraged IS use  - prn anti emetics  - post op hgb of 9.6, was 12.6 prior to surgery  - on PCA pump per general surgery, now off  - dvt ppx: SCD  - CT A/P on 2/5 for severe LLQ pain showed ileus, no anastomic leak  - had 1 BM with streak blood, now normal  - NG to LIS and on CLD per general surgery-> NG tube was clamped later on to 2/10/24-> increase bloating and nausea, NG tube unclamped,  repeat imaging with dilated loops   - on PPN now to TPN  - Obstructive series on 2/24/24 with interval improvement.    - Passed clamp trial, NGT removed  - advance diet per surgery    - plans for dc tomorrow     Bilateral lower extreme edema, intermittent  - Likely dependent edema secondary to IV fluids and poor nutritional status currently  - Dopplers negative for DVT, AGUSTINA hose as tolerated, encourage patient to keep legs elevated     Possible afib in PACU?   pSVT  - reported afib in PACU but 12 lead EKG with NSR  - tele monitoring to assess if truly afib as does have a history of SVT  - hold off on AC until/if atrial fibrillation is confirmed  - resume PO metoprolol  - tele monitor reviewed,  has PACs/PVCs but no afib     COPD  - resume inhalers     RLS  - trileptal      Chronic pain  - outpatient f/u     IgA deficiency  - outpatient f/u    QUE  - resume PRN benzo     FN:  - IVF: TPN complete   - Diet: ADAT      DVT Prophy: SCD HSQ   Lines: PIV     Dispo: plans for dc tomorrow     Discussed with sister and  at bedside    Thank You,  Oz Olivares MD   Hospitalist  [facility]   Answering service: [phone]       Subjective:   Feels better today.  Ambulating more than 3x per day.   Wants to do stairs before going home.   Tolerating diet but wants to take it slow today.     OBJECTIVE:    Blood pressure 104/54, pulse 76, temperature 97.8 °F (36.6 °C), temperature source Oral, resp. rate 18, height 5' 5\" (1.651 m), weight 104 lb (47.2 kg), SpO2 100%, not currently breastfeeding.    Temp:  [97.8 °F (36.6 °C)-98.3 °F (36.8 °C)] 97.8 °F (36.6 °C)  Pulse:  [50-88] 76  Resp:  [18-20] 18  BP: (104-123)/(50-67) 104/54  SpO2:  [99 %-100 %] 100 %      Intake/Output:    Intake/Output Summary (Last 24 hours) at 2/16/2024 1114  Last data filed at 2/15/2024 2131  Gross per 24 hour   Intake --   Output 125 ml   Net -125 ml       Last 3 Weights   02/03/24 0948 104 lb (47.2 kg)   02/03/24 0140 104 lb (47.2 kg)   01/04/22 1442 105 lb (47.6 kg)   12/10/21 1435 104 lb (47.2 kg)       Exam   Gen: appears A&Ox 3  Heent: NG in place  Pulm: Lungs clear, normal respiratory effort  CV: Heart with regular rate and rhythm, no peripheral edema  Abd: Abdomen soft, tender as expected post op, staples removed, Steri-Strips in place, active bowel sounds  MSK: bilateral lower extremity edema has improved    Data Review:       Labs:     Recent Labs   Lab 02/14/24  0544 02/15/24  0556 02/16/24  0516   RBC 2.83* 2.97* 2.86*   HGB 7.9* 8.2* 8.2*   HCT 24.7* 25.6* 24.6*   MCV 87.3 86.2 86.0   MCH 27.9 27.6 28.7   MCHC 32.0 32.0 33.3   RDW 15.2* 15.2* 15.2*   NEPRELIM 7.62 7.35 6.64   WBC 10.2 10.5 10.0   .0 468.0*  459.0*         Recent Labs   Lab 02/14/24  0544 02/15/24  0556 02/16/24  0516   GLU 97 90 90   BUN 20 17 17   CREATSERUM 0.48* 0.54* 0.61   EGFRCR 96 93 90   CA 8.5* 8.8 9.0    143 144   K 4.0 4.4 4.1    110 109   CO2 28.0 26.0 27.0       Recent Labs   Lab 02/15/24  0556 02/16/24  0516   ALT 35 23   AST 19 17   ALB 3.5 3.4         Imaging:  XR ABDOMEN OBSTRUCTIVE SERIES ROUTINE(2 VW)(CPT=74019)    Result Date: 2/14/2024  CONCLUSION:   Marked interval improvement in the dilated loops of small bowel throughout the abdomen, which now measure up to 2.3 cm in diameter.  These findings likely reflect a resolving ileus.  Well-positioned enteric tube.  Remaining findings are described above.     Dictated by (CST): Daron Rinaldi MD on 2/14/2024 at 9:24 AM     Finalized by (CST): Daron Rinaldi MD on 2/14/2024 at 9:30 AM             Meds:      lansoprazole  30 mg Oral QAM AC    simethicone  80 mg Oral TID    alvimopan  12 mg Oral BID    heparin  5,000 Units Subcutaneous Q12H    metoprolol succinate  12.5 mg Oral Nightly    OXcarbazepine  300 mg Oral BID      dextrose 10% Stopped (02/15/24 2212)     acetaminophen, bisacodyl, clonazePAM, dextrose 10%, phenol, ondansetron, metoclopramide

## 2024-02-16 NOTE — PROGRESS NOTES
[facility]    General Surgery Progress Note  Mayte Lucas  : 1945  CSN: [id number]  HD# 13    Subjective:   POD# 11 right hemicolectomy for acute cecal volvulus  Patient with nausea      Exam:     General: awake and alert, in no acute distress, comfortable, and in good spirits  Pulmonary:lungs clear to auscultation bilaterally  Cardiac: RRR, nl S1,S2, no S3, no S4, and no murmur  Abdomen: Soft less distended positive BM wound clean dry and intact no evidence of infection  Extremities: calves nontender, no edema, SCD's on, motor intact, and sensory intact  Wounds: clean, dry and intact     Assessment and Plan:   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HCC)  S/p right hemicolectomy    CT abdomen images reviewed - ileus but no evidence of leak  Tolerating low residual diet    for discharge planning  If patient tolerates lunch and dinner probable discharge home this evening.  Discussed in detail with the patient as well as her .      Activity: OOB to chair, more ambulation encouraged, and Ambulation in halls at least TID  DVT prophylaxis: SCDs and chemoprophylaxis as ordered          Objective:     Vitals:    02/15/24 0426 02/15/24 1249 02/15/24 1925 24 0535   BP: 118/56 111/67 123/52 106/50   Pulse: 88 88 50 76   Resp: 18 18 20 18   Temp: 98.2 °F (36.8 °C) 98 °F (36.7 °C) 98.3 °F (36.8 °C) 97.8 °F (36.6 °C)   TempSrc: Oral Oral Oral Oral   SpO2: 96% 99% 99% 100%   Weight:       Height:         Body mass index is 17.31 kg/m².    Intake/Output Summary (Last 24 hours) at 2024 0847  Last data filed at 2/15/2024 2131  Gross per 24 hour   Intake --   Output 125 ml   Net -125 ml       Results:     Recent Labs   Lab 24  0544 02/15/24  0556 24  0516   RBC 2.83* 2.97* 2.86*   HGB 7.9* 8.2* 8.2*   HCT 24.7* 25.6* 24.6*   MCV 87.3 86.2 86.0   MCH 27.9 27.6 28.7   MCHC 32.0 32.0 33.3   RDW 15.2* 15.2* 15.2*   NEPRELIM 7.62 7.35 6.64   WBC 10.2 10.5 10.0   .0 468.0* 459.0*        Recent Labs   Lab 02/14/24  0544 02/15/24  0556 02/16/24  0516   GLU 97 90 90   BUN 20 17 17   CREATSERUM 0.48* 0.54* 0.61   CA 8.5* 8.8 9.0    143 144   K 4.0 4.4 4.1    110 109   CO2 28.0 26.0 27.0       Lab Results   Component Value Date    WBC 10.0 02/16/2024    HGB 8.2 02/16/2024    HCT 24.6 02/16/2024    .0 02/16/2024     02/16/2024    K 4.1 02/16/2024     02/16/2024    CO2 27.0 02/16/2024    BUN 17 02/16/2024    CREATSERUM 0.61 02/16/2024    GLU 90 02/16/2024    MG 1.9 02/16/2024    PHOS 4.4 02/16/2024    BILT 0.2 02/16/2024    AST 17 02/16/2024    ALT 23 02/16/2024    CA 9.0 02/16/2024    ALB 3.4 02/16/2024    TP 5.4 02/16/2024       XR ABDOMEN OBSTRUCTIVE SERIES ROUTINE(2 VW)(CPT=74019)    Result Date: 2/14/2024  CONCLUSION:   Marked interval improvement in the dilated loops of small bowel throughout the abdomen, which now measure up to 2.3 cm in diameter.  These findings likely reflect a resolving ileus.  Well-positioned enteric tube.  Remaining findings are described above.     Dictated by (CST): Daron Rinaldi MD on 2/14/2024 at 9:24 AM     Finalized by (CST): Daron Rinaldi MD on 2/14/2024 at 9:30 AM               TWAN PENNY JR., MD. [facility]    2/16/2024  8:47 AM

## 2024-02-16 NOTE — DISCHARGE INSTRUCTIONS
Keep all follow-up appointments and continue current medications.    Continue stool softeners, prunes, probiotic for regular bowel movements.  Use suppositories as needed.    [facility], [facility]   [address]  [place] [address]  Phone: [phone]  Fax: [phone]      Maintain a low fiber soft diet until you are seen by the General surgeon in follow-up

## 2024-02-16 NOTE — PHYSICAL THERAPY NOTE
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  Patient is currently functioning near baseline with transfers, gait, and stair negotiation.  Patient is requiring stand-by assist and contact guard assist as a result of the following impairments: decreased endurance/aerobic capacity, pain, and medical status.  Physical Therapy will continue to follow for duration of hospitalization.    Patient will benefit from continued skilled PT Services at discharge to promote functional independence in home.  Anticipate patient will return home with home health PT.    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[employer], 12,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    SUBJECTIVE  \"The stairs were harder than I expected.\"    PHYSICAL THERAPY EXAMINATION   OBJECTIVE  Precautions: Abdominal protective strategies  Fall Risk: Standard fall  risk    WEIGHT BEARING RESTRICTION  Weight Bearing Restriction: None                PAIN ASSESSMENT  Rating: Other (Comment) (did not quantify)  Location: surgical site  Management Techniques: Body mechanics;Breathing techniques    COGNITION  Overall Cognitive Status:  WFL - within functional limits    RANGE OF MOTION AND STRENGTH ASSESSMENT  Upper extremity ROM and strength are within functional limits  BUEs  Lower extremity ROM is within functional limits  BLEs  Lower extremity strength is within functional limits  BLEs    BALANCE  Static Sitting: Normal  Dynamic Sitting: Normal  Static Standing: Normal  Dynamic Standing: Good    ACTIVITY TOLERANCE  Pulse: 76  Heart Rate Source: Monitor     BP: 104/54  BP Location: Left arm  BP Method: Automatic  Patient Position: Sitting    O2 WALK  Oxygen Therapy  SPO2% on Room Air at Rest: 98  SPO2% Ambulation on Room Air: 100    AM-PAC '6-Clicks' INPATIENT SHORT FORM - BASIC MOBILITY  How much difficulty does the patient currently have...  Patient Difficulty: Turning over in bed (including adjusting bedclothes, sheets and blankets)?: A Little   Patient Difficulty: Sitting down on and standing up from a chair with arms (e.g., wheelchair, bedside commode, etc.): None   Patient Difficulty: Moving from lying on back to sitting on the side of the bed?: A Little   How much help from another person does the patient currently need...   Help from Another: Moving to and from a bed to a chair (including a wheelchair)?: A Little   Help from Another: Need to walk in hospital room?: A Little   Help from Another: Climbing 3-5 steps with a railing?: A Little     AM-PAC Score:  Raw Score: 19   Approx Degree of Impairment: 41.77%   Standardized Score (AM-PAC Scale): 45.44   CMS Modifier (G-Code): CK    FUNCTIONAL ABILITY STATUS  Functional Mobility/Gait Assessment  Gait Assistance: Contact guard assist;Supervision  Distance (ft): 100'  Assistive Device: None  Pattern: Within Functional Limits  (step-through pattern with reciprocal arm swing, slight kyphotic posture, decreased step length and gait speed with fatigue, declining to use RW)  Stairs: Stairs  How Many Stairs: 13  Device: 1 Rail  Assist: Contact guard assist  Pattern: Ascend and Descend  Ascend and Descend : Step to  Sit to Stand: independent - from bedside chair    ADDITIONAL SESSION DETAIL    Patient is cleared from a physical therapy standpoint for discharge as they have adequately achieved therapy goals to return home safely, however patient would benefit from continued inpatient therapy services if patient remains hospitalized to further progress towards prior level of function.      Patient received seated in bedside chair, agreeable to physical therapy evaluation. Vital signs monitored as noted above, no adverse symptoms and patient stable during session. Education with patient provided on Physical therapy plan of care, physiological benefits of out of bed mobility, and abdominal protective/pain management strategies . Next session anticipate to progress stair negotiation.    Patient history and/or personal factors that may impact the plan of care include home accessibility concerns and co-morbidities (SVT, RLS, COPD, chronic pain, HLD, IgA deficiency) affecting endurance and medical status . Based on the physical therapy examination of the noted systems and functional activity/participation limitations, the patient presentation is evolving given the patient presents with surgical precautions/limitations.    Exercise/Education Provided:  Bed mobility  Body mechanics  Functional activity tolerated  Gait training  Posture  ROM  Transfer training    The patient's Approx Degree of Impairment: 41.77% has been calculated based on documentation in the [facility] '6 clicks' Inpatient Basic Mobility Short Form.  Research supports that patients with this level of impairment may benefit from home with home-health PT.  Final disposition will be made by  interdisciplinary medical team.    Patient End of Session: Up in chair;Needs met;Call light within reach;RN aware of session/findings;Family present;All patient questions and concerns addressed    CURRENT GOALS  Goals to be met by: 24  Patient Goal Patient's self-stated goal is: return home safely   Goal #1 Patient is able to demonstrate supine - sit EOB @ level: independent     Goal #1   Current Status    Goal #2 Patient is able to demonstrate transfers EOB to/from [facility] at assistance level: independent with none     Goal #2  Current Status    Goal #3 Patient is able to ambulate 300 feet with assist device: none at assistance level: independent   Goal #3   Current Status    Goal #4 Patient will negotiate 12 stairs/one curb w/ assistive device and supervision   Goal #4   Current Status    Goal #5 Patient to demonstrate independence with home activity/exercise instructions provided to patient in preparation for discharge.   Goal #5   Current Status    Goal #6    Goal #6  Current Status      Patient Evaluation Complexity Level:  History Moderate - 1 or 2 personal factors and/or co-morbidities   Examination of body systems Moderate - addressing a total of 3 or more elements   Clinical Presentation  Moderate - Evolving   Clinical Decision Making  Moderate Complexity     Gait Trainin minutes      Steph Lujan, PT, DPT  [facility]  [id number]

## 2024-02-16 NOTE — DIETARY NOTE
ADULT NUTRITION REASSESSMENT    Pt is at moderate nutrition risk decreased to moderate with stop of TPN last PM.  Pt does not meet malnutrition criteria.      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 Pt presented to hospital with c/o abdominal pain. POD#3 s/p right hemicolectomy for acute cecal volvulus. S/p obstructive series this AM - dilated small bowel loops with differential fluid levels c/w early SBO. Pt sitting up in bed at time of visit awaiting NGT reinsertion - first attempt coiled. Pt reports wt stable prior to admission with normal appetite/intake. Typically consumes 2 meals per day. Pt and  share meal prep responsibility however pt prefers to \"graze\". Pt reports she is very active, walking at least 10,000 steps daily.    2/7/24: MD order to initiate PPN. PPN ordered and discussed with RN and patient.   Patient with NGT + NPO (Ice Chips + Sips of Liquids).     2/9/24: PICC Line Placement. MD order to start TPN. NG to LIS. Taking some ice chips and sips of clear liquids from floor stock. Minimal intake per RN. Passing flatus + small amount of BM.   Discussed Abdomen Obstructive Series (2/8) with RN. Noted increasing diffuse small bowel dilation (could be obstruction or ileus). Monitor medical plans.     2/12/24: POD #9 (R) Hemicolectomy for acute cecal volvulus.   Noted repeat obstructive series today. Pending results. NG to LIS continues.   TPN infusing (Meeting needs). Tolerating sips of Clear Liquid Diet.   Continue to monitor medical plans.     2/16/24 UPDATE: TPN stopped last night. Diet increased to low fiber this am. Pt tolerated omelet this am. Pt requested low fiber diet ed.      FOOD/NUTRITION RELATED HISTORY:  Appetite:  improving  Intake:  omelet this am  Intake Meeting Needs:  no, but po intake improving. Pt declines ONS.   Percent Meals Eaten (last 3 days)       Date/Time Percent Meals Eaten (%)    02/15/24 0839 50 %    02/16/24 0900 75 %        Food Allergies: No Known Food Allergies (NKFA)  Cultural/Ethnic/Gnosticism Preferences: None    GASTROINTESTINAL:  denies N/V. Some abd pain after lunch.     MEDICATIONS: reviewed      dextrose 10% Stopped (02/15/24 2217)      lansoprazole  30 mg Oral QAM AC    simethicone  80 mg Oral TID    heparin  5,000 Units Subcutaneous Q12H    metoprolol succinate  12.5 mg Oral Nightly    OXcarbazepine  300 mg Oral BID     LABS: reviewed    Recent Labs     02/14/24  0544 02/15/24  0556 02/16/24  0516   GLU 97 90 90   BUN 20 17 17   CREATSERUM 0.48* 0.54* 0.61   CA 8.5* 8.8 9.0   MG 1.9 2.0 1.9    143 144   K 4.0 4.4 4.1    110 109   CO2 28.0 26.0 27.0   PHOS 3.9 4.6 4.4   OSMOCALC 299* 297* 299*     NUTRITION RELATED PHYSICAL FINDINGS:  - Nutrition Focused Physical Exam (NFPE): no wasting noted and appears well nourished  per visual exam.   2/7: NFPE at visit. Appears to have mild muscle mass depletion to dorsal vasques region, clavicle and shoulder regions + thigh region.   - Fluid Accumulation:  Bilateral LE/Feet 1-2+ Edema documented    See RN documentation for details  - Skin Integrity: surgical wound(s) see RN documentation for details    ANTHROPOMETRICS:  HT: 165.1 cm (5' 5\")  WT: 47.2 kg (104 lb) no recent weight. Requested weight.  BMI: Body mass index is 17.31 kg/m².  BMI CLASSIFICATION: <22 considered underweight for advanced age  IBW: 125 lbs        83% IBW  Usual Body Wt: 105 lbs per pt report      99% UBW    WEIGHT HISTORY:  Patient Weight(s) for the past 336 hrs:   Weight   02/03/24 0948 47.2 kg (104 lb)   02/03/24 0140 47.2 kg (104 lb)     Wt Readings from Last 10 Encounters:   02/03/24 47.2 kg (104 lb)   01/04/22 47.6 kg (105  lb)   12/10/21 47.2 kg (104 lb)   12/01/21 48.5 kg (107 lb)   08/05/21 47.3 kg (104 lb 4 oz)   06/24/21 46.7 kg (103 lb)   06/10/21 47.2 kg (104 lb)   10/15/20 47.6 kg (105 lb)   10/08/20 47.6 kg (105 lb)   03/05/20 49.4 kg (109 lb)     NUTRITION DIAGNOSIS/PROBLEM:   Inadequate protein energy intake related to Decreased ability to consume sufficient energy  as evidenced by NPO/CL day 3.     Nutrition Diagnosis Progress (improved) TPN for Nutrition up until last pm, now progressing po intake.      NUTRITION INTERVENTION:     NUTRITION PRESCRIPTION:   Estimated Nutrition needs: --dosing wt of 47 kg - wt taken on 2/3/24  Calories: 6021-4939 calories/day (MSJ REE = 950 kcal x 1.1(AF) x1.2-1.3(AF) or 27-29 calories per kg Dosing wt)  Protein: 56-71 g protein/day (1.2-1.5 g protein/kg Dosing wt)  Fluid Needs: 9510-6595 ml/day (1 ml/kcal)    - Diet:       Procedures    Low Fiber/Soft diet Is Patient on Accuchecks? No; ERAS      TPN: complete last pm    - RD Care Plan: Monitor need for ONS (oral nutritional supplements)--pt declines--discussed fairlife milk use at home  - Meals and snacks: Encouraged increased PO intake  - Medical Food Supplements: RD added None at this time   - Vitamin and mineral supplements: none  - Feeding assistance: meal set up  - Nutrition education:  low fiber diet education completed--urged small frequent meals, chewing foods well, adequate fluids   - Coordination of nutrition care: collaboration with other providers - discussed with RN on unit  - Discharge and transfer of nutrition care to new setting or provider: monitor plans - from home with spouse    MONITOR AND EVALUATE/NUTRITION GOALS:  - Food and Nutrient Intake:      Monitor: tolerance of PO intake  - Food and Nutrient Administration:      Monitor: N/A  - Anthropometric Measurement:    Monitor weight  - Nutrition Goals:      labs within acceptable limits, minimize lean body mass loss, maintain true wt within 5%, and improved GI status      DIETITIAN FOLLOW UP: RD to follow and monitor nutrition status    Katie Oneill RD, LDN   Clinical Dietitian [id number]

## 2024-02-16 NOTE — PLAN OF CARE
Pt A&Ox4. Room air. TPN discontinued last night per orders. Tolerating full liq diet. No reports of n/v. No BM overnight. Passing gas. Call light within reach.    Problem: Patient Centered Care  Goal: Patient preferences are identified and integrated in the patient's plan of care  Description: Interventions:  - What would you like us to know as we care for you? From home with    - Provide timely, complete, and accurate information to patient/family  - Incorporate patient and family knowledge, values, beliefs, and cultural backgrounds into the planning and delivery of care  - Encourage patient/family to participate in care and decision-making at the level they choose  - Honor patient and family perspectives and choices  Outcome: Progressing     Problem: Patient/Family Goals  Goal: Patient/Family Long Term Goal  Description: Patient's Long Term Goal:     Interventions:  - See additional Care Plan goals for specific interventions  Outcome: Progressing  Goal: Patient/Family Short Term Goal  Description: Patient's Short Term Goal:     Interventions:   - See additional Care Plan goals for specific interventions  Outcome: Progressing     Problem: PAIN - ADULT  Goal: Verbalizes/displays adequate comfort level or patient's stated pain goal  Description: INTERVENTIONS:  - Encourage pt to monitor pain and request assistance  - Assess pain using appropriate pain scale  - Administer analgesics based on type and severity of pain and evaluate response  - Implement non-pharmacological measures as appropriate and evaluate response  - Consider cultural and social influences on pain and pain management  - Manage/alleviate anxiety  - Utilize distraction and/or relaxation techniques  - Monitor for opioid side effects  - Notify MD/LIP if interventions unsuccessful or patient reports new pain  - Anticipate increased pain with activity and pre-medicate as appropriate  Outcome: Progressing     Problem: SAFETY ADULT - FALL  Goal: Free  from fall injury  Description: INTERVENTIONS:  - Assess pt frequently for physical needs  - Identify cognitive and physical deficits and behaviors that affect risk of falls.  - [place] fall precautions as indicated by assessment.  - Educate pt/family on patient safety including physical limitations  - Instruct pt to call for assistance with activity based on assessment  - Modify environment to reduce risk of injury  - Provide assistive devices as appropriate  - Consider OT/PT consult to assist with strengthening/mobility  - Encourage toileting schedule  Outcome: Progressing     Problem: GASTROINTESTINAL - ADULT  Goal: Minimal or absence of nausea and vomiting  Description: INTERVENTIONS:  - Maintain adequate hydration with IV or PO as ordered and tolerated  - Nasogastric tube to low intermittent suction as ordered  - Evaluate effectiveness of ordered antiemetic medications  - Provide nonpharmacologic comfort measures as appropriate  - Advance diet as tolerated, if ordered  - Obtain nutritional consult as needed  - Evaluate fluid balance  Outcome: Progressing  Goal: Maintains or returns to baseline bowel function  Description: INTERVENTIONS:  - Assess bowel function  - Maintain adequate hydration with IV or PO as ordered and tolerated  - Evaluate effectiveness of GI medications  - Encourage mobilization and activity  - Obtain nutritional consult as needed  - Establish a toileting routine/schedule  - Consider collaborating with pharmacy to review patient's medication profile  Outcome: Progressing     Problem: SKIN/TISSUE INTEGRITY - ADULT  Goal: Skin integrity remains intact  Description: INTERVENTIONS  - Assess and document risk factors for pressure ulcer development  - Assess and document skin integrity  - Monitor for areas of redness and/or skin breakdown  - Initiate interventions, skin care algorithm/standards of care as needed  Outcome: Progressing  Goal: Incision(s), wounds(s) or drain site(s) healing without  S/S of infection  Description: INTERVENTIONS:  - Assess and document risk factors for pressure ulcer development  - Assess and document skin integrity  - Assess and document dressing/incision, wound bed, drain sites and surrounding tissue  - Implement wound care per orders  - Initiate isolation precautions as appropriate  - Initiate Pressure Ulcer prevention bundle as indicated  Outcome: Progressing

## 2024-02-17 VITALS
HEART RATE: 81 BPM | OXYGEN SATURATION: 97 % | RESPIRATION RATE: 16 BRPM | SYSTOLIC BLOOD PRESSURE: 107 MMHG | DIASTOLIC BLOOD PRESSURE: 57 MMHG | WEIGHT: 104 LBS | HEIGHT: 65 IN | BODY MASS INDEX: 17.33 KG/M2 | TEMPERATURE: 98 F

## 2024-02-17 LAB
ALBUMIN SERPL-MCNC: 3.2 G/DL (ref 3.2–4.8)
ALBUMIN/GLOB SERPL: 1.6 {RATIO} (ref 1–2)
ALP LIVER SERPL-CCNC: 88 U/L
ALT SERPL-CCNC: 21 U/L
ANION GAP SERPL CALC-SCNC: 7 MMOL/L (ref 0–18)
AST SERPL-CCNC: 15 U/L (ref ?–34)
BASOPHILS # BLD AUTO: 0.05 X10(3) UL (ref 0–0.2)
BASOPHILS NFR BLD AUTO: 0.8 %
BILIRUB SERPL-MCNC: 0.2 MG/DL (ref 0.2–1.1)
BUN BLD-MCNC: 16 MG/DL (ref 9–23)
BUN/CREAT SERPL: 26.7 (ref 10–20)
CALCIUM BLD-MCNC: 8.8 MG/DL (ref 8.7–10.4)
CHLORIDE SERPL-SCNC: 109 MMOL/L (ref 98–112)
CO2 SERPL-SCNC: 26 MMOL/L (ref 21–32)
CREAT BLD-MCNC: 0.6 MG/DL
DEPRECATED RDW RBC AUTO: 47.4 FL (ref 35.1–46.3)
EGFRCR SERPLBLD CKD-EPI 2021: 91 ML/MIN/1.73M2 (ref 60–?)
EOSINOPHIL # BLD AUTO: 0.14 X10(3) UL (ref 0–0.7)
EOSINOPHIL NFR BLD AUTO: 2.3 %
ERYTHROCYTE [DISTWIDTH] IN BLOOD BY AUTOMATED COUNT: 15.1 % (ref 11–15)
GLOBULIN PLAS-MCNC: 2 G/DL (ref 2.8–4.4)
GLUCOSE BLD-MCNC: 88 MG/DL (ref 70–99)
HCT VFR BLD AUTO: 22.9 %
HGB BLD-MCNC: 7.6 G/DL
IMM GRANULOCYTES # BLD AUTO: 0.13 X10(3) UL (ref 0–1)
IMM GRANULOCYTES NFR BLD: 2.1 %
LYMPHOCYTES # BLD AUTO: 1.25 X10(3) UL (ref 1–4)
LYMPHOCYTES NFR BLD AUTO: 20.5 %
MAGNESIUM SERPL-MCNC: 1.7 MG/DL (ref 1.6–2.6)
MCH RBC QN AUTO: 28.6 PG (ref 26–34)
MCHC RBC AUTO-ENTMCNC: 33.2 G/DL (ref 31–37)
MCV RBC AUTO: 86.1 FL
MONOCYTES # BLD AUTO: 0.63 X10(3) UL (ref 0.1–1)
MONOCYTES NFR BLD AUTO: 10.3 %
NEUTROPHILS # BLD AUTO: 3.9 X10 (3) UL (ref 1.5–7.7)
NEUTROPHILS # BLD AUTO: 3.9 X10(3) UL (ref 1.5–7.7)
NEUTROPHILS NFR BLD AUTO: 64 %
OSMOLALITY SERPL CALC.SUM OF ELEC: 295 MOSM/KG (ref 275–295)
PLATELET # BLD AUTO: 435 10(3)UL (ref 150–450)
POTASSIUM SERPL-SCNC: 3.8 MMOL/L (ref 3.5–5.1)
PROT SERPL-MCNC: 5.2 G/DL (ref 5.7–8.2)
RBC # BLD AUTO: 2.66 X10(6)UL
SODIUM SERPL-SCNC: 142 MMOL/L (ref 136–145)
WBC # BLD AUTO: 6.1 X10(3) UL (ref 4–11)

## 2024-02-17 PROCEDURE — 80053 COMPREHEN METABOLIC PANEL: CPT | Performed by: INTERNAL MEDICINE

## 2024-02-17 PROCEDURE — 83735 ASSAY OF MAGNESIUM: CPT | Performed by: INTERNAL MEDICINE

## 2024-02-17 PROCEDURE — 85025 COMPLETE CBC W/AUTO DIFF WBC: CPT | Performed by: INTERNAL MEDICINE

## 2024-02-17 RX ORDER — MAGNESIUM OXIDE 400 MG/1
400 TABLET ORAL ONCE
Status: COMPLETED | OUTPATIENT
Start: 2024-02-17 | End: 2024-02-17

## 2024-02-17 RX ORDER — POTASSIUM CHLORIDE 20 MEQ/1
40 TABLET, EXTENDED RELEASE ORAL ONCE
Status: COMPLETED | OUTPATIENT
Start: 2024-02-17 | End: 2024-02-17

## 2024-02-17 NOTE — PLAN OF CARE
No acute changes. Vitals stable. Denies pain and nausea. Passing gas. Tolerating low fiber diet. Ambulating frequently. Incisions clean and intact. PICC line removed. Discharge instructions given. All needs met at this time   Problem: Patient Centered Care  Goal: Patient preferences are identified and integrated in the patient's plan of care  Description: Interventions:  - What would you like us to know as we care for you?   - Provide timely, complete, and accurate information to patient/family  - Incorporate patient and family knowledge, values, beliefs, and cultural backgrounds into the planning and delivery of care  - Encourage patient/family to participate in care and decision-making at the level they choose  - Honor patient and family perspectives and choices  Outcome: Adequate for Discharge     Problem: Patient/Family Goals  Goal: Patient/Family Long Term Goal  Description: Patient's Long Term Goal:     Interventions:  -   - See additional Care Plan goals for specific interventions  Outcome: Adequate for Discharge  Goal: Patient/Family Short Term Goal  Description: Patient's Short Term Goal:     Interventions:   -   - See additional Care Plan goals for specific interventions  Outcome: Adequate for Discharge     Problem: PAIN - ADULT  Goal: Verbalizes/displays adequate comfort level or patient's stated pain goal  Description: INTERVENTIONS:  - Encourage pt to monitor pain and request assistance  - Assess pain using appropriate pain scale  - Administer analgesics based on type and severity of pain and evaluate response  - Implement non-pharmacological measures as appropriate and evaluate response  - Consider cultural and social influences on pain and pain management  - Manage/alleviate anxiety  - Utilize distraction and/or relaxation techniques  - Monitor for opioid side effects  - Notify MD/LIP if interventions unsuccessful or patient reports new pain  - Anticipate increased pain with activity and pre-medicate  as appropriate  Outcome: Adequate for Discharge     Problem: SAFETY ADULT - FALL  Goal: Free from fall injury  Description: INTERVENTIONS:  - Assess pt frequently for physical needs  - Identify cognitive and physical deficits and behaviors that affect risk of falls.  - [place] fall precautions as indicated by assessment.  - Educate pt/family on patient safety including physical limitations  - Instruct pt to call for assistance with activity based on assessment  - Modify environment to reduce risk of injury  - Provide assistive devices as appropriate  - Consider OT/PT consult to assist with strengthening/mobility  - Encourage toileting schedule  Outcome: Adequate for Discharge     Problem: GASTROINTESTINAL - ADULT  Goal: Minimal or absence of nausea and vomiting  Description: INTERVENTIONS:  - Maintain adequate hydration with IV or PO as ordered and tolerated  - Nasogastric tube to low intermittent suction as ordered  - Evaluate effectiveness of ordered antiemetic medications  - Provide nonpharmacologic comfort measures as appropriate  - Advance diet as tolerated, if ordered  - Obtain nutritional consult as needed  - Evaluate fluid balance  Outcome: Adequate for Discharge  Goal: Maintains or returns to baseline bowel function  Description: INTERVENTIONS:  - Assess bowel function  - Maintain adequate hydration with IV or PO as ordered and tolerated  - Evaluate effectiveness of GI medications  - Encourage mobilization and activity  - Obtain nutritional consult as needed  - Establish a toileting routine/schedule  - Consider collaborating with pharmacy to review patient's medication profile  Outcome: Adequate for Discharge     Problem: SKIN/TISSUE INTEGRITY - ADULT  Goal: Skin integrity remains intact  Description: INTERVENTIONS  - Assess and document risk factors for pressure ulcer development  - Assess and document skin integrity  - Monitor for areas of redness and/or skin breakdown  - Initiate interventions, skin  care algorithm/standards of care as needed  Outcome: Adequate for Discharge  Goal: Incision(s), wounds(s) or drain site(s) healing without S/S of infection  Description: INTERVENTIONS:  - Assess and document risk factors for pressure ulcer development  - Assess and document skin integrity  - Assess and document dressing/incision, wound bed, drain sites and surrounding tissue  - Implement wound care per orders  - Initiate isolation precautions as appropriate  - Initiate Pressure Ulcer prevention bundle as indicated  Outcome: Adequate for Discharge

## 2024-02-17 NOTE — PLAN OF CARE
No acute changes over night. Pt is alert and oriented on RA. Remote tele in place. Tolerating Low fiber soft diet. Pain controlled. Pt is up with a standby assist with walker. Plan for discharge today. Call light is within reach and safety measures are in place.       Problem: PAIN - ADULT  Goal: Verbalizes/displays adequate comfort level or patient's stated pain goal  Description: INTERVENTIONS:  - Encourage pt to monitor pain and request assistance  - Assess pain using appropriate pain scale  - Administer analgesics based on type and severity of pain and evaluate response  - Implement non-pharmacological measures as appropriate and evaluate response  - Consider cultural and social influences on pain and pain management  - Manage/alleviate anxiety  - Utilize distraction and/or relaxation techniques  - Monitor for opioid side effects  - Notify MD/LIP if interventions unsuccessful or patient reports new pain  - Anticipate increased pain with activity and pre-medicate as appropriate  Outcome: Progressing     Problem: SAFETY ADULT - FALL  Goal: Free from fall injury  Description: INTERVENTIONS:  - Assess pt frequently for physical needs  - Identify cognitive and physical deficits and behaviors that affect risk of falls.  - [place] fall precautions as indicated by assessment.  - Educate pt/family on patient safety including physical limitations  - Instruct pt to call for assistance with activity based on assessment  - Modify environment to reduce risk of injury  - Provide assistive devices as appropriate  - Consider OT/PT consult to assist with strengthening/mobility  - Encourage toileting schedule  Outcome: Progressing     Problem: GASTROINTESTINAL - ADULT  Goal: Minimal or absence of nausea and vomiting  Description: INTERVENTIONS:  - Maintain adequate hydration with IV or PO as ordered and tolerated  - Nasogastric tube to low intermittent suction as ordered  - Evaluate effectiveness of ordered antiemetic  medications  - Provide nonpharmacologic comfort measures as appropriate  - Advance diet as tolerated, if ordered  - Obtain nutritional consult as needed  - Evaluate fluid balance  Outcome: Progressing  Goal: Maintains or returns to baseline bowel function  Description: INTERVENTIONS:  - Assess bowel function  - Maintain adequate hydration with IV or PO as ordered and tolerated  - Evaluate effectiveness of GI medications  - Encourage mobilization and activity  - Obtain nutritional consult as needed  - Establish a toileting routine/schedule  - Consider collaborating with pharmacy to review patient's medication profile  Outcome: Progressing     Problem: SKIN/TISSUE INTEGRITY - ADULT  Goal: Skin integrity remains intact  Description: INTERVENTIONS  - Assess and document risk factors for pressure ulcer development  - Assess and document skin integrity  - Monitor for areas of redness and/or skin breakdown  - Initiate interventions, skin care algorithm/standards of care as needed  Outcome: Progressing  Goal: Incision(s), wounds(s) or drain site(s) healing without S/S of infection  Description: INTERVENTIONS:  - Assess and document risk factors for pressure ulcer development  - Assess and document skin integrity  - Assess and document dressing/incision, wound bed, drain sites and surrounding tissue  - Implement wound care per orders  - Initiate isolation precautions as appropriate  - Initiate Pressure Ulcer prevention bundle as indicated  Outcome: Progressing

## 2024-02-17 NOTE — PLAN OF CARE
Problem: Patient Centered Care  Goal: Patient preferences are identified and integrated in the patient's plan of care  Description: Interventions:  - What would you like us to know as we care for you?   - Provide timely, complete, and accurate information to patient/family  - Incorporate patient and family knowledge, values, beliefs, and cultural backgrounds into the planning and delivery of care  - Encourage patient/family to participate in care and decision-making at the level they choose  - Honor patient and family perspectives and choices  Outcome: Progressing     Problem: Patient/Family Goals  Goal: Patient/Family Long Term Goal  Description: Patient's Long Term Goal:     Interventions  - See additional Care Plan goals for specific interventions  Outcome: Progressing  Goal: Patient/Family Short Term Goal  Description: Patient's Short Term Goal:     Interventions:   - See additional Care Plan goals for specific interventions  Outcome: Progressing     Problem: PAIN - ADULT  Goal: Verbalizes/displays adequate comfort level or patient's stated pain goal  Description: INTERVENTIONS:  - Encourage pt to monitor pain and request assistance  - Assess pain using appropriate pain scale  - Administer analgesics based on type and severity of pain and evaluate response  - Implement non-pharmacological measures as appropriate and evaluate response  - Consider cultural and social influences on pain and pain management  - Manage/alleviate anxiety  - Utilize distraction and/or relaxation techniques  - Monitor for opioid side effects  - Notify MD/LIP if interventions unsuccessful or patient reports new pain  - Anticipate increased pain with activity and pre-medicate as appropriate  Outcome: Progressing     Problem: SAFETY ADULT - FALL  Goal: Free from fall injury  Description: INTERVENTIONS:  - Assess pt frequently for physical needs  - Identify cognitive and physical deficits and behaviors that affect risk of falls.  -  [place] fall precautions as indicated by assessment.  - Educate pt/family on patient safety including physical limitations  - Instruct pt to call for assistance with activity based on assessment  - Modify environment to reduce risk of injury  - Provide assistive devices as appropriate  - Consider OT/PT consult to assist with strengthening/mobility  - Encourage toileting schedule  Outcome: Progressing     Problem: GASTROINTESTINAL - ADULT  Goal: Minimal or absence of nausea and vomiting  Description: INTERVENTIONS:  - Maintain adequate hydration with IV or PO as ordered and tolerated  - Nasogastric tube to low intermittent suction as ordered  - Evaluate effectiveness of ordered antiemetic medications  - Provide nonpharmacologic comfort measures as appropriate  - Advance diet as tolerated, if ordered  - Obtain nutritional consult as needed  - Evaluate fluid balance  Outcome: Progressing  Goal: Maintains or returns to baseline bowel function  Description: INTERVENTIONS:  - Assess bowel function  - Maintain adequate hydration with IV or PO as ordered and tolerated  - Evaluate effectiveness of GI medications  - Encourage mobilization and activity  - Obtain nutritional consult as needed  - Establish a toileting routine/schedule  - Consider collaborating with pharmacy to review patient's medication profile  Outcome: Progressing     Problem: SKIN/TISSUE INTEGRITY - ADULT  Goal: Skin integrity remains intact  Description: INTERVENTIONS  - Assess and document risk factors for pressure ulcer development  - Assess and document skin integrity  - Monitor for areas of redness and/or skin breakdown  - Initiate interventions, skin care algorithm/standards of care as needed  Outcome: Progressing  Goal: Incision(s), wounds(s) or drain site(s) healing without S/S of infection  Description: INTERVENTIONS:  - Assess and document risk factors for pressure ulcer development  - Assess and document skin integrity  - Assess and document  dressing/incision, wound bed, drain sites and surrounding tissue  - Implement wound care per orders  - Initiate isolation precautions as appropriate  - Initiate Pressure Ulcer prevention bundle as indicated  Outcome: Progressing     No acute changes today. Tolerating low fiber soft. Having bm's. On tele, no calls. Plans for home tomorrow with C. Call light within reach, frequent rounding.

## 2024-02-17 NOTE — PROGRESS NOTES
[facility], [place]    General Surgery Progress Note  Mayte Lucas  : 1945  CSN: [id number]  HD# [id number]    Subjective:   POD# 12 right hemicolectomy for acute cecal volvulus  Patient with nausea      Exam:     General: awake and alert, in no acute distress, comfortable, and in good spirits  Pulmonary:lungs clear to auscultation bilaterally  Cardiac: RRR, nl S1,S2, no S3, no S4, and no murmur  Abdomen: Soft less distended positive BM wound clean dry and intact no evidence of infection  Extremities: calves nontender, no edema, SCD's on, motor intact, and sensory intact  Wounds: clean, dry and intact     Assessment and Plan:   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HCC)  S/p right hemicolectomy    CT abdomen images reviewed - ileus but no evidence of leak  Tolerating low residual diet   Home today      Objective:     Vitals:    24 1108 24 1301 243 24 0619   BP: 104/54 119/57 124/55 107/57   Pulse: 76 55 78 81   Resp:  18 18 16   Temp:  97.8 °F (36.6 °C) 97.7 °F (36.5 °C) 97.8 °F (36.6 °C)   TempSrc:  Oral Oral Oral   SpO2:  98% 99% 97%   Weight:       Height:         Body mass index is 17.31 kg/m².    Intake/Output Summary (Last 24 hours) at 2024 1226  Last data filed at 2024 0619  Gross per 24 hour   Intake --   Output 300 ml   Net -300 ml       Results:     Recent Labs   Lab 02/15/24  0556 24  0516 24  0634   RBC 2.97* 2.86* 2.66*   HGB 8.2* 8.2* 7.6*   HCT 25.6* 24.6* 22.9*   MCV 86.2 86.0 86.1   MCH 27.6 28.7 28.6   MCHC 32.0 33.3 33.2   RDW 15.2* 15.2* 15.1*   NEPRELIM 7.35 6.64 3.90   WBC 10.5 10.0 6.1   .0* 459.0* 435.0       Recent Labs   Lab 02/15/24  0556 24  0516 24  0634   GLU 90 90 88   BUN 17  16   CREATSERUM 0.54* 0.61 0.60   CA 8.8 9.0 8.8    144 142   K 4.4 4.1 3.8    109 109   CO2 26.0 27.0 26.0       Lab Results   Component Value Date    WBC 6.1 2024    HGB 7.6 2024    HCT 22.9 2024    .0 2024    NA  142 02/17/2024    K 3.8 02/17/2024     02/17/2024    CO2 26.0 02/17/2024    BUN 16 02/17/2024    CREATSERUM 0.60 02/17/2024    GLU 88 02/17/2024    MG 1.7 02/17/2024    BILT 0.2 02/17/2024    AST 15 02/17/2024    ALT 21 02/17/2024    CA 8.8 02/17/2024    ALB 3.2 02/17/2024    TP 5.2 02/17/2024       No results found.        TWAN PENNY JR., MD. [facility]  GENERAL SURGERY  [facility]    2/17/2024  12:26 PM  Home today

## 2024-02-17 NOTE — DISCHARGE SUMMARY
General Medicine Discharge Summary     Patient ID:  Mayte Lucas  79 year old  2/4/1945    Admit date: 2/3/2024    Discharge date and time: 2/17/2024  1:21 PM     Attending Physician: Daniel Manuel DO     Consults: IP CONSULT TO FOOD AND NUTRITION SERVICES  IP CONSULT TO VASCULAR ACCESS TEAM  IP CONSULT TO FOOD AND NUTRITION SERVICES  IP CONSULT TO SOCIAL WORK  IP CONSULT TO FOOD AND NUTRITION SERVICES    Primary Care Physician: Abi Ohara MD     Reason for admission: abd pain , S/P right hemicolectomy for acute cecal volvulus     Risk For Readmission: Low     Discharge Diagnoses: 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HCC) [K56.2]  See Additional Discharge Diagnoses in Hospital Course    Discharged Condition: good    Follow-up with labs/images appointments:   Close PCP follow-up has been arranged on Monday   Gen surg follow-up in 2 weeks     Exam  Gen: No acute distress  Pulm: Lungs clear, normal respiratory effort  CV: Heart with regular rate and rhythm  Abd: abd soft NT ND + BS surgical scar well approximated       HPI: Per Dr. Bain     Patient is a 78 year old female with PMH sig for SVT, RLS, COPD, chronic pain, HLD, IgA deficiency who presented to the hospital with abdominal pain. Patient says that her abdominal pain had started the day prior. Initially in her epigastric region but started to spread without. She also said she was having whole body muscle spasms from how bad the pain was. She had one episode of associated nausea and vomiting. No fevers or chills. Upon arrival to the ED, initial vitals were stable. Labwork showed a leukocytosis of 13.2 but no lactic acidosis. CT A/P showed cecal volvulus with severe mesenteric ischemia. She was evaluated by general surgery and underwent exploratory laparotomy, R hemicolectomy, omentoplasty of anastomosis. She was seen and examined post operatively. During my examination she was resting comfortably in bed. She was having abdominal pain but tolerable. Some nausea earlier  that had resolved. Otherwise no other complaints.     Hospital Course:     Admitted for abd pain found to have an acute cecal volvulus underwent right hemicolectomy postoperative course non complicated, slow return of bowel function initially started on PPN and then TPN ultimately discharged home with close PCP follow-up she had some right lower extremity pain and edema ultrasound the leg not reveal DVT patient was encouraged to continue p.o. intake nausea vomiting fevers      Operative Procedures: Procedure(s) (LRB):  EXPLORATORY LAPAROTOMY, RIGHT HEMICOLECTOMY, omentopexy anastomosis (Right)     Imaging: No results found.    Disposition: home    Activity: activity as tolerated  Diet:  Soft low fiber diet  Wound Care: keep wound clean and dry  Code Status: Full Code  O2: none    Home Medication Changes: See changes below    Med list     Medication List        START taking these medications      bisacodyl 10 MG Supp  Commonly known as: Dulcolax  Place 1 suppository (10 mg total) rectally daily as needed.     lansoprazole 30 MG Tbdd  Commonly known as: Prevacid Solutab  Take 1 tablet (30 mg total) by mouth every morning before breakfast.     simethicone 80 MG Chew  Commonly known as: Mylicon  Chew 1 tablet (80 mg total) by mouth 3 (three) times daily.            CONTINUE taking these medications      Aspirin Low Dose 81 MG Tbec  Generic drug: aspirin     azelastine 0.1 % Soln  Commonly known as: Astelin  2 sprays by Nasal route 2 (two) times daily.     Budesonide-Formoterol Fumarate 80-4.5 MCG/ACT Aero  Commonly known as: Symbicort  Inhale 1-2 puffs into the lungs 2 (two) times daily as needed.     clonazePAM 0.5 MG Tabs  Commonly known as: KlonoPIN  Take 1 tablet (0.5 mg total) by mouth nightly.     denosumab 60 MG/ML Sosy  Commonly known as: Prolia     ergocalciferol 1.25 MG (89254 UT) Caps  Commonly known as: Vitamin D2     metoprolol succinate 12.5 mg Tabs  Commonly known as: Toprol XL     OXcarbazepine 150 MG  Tabs  Commonly known as: Trileptal  Take 2 tablets (300 mg total) by mouth 2 (two) times daily.               Where to Get Your Medications        These medications were sent to [id number] [place] [place] [facility] [address] [facility] [phone], [phone]  [address] [facility] [address]      Phone: [phone]   bisacodyl 10 MG Supp  lansoprazole 30 MG Tbdd  simethicone 80 MG Chew         FU   Follow-up Information       Yvan Griggs MD. Call in 10 day(s).    Specialty: SURGERY, GENERAL  Why: Call for Follow-up postoperatively  Contact information:  [address]  [address]  [facility] [address] [phone]               Abi Ohara MD Follow up on 2/19/2024.    Specialty: Internal Medicine  Why: 2/19 at 11:30 am  Contact information:  [address]  [place] [id number] [phone]                             DC instructions:      Other Discharge Instructions:         Keep all follow-up appointments and continue current medications.    Continue stool softeners, prunes, probiotic for regular bowel movements.  Use suppositories as needed.    [facility]   [address]  [place] [address]  Phone: [phone]  Fax: [phone]      Maintain a low fiber soft diet until you are seen by the General surgeon in follow-up         I reconciled current and discharge medications on day of discharge, discussed changes with patient and noted changes above.       Total Time Coordinating Care: 35 minutes    Patient had opportunity to ask questions and state understand and agree with therapeutic plan as outlined    Thank You,    Daniel Manuel,    Hospitalist with [facility]

## 2024-02-19 NOTE — CDS QUERY
How to answer this Query:    1.) DON'T CLICK COSIGN BUTTON FIRST  2.) Click \"3 dots...\" to the right of cosign button and click EDIT on the toolbar.  2.) Type an \"X\" in the bracket for the diagnosis that applies. (You may also add additional clinical details as you feel necessary to substantiate your response).   3.) Finally click \"Sign\" to complete response.  Thank You  CLINICAL DOCUMENTATION CLARIFICATION FORM  Dear Doctor:LINDSAY  Clinical information (provided below) indicates blood loss and abnormal blood counts.   PLEASE (X) DIAGNOSIS THAT APPLIES.  SELECTION BY PROVIDER ONLY    ( )  Acute on Chronic Blood Loss Anemia    ( )  Postoperative Anemia due to Acute Blood Loss    ( )  Postoperative Anemia due to Chronic Blood Loss    ( )  Other (please specify):     RISK FACTORS:SP HEMICOLECTOMY 2/3  CLINICAL INDICATORS: HGB 2/3 12.6 DOWN TO 2/14 HGB 7.9  TREATMENT: MONITOR LABS    If you have any questions, please contact Clinical Documentation  Specialist:  Kendy SALVADOR RN at [phone]     Thank You!    THIS FORM IS A PERMANENT PART OF THE MEDICAL RECORD

## 2024-02-19 NOTE — PAYOR COMM NOTE
--------------  DISCHARGE REVIEW    Payor: [employer]  Subscriber #:  [id number]  Authorization Number: [id number]    Admit date: 2/3/24  Admit time:   9:42 AM  Discharge Date: 2/17/2024  1:21 PM     Admitting Physician: Dre Chang MD  Attending Physician:  No att. providers found  Primary Care Physician: Abi Ohara MD          Discharge Summary Notes        Discharge Summary signed by Daniel Manuel DO at 2/17/2024  2:08 PM       Author: Daniel Manuel DO Specialty: HOSPITALIST Author Type: Physician    Filed: 2/17/2024  2:08 PM Date of Service: 2/17/2024  2:03 PM Status: Signed    : Daniel Manuel DO (Physician)           General Medicine Discharge Summary     Patient ID:  Mayte Lucas  79 year old  2/4/1945    Admit date: 2/3/2024    Discharge date and time: 2/17/2024  1:21 PM     Attending Physician: Daniel Manuel DO     Consults: IP CONSULT TO FOOD AND NUTRITION SERVICES  IP CONSULT TO VASCULAR ACCESS TEAM  IP CONSULT TO FOOD AND NUTRITION SERVICES  IP CONSULT TO SOCIAL WORK  IP CONSULT TO FOOD AND NUTRITION SERVICES    Primary Care Physician: Abi Ohara MD     Reason for admission: abd pain , S/P right hemicolectomy for acute cecal volvulus     Risk For Readmission: Low     Discharge Diagnoses: 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HCC) [K56.2]  See Additional Discharge Diagnoses in Hospital Course    Discharged Condition: good    Follow-up with labs/images appointments:   Close PCP follow-up has been arranged on Monday   Gen surg follow-up in 2 weeks     Exam  Gen: No acute distress  Pulm: Lungs clear, normal respiratory effort  CV: Heart with regular rate and rhythm  Abd: abd soft NT ND + BS surgical scar well approximated       HPI: Per Dr. Bain     Patient is a 78 year old female with PMH sig for SVT, RLS, COPD, chronic pain, HLD, IgA deficiency who presented to the hospital with abdominal pain. Patient says that her abdominal pain had started the day prior. Initially in her epigastric  region but started to spread without. She also said she was having whole body muscle spasms from how bad the pain was. She had one episode of associated nausea and vomiting. No fevers or chills. Upon arrival to the ED, initial vitals were stable. Labwork showed a leukocytosis of 13.2 but no lactic acidosis. CT A/P showed cecal volvulus with severe mesenteric ischemia. She was evaluated by general surgery and underwent exploratory laparotomy, R hemicolectomy, omentoplasty of anastomosis. She was seen and examined post operatively. During my examination she was resting comfortably in bed. She was having abdominal pain but tolerable. Some nausea earlier that had resolved. Otherwise no other complaints.     Hospital Course:     Admitted for abd pain found to have an acute cecal volvulus underwent right hemicolectomy postoperative course non complicated, slow return of bowel function initially started on PPN and then TPN ultimately discharged home with close PCP follow-up she had some right lower extremity pain and edema ultrasound the leg not reveal DVT patient was encouraged to continue p.o. intake nausea vomiting fevers      Operative Procedures: Procedure(s) (LRB):  EXPLORATORY LAPAROTOMY, RIGHT HEMICOLECTOMY, omentopexy anastomosis (Right)     Imaging: No results found.    Disposition: home    Activity: activity as tolerated  Diet:  Soft low fiber diet  Wound Care: keep wound clean and dry  Code Status: Full Code  O2: none    Home Medication Changes: See changes below    Med list     Medication List        START taking these medications      bisacodyl 10 MG Supp  Commonly known as: Dulcolax  Place 1 suppository (10 mg total) rectally daily as needed.     lansoprazole 30 MG Tbdd  Commonly known as: Prevacid Solutab  Take 1 tablet (30 mg total) by mouth every morning before breakfast.     simethicone 80 MG Chew  Commonly known as: Mylicon  Chew 1 tablet (80 mg total) by mouth 3 (three) times daily.            CONTINUE  taking these medications      Aspirin Low Dose 81 MG Tbec  Generic drug: aspirin     azelastine 0.1 % Soln  Commonly known as: Astelin  2 sprays by Nasal route 2 (two) times daily.     Budesonide-Formoterol Fumarate 80-4.5 MCG/ACT Aero  Commonly known as: Symbicort  Inhale 1-2 puffs into the lungs 2 (two) times daily as needed.     clonazePAM 0.5 MG Tabs  Commonly known as: KlonoPIN  Take 1 tablet (0.5 mg total) by mouth nightly.     denosumab 60 MG/ML Sosy  Commonly known as: Prolia     ergocalciferol 1.25 MG (08140 UT) Caps  Commonly known as: Vitamin D2     metoprolol succinate 12.5 mg Tabs  Commonly known as: Toprol XL     OXcarbazepine 150 MG Tabs  Commonly known as: Trileptal  Take 2 tablets (300 mg total) by mouth 2 (two) times daily.               Where to Get Your Medications        These medications were sent to [id number] - [place], [place] - [address] [facility] [phone], [phone]  [address] [place] [address]      Phone: [phone]   bisacodyl 10 MG Supp  lansoprazole 30 MG Tbdd  simethicone 80 MG Chew         FU   Follow-up Information       Yvan Griggs MD. Call in 10 day(s).    Specialty: SURGERY, GENERAL  Why: Call for Follow-up postoperatively  Contact information:  [address]  [facility] [id number] [phone]               Abi Ohara MD Follow up on 2/19/2024.    Specialty: Internal Medicine  Why: 2/19 at 11:30 am  Contact information:  [address]  [place] [address] [phone]                             DC instructions:      Other Discharge Instructions:         Keep all follow-up appointments and continue current medications.    Continue stool softeners, prunes, probiotic for regular bowel movements.  Use suppositories as needed.    [facility]   [address]  [place] [address]  Phone: [phone]  Fax: [phone]      Maintain a low fiber soft diet until you are seen by the General  surgeon in follow-up         I reconciled current and discharge medications on day of discharge, discussed changes with patient and noted changes above.       Total Time Coordinating Care: 35 minutes    Patient had opportunity to ask questions and state understand and agree with therapeutic plan as outlined    Thank You,    Daniel Manuel DO   Hospitalist with [facility]         Electronically signed by Daniel Manuel DO on 2/17/2024  2:08 PM

## 2024-03-05 ENCOUNTER — APPOINTMENT (OUTPATIENT)
Dept: GENERAL RADIOLOGY | Facility: HOSPITAL | Age: 79
End: 2024-03-05
Attending: EMERGENCY MEDICINE
Payer: MEDICARE

## 2024-03-05 ENCOUNTER — HOSPITAL ENCOUNTER (INPATIENT)
Facility: HOSPITAL | Age: 79
LOS: 3 days | Discharge: HOME | End: 2024-03-08
Attending: EMERGENCY MEDICINE | Admitting: HOSPITALIST
Payer: MEDICARE

## 2024-03-05 ENCOUNTER — APPOINTMENT (OUTPATIENT)
Dept: CT IMAGING | Facility: HOSPITAL | Age: 79
End: 2024-03-05
Attending: EMERGENCY MEDICINE
Payer: MEDICARE

## 2024-03-05 DIAGNOSIS — R10.9 ABDOMINAL PAIN, ACUTE: Primary | ICD-10-CM

## 2024-03-05 LAB
ALBUMIN SERPL-MCNC: 4.2 G/DL (ref 3.2–4.8)
ALBUMIN/GLOB SERPL: 1.9 {RATIO} (ref 1–2)
ALP LIVER SERPL-CCNC: 75 U/L
ALT SERPL-CCNC: 9 U/L
ANION GAP SERPL CALC-SCNC: 8 MMOL/L (ref 0–18)
AST SERPL-CCNC: 14 U/L (ref ?–34)
BASOPHILS # BLD AUTO: 0.05 X10(3) UL (ref 0–0.2)
BASOPHILS NFR BLD AUTO: 0.9 %
BILIRUB SERPL-MCNC: 0.4 MG/DL (ref 0.2–1.1)
BILIRUB UR QL: NEGATIVE
BUN BLD-MCNC: 8 MG/DL (ref 9–23)
BUN/CREAT SERPL: 12.1 (ref 10–20)
CALCIUM BLD-MCNC: 9.2 MG/DL (ref 8.7–10.4)
CHLORIDE SERPL-SCNC: 103 MMOL/L (ref 98–112)
CLARITY UR: CLEAR
CO2 SERPL-SCNC: 23 MMOL/L (ref 21–32)
CREAT BLD-MCNC: 0.66 MG/DL
DEPRECATED RDW RBC AUTO: 48.9 FL (ref 35.1–46.3)
EGFRCR SERPLBLD CKD-EPI 2021: 89 ML/MIN/1.73M2 (ref 60–?)
EOSINOPHIL # BLD AUTO: 0.02 X10(3) UL (ref 0–0.7)
EOSINOPHIL NFR BLD AUTO: 0.4 %
ERYTHROCYTE [DISTWIDTH] IN BLOOD BY AUTOMATED COUNT: 15.3 % (ref 11–15)
GLOBULIN PLAS-MCNC: 2.2 G/DL (ref 2.8–4.4)
GLUCOSE BLD-MCNC: 106 MG/DL (ref 70–99)
GLUCOSE UR-MCNC: NORMAL MG/DL
HCT VFR BLD AUTO: 30.9 %
HGB BLD-MCNC: 10.3 G/DL
IMM GRANULOCYTES # BLD AUTO: 0.03 X10(3) UL (ref 0–1)
IMM GRANULOCYTES NFR BLD: 0.6 %
LEUKOCYTE ESTERASE UR QL STRIP.AUTO: NEGATIVE
LIPASE SERPL-CCNC: 26 U/L (ref 13–75)
LYMPHOCYTES # BLD AUTO: 0.84 X10(3) UL (ref 1–4)
LYMPHOCYTES NFR BLD AUTO: 15.5 %
MCH RBC QN AUTO: 28.9 PG (ref 26–34)
MCHC RBC AUTO-ENTMCNC: 33.3 G/DL (ref 31–37)
MCV RBC AUTO: 86.6 FL
MONOCYTES # BLD AUTO: 0.64 X10(3) UL (ref 0.1–1)
MONOCYTES NFR BLD AUTO: 11.8 %
NEUTROPHILS # BLD AUTO: 3.85 X10 (3) UL (ref 1.5–7.7)
NEUTROPHILS # BLD AUTO: 3.85 X10(3) UL (ref 1.5–7.7)
NEUTROPHILS NFR BLD AUTO: 70.8 %
NITRITE UR QL STRIP.AUTO: NEGATIVE
OSMOLALITY SERPL CALC.SUM OF ELEC: 277 MOSM/KG (ref 275–295)
PH UR: 5.5 [PH] (ref 5–8)
PLATELET # BLD AUTO: 278 10(3)UL (ref 150–450)
POTASSIUM SERPL-SCNC: 4.1 MMOL/L (ref 3.5–5.1)
PROT SERPL-MCNC: 6.4 G/DL (ref 5.7–8.2)
PROT UR-MCNC: NEGATIVE MG/DL
RBC # BLD AUTO: 3.57 X10(6)UL
SODIUM SERPL-SCNC: 134 MMOL/L (ref 136–145)
SP GR UR STRIP: 1.02 (ref 1–1.03)
UROBILINOGEN UR STRIP-ACNC: NORMAL
WBC # BLD AUTO: 5.4 X10(3) UL (ref 4–11)

## 2024-03-05 PROCEDURE — 71045 X-RAY EXAM CHEST 1 VIEW: CPT | Performed by: EMERGENCY MEDICINE

## 2024-03-05 PROCEDURE — 80053 COMPREHEN METABOLIC PANEL: CPT | Performed by: EMERGENCY MEDICINE

## 2024-03-05 PROCEDURE — 0D9670Z DRAINAGE OF STOMACH WITH DRAINAGE DEVICE, VIA NATURAL OR ARTIFICIAL OPENING: ICD-10-PCS | Performed by: EMERGENCY MEDICINE

## 2024-03-05 PROCEDURE — 81001 URINALYSIS AUTO W/SCOPE: CPT | Performed by: EMERGENCY MEDICINE

## 2024-03-05 PROCEDURE — 96375 TX/PRO/DX INJ NEW DRUG ADDON: CPT

## 2024-03-05 PROCEDURE — 96361 HYDRATE IV INFUSION ADD-ON: CPT

## 2024-03-05 PROCEDURE — C9113 INJ PANTOPRAZOLE SODIUM, VIA: HCPCS | Performed by: HOSPITALIST

## 2024-03-05 PROCEDURE — 85025 COMPLETE CBC W/AUTO DIFF WBC: CPT | Performed by: EMERGENCY MEDICINE

## 2024-03-05 PROCEDURE — 99285 EMERGENCY DEPT VISIT HI MDM: CPT

## 2024-03-05 PROCEDURE — 74177 CT ABD & PELVIS W/CONTRAST: CPT | Performed by: EMERGENCY MEDICINE

## 2024-03-05 PROCEDURE — 83690 ASSAY OF LIPASE: CPT | Performed by: EMERGENCY MEDICINE

## 2024-03-05 PROCEDURE — 96374 THER/PROPH/DIAG INJ IV PUSH: CPT

## 2024-03-05 RX ORDER — FLUTICASONE FUROATE AND VILANTEROL 100; 25 UG/1; UG/1
1 POWDER RESPIRATORY (INHALATION) DAILY
Status: DISCONTINUED | OUTPATIENT
Start: 2024-03-06 | End: 2024-03-06

## 2024-03-05 RX ORDER — MORPHINE SULFATE 2 MG/ML
2 INJECTION, SOLUTION INTRAMUSCULAR; INTRAVENOUS EVERY 2 HOUR PRN
Status: DISCONTINUED | OUTPATIENT
Start: 2024-03-05 | End: 2024-03-06

## 2024-03-05 RX ORDER — MORPHINE SULFATE 2 MG/ML
1 INJECTION, SOLUTION INTRAMUSCULAR; INTRAVENOUS EVERY 2 HOUR PRN
Status: DISCONTINUED | OUTPATIENT
Start: 2024-03-05 | End: 2024-03-06

## 2024-03-05 RX ORDER — SIMETHICONE 80 MG
80 TABLET,CHEWABLE ORAL 4 TIMES DAILY PRN
Status: DISCONTINUED | OUTPATIENT
Start: 2024-03-05 | End: 2024-03-08

## 2024-03-05 RX ORDER — OXCARBAZEPINE 150 MG/1
300 TABLET, FILM COATED ORAL 2 TIMES DAILY
Status: DISCONTINUED | OUTPATIENT
Start: 2024-03-05 | End: 2024-03-08

## 2024-03-05 RX ORDER — MELATONIN
3 NIGHTLY PRN
Status: DISCONTINUED | OUTPATIENT
Start: 2024-03-05 | End: 2024-03-08

## 2024-03-05 RX ORDER — CLONAZEPAM 0.5 MG/1
0.25 TABLET ORAL NIGHTLY PRN
Status: DISCONTINUED | OUTPATIENT
Start: 2024-03-05 | End: 2024-03-08

## 2024-03-05 RX ORDER — ONDANSETRON 2 MG/ML
4 INJECTION INTRAMUSCULAR; INTRAVENOUS EVERY 6 HOURS PRN
Status: DISCONTINUED | OUTPATIENT
Start: 2024-03-05 | End: 2024-03-08

## 2024-03-05 RX ORDER — MORPHINE SULFATE 4 MG/ML
4 INJECTION, SOLUTION INTRAMUSCULAR; INTRAVENOUS EVERY 2 HOUR PRN
Status: DISCONTINUED | OUTPATIENT
Start: 2024-03-05 | End: 2024-03-06

## 2024-03-05 RX ORDER — ACETAMINOPHEN 500 MG
1000 TABLET ORAL EVERY 6 HOURS PRN
Status: DISCONTINUED | OUTPATIENT
Start: 2024-03-05 | End: 2024-03-08

## 2024-03-05 RX ORDER — LANSOPRAZOLE 30 MG/1
30 TABLET, ORALLY DISINTEGRATING, DELAYED RELEASE ORAL
Status: DISCONTINUED | OUTPATIENT
Start: 2024-03-06 | End: 2024-03-05

## 2024-03-05 RX ORDER — ONDANSETRON 2 MG/ML
4 INJECTION INTRAMUSCULAR; INTRAVENOUS ONCE
Status: COMPLETED | OUTPATIENT
Start: 2024-03-05 | End: 2024-03-05

## 2024-03-05 RX ORDER — SODIUM CHLORIDE 9 MG/ML
INJECTION, SOLUTION INTRAVENOUS CONTINUOUS
Status: DISCONTINUED | OUTPATIENT
Start: 2024-03-05 | End: 2024-03-08

## 2024-03-05 RX ORDER — KETOROLAC TROMETHAMINE 15 MG/ML
15 INJECTION, SOLUTION INTRAMUSCULAR; INTRAVENOUS EVERY 6 HOURS PRN
Status: DISCONTINUED | OUTPATIENT
Start: 2024-03-05 | End: 2024-03-07

## 2024-03-05 RX ORDER — KETOROLAC TROMETHAMINE 15 MG/ML
15 INJECTION, SOLUTION INTRAMUSCULAR; INTRAVENOUS ONCE
Status: COMPLETED | OUTPATIENT
Start: 2024-03-05 | End: 2024-03-05

## 2024-03-05 NOTE — ED QUICK NOTES
Chief Complaint   Patient presents with    Abdomen/Flank Pain    Post-Op     Patient aox3 to ed via private vehicle co of llq abd pain x few days, recently discharged from hospital for sbo. Patient reported feels similar sx. +nausea

## 2024-03-05 NOTE — H&P
[facility] Hospitalist H&P       CC:   Chief Complaint   Patient presents with    Abdomen/Flank Pain    Post-Op        PCP: Abi Ohara MD    History of Present Illness:   Ms. Lucas is a 79 year old female with PMH sig for SVT, RLS, COPD, chronic pain, HLD, IgA deficiency, was hospitalized from 2/3 - 2/17 for acute cecal volvulus, s/p right hemicolectomy on 2/3 per general surgery, who presents with 2 days of abdominal pain.  Patient states she is developed intermittent lower quadrant abdominal pain starting 2 days ago on 3/3, with some associated nausea, but no vomiting, notes some chills, was seen by her PCP and given a suppository with some initial improvement, but worsening today, last bowel movement was 3/4, now not passing flatus, denies vomiting.  She denies other complaints, no chest pain or shortness of breath, no fevers, no other complaints at this time.    PMH  Past Medical History:   Diagnosis Date    Acute exacerbation of chronic obstructive pulmonary disease (COPD) ([facility]) 4/17/2017    ALLERGIC RHINITIS     OTHER DISEASES     TMJ        PSH  Past Surgical History:   Procedure Laterality Date    CATARACT      COLONOSCOPY  2/12/2013    Procedure: COLONOSCOPY, POSSIBLE BIOPSY, POSSIBLE POLYPECTOMY 80083;  Surgeon: Jakub Barr MD;  Location: [facility]    PATIENT DOCUMENTED NOT TO HAVE EXPERIENCED ANY OF THE FOLLOWING EVENTS  12/12/2012    Procedure: LEFT PHACOEMULSIFICATION OF CATARACT WITH INTRAOCULAR LENS IMPLANT [id number];  Surgeon: Siddhartha Vinson MD;  Location: [facility]    PATIENT DOCUMENTED NOT TO HAVE EXPERIENCED ANY OF THE FOLLOWING EVENTS  11/28/2012    Procedure: RIGHT PHACOEMULSIFICATION OF CATARACT WITH INTRAOCULAR LENS IMPLANT 30179;  Surgeon: Siddhartha Vinson MD;  Location: [facility]    PATIENT DOCUMENTED NOT TO HAVE EXPERIENCED ANY OF THE FOLLOWING EVENTS  2/12/2013    Procedure: COLONOSCOPY, POSSIBLE BIOPSY, POSSIBLE POLYPECTOMY  81729;  Surgeon: Jakub Barr MD;  Location: [facility]    PATIENT WITHOUGH PREOPERATIVE ORDER FOR IV ANTIBIOTIC SURGICAL SITE INFECTION PROPHYLAXIS.  12/12/2012    Procedure: LEFT PHACOEMULSIFICATION OF CATARACT WITH INTRAOCULAR LENS IMPLANT 43167;  Surgeon: Siddhartha Vinson MD;  Location: [facility]    PATIENT WITHOUGH PREOPERATIVE ORDER FOR IV ANTIBIOTIC SURGICAL SITE INFECTION PROPHYLAXIS.  11/28/2012    Procedure: RIGHT PHACOEMULSIFICATION OF CATARACT WITH INTRAOCULAR LENS IMPLANT 30994;  Surgeon: Siddhartha Vinson MD;  Location: [facility]    PATIENT WITHOUGH PREOPERATIVE ORDER FOR IV ANTIBIOTIC SURGICAL SITE INFECTION PROPHYLAXIS.  2/12/2013    Procedure: COLONOSCOPY, POSSIBLE BIOPSY, POSSIBLE POLYPECTOMY 05124;  Surgeon: Jakub Barr MD;  Location: [facility]    REMV CATARACT EXTRACAP,INSERT LENS  12/12/2012    Procedure: LEFT PHACOEMULSIFICATION OF CATARACT WITH INTRAOCULAR LENS IMPLANT 76164;  Surgeon: Siddhartha Vinson MD;  Location: [facility]    REMV CATARACT EXTRACAP,INSERT LENS  11/28/2012    Procedure: RIGHT PHACOEMULSIFICATION OF CATARACT WITH INTRAOCULAR LENS IMPLANT 70428;  Surgeon: Siddhartha Vinson MD;  Location: [facility]    TONSILLECTOMY          ALL:  Allergies   Allergen Reactions    Bactrim [Sulfamethoxazole W/Trimethoprim] FEVER     X 4 days    Bicillin L-A      unknown    Mucinex Coughing        Home Medications:  No outpatient medications have been marked as taking for the 3/5/24 encounter (Hospital Encounter).         Soc Hx  Social History     Tobacco Use    Smoking status: Never    Smokeless tobacco: Never   Substance Use Topics    Alcohol use: Yes     Alcohol/week: 1.0 standard drink of alcohol     Types: 1 Standard drinks or equivalent per week     Comment: occasionally        Fam Hx  Family History   Problem Relation Age of Onset    Other (Other) Father         PD    Other (Other) Sister          cramps in body    Cancer Sister        Review of Systems  Comprehensive ROS reviewed and negative except for what's stated above.     OBJECTIVE:  /61   Pulse 62   Temp 98.3 °F (36.8 °C)   Resp 20   Ht 5' 4\" (1.626 m)   Wt 100 lb (45.4 kg)   SpO2 97%   BMI 17.16 kg/m²   General:  Alert, no distress   Head:  Normocephalic, without obvious abnormality, atraumatic.   Eyes:  Sclera anicteric, No conjunctival pallor,    Nose: NG tube in place   Throat: Lips, mucosa, and tongue normal. Teeth and gums normal.   Neck: Supple,    Lungs:   Clear to auscultation bilaterally. Normal effort   Chest wall:  No tenderness or deformity.   Heart:  Regular rate and rhythm, S1, S2 normal, no murmur, rub or gallop appreciated   Abdomen:   Soft, mildly distended, mildly tender to palpation, bowel sounds noted   Extremities: Extremities normal, atraumatic, no cyanosis or edema.   Skin: Skin color, texture, turgor normal. No rashes or lesions.    Neurologic: Normal strength, no focal deficit appreciated     Diagnostic Data:    CBC/Chem  Recent Labs   Lab 03/05/24  1117   WBC 5.4   HGB 10.3*   MCV 86.6   .0       Recent Labs   Lab 03/05/24  1117   *   K 4.1      CO2 23.0   BUN 8*   CREATSERUM 0.66   *   CA 9.2       Recent Labs   Lab 03/05/24  1117   ALT 9*   AST 14   ALB 4.2       No results for input(s): \"TROP\" in the last 168 hours.    Radiology: XR CHEST AP PORTABLE  (CPT=71045)    Result Date: 3/5/2024  CONCLUSION:  1. The NG tube has been repositioned, and now terminates satisfactorily in the distal stomach.  Remainder of the chest is unchanged.    Dictated by (CST): Sandeep Go MD on 3/05/2024 at 2:18 PM     Finalized by (CST): Sandeep Go MD on 3/05/2024 at 2:18 PM          XR CHEST AP PORTABLE  (CPT=71045)    Result Date: 3/5/2024  PROCEDURE: XR CHEST AP PORTABLE  (CPT=71045) TIME: 1337  COMPARISON: [facility], XR CHEST AP PORTABLE (CPT=71045), 2/06/2024, 2:53 PM.   INDICATIONS: Verify correct tube placement  TECHNIQUE:   Single view.   Findings and impression:  The enteric tube is looped in the neck then descends down the esophagus and terminates in the lower  esophagus    Normal heart size  Clear lungs  Biapical calcified pleural scarring       Dictated by (CST): Macho Garcia MD on 3/05/2024 at 1:51 PM     Finalized by (CST): Macho Garcia MD on 3/05/2024 at 1:54 PM          CT ABDOMEN+PELVIS(CONTRAST ONLY)(CPT=74177)    Result Date: 3/5/2024  CONCLUSION:  1. Diffusely dilated loops of small bowel, perhaps reflecting ongoing ileus or possible bowel obstruction.  2. Postoperative changes of partial proximal colectomy with anastomotic staple line in the right lower quadrant.  3. Moderate volume intraperitoneal ascites.  4. Distal colonic diverticulosis without definite CT evidence of primary acute complication.  5. Left-sided calyceal-conforming calculus, possibly reflecting staghorn formation.  6. Mesenteric edema.  7. Lesser incidental findings as above.    Dictated by (CST): Jeremy Covarrubias MD on 3/05/2024 at 12:40 PM     Finalized by (CST): Jeremy Covarrubias MD on 3/05/2024 at 12:51 PM             ASSESSMENT / PLAN:   Ms. Lucas is a 79 year old female with PMH sig for SVT, RLS, COPD, chronic pain, HLD, IgA deficiency, was hospitalized from 2/3 - 2/17 for acute cecal volvulus, s/p right hemicolectomy on 2/3 per general surgery, who presents with 2 days of abdominal pain.       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 Post op fuid? Will discuss with surgery  -N.p.o., IV fluids  -NG tube placed in ER to LIS  -General surgery consulted    Left-sided calculus  -Outpatient follow-up with urology     pSVT  -Resume metoprolol when  able     COPD  - resume inhalers     RLS  - trileptal      Chronic pain  - outpatient f/u     IgA deficiency  - outpatient f/u     QUE  - resume PRN benzo    FN:  - IVF: 100  - Diet: N.p.o.    DVT Prophy: SCDs, hold anticoagulation pending surgery eval  Lines: PIV    Dispo: pending clinical course    Discussed with  at bedside    Outpatient records or previous hospital records reviewed.     Further recommendations pending patient's clinical course.  [facility] hospitalist to continue to follow patient while in house    Patient and/or patient's family given opportunity to ask questions and note understanding and agreeing with therapeutic plan as outlined    Thank You,  Freddy Chavez MD    Hospitalist with [facility] Service number: [phone]

## 2024-03-05 NOTE — ED INITIAL ASSESSMENT (HPI)
Pt sent by Dr Ohara for abdominal pain, distention starting 2 days ago. Pt was admitted 2/27 for partial bowel resection following SBO, sent for further evaluation regarding returning symptoms

## 2024-03-05 NOTE — ED QUICK NOTES
Orders for admission, patient is aware of plan and ready to go upstairs. Any questions, please call ED RN mary  at extension [phone].   Chief Complaint   Patient presents with    Abdomen/Flank Pain    Post-Op       Patient AO x 3  Ambulation: ambulatory  Belongings: accompanying patient  Medications: see mar   IV: 20g lac  Language: english  COVID-19 suspicion level/status: na  CIWA SCORE: na  NIH: na  Other pertinent information:  +npo

## 2024-03-05 NOTE — ED PROVIDER NOTES
Patient Seen in: [facility] Emergency Department    History     Chief Complaint   Patient presents with    Abdomen/Flank Pain    Post-Op       HPI    History is provided by patient/independent historian: Patient  79 year old female with history of recent partial small bowel resection, here with complaints of new onset abdominal pain, distention for the past 2 days.  She feels nauseous without any vomiting.  She is not passing any gas.  She did have a bowel movement yesterday that was successful after using a suppository for her primary care doctor after evaluation, but used 1 today without improvement of her symptoms.  No fevers, no urinary symptoms.    History reviewed.   Past Medical History:   Diagnosis Date    Acute exacerbation of chronic obstructive pulmonary disease (COPD) ([facility]) 4/17/2017    ALLERGIC RHINITIS     OTHER DISEASES     TMJ         History reviewed.   Past Surgical History:   Procedure Laterality Date    CATARACT      COLONOSCOPY  2/12/2013    Procedure: COLONOSCOPY, POSSIBLE BIOPSY, POSSIBLE POLYPECTOMY 73508;  Surgeon: Jakub Barr MD;  Location: [facility]    PATIENT DOCUMENTED NOT TO HAVE EXPERIENCED ANY OF THE FOLLOWING EVENTS  12/12/2012    Procedure: LEFT PHACOEMULSIFICATION OF CATARACT WITH INTRAOCULAR LENS IMPLANT [id number];  Surgeon: Siddhartha Vinson MD;  Location: [facility]    PATIENT DOCUMENTED NOT TO HAVE EXPERIENCED ANY OF THE FOLLOWING EVENTS  11/28/2012    Procedure: RIGHT PHACOEMULSIFICATION OF CATARACT WITH INTRAOCULAR LENS IMPLANT 52855;  Surgeon: Siddhartha Vinson MD;  Location: [facility]    PATIENT DOCUMENTED NOT TO HAVE EXPERIENCED ANY OF THE FOLLOWING EVENTS  2/12/2013    Procedure: COLONOSCOPY, POSSIBLE BIOPSY, POSSIBLE POLYPECTOMY 37277;  Surgeon: Jakub Barr MD;  Location: [facility]    PATIENT WITHOUGH PREOPERATIVE ORDER FOR IV ANTIBIOTIC SURGICAL SITE INFECTION PROPHYLAXIS.  12/12/2012    Procedure:  LEFT PHACOEMULSIFICATION OF CATARACT WITH INTRAOCULAR LENS IMPLANT 41595;  Surgeon: Siddhartha Vinson MD;  Location: [facility]    PATIENT WITHOUGH PREOPERATIVE ORDER FOR IV ANTIBIOTIC SURGICAL SITE INFECTION PROPHYLAXIS.  11/28/2012    Procedure: RIGHT PHACOEMULSIFICATION OF CATARACT WITH INTRAOCULAR LENS IMPLANT 57654;  Surgeon: Siddhartha Vinson MD;  Location: [facility]    PATIENT WITHOUGH PREOPERATIVE ORDER FOR IV ANTIBIOTIC SURGICAL SITE INFECTION PROPHYLAXIS.  2/12/2013    Procedure: COLONOSCOPY, POSSIBLE BIOPSY, POSSIBLE POLYPECTOMY 96373;  Surgeon: Jakub Barr MD;  Location: [facility]    REMV CATARACT EXTRACAP,INSERT LENS  12/12/2012    Procedure: LEFT PHACOEMULSIFICATION OF CATARACT WITH INTRAOCULAR LENS IMPLANT 70726;  Surgeon: Siddhartha Vinson MD;  Location: [facility]    REMV CATARACT EXTRACAP,INSERT LENS  11/28/2012    Procedure: RIGHT PHACOEMULSIFICATION OF CATARACT WITH INTRAOCULAR LENS IMPLANT 12033;  Surgeon: Siddhartha Vinson MD;  Location: [facility]    TONSILLECTOMY           Home Medications reviewed :  (Not in a hospital admission)        History reviewed.   Social History     Socioeconomic History    Marital status:    Tobacco Use    Smoking status: Never    Smokeless tobacco: Never   Substance and Sexual Activity    Alcohol use: Yes     Alcohol/week: 1.0 standard drink of alcohol     Types: 1 Standard drinks or equivalent per week     Comment: occasionally    Drug use: No         ROS  Review of Systems   Respiratory:  Negative for shortness of breath.    Cardiovascular:  Negative for chest pain.   Gastrointestinal:  Positive for abdominal distention, abdominal pain, constipation and nausea.   Genitourinary:  Positive for flank pain.   All other systems reviewed and are negative.     All other pertinent organ systems are reviewed and are negative.      Physical Exam     ED Triage Vitals [03/05/24 1032]   /65    Pulse 116   Resp 20   Temp 98.3 °F (36.8 °C)   Temp src    SpO2 99 %   O2 Device None (Room air)     Vital signs reviewed.      Physical Exam  Vitals and nursing note reviewed.   Cardiovascular:      Pulses: Normal pulses.   Pulmonary:      Effort: No respiratory distress.   Abdominal:      General: Abdomen is protuberant. There is no distension.      Tenderness: There is generalized abdominal tenderness.   Musculoskeletal:      Comments: No CVA tenderness   Neurological:      Mental Status: She is alert.         ED Course       Labs:     Labs Reviewed   COMP METABOLIC PANEL (14) - Abnormal; Notable for the following components:       Result Value    Glucose 106 (*)     Sodium 134 (*)     BUN 8 (*)     ALT 9 (*)     Globulin  2.2 (*)     All other components within normal limits   URINALYSIS WITH CULTURE REFLEX - Abnormal; Notable for the following components:    Ketones Urine Trace (*)     Blood Urine Trace (*)     RBC Urine 3-5 (*)     Squamous Epi. Cells Few (*)     All other components within normal limits   CBC W/ DIFFERENTIAL - Abnormal; Notable for the following components:    RBC 3.57 (*)     HGB 10.3 (*)     HCT 30.9 (*)     RDW-SD 48.9 (*)     RDW 15.3 (*)     Lymphocyte Absolute 0.84 (*)     All other components within normal limits   LIPASE - Normal   CBC WITH DIFFERENTIAL WITH PLATELET    Narrative:     The following orders were created for panel order CBC With Differential With Platelet.                  Procedure                               Abnormality         Status                                     ---------                               -----------         ------                                     CBC W/ DIFFERENTIAL[[id number]]          Abnormal            Final result                                                 Please view results for these tests on the individual orders.   RAINBOW DRAW LAVENDER   RAINBOW DRAW LIGHT GREEN         My EKG Interpretation:   As reviewed and Interpreted by  me      Imaging Results Available and Reviewed while in ED:   XR CHEST AP PORTABLE  (CPT=71045)    Result Date: 3/5/2024  CONCLUSION:  1. The NG tube has been repositioned, and now terminates satisfactorily in the distal stomach.  Remainder of the chest is unchanged.    Dictated by (CST): Sandeep Go MD on 3/05/2024 at 2:18 PM     Finalized by (CST): Sandeep Go MD on 3/05/2024 at 2:18 PM          XR CHEST AP PORTABLE  (CPT=71045)    Result Date: 3/5/2024  PROCEDURE: XR CHEST AP PORTABLE  (CPT=71045) TIME: 1337  COMPARISON: [facility], XR CHEST AP PORTABLE (CPT=71045), 2/06/2024, 2:53 PM.  INDICATIONS: Verify correct tube placement  TECHNIQUE:   Single view.   Findings and impression:  The enteric tube is looped in the neck then descends down the esophagus and terminates in the lower  esophagus    Normal heart size  Clear lungs  Biapical calcified pleural scarring       Dictated by (CST): Macho Garcia MD on 3/05/2024 at 1:51 PM     Finalized by (CST): Macho Garcia MD on 3/05/2024 at 1:54 PM          CT ABDOMEN+PELVIS(CONTRAST ONLY)(CPT=74177)    Result Date: 3/5/2024  CONCLUSION:  1. Diffusely dilated loops of small bowel, perhaps reflecting ongoing ileus or possible bowel obstruction.  2. Postoperative changes of partial proximal colectomy with anastomotic staple line in the right lower quadrant.  3. Moderate volume intraperitoneal ascites.  4. Distal colonic diverticulosis without definite CT evidence of primary acute complication.  5. Left-sided calyceal-conforming calculus, possibly reflecting staghorn formation.  6. Mesenteric edema.  7. Lesser incidental findings as above.    Dictated by (CST): Jeremy Covarrubias MD on 3/05/2024 at 12:40 PM     Finalized by (CST): Jeremy Covarrubias MD on 3/05/2024 at 12:51 PM         My review and independent interpretation of CT images: dilated small bowel loops. Radiology report corroborates this in addition to other details as reported by  them.      Decision rules/scores evaluated: none      Diagnostic labs/tests considered but not ordered: none    ED Medications Administered:   Medications   ketorolac (Toradol) 15 MG/ML injection 15 mg (15 mg Intravenous Given 3/5/24 1149)   sodium chloride 0.9 % IV bolus 1,000 mL (0 mL Intravenous Stopped 3/5/24 1250)   ondansetron (Zofran) 4 MG/2ML injection 4 mg (4 mg Intravenous Given 3/5/24 1151)   iopamidol 76% (ISOVUE-370) injection for power injector (60 mL Intravenous Given 3/5/24 1218)                MDM       Medical Decision Making      Differential Diagnosis: After obtaining the patient's history, performing the physical exam and reviewing the diagnostics, multiple initial diagnoses were considered based on the presenting problem including SBO, diverticulitis, UTI, nephrolithiasis    External document review: I personally reviewed available external medical records for any recent pertinent discharge summaries, testing, and procedures - the findings are as follows: 2/3/24 admission for cecal volvulus    Complicating Factors: The patient already  has a past medical history of Acute exacerbation of chronic obstructive pulmonary disease (COPD) ([facility]) (4/17/2017), ALLERGIC RHINITIS, and OTHER DISEASES. to contribute to the complexity of this ED evaluation.    Procedures performed: none    Discussed management with physician/appropriate source: Dr. Ramirez - requesting [facility], Dr. Chavez    Considered admission/deescalation of care for: abdominal pain    Social determinants of health affecting patient care: none    Prescription medications considered: discussed continuing current medication regimen    The patient requires continuous monitoring for: abdominal pain    Shared decision making: discussed possible admission      Disposition and Plan     Clinical Impression:  1. Abdominal pain, acute        Disposition:  Admit    Follow-up:  No follow-up provider specified.    Medications Prescribed:  Current Discharge  Medication List          Hospital Problems       Present on Admission  Date Reviewed: 3/29/2022            ICD-10-CM Noted POA    * (Principal) Abdominal pain, acute R10.9 3/5/2024 Unknown

## 2024-03-06 ENCOUNTER — APPOINTMENT (OUTPATIENT)
Dept: GENERAL RADIOLOGY | Facility: HOSPITAL | Age: 79
End: 2024-03-06
Attending: SURGERY
Payer: MEDICARE

## 2024-03-06 LAB
ANION GAP SERPL CALC-SCNC: 8 MMOL/L (ref 0–18)
BASOPHILS # BLD AUTO: 0.05 X10(3) UL (ref 0–0.2)
BASOPHILS NFR BLD AUTO: 1.1 %
BUN BLD-MCNC: 9 MG/DL (ref 9–23)
BUN/CREAT SERPL: 14.5 (ref 10–20)
CALCIUM BLD-MCNC: 8.5 MG/DL (ref 8.7–10.4)
CHLORIDE SERPL-SCNC: 109 MMOL/L (ref 98–112)
CO2 SERPL-SCNC: 24 MMOL/L (ref 21–32)
CREAT BLD-MCNC: 0.62 MG/DL
DEPRECATED RDW RBC AUTO: 49.6 FL (ref 35.1–46.3)
EGFRCR SERPLBLD CKD-EPI 2021: 90 ML/MIN/1.73M2 (ref 60–?)
EOSINOPHIL # BLD AUTO: 0.27 X10(3) UL (ref 0–0.7)
EOSINOPHIL NFR BLD AUTO: 6.2 %
ERYTHROCYTE [DISTWIDTH] IN BLOOD BY AUTOMATED COUNT: 15.4 % (ref 11–15)
GLUCOSE BLD-MCNC: 59 MG/DL (ref 70–99)
HCT VFR BLD AUTO: 27 %
HGB BLD-MCNC: 8.6 G/DL
IMM GRANULOCYTES # BLD AUTO: 0.02 X10(3) UL (ref 0–1)
IMM GRANULOCYTES NFR BLD: 0.5 %
LYMPHOCYTES # BLD AUTO: 1.09 X10(3) UL (ref 1–4)
LYMPHOCYTES NFR BLD AUTO: 24.9 %
MAGNESIUM SERPL-MCNC: 1.7 MG/DL (ref 1.6–2.6)
MCH RBC QN AUTO: 27.9 PG (ref 26–34)
MCHC RBC AUTO-ENTMCNC: 31.9 G/DL (ref 31–37)
MCV RBC AUTO: 87.7 FL
MONOCYTES # BLD AUTO: 0.57 X10(3) UL (ref 0.1–1)
MONOCYTES NFR BLD AUTO: 13 %
NEUTROPHILS # BLD AUTO: 2.38 X10 (3) UL (ref 1.5–7.7)
NEUTROPHILS # BLD AUTO: 2.38 X10(3) UL (ref 1.5–7.7)
NEUTROPHILS NFR BLD AUTO: 54.3 %
OSMOLALITY SERPL CALC.SUM OF ELEC: 288 MOSM/KG (ref 275–295)
PHOSPHATE SERPL-MCNC: 3.4 MG/DL (ref 2.4–5.1)
PLATELET # BLD AUTO: 213 10(3)UL (ref 150–450)
POTASSIUM SERPL-SCNC: 3.7 MMOL/L (ref 3.5–5.1)
RBC # BLD AUTO: 3.08 X10(6)UL
SODIUM SERPL-SCNC: 141 MMOL/L (ref 136–145)
WBC # BLD AUTO: 4.4 X10(3) UL (ref 4–11)

## 2024-03-06 PROCEDURE — C9113 INJ PANTOPRAZOLE SODIUM, VIA: HCPCS | Performed by: HOSPITALIST

## 2024-03-06 PROCEDURE — 80048 BASIC METABOLIC PNL TOTAL CA: CPT | Performed by: HOSPITALIST

## 2024-03-06 PROCEDURE — 84100 ASSAY OF PHOSPHORUS: CPT | Performed by: SURGERY

## 2024-03-06 PROCEDURE — 74250 X-RAY XM SM INT 1CNTRST STD: CPT | Performed by: SURGERY

## 2024-03-06 PROCEDURE — 85025 COMPLETE CBC W/AUTO DIFF WBC: CPT | Performed by: HOSPITALIST

## 2024-03-06 PROCEDURE — 83735 ASSAY OF MAGNESIUM: CPT | Performed by: HOSPITALIST

## 2024-03-06 RX ORDER — HEPARIN SODIUM 5000 [USP'U]/ML
5000 INJECTION, SOLUTION INTRAVENOUS; SUBCUTANEOUS EVERY 12 HOURS
Status: DISCONTINUED | OUTPATIENT
Start: 2024-03-06 | End: 2024-03-08

## 2024-03-06 RX ORDER — MAGNESIUM OXIDE 400 MG/1
400 TABLET ORAL ONCE
Status: COMPLETED | OUTPATIENT
Start: 2024-03-06 | End: 2024-03-06

## 2024-03-06 NOTE — PROGRESS NOTES
[facility]  part of [facility]    General Surgery Progress Note  Mayte Lucas  : 1945  CSN: [id number]  HD# 1    Subjective:   POD#32 right hemicolectomy for volvulus  Readmitted due to crampy pain, feels better today but still having some lower abdominal cramping  denies N/V  Passing flatus and BM(s)     Exam:     General: awake and alert, in no acute distress, and comfortable  Pulmonary:lungs clear to auscultation bilaterally  Cardiac: RRR and nl S1,S2, no S3, no S4  Abdomen: normal BS, nondistended, and nontender   Extremities: calves nontender, no edema, motor intact, and sensory intact  Wounds: clean, dry and intact     Assessment and Plan:   Abdominal pain, acute  Crampy lower abdominal pain, s/p right hemicolectomy  No evidence of obstruction on SBFT    Doing well  Diet: Low residue soft diet  IVF: TKO  Antibiotics: no need for antibiotics    Activity: OOB to chair, more ambulation encouraged, and Ambulation in halls at least TID  DVT prophylaxis: SCDs and chemoprophylaxis as ordered    Discharge disposition: pending clinical course       Objective:     Vitals:    24 1706 24 2100 24 0500 24 0748   BP:  130/90 120/58 121/52   Pulse:  82 76 66   Resp:  16 16 18   Temp:  98.4 °F (36.9 °C) 97.8 °F (36.6 °C)    TempSrc:  Oral Oral    SpO2:  98% 97% 95%   Weight: 100 lb 14.4 oz (45.8 kg)      Height:         Body mass index is 17.32 kg/m².    Intake/Output Summary (Last 24 hours) at 3/6/2024 1043  Last data filed at 3/6/2024 0500  Gross per 24 hour   Intake 950 ml   Output 100 ml   Net 850 ml       Results:     Recent Labs   Lab 24  1117 24  0718   RBC 3.57* 3.08*   HGB 10.3* 8.6*   HCT 30.9* 27.0*   MCV 86.6 87.7   MCH 28.9 27.9   MCHC 33.3 31.9   RDW 15.3* 15.4*   NEPRELIM 3.85 2.38   WBC 5.4 4.4   .0 213.0       Recent Labs   Lab 24  1117 24  0718   * 59*   BUN 8* 9   CREATSERUM 0.66 0.62   CA 9.2 8.5*   * 141   K  4.1 3.7    109   CO2 23.0 24.0       Lab Results   Component Value Date    WBC 4.4 03/06/2024    HGB 8.6 03/06/2024    HCT 27.0 03/06/2024    .0 03/06/2024     03/06/2024    K 3.7 03/06/2024     03/06/2024    CO2 24.0 03/06/2024    BUN 9 03/06/2024    CREATSERUM 0.62 03/06/2024    GLU 59 03/06/2024    MG 1.7 03/06/2024    PHOS 3.4 03/06/2024    BILT 0.4 03/05/2024    AST 14 03/05/2024    ALT 9 03/05/2024    LIP 26 03/05/2024    CA 8.5 03/06/2024    ALB 4.2 03/05/2024    TP 6.4 03/05/2024       XR CHEST AP PORTABLE  (CPT=71045)    Result Date: 3/5/2024  CONCLUSION:  1. The NG tube has been repositioned, and now terminates satisfactorily in the distal stomach.  Remainder of the chest is unchanged.    Dictated by (CST): Sandeep Go MD on 3/05/2024 at 2:18 PM     Finalized by (CST): Sandeep Go MD on 3/05/2024 at 2:18 PM          XR CHEST AP PORTABLE  (CPT=71045)    Result Date: 3/5/2024  PROCEDURE: XR CHEST AP PORTABLE  (CPT=71045) TIME: 1337  COMPARISON: [facility], XR CHEST AP PORTABLE (CPT=71045), 2/06/2024, 2:53 PM.  INDICATIONS: Verify correct tube placement  TECHNIQUE:   Single view.   Findings and impression:  The enteric tube is looped in the neck then descends down the esophagus and terminates in the lower  esophagus    Normal heart size  Clear lungs  Biapical calcified pleural scarring       Dictated by (CST): Macho Garcia MD on 3/05/2024 at 1:51 PM     Finalized by (CST): Macho Garcia MD on 3/05/2024 at 1:54 PM          CT ABDOMEN+PELVIS(CONTRAST ONLY)(CPT=74177)    Result Date: 3/5/2024  CONCLUSION:  1. Diffusely dilated loops of small bowel, perhaps reflecting ongoing ileus or possible bowel obstruction.  2. Postoperative changes of partial proximal colectomy with anastomotic staple line in the right lower quadrant.  3. Moderate volume intraperitoneal ascites.  4. Distal colonic diverticulosis without definite CT evidence of primary acute complication.  5.  Left-sided calyceal-conforming calculus, possibly reflecting staghorn formation.  6. Mesenteric edema.  7. Lesser incidental findings as above.    Dictated by (CST): Jeremy Covarrubias MD on 3/05/2024 at 12:40 PM     Finalized by (CST): Jeremy Covarrubias MD on 3/05/2024 at 12:51 PM                 Yvan Griggs MD [facility]  03/06/24  10:43 AM

## 2024-03-06 NOTE — PLAN OF CARE
Problem: GASTROINTESTINAL - ADULT  Goal: Minimal or absence of nausea and vomiting  Description: INTERVENTIONS:  - Maintain adequate hydration with IV or PO as ordered and tolerated  - Nasogastric tube to low intermittent suction as ordered  - Evaluate effectiveness of ordered antiemetic medications  - Provide nonpharmacologic comfort measures as appropriate  - Advance diet as tolerated, if ordered  - Obtain nutritional consult as needed  - Evaluate fluid balance  Outcome: Progressing  Goal: Maintains or returns to baseline bowel function  Description: INTERVENTIONS:  - Assess bowel function  - Maintain adequate hydration with IV or PO as ordered and tolerated  - Evaluate effectiveness of GI medications  - Encourage mobilization and activity  - Obtain nutritional consult as needed  - Establish a toileting routine/schedule  - Consider collaborating with pharmacy to review patient's medication profile  Outcome: Progressing     Problem: METABOLIC/FLUID AND ELECTROLYTES - ADULT  Goal: Electrolytes maintained within normal limits  Description: INTERVENTIONS:  - Monitor labs and rhythm and assess patient for signs and symptoms of electrolyte imbalances  - Administer electrolyte replacement as ordered  - Monitor response to electrolyte replacements, including rhythm and repeat lab results as appropriate  - Fluid restriction as ordered  - Instruct patient on fluid and nutrition restrictions as appropriate  Outcome: Progressing     Problem: PAIN - ADULT  Goal: Verbalizes/displays adequate comfort level or patient's stated pain goal  Description: INTERVENTIONS:  - Encourage pt to monitor pain and request assistance  - Assess pain using appropriate pain scale  - Administer analgesics based on type and severity of pain and evaluate response  - Implement non-pharmacological measures as appropriate and evaluate response  - Consider cultural and social influences on pain and pain management  - Manage/alleviate anxiety  - Utilize  distraction and/or relaxation techniques  - Monitor for opioid side effects  - Notify MD/LIP if interventions unsuccessful or patient reports new pain  - Anticipate increased pain with activity and pre-medicate as appropriate  Outcome: Progressing      med history

## 2024-03-06 NOTE — PROGRESS NOTES
[facility] Hospitalist Progress Note     CC: Hospital Follow up    PCP: Abi Ohara MD       Assessment/Plan:     Principal Problem:    Abdominal pain, acute    Ms. Lucas is a 79 year old female with PMH sig for SVT, RLS, COPD, chronic pain, HLD, IgA deficiency, was hospitalized from 2/3 - 2/17 for acute cecal volvulus, s/p right hemicolectomy on 2/3 per general surgery, who presents with 2 days of abdominal pain.       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 Post op fuid? Will discuss with surgery  -SBFT negative  -NG removed, diet advanced per surgery  -General surgery consulted     Left-sided calculus  -Outpatient follow-up with urology     pSVT  -Resume metoprolol when able     COPD  - resume inhalers     RLS  - trileptal      Chronic pain  - outpatient f/u     IgA deficiency  - outpatient f/u     QUE  - resume PRN benzo     FN:  - IVF: stop  - Diet: adv     DVT Prophy: SCDs, hold anticoagulation pending surgery eval  Lines: PIV     Dispo: pending clinical course     Discussed with  at bedside    Questions/concerns were discussed with patient and/or family by bedside.    Thank You,  Freddy Chavez MD    Hospitalist with [facility]     Subjective:     No CP, SOB, or N/V.  Feeling better, abd feels better, tolerating diet this morning    OBJECTIVE:    Blood pressure 121/52, pulse 66, temperature 97.8 °F (36.6 °C), temperature source Oral, resp. rate 18, height 5' 4\" (1.626 m), weight 100 lb 14.4 oz (45.8 kg), SpO2 95%, not currently breastfeeding.    Temp:  [97.8 °F (36.6 °C)-98.5 °F (36.9 °C)] 97.8 °F (36.6 °C)  Pulse:  [62-87] 66  Resp:  [16-18] 18  BP: (120-164)/(50-90) 121/52  SpO2:  [95 %-98 %] 95  %      Intake/Output:    Intake/Output Summary (Last 24 hours) at 3/6/2024 1240  Last data filed at 3/6/2024 0500  Gross per 24 hour   Intake 950 ml   Output 100 ml   Net 850 ml       Last 3 Weights   03/05/24 1706 100 lb 14.4 oz (45.8 kg)   03/05/24 1032 100 lb (45.4 kg)   02/03/24 0948 104 lb (47.2 kg)   02/03/24 0140 104 lb (47.2 kg)   01/04/22 1442 105 lb (47.6 kg)       Exam    Gen: No acute distress, alert and oriented x3   Heent: NC AT, neck supple  Pulm: Lungs clear, normal respiratory effort  CV: Heart with regular rate and rhythm,   Abd: Abdomen soft, nontender, nondistended, BS noted  MSK: no clubbing, no cyanosis  Skin: no rashes or lesions  Neuro: AO*3, motor intact, no sensory deficits  Psyc: appropriate mood and affect      Data Review:       Labs:     Recent Labs   Lab 03/05/24  1117 03/06/24  0718   RBC 3.57* 3.08*   HGB 10.3* 8.6*   HCT 30.9* 27.0*   MCV 86.6 87.7   MCH 28.9 27.9   MCHC 33.3 31.9   RDW 15.3* 15.4*   NEPRELIM 3.85 2.38   WBC 5.4 4.4   .0 213.0         Recent Labs   Lab 03/05/24  1117 03/06/24  0718   * 59*   BUN 8* 9   CREATSERUM 0.66 0.62   EGFRCR 89 90   CA 9.2 8.5*   * 141   K 4.1 3.7    109   CO2 23.0 24.0       Recent Labs   Lab 03/05/24  1117   ALT 9*   AST 14   ALB 4.2         Imaging:  XR CHEST AP PORTABLE  (CPT=71045)    Result Date: 3/5/2024  CONCLUSION:  1. The NG tube has been repositioned, and now terminates satisfactorily in the distal stomach.  Remainder of the chest is unchanged.    Dictated by (CST): Sandeep Go MD on 3/05/2024 at 2:18 PM     Finalized by (CST): Sandeep Go MD on 3/05/2024 at 2:18 PM          XR CHEST AP PORTABLE  (CPT=71045)    Result Date: 3/5/2024  PROCEDURE: XR CHEST AP PORTABLE  (CPT=71045) TIME: 1337  COMPARISON: [facility], XR CHEST AP PORTABLE (CPT=71045), 2/06/2024, 2:53 PM.  INDICATIONS: Verify correct tube placement  TECHNIQUE:   Single view.   Findings and impression:  The enteric tube is  looped in the neck then descends down the esophagus and terminates in the lower  esophagus    Normal heart size  Clear lungs  Biapical calcified pleural scarring       Dictated by (CST): Macho Garcia MD on 3/05/2024 at 1:51 PM     Finalized by (CST): Macho Garcia MD on 3/05/2024 at 1:54 PM          CT ABDOMEN+PELVIS(CONTRAST ONLY)(CPT=74177)    Result Date: 3/5/2024  CONCLUSION:  1. Diffusely dilated loops of small bowel, perhaps reflecting ongoing ileus or possible bowel obstruction.  2. Postoperative changes of partial proximal colectomy with anastomotic staple line in the right lower quadrant.  3. Moderate volume intraperitoneal ascites.  4. Distal colonic diverticulosis without definite CT evidence of primary acute complication.  5. Left-sided calyceal-conforming calculus, possibly reflecting staghorn formation.  6. Mesenteric edema.  7. Lesser incidental findings as above.    Dictated by (CST): Jeremy Covarrubias MD on 3/05/2024 at 12:40 PM     Finalized by (CST): Jeremy Covarrubias MD on 3/05/2024 at 12:51 PM             Meds:      heparin  5,000 Units Subcutaneous Q12H    metoprolol succinate  12.5 mg Oral Nightly    OXcarbazepine  300 mg Oral BID    pantoprazole  40 mg Intravenous Q12H      sodium chloride 20 mL/hr at 03/06/24 1049     clonazePAM, simethicone, acetaminophen, melatonin, ondansetron, ketorolac

## 2024-03-06 NOTE — CONSULTS
[facility]  part of [facility]    Report of Consultation    Mayte Lucas Patient Status:  Inpatient    1945 MRN [id number]   Location [facility] 1W Attending Freddy Chavez MD   Hosp Day # 0 PCP Abi Ohara MD     Date of Admission:  3/5/2024  Date of Consult:  3/5/2024    Reason for Consultation:  Crampy abdominal pain       History of Present Illness:  Mayte Lucas   is a 79 year old    female who presents status post exploratory laparotomy with right hemicolectomy for cecal volvulus on    2/3/2024 by Dr. Yvan Griggs  Patient saw her primary care physician today for crampy abdominal pain.  Patient sent to emergency room.  Patient had CT scan of the abdomen pelvis which revealed question dilated loops of small bowel.  Patient admitted for pain control.  And call for further evaluation.  CT scan did not reveal any evidence of abscess or leak.      Yvan Griggs MD   Physician  Specialty: SURGERY, GENERAL     Operative Report     Addendum     Date of Service: 2/3/2024  4:48 AM  Case Time: Procedures: Surgeons:   2/3/2024  5:58 AM EXPLORATORY LAPAROTOMY, RIGHT HEMICOLECTOMY, omentopexy anastomosis    Yvan Griggs MD                 [facility] EMERGENCY DEPARTMENT OPERATIVE REPORT:      PATIENT NAME: Mayte Lucas  : 1945   MRN: [id number]     DATE OF OPERATION:   24     PREOPERATIVE DIAGNOSIS: Cecal volvulus     POSTOPERATIVE DIAGNOSIS: Same     PROCEDURE PERFORMED: Exploratory laparotomy, right hemicolectomy, omentoplasty of anastomosis     SURGEON:  Yvan Griggs MD     ASST: Katarina Cordova CSA (Assistant helped position patient and helped with positioning, intraoperative retraction, suturing, wound closure, etc)     ANESTHESIA: General anesthesia      ESTIMATED BLOOD LOSS:   30 ml        History:  Past Medical History:   Diagnosis Date    Acute exacerbation of chronic obstructive pulmonary disease (COPD) ([facility]) 2017    ALLERGIC  RHINITIS     OTHER DISEASES     TMJ     Past Surgical History:   Procedure Laterality Date    CATARACT      COLONOSCOPY  2/12/2013    Procedure: COLONOSCOPY, POSSIBLE BIOPSY, POSSIBLE POLYPECTOMY 94906;  Surgeon: Jakub Barr MD;  Location: [facility]    PATIENT DOCUMENTED NOT TO HAVE EXPERIENCED ANY OF THE FOLLOWING EVENTS  12/12/2012    Procedure: LEFT PHACOEMULSIFICATION OF CATARACT WITH INTRAOCULAR LENS IMPLANT 12548;  Surgeon: Siddhartha Vinson MD;  Location: [facility]    PATIENT DOCUMENTED NOT TO HAVE EXPERIENCED ANY OF THE FOLLOWING EVENTS  11/28/2012    Procedure: RIGHT PHACOEMULSIFICATION OF CATARACT WITH INTRAOCULAR LENS IMPLANT 16407;  Surgeon: Siddhartha Vinson MD;  Location: [facility]    PATIENT DOCUMENTED NOT TO HAVE EXPERIENCED ANY OF THE FOLLOWING EVENTS  2/12/2013    Procedure: COLONOSCOPY, POSSIBLE BIOPSY, POSSIBLE POLYPECTOMY 33206;  Surgeon: Jakub Barr MD;  Location: [facility]    PATIENT WITHOUGH PREOPERATIVE ORDER FOR IV ANTIBIOTIC SURGICAL SITE INFECTION PROPHYLAXIS.  12/12/2012    Procedure: LEFT PHACOEMULSIFICATION OF CATARACT WITH INTRAOCULAR LENS IMPLANT 78636;  Surgeon: Siddhartha Vinson MD;  Location: [facility]    PATIENT WITHOUGH PREOPERATIVE ORDER FOR IV ANTIBIOTIC SURGICAL SITE INFECTION PROPHYLAXIS.  11/28/2012    Procedure: RIGHT PHACOEMULSIFICATION OF CATARACT WITH INTRAOCULAR LENS IMPLANT 59280;  Surgeon: Siddhartha Vinson MD;  Location: [facility]    PATIENT WITHOUGH PREOPERATIVE ORDER FOR IV ANTIBIOTIC SURGICAL SITE INFECTION PROPHYLAXIS.  2/12/2013    Procedure: COLONOSCOPY, POSSIBLE BIOPSY, POSSIBLE POLYPECTOMY 91244;  Surgeon: Jakub Barr MD;  Location: [facility]    REMV CATARACT EXTRACAP,INSERT LENS  12/12/2012    Procedure: LEFT PHACOEMULSIFICATION OF CATARACT WITH INTRAOCULAR LENS IMPLANT 90905;  Surgeon: Siddhartha Vinson MD;  Location: [facility]     REMV CATARACT EXTRACAP,INSERT LENS  11/28/2012    Procedure: RIGHT PHACOEMULSIFICATION OF CATARACT WITH INTRAOCULAR LENS IMPLANT 95985;  Surgeon: Siddhartha Vinson MD;  Location: [facility]    TONSILLECTOMY       Family History   Problem Relation Age of Onset    Other (Other) Father         PD    Other (Other) Sister         cramps in body    Cancer Sister       reports that she has never smoked. She has never used smokeless tobacco. She reports current alcohol use of about 1.0 standard drink of alcohol per week. She reports that she does not use drugs.    Allergies:  Allergies   Allergen Reactions    Bactrim [Sulfamethoxazole W/Trimethoprim] FEVER     X 4 days    Bicillin L-A      unknown    Mucinex Coughing       Medications:    Current Facility-Administered Medications:     clonazePAM (KlonoPIN) tab 0.25 mg, 0.25 mg, Oral, Nightly PRN    [START ON 3/6/2024] fluticasone furoate-vilanterol (Breo Ellipta) 100-25 MCG/ACT inhaler 1 puff, 1 puff, Inhalation, Daily    metoprolol succinate (Toprol XL) partial tablet 12.5 mg, 12.5 mg, Oral, Nightly    OXcarbazepine (Trileptal) tab 300 mg, 300 mg, Oral, BID    simethicone (Mylicon) chewable tab 80 mg, 80 mg, Oral, QID PRN    sodium chloride 0.9% infusion, , Intravenous, Continuous    acetaminophen (Tylenol Extra Strength) tab 1,000 mg, 1,000 mg, Oral, Q6H PRN    morphINE PF 2 MG/ML injection 1 mg, 1 mg, Intravenous, Q2H PRN **OR** morphINE PF 2 MG/ML injection 2 mg, 2 mg, Intravenous, Q2H PRN **OR** morphINE PF 4 MG/ML injection 4 mg, 4 mg, Intravenous, Q2H PRN    melatonin tab 3 mg, 3 mg, Oral, Nightly PRN    ondansetron (Zofran) 4 MG/2ML injection 4 mg, 4 mg, Intravenous, Q6H PRN    ketorolac (Toradol) 15 MG/ML injection 15 mg, 15 mg, Intravenous, Q6H PRN    pantoprazole (Protonix) 40 mg in sodium chloride 0.9% PF 10 mL IV push, 40 mg, Intravenous, Q12H    Review of Systems:  Pertinent items are noted in HPI.    Physical Exam:  Blood pressure 134/66, pulse  77, temperature 98.5 °F (36.9 °C), temperature source Oral, resp. rate 18, height 5' 4\" (1.626 m), weight 100 lb 14.4 oz (45.8 kg), SpO2 97%, not currently breastfeeding.    General appearance:  alert, appears stated age, cooperative, and no distress  Eyes: Sclera anicteric  Pulmonary: Equal respiratory excursion, no labored breathing  Cardiovascular: regular rate and rhythm  Abdominal: Soft mildly distended nontender.  Well-healed midline incision present.  No evidence of rebound or guarding present.  Incision healed well.  No evidence of infection.  No palpable masses present.  No ventral hernia present.  Extremities: extremities normal, atraumatic, no cyanosis or edema  Skin: Skin color, texture, turgor normal. No rashes or lesions  Psychiatric: calm  concrete thoughts   memory intact    Results:  Lab Results   Component Value Date    WBC 5.4 03/05/2024    HGB 10.3 (L) 03/05/2024    HCT 30.9 (L) 03/05/2024    .0 03/05/2024    CREATSERUM 0.66 03/05/2024    BUN 8 (L) 03/05/2024     (L) 03/05/2024    K 4.1 03/05/2024     03/05/2024    CO2 23.0 03/05/2024     (H) 03/05/2024    CA 9.2 03/05/2024    ALB 4.2 03/05/2024    ALKPHO 75 03/05/2024    BILT 0.4 03/05/2024    TP 6.4 03/05/2024    AST 14 03/05/2024    ALT 9 (L) 03/05/2024    T4F 0.88 (L) 06/10/2021    TSH 2.820 02/03/2022    LIP 26 03/05/2024    DDIMER 0.32 12/10/2021    ESRML 4 02/03/2022    CRP <0.03 10/08/2020    MG 1.7 02/17/2024    PHOS 4.4 02/16/2024    TROP <0.017 08/22/2017    RPR Non Reactive 01/23/2013    B12 422 02/03/2022       XR CHEST AP PORTABLE  (CPT=71045)    Result Date: 3/5/2024  CONCLUSION:  1. The NG tube has been repositioned, and now terminates satisfactorily in the distal stomach.  Remainder of the chest is unchanged.    Dictated by (CST): Sandeep Go MD on 3/05/2024 at 2:18 PM     Finalized by (CST): Sandeep Go MD on 3/05/2024 at 2:18 PM          XR CHEST AP PORTABLE  (CPT=71045)    Result Date:  3/5/2024  PROCEDURE: XR CHEST AP PORTABLE  (CPT=71045) TIME: 1337  COMPARISON: [facility], XR CHEST AP PORTABLE (CPT=71045), 2/06/2024, 2:53 PM.  INDICATIONS: Verify correct tube placement  TECHNIQUE:   Single view.   Findings and impression:  The enteric tube is looped in the neck then descends down the esophagus and terminates in the lower  esophagus    Normal heart size  Clear lungs  Biapical calcified pleural scarring       Dictated by (CST): Macho Garcia MD on 3/05/2024 at 1:51 PM     Finalized by (CST): Macho Garcia MD on 3/05/2024 at 1:54 PM          CT ABDOMEN+PELVIS(CONTRAST ONLY)(CPT=74177)    Result Date: 3/5/2024  CONCLUSION:  1. Diffusely dilated loops of small bowel, perhaps reflecting ongoing ileus or possible bowel obstruction.  2. Postoperative changes of partial proximal colectomy with anastomotic staple line in the right lower quadrant.  3. Moderate volume intraperitoneal ascites.  4. Distal colonic diverticulosis without definite CT evidence of primary acute complication.  5. Left-sided calyceal-conforming calculus, possibly reflecting staghorn formation.  6. Mesenteric edema.  7. Lesser incidental findings as above.    Dictated by (CST): Jeremy Covarrubias MD on 3/05/2024 at 12:40 PM     Finalized by (CST): Jeremy Covarrubias MD on 3/05/2024 at 12:51 PM                 Impression and Plan:  Patient Active Problem List   Diagnosis    RLS (restless legs syndrome)    Pseudophakia of both eyes    Hand arthritis    Immunoglobulin A deficiency (HCC)    Aortic atherosclerosis (HCC)    Age-related osteoporosis without current pathological fracture    Dyslipidemia, goal LDL below 160    Essential tremor    Iron deficiency anemia due to chronic blood loss    Neuropathy    Anemia, chronic disease    Mixed simple and mucopurulent chronic bronchitis (HCC)    Supraventricular tachycardia    Mood insomnia (HCC)    Qu[place] nodes (DJD hand)    Weight loss    Chronic fatigue    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HCC)     Abdominal pain, acute       This is an 79-year-old white female with status post open right hemicolectomy 2/3/2024 for cecal volvulus.  Patient was doing well up until yesterday when she developed crampy abdominal pain.  Patient had CT scan which revealed possible dilated loops of small bowel.  Patient admitted for nasogastric decompression IV hydration.  Patient improved with nasogastric decompression.  Will continue IV hydration.  Will perform Gastrografin small bowel follow-through tomorrow to evaluate for small bowel obstruction.  Patient without evidence for acute surgical abdomen at this time.  Will follow along with you.  Discussed in detail with the patient.    Time spent in direct patient contact and decision making as well as counseling/coordination of care:  42206- 60 min    TWAN PENNY JR., MD. [facility]  GENERAL SURGERY  [facility]    3/5/2024  8:42 PM

## 2024-03-06 NOTE — PLAN OF CARE
Pt is aox4, resting in bed,up and ambulatory. NG removed, tolerating diet well  Problem: Patient Centered Care  Goal: Patient preferences are identified and integrated in the patient's plan of care  Description: Interventions:  - What would you like us to know as we care for you?   - Provide timely, complete, and accurate information to patient/family  - Incorporate patient and family knowledge, values, beliefs, and cultural backgrounds into the planning and delivery of care  - Encourage patient/family to participate in care and decision-making at the level they choose  - Honor patient and family perspectives and choices  Outcome: Progressing     Problem: GASTROINTESTINAL - ADULT  Goal: Minimal or absence of nausea and vomiting  Description: INTERVENTIONS:  - Maintain adequate hydration with IV or PO as ordered and tolerated  - Nasogastric tube to low intermittent suction as ordered  - Evaluate effectiveness of ordered antiemetic medications  - Provide nonpharmacologic comfort measures as appropriate  - Advance diet as tolerated, if ordered  - Obtain nutritional consult as needed  - Evaluate fluid balance  Outcome: Progressing  Goal: Maintains or returns to baseline bowel function  Description: INTERVENTIONS:  - Assess bowel function  - Maintain adequate hydration with IV or PO as ordered and tolerated  - Evaluate effectiveness of GI medications  - Encourage mobilization and activity  - Obtain nutritional consult as needed  - Establish a toileting routine/schedule  - Consider collaborating with pharmacy to review patient's medication profile  Outcome: Progressing     Problem: METABOLIC/FLUID AND ELECTROLYTES - ADULT  Goal: Electrolytes maintained within normal limits  Description: INTERVENTIONS:  - Monitor labs and rhythm and assess patient for signs and symptoms of electrolyte imbalances  - Administer electrolyte replacement as ordered  - Monitor response to electrolyte replacements, including rhythm and repeat lab  results as appropriate  - Fluid restriction as ordered  - Instruct patient on fluid and nutrition restrictions as appropriate  Outcome: Progressing     Problem: PAIN - ADULT  Goal: Verbalizes/displays adequate comfort level or patient's stated pain goal  Description: INTERVENTIONS:  - Encourage pt to monitor pain and request assistance  - Assess pain using appropriate pain scale  - Administer analgesics based on type and severity of pain and evaluate response  - Implement non-pharmacological measures as appropriate and evaluate response  - Consider cultural and social influences on pain and pain management  - Manage/alleviate anxiety  - Utilize distraction and/or relaxation techniques  - Monitor for opioid side effects  - Notify MD/LIP if interventions unsuccessful or patient reports new pain  - Anticipate increased pain with activity and pre-medicate as appropriate  Outcome: Progressing

## 2024-03-06 NOTE — PAYOR COMM NOTE
--------------  ADMISSION REVIEW     Payor: [employer]  Subscriber #:  [id number]  Authorization Number: [id number]    Admit date: 3/5/24  Admit time:  5:04 PM       REVIEW DOCUMENTATION:     ED Provider Notes        ED Provider Notes signed by Nickolas Muller MD at 3/5/2024  2:49 PM       Author: Nickolas Muller MD Service: -- Author Type: Physician    Filed: 3/5/2024  2:49 PM Date of Service: 3/5/2024 11:58 AM Status: Signed    : Nickolas Muller MD (Physician)           Patient Seen in: [facility] Emergency Department    History     Chief Complaint   Patient presents with    Abdomen/Flank Pain    Post-Op       HPI    History is provided by patient/independent historian: Patient  79 year old female with history of recent partial small bowel resection, here with complaints of new onset abdominal pain, distention for the past 2 days.  She feels nauseous without any vomiting.  She is not passing any gas.  She did have a bowel movement yesterday that was successful after using a suppository for her primary care doctor after evaluation, but used 1 today without improvement of her symptoms.  No fevers, no urinary symptoms.    History reviewed.   Past Medical History:   Diagnosis Date    Acute exacerbation of chronic obstructive pulmonary disease (COPD) ([facility]) 4/17/2017    ALLERGIC RHINITIS     OTHER DISEASES     TMJ     History reviewed.   Past Surgical History:   Procedure Laterality Date    CATARACT      COLONOSCOPY  2/12/2013    Procedure: COLONOSCOPY, POSSIBLE BIOPSY, POSSIBLE POLYPECTOMY 69803;  Surgeon: Jakub Barr MD;  Location: [facility]    PATIENT DOCUMENTED NOT TO HAVE EXPERIENCED ANY OF THE FOLLOWING EVENTS  12/12/2012    Procedure: LEFT PHACOEMULSIFICATION OF CATARACT WITH INTRAOCULAR LENS IMPLANT [id number];  Surgeon: Siddhartha Vinson MD;  Location: [facility]    PATIENT DOCUMENTED NOT TO HAVE EXPERIENCED ANY OF THE FOLLOWING EVENTS  11/28/2012    Procedure: RIGHT  PHACOEMULSIFICATION OF CATARACT WITH INTRAOCULAR LENS IMPLANT [id number];  Surgeon: Siddhartha Vinson MD;  Location: [facility]    PATIENT DOCUMENTED NOT TO HAVE EXPERIENCED ANY OF THE FOLLOWING EVENTS  2/12/2013    Procedure: COLONOSCOPY, POSSIBLE BIOPSY, POSSIBLE POLYPECTOMY 34420;  Surgeon: Jakub Barr MD;  Location: [facility]    PATIENT WITHOUGH PREOPERATIVE ORDER FOR IV ANTIBIOTIC SURGICAL SITE INFECTION PROPHYLAXIS.  12/12/2012    Procedure: LEFT PHACOEMULSIFICATION OF CATARACT WITH INTRAOCULAR LENS IMPLANT [id number];  Surgeon: Siddhartha Vinson MD;  Location: [facility]    PATIENT WITHOUGH PREOPERATIVE ORDER FOR IV ANTIBIOTIC SURGICAL SITE INFECTION PROPHYLAXIS.  11/28/2012    Procedure: RIGHT PHACOEMULSIFICATION OF CATARACT WITH INTRAOCULAR LENS IMPLANT [id number];  Surgeon: Siddhartha Vinson MD;  Location: [facility]    PATIENT WITHOUGH PREOPERATIVE ORDER FOR IV ANTIBIOTIC SURGICAL SITE INFECTION PROPHYLAXIS.  2/12/2013    Procedure: COLONOSCOPY, POSSIBLE BIOPSY, POSSIBLE POLYPECTOMY 56626;  Surgeon: Jakub Barr MD;  Location: [facility]    REMV CATARACT EXTRACAP,INSERT LENS  12/12/2012    Procedure: LEFT PHACOEMULSIFICATION OF CATARACT WITH INTRAOCULAR LENS IMPLANT [id number];  Surgeon: Siddhartha Vinson MD;  Location: [facility]    REMV CATARACT EXTRACAP,INSERT LENS  11/28/2012    Procedure: RIGHT PHACOEMULSIFICATION OF CATARACT WITH INTRAOCULAR LENS IMPLANT [id number];  Surgeon: Siddhartha Vinson MD;  Location: [facility]    TONSILLECTOMY         Physical Exam     ED Triage Vitals [03/05/24 1032]   /65   Pulse 116   Resp 20   Temp 98.3 °F (36.8 °C)   Temp src    SpO2 99 %   O2 Device None (Room air)     Vital signs reviewed.      Physical Exam  Vitals and nursing note reviewed.   Cardiovascular:      Pulses: Normal pulses.   Pulmonary:      Effort: No respiratory distress.   Abdominal:      General: Abdomen is protuberant.  There is no distension.      Tenderness: There is generalized abdominal tenderness.   Musculoskeletal:      Comments: No CVA tenderness   Neurological:      Mental Status: She is alert.        Labs Reviewed   COMP METABOLIC PANEL (14) - Abnormal; Notable for the following components:       Result Value    Glucose 106 (*)     Sodium 134 (*)     BUN 8 (*)     ALT 9 (*)     Globulin  2.2 (*)     All other components within normal limits   URINALYSIS WITH CULTURE REFLEX - Abnormal; Notable for the following components:    Ketones Urine Trace (*)     Blood Urine Trace (*)     RBC Urine 3-5 (*)     Squamous Epi. Cells Few (*)     All other components within normal limits   CBC W/ DIFFERENTIAL - Abnormal; Notable for the following components:    RBC 3.57 (*)     HGB 10.3 (*)     HCT 30.9 (*)     RDW-SD 48.9 (*)     RDW 15.3 (*)     Lymphocyte Absolute 0.84 (*)     All other components within normal limits   LIPASE - Normal     XR CHEST AP PORTABLE  (CPT=71045)    Result Date: 3/5/2024  CONCLUSION:  1. The NG tube has been repositioned, and now terminates satisfactorily in the distal stomach.  Remainder of the chest is unchanged.    Dictated by (CST): Sandeep Go MD on 3/05/2024 at 2:18 PM     Finalized by (CST): Sandeep Go MD on 3/05/2024 at 2:18 PM          CT ABDOMEN+PELVIS(CONTRAST ONLY)(CPT=74177)    Result Date: 3/5/2024  CONCLUSION:  1. Diffusely dilated loops of small bowel, perhaps reflecting ongoing ileus or possible bowel obstruction.  2. Postoperative changes of partial proximal colectomy with anastomotic staple line in the right lower quadrant.  3. Moderate volume intraperitoneal ascites.  4. Distal colonic diverticulosis without definite CT evidence of primary acute complication.  5. Left-sided calyceal-conforming calculus, possibly reflecting staghorn formation.  6. Mesenteric edema.      ED Medications Administered:   Medications   ketorolac (Toradol) 15 MG/ML injection 15 mg (15 mg Intravenous Given  3/5/24 1149)   sodium chloride 0.9 % IV bolus 1,000 mL (0 mL Intravenous Stopped 3/5/24 1250)   ondansetron (Zofran) 4 MG/2ML injection 4 mg (4 mg Intravenous Given 3/5/24 1151)   iopamidol 76% (ISOVUE-370) injection for power injector (60 mL Intravenous Given 3/5/24 1218)         Discussed management with physician/appropriate source: Dr. Ramirez - requesting NGT, Dr. Chavez      Disposition and Plan     Clinical Impression:  1. Abdominal pain, acute        Disposition:  Admit      Signed by Nickolas Muller MD on 3/5/2024  2:49 PM         Hospitalist H&P     ASSESSMENT / PLAN:   Ms. Lucas is a 79 year old female with PMH sig for SVT, RLS, COPD, chronic pain, HLD, IgA deficiency, was hospitalized from 2/3 - 2/17 for acute cecal volvulus, s/p right hemicolectomy on 2/3 per general surgery, who presents with 2 days of abdominal pain.       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 Post op fuid? Will discuss with surgery  -N.p.o., IV fluids  -NG tube placed in ER to [facility]  -General surgery consulted     Left-sided calculus  -Outpatient follow-up with urology     pSVT  -Resume metoprolol when able     COPD  - resume inhalers     RLS  - trileptal      Chronic pain  - outpatient f/u     IgA deficiency  - outpatient f/u     QUE  - resume PRN benzo     FN:  - IVF: 100  - Diet: N.p.o.     DVT Prophy: SCDs, hold anticoagulation pending surgery eval  Lines: PIV     Dispo: pending clinical course        SURGICAL CONSULT      Patient saw her primary care physician today for crampy abdominal pain.  Patient sent to emergency room.  Patient had CT scan of the abdomen pelvis which revealed question dilated loops of small bowel.  Patient admitted for pain control.    CT scan did not  reveal any evidence of abscess or leak.    General appearance:  alert, appears stated age, cooperative, and no distress  Eyes: Sclera anicteric  Pulmonary: Equal respiratory excursion, no labored breathing  Cardiovascular: regular rate and rhythm  Abdominal: Soft mildly distended nontender.  Well-healed midline incision present.  No evidence of rebound or guarding present.  Incision healed well.  No evidence of infection.  No palpable masses present.  No ventral hernia present.  Extremities: extremities normal, atraumatic, no cyanosis or edema  Skin: Skin color, texture, turgor normal. No rashes or lesions  Psychiatric: calm  concrete thoughts   memory intact    This is an 79-year-old white female with status post open right hemicolectomy 2/3/2024 for cecal volvulus. Patient was doing well up until yesterday when she developed crampy abdominal pain. Patient had CT scan which revealed possible dilated loops of small bowel. Patient admitted for nasogastric decompression IV hydration. Patient improved with nasogastric decompression. Will continue IV hydration. Will perform Gastrografin small bowel follow-through tomorrow to evaluate for small bowel obstruction. Patient without evidence for acute surgical abdomen at this time. Will follow along with you. Discussed in detail with the patient.       MEDICATIONS ADMINISTERED IN LAST 1 DAY:  clonazePAM (KlonoPIN) tab 0.25 mg       Date Action Dose Route User    3/5/2024 2125 Given 0.25 mg Oral Dyana Leroy, TL          fluticasone furoate-vilanterol (Breo Ellipta) 100-25 MCG/ACT inhaler 1 puff       Date Action Dose Route User    3/6/2024 0751 Given 1 puff Inhalation Vianey Lee, RN          iopamidol 76% (ISOVUE-370) injection for power injector       Date Action Dose Route User    3/5/2024 1218 Given 60 mL Intravenous Helen French          ketorolac (Toradol) 15 MG/ML injection 15 mg       Date Action Dose Route User    3/5/2024 1149 Given 15 mg Intravenous  Indiana Lott RN          ketorolac (Toradol) 15 MG/ML injection 15 mg       Date Action Dose Route User    3/6/2024 0751 Given 15 mg Intravenous Vianey Lee RN    3/6/2024 0015 Given 15 mg Intravenous Dyana Leroy RN    3/5/2024 1818 Given 15 mg Intravenous Kaylynn Gil RN          metoprolol succinate (Toprol XL) partial tablet 12.5 mg       Date Action Dose Route User    3/5/2024 2116 Given 12.5 mg Oral Dyana Leroy RN          ondansetron (Zofran) 4 MG/2ML injection 4 mg       Date Action Dose Route User    3/5/2024 1151 Given 4 mg Intravenous Indiana Lott RN          OXcarbazepine (Trileptal) tab 300 mg       Date Action Dose Route User    3/6/2024 0751 Given 300 mg Oral Vianey Lee RN    3/5/2024 2125 Given 300 mg Oral Dyana Leroy RN          pantoprazole (Protonix) 40 mg in sodium chloride 0.9% PF 10 mL IV push       Date Action Dose Route User    3/6/2024 0515 Given 40 mg Intravenous Dyana Leroy RN    3/5/2024 1818 Given 40 mg Intravenous Kaylynn Gil RN          sodium chloride 0.9% infusion 83 ML HR        Date Action Dose Route User    3/6/2024 0515 New Bag (none) Intravenous Dyana Leroy RN    3/5/2024 1727 New Bag (none) Intravenous Kaylynn Gil RN          sodium chloride 0.9 % IV bolus 1,000 mL       Date Action Dose Route User    3/5/2024 1150 New Bag 1,000 mL Intravenous Indiana Lott RN            Vitals (last day)       Date/Time Temp Pulse Resp BP SpO2 Weight O2 Device O2 Flow Rate (L/min) [facility]    03/06/24 0748 -- 66 18 121/52 95 % -- None (Room air) -- TH    03/06/24 0500 97.8 °F (36.6 °C) 76 16 120/58 97 % -- None (Room air) -- NA    03/05/24 2100 98.4 °F (36.9 °C) 82 16 130/90 98 % -- None (Room air) -- NA    03/05/24 1706 -- -- -- -- -- 100 lb 14.4 oz -- --     03/05/24 1705 98.5 °F (36.9 °C) 77 18 134/66 97 % -- None (Room air) --     03/05/24 1630 -- 76 -- 143/53 96 % -- None (Room air) --     03/05/24 1515 -- 71 -- -- 96 % -- -- --      03/05/24 1500 -- 62 -- 131/50 96 % -- None (Room air) --     03/05/24 1415 -- 62 -- 120/61 97 % -- None (Room air) --     03/05/24 1330 -- 83 -- 164/83 96 % -- None (Room air) --     03/05/24 1317 -- 87 -- 134/63 98 % -- -- --     03/05/24 1032 98.3 °F (36.8 °C) 116 20 135/65 99 % 100 lb None (Room air) -- KB

## 2024-03-06 NOTE — DIETARY NOTE
ADULT NUTRITION INITIAL ASSESSMENT    Pt is at high nutrition risk.  Pt meets severe malnutrition criteria.      CRITERIA FOR MALNUTRITION DIAGNOSIS:  Criteria for severe malnutrition diagnosis: acute illness/injury related to body fat moderate depletion and muscle mass moderate depletion.    RECOMMENDATIONS TO MD: See Nutrition Intervention    ADMITTING DIAGNOSIS:  Abdominal pain, acute [R10.9]  PERTINENT PAST MEDICAL HISTORY:   Past Medical History:   Diagnosis Date    Acute exacerbation of chronic obstructive pulmonary disease (COPD) ([facility]) 4/17/2017    ALLERGIC RHINITIS     OTHER DISEASES     TMJ     PATIENT STATUS:   Initial 03/06/24: Pt assessed r/t low BMI. Pt presented to ED with c/o intermittent lower quadrant abdominal pain and associated nausea ~2 days PTA. Has PMH as listed above. Pt known to nutrition services from recent admission in February 2024 with rt hemicolectomy for acute cecal volvulus on 2/3/34. Pt received PN 2/6-2/15. S/p CT abdomen/pelvis on 3/5 - \"diffusely dilated loops of small bowel, perhaps reflecting ongoing ileus or possible bowel obstruction\". S/p abdominal X-ray this AM - \"findings may suggest partial obstruction vs mild ileus\". Pt lying in bed of time of visit. NGT removed and diet advanced. Tolerated small, low fiber tray well per pt report. Pt reports her wt was stable prior to onset of acute cecal volvulus in February. Typically consumed 2 meals daily as well as \"grazing\" throughout the day. She is typically very active, walking at least 10,000 steps per day. Pt reports wt on home scale PTA 98 lbs. Has been eating fairly well since discharge, following a strict low fiber diet as instructed. Pt has increased her meal frequency to 3 times daily and tries to consume a good source of protein with each meal. Declining ONS at this time.      FOOD/NUTRITION RELATED HISTORY:  Appetite:  Good appetite PTA  Intake:  Good intake PTA until onset of symptoms on Narendra 3/3.   Intake Meeting  Needs: Marginal, monitor need for oral nutrition supplements (ONS)  Percent Meals Eaten (last 3 days)       None           Food Allergies: No Known Food Allergies (NKFA)  Cultural/Ethnic/[place] Preferences: Not Obtained    GASTROINTESTINAL: +BM last reported BM on 3/4, Loss of appetite, nausea, and abdomen soft/nontender, hypoactive bowel sounds     MEDICATIONS: reviewed; Noted non-cardiac electrolyte replacement protocol ordered   sodium chloride 20 mL/hr at 03/06/24 1049      heparin  5,000 Units Subcutaneous Q12H    metoprolol succinate  12.5 mg Oral Nightly    OXcarbazepine  300 mg Oral BID    pantoprazole  40 mg Intravenous Q12H     LABS: reviewed; noted low-normal magnesium this AM warranting replacement per protocol  Recent Labs     03/05/24  1117 03/06/24  0718   * 59*   BUN 8* 9   CREATSERUM 0.66 0.62   CA 9.2 8.5*   MG  --  1.7   * 141   K 4.1 3.7    109   CO2 23.0 24.0   PHOS  --  3.4   OSMOCALC 277 288     NUTRITION RELATED PHYSICAL FINDINGS:  - Nutrition Focused Physical Exam (NFPE): moderate depletion body fat triceps region, moderate depletion muscle mass clavicle region, scapular region, and shoulder region, and severe depletion muscle mass temple region per visual exam  - Fluid Accumulation: none  see RN documentation for details  - Skin Integrity: intact see RN documentation for details    ANTHROPOMETRICS:  HT: 162.6 cm (5' 4\")  WT: 45.8 kg (100 lb 14.4 oz)   BMI: Body mass index is 17.32 kg/m².  BMI CLASSIFICATION: <22 considered underweight for advanced age  IBW: 120 lbs        84% IBW  Usual Body Wt: 105 lbs (reported per pt)      96% UBW    WEIGHT HISTORY: Current wt reflects wt down 4% from UBW over the past 1 month.   Patient Weight(s) for the past 336 hrs:   Weight   03/05/24 1706 45.8 kg (100 lb 14.4 oz)   03/05/24 1032 45.4 kg (100 lb)     Wt Readings from Last 10 Encounters:   03/05/24 45.8 kg (100 lb 14.4 oz)   02/03/24 47.2 kg (104 lb)   01/04/22 47.6 kg (105 lb)    12/10/21 47.2 kg (104 lb)   12/01/21 48.5 kg (107 lb)   08/05/21 47.3 kg (104 lb 4 oz)   06/24/21 46.7 kg (103 lb)   06/10/21 47.2 kg (104 lb)   10/15/20 47.6 kg (105 lb)   10/08/20 47.6 kg (105 lb)     NUTRITION DIAGNOSIS/PROBLEM:   Severe Malnutrition related to Acute - Physiological causes resulting in anorexia or diminished intake as evidenced by body fat moderate depletion and muscle mass moderate depletion    NUTRITION INTERVENTION:     NUTRITION PRESCRIPTION:   Estimated Nutrition needs: --dosing wt of 46 kg - wt taken on 3/5/24  Calories: 5625-6964 calories/day (30-35 calories per kg Dosing wt)  Protein: 55-69 g protein/day (1.2-1.5 g protein/kg Dosing wt)  Fluid Needs: 4437-6019 ml/ day (1 ml/kcal)    - Diet:       Procedures    Low Fiber/Soft diet Is Patient on Accuchecks? No      - RD Malnutrition Care Plan:  Recommend nutrition support (via PN) if unable to take adequate PO in the next 48-72 hrs or prolonged NPO anticipated  - Meals and snacks: Encouraged increased PO intake  - Medical Food Supplements: RD added None at this time r/t declining. Monitor need.   - Vitamin and mineral supplements: none  - Feeding assistance: independent  - Nutrition education: Discussed importance of adequate energy and protein intake via 3 nutrient dense meals and frequent snacks daily; Encouraged intake of high protein foods   - Coordination of nutrition care: collaboration with other providers - discussed with RN on unit  - Discharge and transfer of nutrition care to new setting or provider: monitor plans  - from home with spouse    MONITOR AND EVALUATE/NUTRITION GOALS:  - Food and Nutrient Intake:      Monitor: for PO initiation and for PO diet advancement  - Food and Nutrient Administration:      Monitor: need for temporary nutrition support  - Anthropometric Measurement:    Monitor weight  - Nutrition Goals:      PO greater than 75% of meals, halt true wt loss, and improved GI status    DIETITIAN FOLLOW UP: RD to  follow and monitor nutrition status    Abida De Santiago MS, RD, LDN, [facility]  [id number]

## 2024-03-07 LAB
ANION GAP SERPL CALC-SCNC: 3 MMOL/L (ref 0–18)
BUN BLD-MCNC: 10 MG/DL (ref 9–23)
BUN/CREAT SERPL: 15.9 (ref 10–20)
CALCIUM BLD-MCNC: 8.5 MG/DL (ref 8.7–10.4)
CHLORIDE SERPL-SCNC: 110 MMOL/L (ref 98–112)
CO2 SERPL-SCNC: 27 MMOL/L (ref 21–32)
CREAT BLD-MCNC: 0.63 MG/DL
EGFRCR SERPLBLD CKD-EPI 2021: 90 ML/MIN/1.73M2 (ref 60–?)
GLUCOSE BLD-MCNC: 84 MG/DL (ref 70–99)
MAGNESIUM SERPL-MCNC: 1.6 MG/DL (ref 1.6–2.6)
OSMOLALITY SERPL CALC.SUM OF ELEC: 288 MOSM/KG (ref 275–295)
PHOSPHATE SERPL-MCNC: 3.3 MG/DL (ref 2.4–5.1)
POTASSIUM SERPL-SCNC: 3.7 MMOL/L (ref 3.5–5.1)
SODIUM SERPL-SCNC: 140 MMOL/L (ref 136–145)
TSI SER-ACNC: 4.23 MIU/ML (ref 0.55–4.78)

## 2024-03-07 PROCEDURE — 84443 ASSAY THYROID STIM HORMONE: CPT | Performed by: HOSPITALIST

## 2024-03-07 PROCEDURE — 80048 BASIC METABOLIC PNL TOTAL CA: CPT | Performed by: SURGERY

## 2024-03-07 PROCEDURE — 84100 ASSAY OF PHOSPHORUS: CPT | Performed by: SURGERY

## 2024-03-07 PROCEDURE — C9113 INJ PANTOPRAZOLE SODIUM, VIA: HCPCS | Performed by: HOSPITALIST

## 2024-03-07 PROCEDURE — 83735 ASSAY OF MAGNESIUM: CPT | Performed by: HOSPITALIST

## 2024-03-07 RX ORDER — MAGNESIUM OXIDE 400 MG/1
400 TABLET ORAL ONCE
Status: COMPLETED | OUTPATIENT
Start: 2024-03-07 | End: 2024-03-07

## 2024-03-07 RX ORDER — PANTOPRAZOLE SODIUM 40 MG/1
40 TABLET, DELAYED RELEASE ORAL
Status: DISCONTINUED | OUTPATIENT
Start: 2024-03-07 | End: 2024-03-08

## 2024-03-07 NOTE — PLAN OF CARE
Patient is tolerating soft diet, denies nausea. Minimal pain and tenderness to abdomen after eating at times, declines pain medications. Ambulating up independently in room and halls. IVF in place. Plan for discharge home tomorrow.     Problem: Patient Centered Care  Goal: Patient preferences are identified and integrated in the patient's plan of care  Description: Interventions:  - What would you like us to know as we care for you? Lives at home with   - Provide timely, complete, and accurate information to patient/family  - Incorporate patient and family knowledge, values, beliefs, and cultural backgrounds into the planning and delivery of care  - Encourage patient/family to participate in care and decision-making at the level they choose  - Honor patient and family perspectives and choices  Outcome: Progressing     Problem: GASTROINTESTINAL - ADULT  Goal: Minimal or absence of nausea and vomiting  Description: INTERVENTIONS:  - Maintain adequate hydration with IV or PO as ordered and tolerated  - Nasogastric tube to low intermittent suction as ordered  - Evaluate effectiveness of ordered antiemetic medications  - Provide nonpharmacologic comfort measures as appropriate  - Advance diet as tolerated, if ordered  - Obtain nutritional consult as needed  - Evaluate fluid balance  Outcome: Progressing  Goal: Maintains or returns to baseline bowel function  Description: INTERVENTIONS:  - Assess bowel function  - Maintain adequate hydration with IV or PO as ordered and tolerated  - Evaluate effectiveness of GI medications  - Encourage mobilization and activity  - Obtain nutritional consult as needed  - Establish a toileting routine/schedule  - Consider collaborating with pharmacy to review patient's medication profile  Outcome: Progressing     Problem: METABOLIC/FLUID AND ELECTROLYTES - ADULT  Goal: Electrolytes maintained within normal limits  Description: INTERVENTIONS:  - Monitor labs and rhythm and assess  patient for signs and symptoms of electrolyte imbalances  - Administer electrolyte replacement as ordered  - Monitor response to electrolyte replacements, including rhythm and repeat lab results as appropriate  - Fluid restriction as ordered  - Instruct patient on fluid and nutrition restrictions as appropriate  Outcome: Progressing     Problem: PAIN - ADULT  Goal: Verbalizes/displays adequate comfort level or patient's stated pain goal  Description: INTERVENTIONS:  - Encourage pt to monitor pain and request assistance  - Assess pain using appropriate pain scale  - Administer analgesics based on type and severity of pain and evaluate response  - Implement non-pharmacological measures as appropriate and evaluate response  - Consider cultural and social influences on pain and pain management  - Manage/alleviate anxiety  - Utilize distraction and/or relaxation techniques  - Monitor for opioid side effects  - Notify MD/LIP if interventions unsuccessful or patient reports new pain  - Anticipate increased pain with activity and pre-medicate as appropriate  Outcome: Progressing

## 2024-03-07 NOTE — PLAN OF CARE
Problem: GASTROINTESTINAL - ADULT  Goal: Minimal or absence of nausea and vomiting  Description: INTERVENTIONS:  - Maintain adequate hydration with IV or PO as ordered and   - Provide nonpharmacologic comfort measures as appropriate  - Advance diet as tolerated, if ordered  - Obtain nutritional consult as needed  - Evaluate fluid balance  Outcome: Progressing     Problem: PAIN - ADULT  Goal: Verbalizes/displays adequate comfort level or patient's stated pain goal  Description: INTERVENTIONS:  - Encourage pt to monitor pain and request assistance  - Assess pain using appropriate pain scale  - Administer analgesics based on type and severity of pain and evaluate response  - Implement non-pharmacological measures as appropriate and evaluate response  - Consider cultural and social influences on pain and pain management  - Manage/alleviate anxiety  - Utilize distraction and/or relaxation techniques  Outcome: Progressing  Patient aoX4 with no complaints of pain or nausea overnight. Patient ambulates without assistance. PRN clonipin given per pt request; pt states she just wants to sleep.  IVF infusing; VSS, no tele. Safety precautions maintained; call light within reach.

## 2024-03-07 NOTE — PAYOR COMM NOTE
--------------  CONTINUED STAY REVIEW    Payor: [employer]  Subscriber #:  [id number]  Authorization Number: [id number]    Admit date: 3/5/24  Admit time:  5:04 PM    Admitting Physician: Freddy Chavez MD  Attending Physician:  Freddy Chavez MD  Primary Care Physician: Abi Ohara MD    REVIEW DOCUMENTATION:  3-6-24     Subjective:   POD#32 right hemicolectomy for volvulus  Readmitted due to crampy pain, feels better today but still having some lower abdominal cramping  denies N/V  Passing flatus and BM(s)      Exam:      General: awake and alert, in no acute distress, and comfortable  Pulmonary:lungs clear to auscultation bilaterally  Cardiac: RRR and nl S1,S2, no S3, no S4  Abdomen: normal BS, nondistended, and nontender   Extremities: calves nontender, no edema, motor intact, and sensory intact  Wounds: clean, dry and intact      Assessment and Plan:   Abdominal pain, acute  Crampy lower abdominal pain, s/p right hemicolectomy  No evidence of obstruction on SBFT     Doing well  Diet: Low residue soft diet  IVF: TKO  Antibiotics: no need for antibiotics     Activity: OOB to chair, more ambulation encouraged, and Ambulation in halls at least TID  DVT prophylaxis: SCDs and chemoprophylaxis as ordered     Discharge disposition: pending clinical course     Lab Results   Component Value Date     WBC 4.4 03/06/2024     HGB 8.6 03/06/2024     HCT 27.0 03/06/2024     .0 03/06/2024      03/06/2024     K 3.7 03/06/2024      03/06/2024     CO2 24.0 03/06/2024     BUN 9 03/06/2024     CREATSERUM 0.62 03/06/2024     GLU 59 03/06/2024     MG 1.7 03/06/2024     PHOS 3.4 03/06/2024         ATTENDING     Assessment/Plan:      Principal Problem:    Abdominal pain, acute     Ms. Lucas is a 79 year old female with PMH sig for SVT, RLS, COPD, chronic pain, HLD, IgA deficiency, was hospitalized from 2/3 - 2/17 for acute cecal volvulus, s/p right hemicolectomy on 2/3 per general surgery, who presents with 2  days of abdominal pain.       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 Post op fuid? Will discuss with surgery  -SBFT negative  -NG removed, diet advanced per surgery  -General surgery consulted     Left-sided calculus  -Outpatient follow-up with urology     pSVT  -Resume metoprolol when able     COPD  - resume inhalers     RLS  - trileptal      Chronic pain  - outpatient f/u     IgA deficiency  - outpatient f/u     QUE  - resume PRN benzo     FN:  - IVF: stop  - Diet: adv     DVT Prophy: SCDs, hold anticoagulation pending surgery eval  Lines: PIV     Dispo: pending clinical course               MEDICATIONS ADMINISTERED IN LAST 1 DAY:  clonazePAM (KlonoPIN) tab 0.25 mg       Date Action Dose Route User    3/6/2024 2256 Given 0.25 mg Oral Kiya Harris RN          heparin (Porcine) 5000 UNIT/ML injection 5,000 Units       Date Action Dose Route User    3/6/2024 2256 Given 5,000 Units Subcutaneous (Right Upper Arm) Kiya Harris RN    3/6/2024 1105 Given 5,000 Units Subcutaneous (Left Lower Abdomen) Vianey Lee RN          magnesium oxide (Mag-Ox) tab 400 mg       Date Action Dose Route User    3/6/2024 1105 Given 400 mg Oral Vianey Lee RN          magnesium oxide (Mag-Ox) tab 400 mg       Date Action Dose Route User    3/7/2024 0741 Given 400 mg Oral Kiya Harris RN          metoprolol succinate (Toprol XL) partial tablet 12.5 mg       Date Action Dose Route User    3/6/2024 2034 Given 12.5 mg Oral Ninoska Huizar RN          OXcarbazepine (Trileptal) tab 300 mg       Date Action Dose Route User    3/6/2024 2035 Given 300 mg Oral Ninoska Huizar RN          pantoprazole (Protonix) 40 mg in sodium chloride 0.9% PF 10 mL IV push       Date Action Dose Route  User    3/7/2024 0502 Given 40 mg Intravenous Kiya Harris RN    3/6/2024 1643 Given 40 mg Intravenous Vianey Lee RN          sodium chloride 0.9% infusion  20 ML HR        Date Action Dose Route User    3/6/2024 2256 New Bag (none) Intravenous Kiya Harris RN    3/6/2024 1049 Rate/Dose Change (none) Intravenous Vianey Lee RN            Vitals (last day)       Date/Time Temp Pulse Resp BP SpO2 Weight O2 Device O2 Flow Rate (L/min) [facility]    03/07/24 0500 98.5 °F (36.9 °C) 79 18 118/66 95 % -- None (Room air) --     03/06/24 2206 98.9 °F (37.2 °C) 78 18 132/57 100 % -- None (Room air) --     03/06/24 2032 98.4 °F (36.9 °C) 86 18 119/52 99 % -- None (Room air) --     03/06/24 1641 98.1 °F (36.7 °C) 74 18 108/50 99 % -- None (Room air) --     03/06/24 0748 -- 66 18 121/52 95 % -- None (Room air) --     03/06/24 0500 97.8 °F (36.6 °C) 76 16 120/58 97 % -- None (Room air) -- NA

## 2024-03-07 NOTE — PROGRESS NOTES
[facility] Hospitalist Progress Note     CC: Hospital Follow up    PCP: Abi Ohara MD       Assessment/Plan:     Principal Problem:    Abdominal pain, acute    Ms. Lucas is a 79 year old female with PMH sig for SVT, RLS, COPD, chronic pain, HLD, IgA deficiency, was hospitalized from 2/3 - 2/17 for acute cecal volvulus, s/p right hemicolectomy on 2/3 per general surgery, who presents with 2 days of abdominal pain.       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  s/p ex lap, R hemicolectomy, omentoplasty of anastomosis on 2/3/24  Moderate ascites  -CT on admission with diffusely dilated loops of small bowel, ileus versus SBO  -WBCs normal, no evidence of abscess or leakage on CT  -ascites minimal discussed with surgery likely post op fluid  -SBFT negative for obs  -NG removed, diet advanced per surgery  -General surgery consulted, discussed with surgery     Left-sided calculus  -Outpatient follow-up with urology     pSVT  -Resume metoprolol when able     COPD  - resume inhalers     RLS  - trileptal      Chronic pain  - outpatient f/u     IgA deficiency  - outpatient f/u     QUE  - resume PRN benzo     FN:  - IVF: stop  - Diet: adv     DVT Prophy: SCDs, heparin sq  Lines: PIV     Dispo: pending clinical course     Discussed with  at bedside    Questions/concerns were discussed with patient and/or family by bedside.    Thank You,  Freddy Chavez MD    Hospitalist with [facility]     Subjective:     No CP, SOB, or N/V.  Feeling more bloated this morning, no nausea, less flatus    OBJECTIVE:    Blood pressure 106/55, pulse 83, temperature 98.2 °F (36.8 °C), temperature source Oral, resp. rate 18, height 5' 4\" (1.626 m), weight 100 lb 14.4 oz (45.8 kg), SpO2 100%, not currently breastfeeding.    Temp:  [98.1 °F (36.7 °C)-98.9 °F (37.2 °C)] 98.2 °F (36.8 °C)  Pulse:  [74-86] 83  Resp:  [18] 18  BP: (106-132)/(50-66) 106/55  SpO2:  [95 %-100 %] 100  %      Intake/Output:    Intake/Output Summary (Last 24 hours) at 3/7/2024 1239  Last data filed at 3/7/2024 0314  Gross per 24 hour   Intake 384.67 ml   Output --   Net 384.67 ml       Last 3 Weights   03/05/24 1706 100 lb 14.4 oz (45.8 kg)   03/05/24 1032 100 lb (45.4 kg)   02/03/24 0948 104 lb (47.2 kg)   02/03/24 0140 104 lb (47.2 kg)   01/04/22 1442 105 lb (47.6 kg)       Exam    Gen: No acute distress, alert and oriented x3   Heent: NC AT, neck supple  Pulm: Lungs clear, normal respiratory effort  CV: Heart with regular rate and rhythm,   Abd: Abdomen soft, nontender, nondistended, BS noted  MSK: no clubbing, no cyanosis  Skin: no rashes or lesions  Neuro: AO*3, motor intact, no sensory deficits  Psyc: appropriate mood and affect      Data Review:       Labs:     Recent Labs   Lab 03/05/24  1117 03/06/24  0718   RBC 3.57* 3.08*   HGB 10.3* 8.6*   HCT 30.9* 27.0*   MCV 86.6 87.7   MCH 28.9 27.9   MCHC 33.3 31.9   RDW 15.3* 15.4*   NEPRELIM 3.85 2.38   WBC 5.4 4.4   .0 213.0         Recent Labs   Lab 03/05/24  1117 03/06/24  0718 03/07/24  0550   * 59* 84   BUN 8* 9 10   CREATSERUM 0.66 0.62 0.63   EGFRCR 89 90 90   CA 9.2 8.5* 8.5*   * 141 140   K 4.1 3.7 3.7    109 110   CO2 23.0 24.0 27.0       Recent Labs   Lab 03/05/24  1117   ALT 9*   AST 14   ALB 4.2         Imaging:  XR SMALL BOWEL SINGLE CONTRAST (CPT=74250)    Result Date: 3/6/2024  CONCLUSION:  1. Intermittent moderate dilatation of proximal and mid small bowel without complete bowel obstruction.  Contrast reaches the ascending colon at approximately 60 minutes and reaches the rectosigmoid by 1 hour and 25 minutes.  Findings may suggest partial  obstruction versus mild ileus.    Dictated by (CST): Sandeep Go MD on 3/06/2024 at 2:40 PM     Finalized by (CST): Sandeep Go MD on 3/06/2024 at 2:44 PM          XR CHEST AP PORTABLE  (CPT=71045)    Result Date: 3/5/2024  CONCLUSION:  1. The NG tube has been repositioned, and  now terminates satisfactorily in the distal stomach.  Remainder of the chest is unchanged.    Dictated by (CST): Sandeep Go MD on 3/05/2024 at 2:18 PM     Finalized by (CST): Sandeep Go MD on 3/05/2024 at 2:18 PM          XR CHEST AP PORTABLE  (CPT=71045)    Result Date: 3/5/2024  PROCEDURE: XR CHEST AP PORTABLE  (CPT=71045) TIME: 1337  COMPARISON: [facility], XR CHEST AP PORTABLE (CPT=71045), 2/06/2024, 2:53 PM.  INDICATIONS: Verify correct tube placement  TECHNIQUE:   Single view.   Findings and impression:  The enteric tube is looped in the neck then descends down the esophagus and terminates in the lower  esophagus    Normal heart size  Clear lungs  Biapical calcified pleural scarring       Dictated by (CST): Macho Garcia MD on 3/05/2024 at 1:51 PM     Finalized by (CST): Macho Garcia MD on 3/05/2024 at 1:54 PM          CT ABDOMEN+PELVIS(CONTRAST ONLY)(CPT=74177)    Result Date: 3/5/2024  CONCLUSION:  1. Diffusely dilated loops of small bowel, perhaps reflecting ongoing ileus or possible bowel obstruction.  2. Postoperative changes of partial proximal colectomy with anastomotic staple line in the right lower quadrant.  3. Moderate volume intraperitoneal ascites.  4. Distal colonic diverticulosis without definite CT evidence of primary acute complication.  5. Left-sided calyceal-conforming calculus, possibly reflecting staghorn formation.  6. Mesenteric edema.  7. Lesser incidental findings as above.    Dictated by (CST): Jeremy Covarrubias MD on 3/05/2024 at 12:40 PM     Finalized by (CST): Jeremy Covarrubias MD on 3/05/2024 at 12:51 PM             Meds:      heparin  5,000 Units Subcutaneous Q12H    metoprolol succinate  12.5 mg Oral Nightly    OXcarbazepine  300 mg Oral BID    pantoprazole  40 mg Intravenous Q12H      sodium chloride 20 mL/hr at 03/06/24 1494     clonazePAM, simethicone, acetaminophen, melatonin, ondansetron

## 2024-03-07 NOTE — PROGRESS NOTES
[facility]  part of [facility]    General Surgery Progress Note  Mayte Lucas  : 1945  CSN: [id number]  HD# 2    Subjective:   POD#33 right hemicolectomy for volvulus  Readmitted due to crampy pain, feels better today but still having minimal lower abdominal cramping and bowel sounds, denies N/V but bloated moderately  Passing flatus and BM(s)     Exam:     General: awake and alert, in no acute distress, and comfortable  Pulmonary:lungs clear to auscultation bilaterally  Cardiac: RRR and nl S1,S2, no S3, no S4  Abdomen: normal BS, nondistended, and nontender   Extremities: calves nontender, no edema, motor intact, and sensory intact  Wounds: clean, dry and intact     Assessment and Plan:   Abdominal pain, acute  Crampy lower abdominal pain, s/p right hemicolectomy  No evidence of obstruction on SBFT    Doing well  Diet: Low residue soft diet  IVF: TKO  Antibiotics: no need for antibiotics    Activity: OOB to chair, more ambulation encouraged, and Ambulation in halls at least TID  DVT prophylaxis: SCDs and chemoprophylaxis as ordered    Discharge disposition: possible discharge in AM       Objective:     Vitals:    24 2032 24 2206 24 0500 24 1042   BP: 119/52 132/57 118/66 106/55   Pulse: 86 78 79 83   Resp: 18 18 18 18   Temp: 98.4 °F (36.9 °C) 98.9 °F (37.2 °C) 98.5 °F (36.9 °C) 98.2 °F (36.8 °C)   TempSrc: Oral Oral Oral Oral   SpO2: 99% 100% 95% 100%   Weight:       Height:         Body mass index is 17.32 kg/m².    Intake/Output Summary (Last 24 hours) at 3/7/2024 1304  Last data filed at 3/7/2024 0314  Gross per 24 hour   Intake 384.67 ml   Output --   Net 384.67 ml       Results:     Recent Labs   Lab 24  1117 24  0718   RBC 3.57* 3.08*   HGB 10.3* 8.6*   HCT 30.9* 27.0*   MCV 86.6 87.7   MCH 28.9 27.9   MCHC 33.3 31.9   RDW 15.3* 15.4*   NEPRELIM 3.85 2.38   WBC 5.4 4.4   .0 213.0       Recent Labs   Lab 24  1117 24  0718  03/07/24  0550   * 59* 84   BUN 8* 9 10   CREATSERUM 0.66 0.62 0.63   CA 9.2 8.5* 8.5*   * 141 140   K 4.1 3.7 3.7    109 110   CO2 23.0 24.0 27.0       Lab Results   Component Value Date     03/07/2024    K 3.7 03/07/2024     03/07/2024    CO2 27.0 03/07/2024    BUN 10 03/07/2024    CREATSERUM 0.63 03/07/2024    GLU 84 03/07/2024    MG 1.6 03/07/2024    PHOS 3.3 03/07/2024    CA 8.5 03/07/2024       XR SMALL BOWEL SINGLE CONTRAST (CPT=74250)    Result Date: 3/6/2024  CONCLUSION:  1. Intermittent moderate dilatation of proximal and mid small bowel without complete bowel obstruction.  Contrast reaches the ascending colon at approximately 60 minutes and reaches the rectosigmoid by 1 hour and 25 minutes.  Findings may suggest partial  obstruction versus mild ileus.    Dictated by (CST): Sandeep Go MD on 3/06/2024 at 2:40 PM     Finalized by (CST): Sandeep Go MD on 3/06/2024 at 2:44 PM          XR CHEST AP PORTABLE  (CPT=71045)    Result Date: 3/5/2024  CONCLUSION:  1. The NG tube has been repositioned, and now terminates satisfactorily in the distal stomach.  Remainder of the chest is unchanged.    Dictated by (CST): Sandeep Go MD on 3/05/2024 at 2:18 PM     Finalized by (CST): Sandeep Go MD on 3/05/2024 at 2:18 PM          XR CHEST AP PORTABLE  (CPT=71045)    Result Date: 3/5/2024  PROCEDURE: XR CHEST AP PORTABLE  (CPT=71045) TIME: 1337  COMPARISON: [facility], XR CHEST AP PORTABLE (CPT=71045), 2/06/2024, 2:53 PM.  INDICATIONS: Verify correct tube placement  TECHNIQUE:   Single view.   Findings and impression:  The enteric tube is looped in the neck then descends down the esophagus and terminates in the lower  esophagus    Normal heart size  Clear lungs  Biapical calcified pleural scarring       Dictated by (CST): Mahco Garcia MD on 3/05/2024 at 1:51 PM     Finalized by (CST): Macho Garcia MD on 3/05/2024 at 1:54 PM                 Yvan Griggs MD  [facility]  03/07/24  1:05 PM

## 2024-03-08 ENCOUNTER — APPOINTMENT (OUTPATIENT)
Dept: GENERAL RADIOLOGY | Facility: HOSPITAL | Age: 79
End: 2024-03-08
Attending: SURGERY
Payer: MEDICARE

## 2024-03-08 VITALS
DIASTOLIC BLOOD PRESSURE: 52 MMHG | BODY MASS INDEX: 17.22 KG/M2 | RESPIRATION RATE: 16 BRPM | TEMPERATURE: 99 F | HEART RATE: 79 BPM | SYSTOLIC BLOOD PRESSURE: 101 MMHG | WEIGHT: 100.88 LBS | HEIGHT: 64 IN | OXYGEN SATURATION: 97 %

## 2024-03-08 LAB
ANION GAP SERPL CALC-SCNC: 5 MMOL/L (ref 0–18)
BASOPHILS # BLD AUTO: 0.05 X10(3) UL (ref 0–0.2)
BASOPHILS NFR BLD AUTO: 1 %
BUN BLD-MCNC: 8 MG/DL (ref 9–23)
BUN/CREAT SERPL: 12.3 (ref 10–20)
CALCIUM BLD-MCNC: 8.7 MG/DL (ref 8.7–10.4)
CHLORIDE SERPL-SCNC: 110 MMOL/L (ref 98–112)
CO2 SERPL-SCNC: 28 MMOL/L (ref 21–32)
CREAT BLD-MCNC: 0.65 MG/DL
DEPRECATED RDW RBC AUTO: 50 FL (ref 35.1–46.3)
EGFRCR SERPLBLD CKD-EPI 2021: 89 ML/MIN/1.73M2 (ref 60–?)
EOSINOPHIL # BLD AUTO: 0.28 X10(3) UL (ref 0–0.7)
EOSINOPHIL NFR BLD AUTO: 5.3 %
ERYTHROCYTE [DISTWIDTH] IN BLOOD BY AUTOMATED COUNT: 15.4 % (ref 11–15)
GLUCOSE BLD-MCNC: 88 MG/DL (ref 70–99)
HCT VFR BLD AUTO: 27.2 %
HGB BLD-MCNC: 9.1 G/DL
IMM GRANULOCYTES # BLD AUTO: 0.03 X10(3) UL (ref 0–1)
IMM GRANULOCYTES NFR BLD: 0.6 %
LYMPHOCYTES # BLD AUTO: 1.12 X10(3) UL (ref 1–4)
LYMPHOCYTES NFR BLD AUTO: 21.3 %
MAGNESIUM SERPL-MCNC: 1.8 MG/DL (ref 1.6–2.6)
MCH RBC QN AUTO: 29.5 PG (ref 26–34)
MCHC RBC AUTO-ENTMCNC: 33.5 G/DL (ref 31–37)
MCV RBC AUTO: 88.3 FL
MONOCYTES # BLD AUTO: 0.64 X10(3) UL (ref 0.1–1)
MONOCYTES NFR BLD AUTO: 12.2 %
NEUTROPHILS # BLD AUTO: 3.13 X10 (3) UL (ref 1.5–7.7)
NEUTROPHILS # BLD AUTO: 3.13 X10(3) UL (ref 1.5–7.7)
NEUTROPHILS NFR BLD AUTO: 59.6 %
OSMOLALITY SERPL CALC.SUM OF ELEC: 294 MOSM/KG (ref 275–295)
PHOSPHATE SERPL-MCNC: 3.6 MG/DL (ref 2.4–5.1)
PLATELET # BLD AUTO: 215 10(3)UL (ref 150–450)
POTASSIUM SERPL-SCNC: 3.9 MMOL/L (ref 3.5–5.1)
RBC # BLD AUTO: 3.08 X10(6)UL
SODIUM SERPL-SCNC: 143 MMOL/L (ref 136–145)
TSI SER-ACNC: 4.12 MIU/ML (ref 0.55–4.78)
WBC # BLD AUTO: 5.3 X10(3) UL (ref 4–11)

## 2024-03-08 PROCEDURE — 84443 ASSAY THYROID STIM HORMONE: CPT | Performed by: SURGERY

## 2024-03-08 PROCEDURE — 84100 ASSAY OF PHOSPHORUS: CPT | Performed by: SURGERY

## 2024-03-08 PROCEDURE — 85025 COMPLETE CBC W/AUTO DIFF WBC: CPT | Performed by: HOSPITALIST

## 2024-03-08 PROCEDURE — 74019 RADEX ABDOMEN 2 VIEWS: CPT | Performed by: SURGERY

## 2024-03-08 PROCEDURE — 80048 BASIC METABOLIC PNL TOTAL CA: CPT | Performed by: HOSPITALIST

## 2024-03-08 PROCEDURE — 83735 ASSAY OF MAGNESIUM: CPT | Performed by: HOSPITALIST

## 2024-03-08 RX ORDER — MAGNESIUM OXIDE 400 MG/1
400 TABLET ORAL ONCE
Status: COMPLETED | OUTPATIENT
Start: 2024-03-08 | End: 2024-03-08

## 2024-03-08 NOTE — PLAN OF CARE
Problem: Patient Centered Care  Goal: Patient preferences are identified and integrated in the patient's plan of care  Description: Interventions:  - Provide timely, complete, and accurate information to patient/family  - Incorporate patient and family knowledge, values, beliefs, and cultural backgrounds into the planning and delivery of care  - Encourage patient/family to participate in care and decision-making at the level they choose  - Honor patient and family perspectives and choices  Outcome: Adequate for Discharge     Problem: GASTROINTESTINAL - ADULT  Goal: Minimal or absence of nausea and vomiting  Description: INTERVENTIONS:  - Maintain adequate hydration with IV or PO as ordered and tolerated  - Nasogastric tube to low intermittent suction as ordered  - Evaluate effectiveness of ordered antiemetic medications  - Provide nonpharmacologic comfort measures as appropriate  - Advance diet as tolerated, if ordered  - Obtain nutritional consult as needed  - Evaluate fluid balance  Outcome: Adequate for Discharge  Goal: Maintains or returns to baseline bowel function  Description: INTERVENTIONS:  - Assess bowel function  - Maintain adequate hydration with IV or PO as ordered and tolerated  - Evaluate effectiveness of GI medications  - Encourage mobilization and activity  - Obtain nutritional consult as needed  - Establish a toileting routine/schedule  - Consider collaborating with pharmacy to review patient's medication profile  Outcome: Adequate for Discharge     Problem: METABOLIC/FLUID AND ELECTROLYTES - ADULT  Goal: Electrolytes maintained within normal limits  Description: INTERVENTIONS:  - Monitor labs and rhythm and assess patient for signs and symptoms of electrolyte imbalances  - Administer electrolyte replacement as ordered  - Monitor response to electrolyte replacements, including rhythm and repeat lab results as appropriate  - Fluid restriction as ordered  - Instruct patient on fluid and nutrition  restrictions as appropriate  Outcome: Adequate for Discharge     Problem: PAIN - ADULT  Goal: Verbalizes/displays adequate comfort level or patient's stated pain goal  Description: INTERVENTIONS:  - Encourage pt to monitor pain and request assistance  - Assess pain using appropriate pain scale  - Administer analgesics based on type and severity of pain and evaluate response  - Implement non-pharmacological measures as appropriate and evaluate response  - Consider cultural and social influences on pain and pain management  - Manage/alleviate anxiety  - Utilize distraction and/or relaxation techniques  - Monitor for opioid side effects  - Notify MD/LIP if interventions unsuccessful or patient reports new pain  - Anticipate increased pain with activity and pre-medicate as appropriate  Outcome: Adequate for Discharge   Patient denies new complaints. Patient has been tolerating low fiber soft diet, denies nausea. Patient states she had a BM, passing gas.Patient has been ambulating in hallways, tolerating activity well. Patient is being discharge home today accompanied by her . Written discharge instructions discussed with patient , she verbalized understanding, aware of medication regimen and follow up visits.

## 2024-03-08 NOTE — PROGRESS NOTES
[facility] Hospitalist Progress Note     CC: Hospital Follow up    PCP: Abi Ohara MD       Assessment/Plan:     Principal Problem:    Abdominal pain, acute    Ms. Lucas is a 79 year old female with PMH sig for SVT, RLS, COPD, chronic pain, HLD, IgA deficiency, was hospitalized from 2/3 - 2/17 for acute cecal volvulus, s/p right hemicolectomy on 2/3 per general surgery, who presents with 2 days of abdominal pain.       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       Left-sided calculus  -Outpatient follow-up with urology     pSVT  -Resume metoprolol when able     COPD  - resume inhalers     RLS  - trileptal      Chronic pain  - outpatient f/u     IgA deficiency  - outpatient f/u     QUE  - resume PRN benzo     FN:  - IVF: stop  - Diet: adv     DVT Prophy: SCDs, heparin sq  Lines: PIV     Dispo: pending clinical course     Discussed with sister at bedside    Questions/concerns were discussed with patient and/or family by bedside.    Thank You,  Freddy Chavez MD    Hospitalist with [facility]     Subjective:     No CP, SOB, or N/V.  Feeling less bloated this morning, no nausea, still with flatus no BM    OBJECTIVE:    Blood pressure 117/65, pulse 71, temperature 98.2 °F (36.8 °C), temperature source Oral, resp. rate 18, height 5' 4\" (1.626 m), weight 100 lb 14.4 oz (45.8 kg), SpO2 94%, not currently breastfeeding.    Temp:  [98.2 °F (36.8 °C)-98.6  °F (37 °C)] 98.2 °F (36.8 °C)  Pulse:  [71-76] 71  Resp:  [18] 18  BP: (117-120)/(55-65) 117/65  SpO2:  [94 %-98 %] 94 %      Intake/Output:    Intake/Output Summary (Last 24 hours) at 3/8/2024 1130  Last data filed at 3/8/2024 0900  Gross per 24 hour   Intake 1594 ml   Output --   Net 1594 ml       Last 3 Weights   03/05/24 1706 100 lb 14.4 oz (45.8 kg)   03/05/24 1032 100 lb (45.4 kg)   02/03/24 0948 104 lb (47.2 kg)   02/03/24 0140 104 lb (47.2 kg)   01/04/22 1442 105 lb (47.6 kg)       Exam    Gen: No acute distress, alert and oriented x3   Heent: NC AT, neck supple  Pulm: Lungs clear, normal respiratory effort  CV: Heart with regular rate and rhythm,   Abd: Abdomen soft, mild TTP, nondistended, BS noted  MSK: no clubbing, no cyanosis  Skin: no rashes or lesions  Neuro: AO*3, motor intact, no sensory deficits  Psyc: appropriate mood and affect      Data Review:       Labs:     Recent Labs   Lab 03/05/24  1117 03/06/24  0718 03/08/24  0635   RBC 3.57* 3.08* 3.08*   HGB 10.3* 8.6* 9.1*   HCT 30.9* 27.0* 27.2*   MCV 86.6 87.7 88.3   MCH 28.9 27.9 29.5   MCHC 33.3 31.9 33.5   RDW 15.3* 15.4* 15.4*   NEPRELIM 3.85 2.38 3.13   WBC 5.4 4.4 5.3   .0 213.0 215.0         Recent Labs   Lab 03/06/24  0718 03/07/24  0550 03/08/24  0635   GLU 59* 84 88   BUN 9 10 8*   CREATSERUM 0.62 0.63 0.65   EGFRCR 90 90 89   CA 8.5* 8.5* 8.7    140 143   K 3.7 3.7 3.9    110 110   CO2 24.0 27.0 28.0       Recent Labs   Lab 03/05/24  1117   ALT 9*   AST 14   ALB 4.2         Imaging:  XR ABDOMEN OBSTRUCTIVE SERIES ROUTINE(2 VW)(CPT=74019)    Result Date: 3/8/2024  CONCLUSION:  1. Resolution of the generalized ileus noted on prior studies.  Enteric contrast has passed into the colon. 2. Stable nonobstructing left renal stone.    Dictated by (CST): Vinicius Medrano MD on 3/08/2024 at 9:25 AM     Finalized by (CST): Vinicius Medrano MD on 3/08/2024 at 9:35 AM          XR SMALL BOWEL SINGLE CONTRAST (CPT=74250)    Result  Date: 3/6/2024  CONCLUSION:  1. Intermittent moderate dilatation of proximal and mid small bowel without complete bowel obstruction.  Contrast reaches the ascending colon at approximately 60 minutes and reaches the rectosigmoid by 1 hour and 25 minutes.  Findings may suggest partial  obstruction versus mild ileus.    Dictated by (CST): Sandeep Go MD on 3/06/2024 at 2:40 PM     Finalized by (CST): Sandeep Go MD on 3/06/2024 at 2:44 PM          XR CHEST AP PORTABLE  (CPT=71045)    Result Date: 3/5/2024  CONCLUSION:  1. The NG tube has been repositioned, and now terminates satisfactorily in the distal stomach.  Remainder of the chest is unchanged.    Dictated by (CST): Sandeep Go MD on 3/05/2024 at 2:18 PM     Finalized by (CST): Sandeep Go MD on 3/05/2024 at 2:18 PM          XR CHEST AP PORTABLE  (CPT=71045)    Result Date: 3/5/2024  PROCEDURE: XR CHEST AP PORTABLE  (CPT=71045) TIME: 1337  COMPARISON: [facility], XR CHEST AP PORTABLE (CPT=71045), 2/06/2024, 2:53 PM.  INDICATIONS: Verify correct tube placement  TECHNIQUE:   Single view.   Findings and impression:  The enteric tube is looped in the neck then descends down the esophagus and terminates in the lower  esophagus    Normal heart size  Clear lungs  Biapical calcified pleural scarring       Dictated by (CST): Macho Garcia MD on 3/05/2024 at 1:51 PM     Finalized by (CST): Macho Garcia MD on 3/05/2024 at 1:54 PM          CT ABDOMEN+PELVIS(CONTRAST ONLY)(CPT=74177)    Result Date: 3/5/2024  CONCLUSION:  1. Diffusely dilated loops of small bowel, perhaps reflecting ongoing ileus or possible bowel obstruction.  2. Postoperative changes of partial proximal colectomy with anastomotic staple line in the right lower quadrant.  3. Moderate volume intraperitoneal ascites.  4. Distal colonic diverticulosis without definite CT evidence of primary acute complication.  5. Left-sided calyceal-conforming calculus, possibly reflecting staghorn  formation.  6. Mesenteric edema.  7. Lesser incidental findings as above.    Dictated by (CST): Jeremy Covarrubias MD on 3/05/2024 at 12:40 PM     Finalized by (CST): Jeremy Covarrubias MD on 3/05/2024 at 12:51 PM             Meds:      pantoprazole  40 mg Oral QAM AC    heparin  5,000 Units Subcutaneous Q12H    metoprolol succinate  12.5 mg Oral Nightly    OXcarbazepine  300 mg Oral BID      sodium chloride 20 mL/hr at 03/07/24 1900     clonazePAM, simethicone, acetaminophen, melatonin, ondansetron

## 2024-03-08 NOTE — DISCHARGE INSTRUCTIONS
Follow up with PCP as scheduled    Follow up with surgery in 2 weeks.    Your CT scan incidentally noted a stone in your left kidney, non-obstructing. Please follow up with your PCP regarding this.

## 2024-03-08 NOTE — PLAN OF CARE
Patient is alert and oriented.  Patient up walking halls and sitting in chair.  Patient brushing teeth and getting ready for bed.  Patient has no complaint of pain.  Patient did take clonazepam 0.25 to help her sleep.  Patient looking forward to going home soon.  No nausea, pain, or GI upset at this time.  Patient has personal belongings and call light within reach.  Safety measures in place.  Problem: Patient Centered Care  Goal: Patient preferences are identified and integrated in the patient's plan of care  Description: Interventions:  - What would you like us to know as we care for you? I live independently   - Provide timely, complete, and accurate information to patient/family  - Incorporate patient and family knowledge, values, beliefs, and cultural backgrounds into the planning and delivery of care  - Encourage patient/family to participate in care and decision-making at the level they choose  - Honor patient and family perspectives and choices  Outcome: Progressing     Problem: GASTROINTESTINAL - ADULT  Goal: Minimal or absence of nausea and vomiting  Description: INTERVENTIONS:  - Maintain adequate hydration with IV or PO as ordered and tolerated  - Nasogastric tube to low intermittent suction as ordered  - Evaluate effectiveness of ordered antiemetic medications  - Provide nonpharmacologic comfort measures as appropriate  - Advance diet as tolerated, if ordered  - Obtain nutritional consult as needed  - Evaluate fluid balance  Outcome: Progressing  Goal: Maintains or returns to baseline bowel function  Description: INTERVENTIONS:  - Assess bowel function  - Maintain adequate hydration with IV or PO as ordered and tolerated  - Evaluate effectiveness of GI medications  - Encourage mobilization and activity  - Obtain nutritional consult as needed  - Establish a toileting routine/schedule  - Consider collaborating with pharmacy to review patient's medication profile  Outcome: Progressing     Problem:  METABOLIC/FLUID AND ELECTROLYTES - ADULT  Goal: Electrolytes maintained within normal limits  Description: INTERVENTIONS:  - Monitor labs and rhythm and assess patient for signs and symptoms of electrolyte imbalances  - Administer electrolyte replacement as ordered  - Monitor response to electrolyte replacements, including rhythm and repeat lab results as appropriate  - Fluid restriction as ordered  - Instruct patient on fluid and nutrition restrictions as appropriate  Outcome: Progressing     Problem: PAIN - ADULT  Goal: Verbalizes/displays adequate comfort level or patient's stated pain goal  Description: INTERVENTIONS:  - Encourage pt to monitor pain and request assistance  - Assess pain using appropriate pain scale  - Administer analgesics based on type and severity of pain and evaluate response  - Implement non-pharmacological measures as appropriate and evaluate response  - Consider cultural and social influences on pain and pain management  - Manage/alleviate anxiety  - Utilize distraction and/or relaxation techniques  - Monitor for opioid side effects  - Notify MD/LIP if interventions unsuccessful or patient reports new pain  - Anticipate increased pain with activity and pre-medicate as appropriate  Outcome: Progressing

## 2024-03-08 NOTE — DISCHARGE SUMMARY
General Medicine Discharge Summary     Patient ID:  Mayte Lucas  79 year old  2/4/1945    Admit date: 3/5/2024    Discharge date and time: 3/8/2024    Attending Physician: Freddy Chavez MD     Consults: IP CONSULT TO GENERAL SURGERY  NURSING CONSULT TO DIETITIAN    Primary Care Physician: Abi Ohara MD     Reason for admission: abd pain    Risk For Readmission: low    Discharge Diagnoses: Abdominal pain, acute [R10.9]  See Additional Discharge Diagnoses in Hospital Course    Discharged Condition: good    Follow-up with labs/images appointments:       Other Discharge Instructions:         Follow up with PCP as scheduled    Follow up with surgery in 2 weeks.    Your CT scan incidentally noted a stone in your left kidney, non-obstructing. Please follow up with your PCP regarding this.            Exam  Gen: No acute distress  Pulm: Lungs clear, normal respiratory effort  CV: Heart with regular rate and rhythm  Abd: Abdomen soft,   EXT: no edema     HPI:     History of Present Illness:   Ms. Lucas is a 79 year old female with PMH sig for SVT, RLS, COPD, chronic pain, HLD, IgA deficiency, was hospitalized from 2/3 - 2/17 for acute cecal volvulus, s/p right hemicolectomy on 2/3 per general surgery, who presents with 2 days of abdominal pain.  Patient states she is developed intermittent lower quadrant abdominal pain starting 2 days ago on 3/3, with some associated nausea, but no vomiting, notes some chills, was seen by her PCP and given a suppository with some initial improvement, but worsening today, last bowel movement was 3/4, now not passing flatus, denies vomiting.  She denies other complaints, no chest pain or shortness of breath, no fevers, no other complaints at this time.      Hospital Course:   Ms. Lucas is a 79 year old female with PMH sig for SVT, RLS, COPD, chronic pain, HLD, IgA deficiency, was hospitalized from 2/3 - 2/17 for acute cecal volvulus, s/p right hemicolectomy on 2/3 per general surgery,  who presents with 2 days of abdominal pain, found to have partial SBO versus ileus, NG tube placed, with clinical improvement, small bowel follow-through with no evidence of obstruction, NG tube removed, diet advanced slowly.  Patient was able to tolerate this, tolerating full diet, having bowel movements passing gas, repeat obstructive series negative for any bowel dilation, general surgery cleared for discharge home, will follow-up with PCP as scheduled, and with general surgery in 2 to 3 weeks.     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  Follow-up in 2 to 3 weeks     Anemia  - poss residual from surgery last admit  - no signs of blood loss  - hgb 9.1 this am, fu outpatient.       Left-sided calculus  -Outpatient follow-up with urology, discuss with PCP     pSVT  -Resume metoprolol when able     COPD  - resume inhalers     RLS  - trileptal      Chronic pain  - outpatient f/u     IgA deficiency  - outpatient f/u     QUE  - resume PRN benzo    Operative Procedures:      Imaging: XR ABDOMEN OBSTRUCTIVE SERIES ROUTINE(2 VW)(CPT=74019)    Result Date: 3/8/2024  CONCLUSION:  1. Resolution of the generalized ileus noted on prior studies.  Enteric contrast has passed into the colon. 2. Stable nonobstructing left renal stone.    Dictated by (CST): Vinicius Medrano MD on 3/08/2024 at 9:25 AM     Finalized by (CST): Vinicius Medrano MD on 3/08/2024 at 9:35 AM          XR SMALL BOWEL SINGLE CONTRAST (CPT=74250)    Result Date: 3/6/2024  CONCLUSION:  1. Intermittent moderate  dilatation of proximal and mid small bowel without complete bowel obstruction.  Contrast reaches the ascending colon at approximately 60 minutes and reaches the rectosigmoid by 1 hour and 25 minutes.  Findings may suggest partial  obstruction versus mild ileus.    Dictated by (CST): Sandeep Go MD on 3/06/2024 at 2:40 PM     Finalized by (CST): Sandeep Go MD on 3/06/2024 at 2:44 PM             Disposition: home    Activity: activity as tolerated    Wound Care: keep wound clean and dry  Code Status: Full Code    Home Medication Changes    Med list     Medication List        CONTINUE taking these medications      Aspirin Low Dose 81 MG Tbec  Generic drug: aspirin     bisacodyl 10 MG Supp  Commonly known as: Dulcolax  Place 1 suppository (10 mg total) rectally daily as needed.     clonazePAM 0.5 MG Tabs  Commonly known as: KlonoPIN  Take 1 tablet (0.5 mg total) by mouth nightly.     denosumab 60 MG/ML Sosy  Commonly known as: Prolia     ergocalciferol 1.25 MG (36499 UT) Caps  Commonly known as: Vitamin D2     lansoprazole 30 MG Tbdd  Commonly known as: Prevacid Solutab  Take 1 tablet (30 mg total) by mouth every morning before breakfast.     metoprolol succinate 12.5 mg Tabs  Commonly known as: Toprol XL     OXcarbazepine 150 MG Tabs  Commonly known as: Trileptal  Take 2 tablets (300 mg total) by mouth 2 (two) times daily.     simethicone 80 MG Chew  Commonly known as: Mylicon  Chew 1 tablet (80 mg total) by mouth 3 (three) times daily.            STOP taking these medications      azelastine 0.1 % Soln  Commonly known as: Astelin              FU   Follow-up Information       Yvan Griggs MD. Schedule an appointment as soon as possible for a visit in 2 week(s).    Specialty: SURGERY, GENERAL  Contact information:  [address]  [facility] [id number] [phone]               Abi Ohara MD. Go on 3/12/2024.    Specialty: Internal Medicine  Why: 3/12 at 11:30 am  Contact information:  [address]  [place] [address]  [phone]                             DC instructions:      Other Discharge Instructions:         Follow up with PCP as scheduled    Follow up with surgery in 2 weeks.    Your CT scan incidentally noted a stone in your left kidney, non-obstructing. Please follow up with your PCP regarding this.          I reconciled current and discharge medications on day of discharge, discussed changes with patient and noted changes above.       Total Time Coordinating Care: Greater than 30 minutes    Patient had opportunity to ask questions and state understand and agree with therapeutic plan as outlined    Thank You,    Freddy Chavez MD   Hospitalist with [facility]

## 2024-03-08 NOTE — PROGRESS NOTES
[facility]  part of [facility]    General Surgery Progress Note  Mayte Lucas  : 1945  CSN: [id number]  HD# 3    Subjective:   POD#34 right hemicolectomy for volvulus  Feels well, denies N/V no bloating  Passing flatus and BM(s)     Exam:     General: awake and alert, in no acute distress, and comfortable  Pulmonary:lungs clear to auscultation bilaterally  Cardiac: RRR and nl S1,S2, no S3, no S4  Abdomen: normal BS, nondistended, and nontender   Extremities: calves nontender, no edema, motor intact, and sensory intact  Wounds: clean, dry and intact     Assessment and Plan:   Abdominal pain, acute  Crampy lower abdominal pain, s/p right hemicolectomy  No evidence of obstruction on SBFT    Doing well  Diet: Low residue soft diet  IVF: TKO  Antibiotics: no need for antibiotics    Activity: OOB to chair, more ambulation encouraged, and Ambulation in halls at least TID  DVT prophylaxis: SCDs and chemoprophylaxis as ordered    Discharge disposition: OK to discharge later today from surgical standpoint  OK to d/c home from surgery standpoint  Instructions given  F/u with Dr. Griggs in 2-3 weeks.         Objective:     Vitals:    24 1042 24 2119 24 0606 24 1256   BP: 106/55 120/55 117/65 121/63   Pulse: 83 76 71 74   Resp: 18 18 18 18   Temp: 98.2 °F (36.8 °C) 98.6 °F (37 °C) 98.2 °F (36.8 °C) 98.7 °F (37.1 °C)   TempSrc: Oral Oral Oral Oral   SpO2: 100% 98% 94% 99%   Weight:       Height:         Body mass index is 17.32 kg/m².    Intake/Output Summary (Last 24 hours) at 3/8/2024 1311  Last data filed at 3/8/2024 0900  Gross per 24 hour   Intake 1594 ml   Output --   Net 1594 ml       Results:     Recent Labs   Lab 24  1117 24  0718 24  0635   RBC 3.57* 3.08* 3.08*   HGB 10.3* 8.6* 9.1*   HCT 30.9* 27.0* 27.2*   MCV 86.6 87.7 88.3   MCH 28.9 27.9 29.5   MCHC 33.3 31.9 33.5   RDW 15.3* 15.4* 15.4*   NEPRELIM 3.85 2.38 3.13   WBC 5.4 4.4 5.3   .0  213.0 215.0       Recent Labs   Lab 03/06/24  0718 03/07/24  0550 03/08/24  0635   GLU 59* 84 88   BUN 9 10 8*   CREATSERUM 0.62 0.63 0.65   CA 8.5* 8.5* 8.7    140 143   K 3.7 3.7 3.9    110 110   CO2 24.0 27.0 28.0       Lab Results   Component Value Date    WBC 5.3 03/08/2024    HGB 9.1 03/08/2024    HCT 27.2 03/08/2024    .0 03/08/2024     03/08/2024    K 3.9 03/08/2024     03/08/2024    CO2 28.0 03/08/2024    BUN 8 03/08/2024    CREATSERUM 0.65 03/08/2024    GLU 88 03/08/2024    MG 1.8 03/08/2024    PHOS 3.6 03/08/2024    CA 8.7 03/08/2024    TSH 4.119 03/08/2024       XR ABDOMEN OBSTRUCTIVE SERIES ROUTINE(2 VW)(CPT=74019)    Result Date: 3/8/2024  CONCLUSION:  1. Resolution of the generalized ileus noted on prior studies.  Enteric contrast has passed into the colon. 2. Stable nonobstructing left renal stone.    Dictated by (CST): Vinicius Medrano MD on 3/08/2024 at 9:25 AM     Finalized by (CST): Vinicius Medrano MD on 3/08/2024 at 9:35 AM               Yvan Griggs MD [facility]  03/08/24  1:12 PM

## 2024-03-11 NOTE — PAYOR COMM NOTE
--------------  DISCHARGE REVIEW    Payor: [employer]  Subscriber #:  [id number]  Authorization Number: [id number]    Admit date: 3/5/24  Admit time:   5:04 PM  Discharge Date: 3/8/2024  4:04 PM     Admitting Physician: Freddy Chavez MD  Attending Physician:  No att. providers found  Primary Care Physician: Abi Ohara MD          Discharge Summary Notes        Discharge Summary signed by Freddy Chavez MD at 3/8/2024  2:15 PM       Author: Freddy Chavez MD Specialty: HOSPITALIST, Internal Medicine Author Type: Physician    Filed: 3/8/2024  2:15 PM Date of Service: 3/8/2024  2:10 PM Status: Signed    : Freddy Chavez MD (Physician)           General Medicine Discharge Summary     Patient ID:  Mayte Lucas  79 year old  2/4/1945    Admit date: 3/5/2024    Discharge date and time: 3/8/2024    Attending Physician: Freddy Chavez MD     Consults: IP CONSULT TO GENERAL SURGERY  NURSING CONSULT TO DIETITIAN    Primary Care Physician: Abi Ohara MD     Reason for admission: abd pain    Risk For Readmission: low    Discharge Diagnoses: Abdominal pain, acute [R10.9]  See Additional Discharge Diagnoses in Hospital Course    Discharged Condition: good    Follow-up with labs/images appointments:       Other Discharge Instructions:         Follow up with PCP as scheduled    Follow up with surgery in 2 weeks.    Your CT scan incidentally noted a stone in your left kidney, non-obstructing. Please follow up with your PCP regarding this.            Exam  Gen: No acute distress  Pulm: Lungs clear, normal respiratory effort  CV: Heart with regular rate and rhythm  Abd: Abdomen soft,   EXT: no edema     HPI:     History of Present Illness:   Ms. Lucas is a 79 year old female with PMH sig for SVT, RLS, COPD, chronic pain, HLD, IgA deficiency, was hospitalized from 2/3 - 2/17 for acute cecal volvulus, s/p right hemicolectomy on 2/3 per general surgery, who presents with 2 days of abdominal pain.  Patient states she is developed  intermittent lower quadrant abdominal pain starting 2 days ago on 3/3, with some associated nausea, but no vomiting, notes some chills, was seen by her PCP and given a suppository with some initial improvement, but worsening today, last bowel movement was 3/4, now not passing flatus, denies vomiting.  She denies other complaints, no chest pain or shortness of breath, no fevers, no other complaints at this time.      Hospital Course:   Ms. Lucas is a 79 year old female with PMH sig for SVT, RLS, COPD, chronic pain, HLD, IgA deficiency, was hospitalized from 2/3 - 2/17 for acute cecal volvulus, s/p right hemicolectomy on 2/3 per general surgery, who presents with 2 days of abdominal pain, found to have partial SBO versus ileus, NG tube placed, with clinical improvement, small bowel follow-through with no evidence of obstruction, NG tube removed, diet advanced slowly.  Patient was able to tolerate this, tolerating full diet, having bowel movements passing gas, repeat obstructive series negative for any bowel dilation, general surgery cleared for discharge home, will follow-up with PCP as scheduled, and with general surgery in 2 to 3 weeks.     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  Follow-up in 2 to 3 weeks     Anemia  - poss residual from surgery last admit  - no signs of blood loss  - hgb 9.1 this am, fu outpatient.       Left-sided calculus  -Outpatient follow-up with urology, discuss with PCP     pSVT  -Resume metoprolol when able     COPD  - resume inhalers      RLS  - trileptal      Chronic pain  - outpatient f/u     IgA deficiency  - outpatient f/u     QUE  - resume PRN benzo    Operative Procedures:      Imaging: XR ABDOMEN OBSTRUCTIVE SERIES ROUTINE(2 VW)(CPT=74019)    Result Date: 3/8/2024  CONCLUSION:  1. Resolution of the generalized ileus noted on prior studies.  Enteric contrast has passed into the colon. 2. Stable nonobstructing left renal stone.    Dictated by (CST): Vinicius Medrano MD on 3/08/2024 at 9:25 AM     Finalized by (CST): Vinicius Medrano MD on 3/08/2024 at 9:35 AM          XR SMALL BOWEL SINGLE CONTRAST (CPT=74250)    Result Date: 3/6/2024  CONCLUSION:  1. Intermittent moderate dilatation of proximal and mid small bowel without complete bowel obstruction.  Contrast reaches the ascending colon at approximately 60 minutes and reaches the rectosigmoid by 1 hour and 25 minutes.  Findings may suggest partial  obstruction versus mild ileus.    Dictated by (CST): Sandeep Go MD on 3/06/2024 at 2:40 PM     Finalized by (CST): Sandeep Go MD on 3/06/2024 at 2:44 PM             Disposition: home    Activity: activity as tolerated    Wound Care: keep wound clean and dry  Code Status: Full Code    Home Medication Changes    Med list     Medication List        CONTINUE taking these medications      Aspirin Low Dose 81 MG Tbec  Generic drug: aspirin     bisacodyl 10 MG Supp  Commonly known as: Dulcolax  Place 1 suppository (10 mg total) rectally daily as needed.     clonazePAM 0.5 MG Tabs  Commonly known as: KlonoPIN  Take 1 tablet (0.5 mg total) by mouth nightly.     denosumab 60 MG/ML Sosy  Commonly known as: Prolia     ergocalciferol 1.25 MG (03693 UT) Caps  Commonly known as: Vitamin D2     lansoprazole 30 MG Tbdd  Commonly known as: Prevacid Solutab  Take 1 tablet (30 mg total) by mouth every morning before breakfast.     metoprolol succinate 12.5 mg Tabs  Commonly known as: Toprol XL     OXcarbazepine 150 MG Tabs  Commonly known as:  Trileptal  Take 2 tablets (300 mg total) by mouth 2 (two) times daily.     simethicone 80 MG Chew  Commonly known as: Mylicon  Chew 1 tablet (80 mg total) by mouth 3 (three) times daily.            STOP taking these medications      azelastine 0.1 % Soln  Commonly known as: Astelin              FU   Follow-up Information       Yvan Griggs MD. Schedule an appointment as soon as possible for a visit in 2 week(s).    Specialty: SURGERY, GENERAL  Contact information:  [address] [facility] [address]  [facility] [id number] [phone]               Abi Ohara MD. Go on 3/12/2024.    Specialty: Internal Medicine  Why: 3/12 at 11:30 am  Contact information:  [address] [facility] [address]  [place] [id number] [phone]                             DC instructions:      Other Discharge Instructions:         Follow up with PCP as scheduled    Follow up with surgery in 2 weeks.    Your CT scan incidentally noted a stone in your left kidney, non-obstructing. Please follow up with your PCP regarding this.          I reconciled current and discharge medications on day of discharge, discussed changes with patient and noted changes above.       Total Time Coordinating Care: Greater than 30 minutes    Patient had opportunity to ask questions and state understand and agree with therapeutic plan as outlined    Thank You,    Freddy Chavez MD   Hospitalist with [facility]         Electronically signed by Freddy Chavez MD on 3/8/2024  2:15 PM       3/7  General Surgery Progress Note       Subjective:   POD#33 right hemicolectomy for volvulus  Readmitted due to crampy pain, feels better today but still having minimal lower abdominal cramping and bowel sounds, denies N/V but bloated moderately  Passing flatus and BM(s)      Exam:      General: awake and alert, in no acute distress, and comfortable  Pulmonary:lungs clear to auscultation bilaterally  Cardiac: RRR and nl S1,S2, no S3, no S4  Abdomen: normal BS, nondistended, and  nontender   Extremities: calves nontender, no edema, motor intact, and sensory intact  Wounds: clean, dry and intact      Assessment and Plan:   Abdominal pain, acute  Crampy lower abdominal pain, s/p right hemicolectomy  No evidence of obstruction on SBFT     Doing well  Diet: Low residue soft diet  IVF: TKO  Antibiotics: no need for antibiotics     Activity: OOB to chair, more ambulation encouraged, and Ambulation in halls at least TID  DVT prophylaxis: SCDs and chemoprophylaxis as ordered     Discharge disposition: possible discharge in AM         Objective:             Vitals:     03/06/24 2032 03/06/24 2206 03/07/24 0500 03/07/24 1042   BP: 119/52 132/57 118/66 106/55   Pulse: 86 78 79 83   Resp: 18 18 18 18   Temp: 98.4 °F (36.9 °C) 98.9 °F (37.2 °C) 98.5 °F (36.9 °C) 98.2 °F (36.8 °C)   TempSrc: Oral Oral Oral Oral   SpO2: 99% 100% 95% 100%   Weight:           Height:              Body mass index is 17.32 kg/m².     Intake/Output Summary (Last 24 hours) at 3/7/2024 1304  Last data filed at 3/7/2024 0314      Gross per 24 hour   Intake 384.67 ml   Output --   Net 384.67 ml         Results:           Recent Labs   Lab 03/05/24 1117 03/06/24  0718   RBC 3.57* 3.08*   HGB 10.3* 8.6*   HCT 30.9* 27.0*   MCV 86.6 87.7   MCH 28.9 27.9   MCHC 33.3 31.9   RDW 15.3* 15.4*   NEPRELIM 3.85 2.38   WBC 5.4 4.4   .0 213.0               Recent Labs   Lab 03/05/24  1117 03/06/24  0718 03/07/24  0550   * 59* 84   BUN 8* 9 10   CREATSERUM 0.66 0.62 0.63   CA 9.2 8.5* 8.5*   * 141 140   K 4.1 3.7 3.7    109 110   CO2 23.0 24.0 27.0               Lab Results   Component Value Date      03/07/2024     K 3.7 03/07/2024      03/07/2024     CO2 27.0 03/07/2024     BUN 10 03/07/2024     CREATSERUM 0.63 03/07/2024     GLU 84 03/07/2024     MG 1.6 03/07/2024     PHOS 3.3 03/07/2024     CA 8.5 03/07/2024         XR SMALL BOWEL SINGLE CONTRAST (CPT=74250)     Result Date: 3/6/2024  CONCLUSION:          1. Intermittent moderate dilatation of proximal and mid small bowel without complete bowel obstruction.  Contrast reaches the ascending colon at approximately 60 minutes and reaches the rectosigmoid by 1 hour and 25 minutes.  Findings may suggest partial  obstruction versus mild ileus.    Dictated by (CST): Sandeep Go MD on 3/06/2024 at 2:40 PM     Finalized by (CST): Sandeep Go MD on 3/06/2024 at 2:44 PM           XR CHEST AP PORTABLE  (CPT=71045)     Result Date: 3/5/2024  CONCLUSION:         1. The NG tube has been repositioned, and now terminates satisfactorily in the distal stomach.  Remainder of the chest is unchanged.    Dictated by (CST): Sandeep Go MD on 3/05/2024 at 2:18 PM     Finalized by (CST): Sandeep Go MD on 3/05/2024 at 2:18 PM           XR CHEST AP PORTABLE  (CPT=71045)     Result Date: 3/5/2024  PROCEDURE:         XR CHEST AP PORTABLE  (CPT=71045) TIME:        1337  COMPARISON:      [facility], XR CHEST AP PORTABLE (CPT=71045), 2/06/2024, 2:53 PM.  INDICATIONS:    Verify correct tube placement  TECHNIQUE:             Single view.   Findings and impression:  The enteric tube is looped in the neck then descends down the esophagus and terminates in the lower  esophagus    Normal heart size  Clear lungs  Biapical calcified pleural scarring       Dictated by (CST): Macho Garcia MD on 3/05/2024 at 1:51 PM     Finalized by (CST): Macho Garcia MD on 3/05/2024 at 1:54 PM                   Yvna Griggs MD [facility]  03/07/24  1:05 PM

## 2024-03-19 ENCOUNTER — APPOINTMENT (OUTPATIENT)
Dept: GENERAL RADIOLOGY | Facility: HOSPITAL | Age: 79
End: 2024-03-19
Attending: EMERGENCY MEDICINE
Payer: MEDICARE

## 2024-03-19 ENCOUNTER — HOSPITAL ENCOUNTER (INPATIENT)
Facility: HOSPITAL | Age: 79
LOS: 10 days | Discharge: HOME | End: 2024-03-29
Attending: EMERGENCY MEDICINE | Admitting: INTERNAL MEDICINE
Payer: MEDICARE

## 2024-03-19 ENCOUNTER — APPOINTMENT (OUTPATIENT)
Dept: CT IMAGING | Facility: HOSPITAL | Age: 79
End: 2024-03-19
Attending: EMERGENCY MEDICINE
Payer: MEDICARE

## 2024-03-19 DIAGNOSIS — K56.609 SBO (SMALL BOWEL OBSTRUCTION) (HCC): Primary | ICD-10-CM

## 2024-03-19 PROBLEM — D64.9 ANEMIA: Status: ACTIVE | Noted: 2024-03-19

## 2024-03-19 PROBLEM — R79.89 AZOTEMIA: Status: ACTIVE | Noted: 2024-03-19

## 2024-03-19 LAB
ALBUMIN SERPL-MCNC: 4.9 G/DL (ref 3.2–4.8)
ALP LIVER SERPL-CCNC: 121 U/L
ALT SERPL-CCNC: <7 U/L
ANION GAP SERPL CALC-SCNC: 10 MMOL/L (ref 0–18)
AST SERPL-CCNC: 16 U/L (ref ?–34)
BASOPHILS # BLD AUTO: 0.04 X10(3) UL (ref 0–0.2)
BASOPHILS NFR BLD AUTO: 0.4 %
BILIRUB DIRECT SERPL-MCNC: 0.1 MG/DL (ref ?–0.3)
BILIRUB SERPL-MCNC: 0.5 MG/DL (ref 0.2–1.1)
BILIRUB UR QL: NEGATIVE
BUN BLD-MCNC: 17 MG/DL (ref 9–23)
BUN/CREAT SERPL: 21.8 (ref 10–20)
CALCIUM BLD-MCNC: 10 MG/DL (ref 8.7–10.4)
CHLORIDE SERPL-SCNC: 101 MMOL/L (ref 98–112)
CLARITY UR: CLEAR
CO2 SERPL-SCNC: 26 MMOL/L (ref 21–32)
CREAT BLD-MCNC: 0.78 MG/DL
DEPRECATED RDW RBC AUTO: 47.2 FL (ref 35.1–46.3)
EGFRCR SERPLBLD CKD-EPI 2021: 77 ML/MIN/1.73M2 (ref 60–?)
EOSINOPHIL # BLD AUTO: 0 X10(3) UL (ref 0–0.7)
EOSINOPHIL NFR BLD AUTO: 0 %
ERYTHROCYTE [DISTWIDTH] IN BLOOD BY AUTOMATED COUNT: 14.7 % (ref 11–15)
GLUCOSE BLD-MCNC: 115 MG/DL (ref 70–99)
GLUCOSE UR-MCNC: NORMAL MG/DL
HCT VFR BLD AUTO: 34.5 %
HGB BLD-MCNC: 11.2 G/DL
IMM GRANULOCYTES # BLD AUTO: 0.03 X10(3) UL (ref 0–1)
IMM GRANULOCYTES NFR BLD: 0.3 %
KETONES UR-MCNC: 40 MG/DL
LACTATE SERPL-SCNC: 0.9 MMOL/L (ref 0.5–2)
LEUKOCYTE ESTERASE UR QL STRIP.AUTO: NEGATIVE
LIPASE SERPL-CCNC: 35 U/L (ref 13–75)
LYMPHOCYTES # BLD AUTO: 0.6 X10(3) UL (ref 1–4)
LYMPHOCYTES NFR BLD AUTO: 6.6 %
MCH RBC QN AUTO: 28.1 PG (ref 26–34)
MCHC RBC AUTO-ENTMCNC: 32.5 G/DL (ref 31–37)
MCV RBC AUTO: 86.7 FL
MONOCYTES # BLD AUTO: 0.32 X10(3) UL (ref 0.1–1)
MONOCYTES NFR BLD AUTO: 3.5 %
NEUTROPHILS # BLD AUTO: 8.17 X10 (3) UL (ref 1.5–7.7)
NEUTROPHILS # BLD AUTO: 8.17 X10(3) UL (ref 1.5–7.7)
NEUTROPHILS NFR BLD AUTO: 89.2 %
NITRITE UR QL STRIP.AUTO: NEGATIVE
OSMOLALITY SERPL CALC.SUM OF ELEC: 286 MOSM/KG (ref 275–295)
PH UR: 6 [PH] (ref 5–8)
PLATELET # BLD AUTO: 427 10(3)UL (ref 150–450)
POTASSIUM SERPL-SCNC: 4.9 MMOL/L (ref 3.5–5.1)
PROT SERPL-MCNC: 7.6 G/DL (ref 5.7–8.2)
PROT UR-MCNC: 20 MG/DL
RBC # BLD AUTO: 3.98 X10(6)UL
SODIUM SERPL-SCNC: 137 MMOL/L (ref 136–145)
SP GR UR STRIP: 1.03 (ref 1–1.03)
UROBILINOGEN UR STRIP-ACNC: NORMAL
WBC # BLD AUTO: 9.2 X10(3) UL (ref 4–11)

## 2024-03-19 PROCEDURE — 96375 TX/PRO/DX INJ NEW DRUG ADDON: CPT

## 2024-03-19 PROCEDURE — 96376 TX/PRO/DX INJ SAME DRUG ADON: CPT

## 2024-03-19 PROCEDURE — 99285 EMERGENCY DEPT VISIT HI MDM: CPT

## 2024-03-19 PROCEDURE — 96361 HYDRATE IV INFUSION ADD-ON: CPT

## 2024-03-19 PROCEDURE — 96374 THER/PROPH/DIAG INJ IV PUSH: CPT

## 2024-03-19 PROCEDURE — 80076 HEPATIC FUNCTION PANEL: CPT | Performed by: EMERGENCY MEDICINE

## 2024-03-19 PROCEDURE — 71045 X-RAY EXAM CHEST 1 VIEW: CPT | Performed by: EMERGENCY MEDICINE

## 2024-03-19 PROCEDURE — 85025 COMPLETE CBC W/AUTO DIFF WBC: CPT | Performed by: EMERGENCY MEDICINE

## 2024-03-19 PROCEDURE — 83690 ASSAY OF LIPASE: CPT | Performed by: EMERGENCY MEDICINE

## 2024-03-19 PROCEDURE — 80048 BASIC METABOLIC PNL TOTAL CA: CPT | Performed by: EMERGENCY MEDICINE

## 2024-03-19 PROCEDURE — 74177 CT ABD & PELVIS W/CONTRAST: CPT | Performed by: EMERGENCY MEDICINE

## 2024-03-19 PROCEDURE — 83605 ASSAY OF LACTIC ACID: CPT | Performed by: EMERGENCY MEDICINE

## 2024-03-19 PROCEDURE — 81001 URINALYSIS AUTO W/SCOPE: CPT | Performed by: EMERGENCY MEDICINE

## 2024-03-19 RX ORDER — LANSOPRAZOLE 30 MG/1
30 TABLET, ORALLY DISINTEGRATING, DELAYED RELEASE ORAL
Status: DISCONTINUED | OUTPATIENT
Start: 2024-03-20 | End: 2024-03-29

## 2024-03-19 RX ORDER — HYDROCODONE BITARTRATE AND ACETAMINOPHEN 5; 325 MG/1; MG/1
2 TABLET ORAL EVERY 4 HOURS PRN
Status: DISCONTINUED | OUTPATIENT
Start: 2024-03-19 | End: 2024-03-22

## 2024-03-19 RX ORDER — METOCLOPRAMIDE HYDROCHLORIDE 5 MG/ML
5 INJECTION INTRAMUSCULAR; INTRAVENOUS EVERY 8 HOURS PRN
Status: DISCONTINUED | OUTPATIENT
Start: 2024-03-19 | End: 2024-03-29

## 2024-03-19 RX ORDER — HEPARIN SODIUM 5000 [USP'U]/ML
5000 INJECTION, SOLUTION INTRAVENOUS; SUBCUTANEOUS EVERY 8 HOURS SCHEDULED
Status: DISCONTINUED | OUTPATIENT
Start: 2024-03-19 | End: 2024-03-22

## 2024-03-19 RX ORDER — CLONAZEPAM 0.25 MG/1
0.25 TABLET, ORALLY DISINTEGRATING ORAL NIGHTLY PRN
Status: DISCONTINUED | OUTPATIENT
Start: 2024-03-19 | End: 2024-03-23

## 2024-03-19 RX ORDER — SODIUM CHLORIDE 9 MG/ML
INJECTION, SOLUTION INTRAVENOUS CONTINUOUS
Status: DISCONTINUED | OUTPATIENT
Start: 2024-03-19 | End: 2024-03-20

## 2024-03-19 RX ORDER — OXCARBAZEPINE 150 MG/1
300 TABLET, FILM COATED ORAL 2 TIMES DAILY
Status: DISCONTINUED | OUTPATIENT
Start: 2024-03-19 | End: 2024-03-29

## 2024-03-19 RX ORDER — HYDROCODONE BITARTRATE AND ACETAMINOPHEN 5; 325 MG/1; MG/1
1 TABLET ORAL EVERY 4 HOURS PRN
Status: DISCONTINUED | OUTPATIENT
Start: 2024-03-19 | End: 2024-03-22

## 2024-03-19 RX ORDER — SODIUM CHLORIDE 9 MG/ML
125 INJECTION, SOLUTION INTRAVENOUS CONTINUOUS
Status: DISCONTINUED | OUTPATIENT
Start: 2024-03-19 | End: 2024-03-20

## 2024-03-19 RX ORDER — ACETAMINOPHEN 325 MG/1
650 TABLET ORAL EVERY 4 HOURS PRN
Status: DISCONTINUED | OUTPATIENT
Start: 2024-03-19 | End: 2024-03-22

## 2024-03-19 RX ORDER — BISACODYL 10 MG
10 SUPPOSITORY, RECTAL RECTAL
Status: DISCONTINUED | OUTPATIENT
Start: 2024-03-19 | End: 2024-03-22

## 2024-03-19 RX ORDER — ONDANSETRON 2 MG/ML
4 INJECTION INTRAMUSCULAR; INTRAVENOUS ONCE
Status: COMPLETED | OUTPATIENT
Start: 2024-03-19 | End: 2024-03-19

## 2024-03-19 RX ORDER — SENNOSIDES 8.6 MG
17.2 TABLET ORAL NIGHTLY PRN
Status: DISCONTINUED | OUTPATIENT
Start: 2024-03-19 | End: 2024-03-22

## 2024-03-19 RX ORDER — POLYETHYLENE GLYCOL 3350 17 G/17G
17 POWDER, FOR SOLUTION ORAL DAILY PRN
Status: DISCONTINUED | OUTPATIENT
Start: 2024-03-19 | End: 2024-03-22

## 2024-03-19 RX ORDER — ACETAMINOPHEN 500 MG
500 TABLET ORAL EVERY 4 HOURS PRN
Status: DISCONTINUED | OUTPATIENT
Start: 2024-03-19 | End: 2024-03-22

## 2024-03-19 RX ORDER — OXCARBAZEPINE 150 MG/1
300 TABLET, FILM COATED ORAL 2 TIMES DAILY
Status: DISCONTINUED | OUTPATIENT
Start: 2024-03-20 | End: 2024-03-19

## 2024-03-19 RX ORDER — ONDANSETRON 2 MG/ML
4 INJECTION INTRAMUSCULAR; INTRAVENOUS EVERY 4 HOURS PRN
Status: DISCONTINUED | OUTPATIENT
Start: 2024-03-19 | End: 2024-03-29

## 2024-03-19 RX ORDER — ENEMA 19; 7 G/133ML; G/133ML
1 ENEMA RECTAL ONCE AS NEEDED
Status: DISCONTINUED | OUTPATIENT
Start: 2024-03-19 | End: 2024-03-22

## 2024-03-19 RX ORDER — ONDANSETRON 2 MG/ML
4 INJECTION INTRAMUSCULAR; INTRAVENOUS EVERY 6 HOURS PRN
Status: DISCONTINUED | OUTPATIENT
Start: 2024-03-19 | End: 2024-03-19 | Stop reason: ALTCHOICE

## 2024-03-19 RX ORDER — METOCLOPRAMIDE HYDROCHLORIDE 5 MG/ML
5 INJECTION INTRAMUSCULAR; INTRAVENOUS EVERY 8 HOURS
Status: DISCONTINUED | OUTPATIENT
Start: 2024-03-19 | End: 2024-03-20

## 2024-03-19 RX ORDER — DEXTROSE MONOHYDRATE, SODIUM CHLORIDE, AND POTASSIUM CHLORIDE 50; 1.49; 4.5 G/1000ML; G/1000ML; G/1000ML
INJECTION, SOLUTION INTRAVENOUS CONTINUOUS
Status: DISCONTINUED | OUTPATIENT
Start: 2024-03-19 | End: 2024-03-21

## 2024-03-19 RX ORDER — ONDANSETRON 2 MG/ML
4 INJECTION INTRAMUSCULAR; INTRAVENOUS EVERY 4 HOURS PRN
Status: DISCONTINUED | OUTPATIENT
Start: 2024-03-19 | End: 2024-03-19

## 2024-03-19 NOTE — H&P
[facility] Hospitalist H&P     CC:   Chief Complaint   Patient presents with    Abdominal Pain      PCP: Abi Ohara MD    History of Present Illness:   Ms. Lucas is a 79 year old female with PMH sig for SVT, RLS, COPD, chronic pain, HLD, IgA deficiency, was hospitalized from 2/3 - 2/17 for acute cecal volvulus, s/p right hemicolectomy on 2/3 per general surgery, and another hospital stay for partial SBO 3/5-3/8 treated with NGT and conservative management.  Presents today with recurrent abdominal pain associated with nausea starting about midnight last night she states she has been having gas and passing stool however she been able to eat today as she feels very nauseous.  In the emergency room repeat CT scan again revealed small bowel obstruction with transition point the left lower abdomen dilated multiple loops of small bowel with raising the concern for rim enhancement suggesting bowel ischemia.  Lactic acid was ordered but is still pending General surgery was consulted recommended again routine NG tube with IV fluids and bowel rest.     Patient denies associated fevers or chills no chest pain, headache dizziness or other complaints.       PMH  Past Medical History:   Diagnosis Date    Acute exacerbation of chronic obstructive pulmonary disease (COPD) (HCC) 4/17/2017    ALLERGIC RHINITIS     OTHER DISEASES     TMJ        PSH  Past Surgical History:   Procedure Laterality Date    ABDOMINAL SURGERY  02/03/2024    Exploratory laparotomy, right hemicolectomy, omentoplasty of anastomosis    CATARACT      COLONOSCOPY  02/12/2013    Procedure: COLONOSCOPY, POSSIBLE BIOPSY, POSSIBLE POLYPECTOMY 27404;  Surgeon: Jakub Barr MD;  Location: [facility]    PATIENT DOCUMENTED NOT TO HAVE EXPERIENCED ANY OF THE FOLLOWING EVENTS  12/12/2012    Procedure: LEFT PHACOEMULSIFICATION OF CATARACT WITH INTRAOCULAR LENS IMPLANT [id number];  Surgeon: Siddhartha Vinson MD;  Location: [facility]     PATIENT DOCUMENTED NOT TO HAVE EXPERIENCED ANY OF THE FOLLOWING EVENTS  11/28/2012    Procedure: RIGHT PHACOEMULSIFICATION OF CATARACT WITH INTRAOCULAR LENS IMPLANT [id number];  Surgeon: Siddhartha Vinson MD;  Location: [facility]    PATIENT DOCUMENTED NOT TO HAVE EXPERIENCED ANY OF THE FOLLOWING EVENTS  02/12/2013    Procedure: COLONOSCOPY, POSSIBLE BIOPSY, POSSIBLE POLYPECTOMY 08043;  Surgeon: Jakub Barr MD;  Location: [facility]    PATIENT WITHOUGH PREOPERATIVE ORDER FOR IV ANTIBIOTIC SURGICAL SITE INFECTION PROPHYLAXIS.  12/12/2012    Procedure: LEFT PHACOEMULSIFICATION OF CATARACT WITH INTRAOCULAR LENS IMPLANT 76555;  Surgeon: Siddhartha Vinson MD;  Location: [facility]    PATIENT WITHOUGH PREOPERATIVE ORDER FOR IV ANTIBIOTIC SURGICAL SITE INFECTION PROPHYLAXIS.  11/28/2012    Procedure: RIGHT PHACOEMULSIFICATION OF CATARACT WITH INTRAOCULAR LENS IMPLANT 72784;  Surgeon: Siddhartha Vinson MD;  Location: [facility]    PATIENT WITHOUGH PREOPERATIVE ORDER FOR IV ANTIBIOTIC SURGICAL SITE INFECTION PROPHYLAXIS.  02/12/2013    Procedure: COLONOSCOPY, POSSIBLE BIOPSY, POSSIBLE POLYPECTOMY 86295;  Surgeon: Jakub Barr MD;  Location: [facility]    REMV CATARACT EXTRACAP,INSERT LENS  12/12/2012    Procedure: LEFT PHACOEMULSIFICATION OF CATARACT WITH INTRAOCULAR LENS IMPLANT 92813;  Surgeon: Siddhartha Vinson MD;  Location: [facility]    REMV CATARACT EXTRACAP,INSERT LENS  11/28/2012    Procedure: RIGHT PHACOEMULSIFICATION OF CATARACT WITH INTRAOCULAR LENS IMPLANT 61761;  Surgeon: Siddhartha Vinson MD;  Location: [facility]    TONSILLECTOMY          ALL:  Allergies   Allergen Reactions    Bactrim [Sulfamethoxazole W/Trimethoprim] FEVER     X 4 days    Bicillin L-A      unknown    Mucinex Coughing        Home Medications:  No outpatient medications have been marked as taking for the 3/19/24 encounter (Hospital Encounter).          Soc Hx  Social History     Tobacco Use    Smoking status: Never    Smokeless tobacco: Never   Substance Use Topics    Alcohol use: Yes     Alcohol/week: 1.0 standard drink of alcohol     Types: 1 Standard drinks or equivalent per week     Comment: occasionally        Fam Hx  Family History   Problem Relation Age of Onset    Other (Other) Father         PD    Other (Other) Sister         cramps in body    Cancer Sister        Review of Systems  Comprehensive ROS reviewed and negative except for what's stated above.     OBJECTIVE:  /62   Pulse 70   Temp 98.4 °F (36.9 °C) (Temporal)   Resp 20   Ht 5' 4\" (1.626 m)   Wt 98 lb (44.5 kg)   SpO2 100%   BMI 16.82 kg/m²   General:  Alert, no distress   Head:  Normocephalic, without obvious abnormality   Eyes:  Sclera anicteric   Nose: Nares normal. Septum midline.    Throat: Lips, mucosa, and tongue normal.    Neck: Supple, symmetrical, trachea midline   Lungs:   Clear to auscultation bilaterally. Normal effort   Chest wall:  No tenderness or deformity.   Heart:  Regular rate and rhythm, S1, S2 normal, no murmur   Abdomen:   Distended decreased bowel sounds tender to the palpation   Extremities: Extremities normal, atraumatic, no cyanosis or edema.   Skin: Skin color, texture, turgor normal. No rashes or lesions.    Neurologic: Normal strength, no focal deficit appreciated     Diagnostic Data:    CBC/Chem  Recent Labs   Lab 03/19/24  1249   WBC 9.2   HGB 11.2*   MCV 86.7   .0       Recent Labs   Lab 03/19/24  1249      K 4.9      CO2 26.0   BUN 17   CREATSERUM 0.78   *   CA 10.0       Recent Labs   Lab 03/19/24  1249   ALT <7*   AST 16   ALB 4.9*       No results for input(s): \"TROP\" in the last 168 hours.      Radiology: CT ABDOMEN+PELVIS(CONTRAST ONLY)(CPT=74177)    Result Date: 3/19/2024  CONCLUSION:   Small bowel obstruction with a transition point within the left lower abdomen.  The small bowel dilation has progressed since  3/5/2024.  Multiple small bowel loops within the left lower quadrant demonstrate areas of dusky and diminished enhancement suspicious for small bowel ischemia.  Correlation with lactate suggested.  No pneumatosis or pneumoperitoneum seen at this point.  Nonobstructing left lower pole caliceal calculus.  Multiple other incidental findings as described in the body of the report which are unchanged.  This report was communicated by telephone to Dr. Rutledge in the emergency room on 3/19/2024 at 1406 hours.        Dictated by (CST): Jay James MD on 3/19/2024 at 1:56 PM     Finalized by (CST): Jay James MD on 3/19/2024 at 2:07 PM             ASSESSMENT / PLAN:   Ms. Lucas is a 79 year old female with PMH sig for SVT, RLS, COPD, chronic pain, HLD, IgA deficiency, was hospitalized from 2/3 - 2/17 for acute cecal volvulus, s/p right hemicolectomy on 2/3 per general surgery, and another hospital stay for partial SBO 3/5-3/8 treated with NGT and conservative management.  Presents today with recurrent abdominal pain associated with nausea starting about midnight last night she states she has been having gas and passing stool however she been able to eat today as she feels very nauseous.  In the emergency room repeat CT scan again revealed small bowel obstruction with transition point the left lower abdomen.    Recurrent small bowel obstruction  -History of cecal volvulus and extensive mesenteric ischemia with hemicolectomy 2/3/24  -Marito with surgery recommend conservative management with NG tube bowel rest and IV fluids    2.  Expected postoperative anemia is improving hemoglobin 11.2 on admission    3.  Paroxysmal supraventricular tachycardia  -On home metoprolol    4.  GERD continue home PPI    5.  Restless leg syndrome continue home trileptal     6. QUE  PRN benzo at home       FN:  - IVF:Saline at 100   - Diet:NPO     FULL CODE confirmed on admission     DVT Prophy:Heparin TID   Lines:PIV     Dispo: pending clinical  course    Outpatient records or previous hospital records reviewed.     Further recommendations pending patient's clinical course.  [facility] hospitalist to continue to follow patient while in house    Patient and/or patient's family given opportunity to ask questions and note understanding and agreeing with therapeutic plan as outlined    Thank You,  Daniel Manuel DO    Hospitalist with [facility] Service number: [phone]

## 2024-03-19 NOTE — ED QUICK NOTES
Orders for admission, patient is aware of plan and ready to go upstairs. Any questions, please call ED RN JURATE at extension [phone].     Patient Covid vaccination status: Fully vaccinated     COVID Test Ordered in ED: None    COVID Suspicion at Admission: N/A    Running Infusions:    sodium chloride 125 mL/hr (03/19/24 1314)        Mental Status/LOC at time of transport: AXOX4    Other pertinent information:   CIWA score: N/A   NIH score:  N/A

## 2024-03-19 NOTE — CONSULTS
[facility]  part of [facility]                                                                             GENERAL SURGERY CONSULTATION    Mayte Lucas  :1945  CSN:[id number]  LOS:0    Date of Admission:  3/19/2024  Date of Consult:  3/19/2024     Reason for Consultation: sbo     History of Present Illness:  Mayte Lucas is a a(n) 79 year old female who is status post exploratory laparotomy with right hemicolectomy for cecal volvulus on 2/3/2024 who presents with recurrent lower abdominal crampy pain radiating to the right side. + nausea, no vomiting. No fever/chills. Passing flatus and having regular BMs w [employer] at home. Feels better and is in good spirits.      History:  Past Medical History:   Diagnosis Date    Acute exacerbation of chronic obstructive pulmonary disease (COPD) (HCC) 2017    ALLERGIC RHINITIS     OTHER DISEASES     TMJ      Past Surgical History:   Procedure Laterality Date    ABDOMINAL SURGERY  2024    Exploratory laparotomy, right hemicolectomy, omentoplasty of anastomosis    CATARACT      COLONOSCOPY  2013    Procedure: COLONOSCOPY, POSSIBLE BIOPSY, POSSIBLE POLYPECTOMY 81248;  Surgeon: Jakub Barr MD;  Location: [facility]    PATIENT DOCUMENTED NOT TO HAVE EXPERIENCED ANY OF THE FOLLOWING EVENTS  2012    Procedure: LEFT PHACOEMULSIFICATION OF CATARACT WITH INTRAOCULAR LENS IMPLANT [id number];  Surgeon: Siddhartha Vinson MD;  Location: [facility]    PATIENT DOCUMENTED NOT TO HAVE EXPERIENCED ANY OF THE FOLLOWING EVENTS  2012    Procedure: RIGHT PHACOEMULSIFICATION OF CATARACT WITH INTRAOCULAR LENS IMPLANT 96225;  Surgeon: Siddhartha Vinson MD;  Location: [facility]    PATIENT DOCUMENTED NOT TO HAVE EXPERIENCED ANY OF THE FOLLOWING EVENTS  2013    Procedure: COLONOSCOPY, POSSIBLE BIOPSY, POSSIBLE POLYPECTOMY 70201;  Surgeon: Jakub Barr MD;  Location: [facility]     PATIENT WITHOUGH PREOPERATIVE ORDER FOR IV ANTIBIOTIC SURGICAL SITE INFECTION PROPHYLAXIS.  12/12/2012    Procedure: LEFT PHACOEMULSIFICATION OF CATARACT WITH INTRAOCULAR LENS IMPLANT [id number];  Surgeon: Siddhartha Vinson MD;  Location: [facility]    PATIENT WITHOUGH PREOPERATIVE ORDER FOR IV ANTIBIOTIC SURGICAL SITE INFECTION PROPHYLAXIS.  11/28/2012    Procedure: RIGHT PHACOEMULSIFICATION OF CATARACT WITH INTRAOCULAR LENS IMPLANT 75132;  Surgeon: Siddhartha Vinson MD;  Location: [facility]    PATIENT WITHOUGH PREOPERATIVE ORDER FOR IV ANTIBIOTIC SURGICAL SITE INFECTION PROPHYLAXIS.  02/12/2013    Procedure: COLONOSCOPY, POSSIBLE BIOPSY, POSSIBLE POLYPECTOMY 24096;  Surgeon: Jakub Barr MD;  Location: [facility]    REMV CATARACT EXTRACAP,INSERT LENS  12/12/2012    Procedure: LEFT PHACOEMULSIFICATION OF CATARACT WITH INTRAOCULAR LENS IMPLANT 90111;  Surgeon: Siddhartha Vinson MD;  Location: [facility]    REMV CATARACT EXTRACAP,INSERT LENS  11/28/2012    Procedure: RIGHT PHACOEMULSIFICATION OF CATARACT WITH INTRAOCULAR LENS IMPLANT 63926;  Surgeon: Siddhartha Vinson MD;  Location: [facility]    TONSILLECTOMY        Family History   Problem Relation Age of Onset    Other (Other) Father         PD    Other (Other) Sister         cramps in body    Cancer Sister       reports that she has never smoked. She has never used smokeless tobacco. She reports current alcohol use of about 1.0 standard drink of alcohol per week. She reports that she does not use drugs.     Allergies:  Allergies   Allergen Reactions    Bactrim [Sulfamethoxazole W/Trimethoprim] FEVER     X 4 days    Bicillin L-A      unknown    Mucinex Coughing       Medications:     Current Facility-Administered Medications:     sodium chloride 0.9% infusion, 125 mL/hr, Intravenous, Continuous    Review of Systems:   Pertinent items are noted in HPI.  Constitutional: negative  Eyes: negative  Ears,  nose, mouth, throat, and face: negative  Respiratory: negative  Cardiovascular: negative  Gastrointestinal: positive for abdominal pain and nausea  Genitourinary:negative  Integument/breast: negative  Musculoskeletal:negative  Neurological: negative  Behavioral/Psych: negative    Physical Exam:   Vitals:    03/19/24 0950 03/19/24 1300 03/19/24 1345   BP: 136/69 142/56 143/62   Pulse: 69 66 70   Resp: 16 20 20   Temp: 98.4 °F (36.9 °C)     TempSrc: Temporal     SpO2: 99% 95% 100%   Weight: 98 lb (44.5 kg)     Height: 5' 4\" (1.626 m)        Body mass index is 16.82 kg/m².    General: no acute distress, comfortable, and in good spirits  Pulmonary:lungs clear to auscultation bilaterally  Cardiac: RRR, nl S1,S2, no S3, no S4,  no murmurs, and no ectopy  Abdomen: normal BS, soft, nontender, and mildly distended, no guarding  Rectal exam: deferred  Extremities: calves nontender, no edema, motor intact, sensory intact, and SCD's on    Laboratory Data:  Recent Labs   Lab 03/19/24  1249   RBC 3.98   HGB 11.2*   HCT 34.5*   MCV 86.7   MCH 28.1   MCHC 32.5   RDW 14.7   NEPRELIM 8.17*   WBC 9.2   .0       Recent Labs   Lab 03/19/24  1249   *   BUN 17   CREATSERUM 0.78   CA 10.0      K 4.9      CO2 26.0       Lab Results   Component Value Date    WBC 9.2 03/19/2024    HGB 11.2 03/19/2024    HCT 34.5 03/19/2024    .0 03/19/2024     03/19/2024    K 4.9 03/19/2024     03/19/2024    CO2 26.0 03/19/2024    BUN 17 03/19/2024    CREATSERUM 0.78 03/19/2024     03/19/2024    BILT 0.5 03/19/2024    AST 16 03/19/2024    ALT <7 03/19/2024    LIP 35 03/19/2024    CA 10.0 03/19/2024    ALB 4.9 03/19/2024    TP 7.6 03/19/2024       CT ABDOMEN+PELVIS(CONTRAST ONLY)(CPT=74177)    Result Date: 3/19/2024  CONCLUSION:   Small bowel obstruction with a transition point within the left lower abdomen.  The small bowel dilation has progressed since 3/5/2024.  Multiple small bowel loops within the left  lower quadrant demonstrate areas of dusky and diminished enhancement suspicious for small bowel ischemia.  Correlation with lactate suggested.  No pneumatosis or pneumoperitoneum seen at this point.  Nonobstructing left lower pole caliceal calculus.  Multiple other incidental findings as described in the body of the report which are unchanged.  This report was communicated by telephone to Dr. Rutledge in the emergency room on 3/19/2024 at 1406 hours.        Dictated by (CST): Jay James MD on 3/19/2024 at 1:56 PM     Finalized by (CST): Jay James MD on 3/19/2024 at 2:07 PM           Impression and Plan:   Patient Active Problem List   Diagnosis    RLS (restless legs syndrome)    Pseudophakia of both eyes    Hand arthritis    Immunoglobulin A deficiency (HCC)    Aortic atherosclerosis (HCC)    Age-related osteoporosis without current pathological fracture    Dyslipidemia, goal LDL below 160    Essential tremor    Iron deficiency anemia due to chronic blood loss    Neuropathy    Anemia, chronic disease    Mixed simple and mucopurulent chronic bronchitis (HCC)    Supraventricular tachycardia    Mood insomnia (HCC)    Quinton nodes (DJD hand)    Weight loss    Chronic fatigue    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HCC)    Abdominal pain, acute    Anemia    Azotemia    SBO (small bowel obstruction) (HCC)     Recurrent SBO    Admit, IVF, NGT to LIS  Gastrograffin LGI in AM  Will treat conservatively since will likely improve      Yvan Griggs MD   3/19/2024  2:28 PM                Time spent on counseling/coordination of care:  30 Minutes  Total time spent with patient:  30 Minutes  CT imaging reviewed

## 2024-03-19 NOTE — ED PROVIDER NOTES
Patient Seen in: [facility] Emergency Department      History     Chief Complaint   Patient presents with    Abdominal Pain     Stated Complaint: abdominal pain    Subjective:   Ms. Lucas is a 79 year old female with PMH sig for SVT, RLS, COPD, chronic pain, HLD, IgA deficiency, was hospitalized from 2/3 - 2/17 for acute cecal volvulus, s/p right hemicolectomy on 2/3 per general surgery, readmitted, who presents with 12 hours of abdominal pain.  It started at midnight.  It is severe and crampy in the low abdomen and radiates to the right flank like the last time she had to be admitted.  She had nausea but no vomiting.  She is having bowel movements.  She was at the primary office for a follow-up and they told her to come here because she was still in significant pain rated 8 out of 10.  No fever or chills.  No urinary symptoms.  No leg swelling.  No cough or congestion.  No focal weakness, numbness or paresthesias            Objective:   Past Medical History:   Diagnosis Date    Acute exacerbation of chronic obstructive pulmonary disease (COPD) ([facility]) 4/17/2017    ALLERGIC RHINITIS     OTHER DISEASES     TMJ              Past Surgical History:   Procedure Laterality Date    ABDOMINAL SURGERY  02/03/2024    Exploratory laparotomy, right hemicolectomy, omentoplasty of anastomosis    CATARACT      COLONOSCOPY  02/12/2013    Procedure: COLONOSCOPY, POSSIBLE BIOPSY, POSSIBLE POLYPECTOMY 66675;  Surgeon: Jakub Barr MD;  Location: [facility]    PATIENT DOCUMENTED NOT TO HAVE EXPERIENCED ANY OF THE FOLLOWING EVENTS  12/12/2012    Procedure: LEFT PHACOEMULSIFICATION OF CATARACT WITH INTRAOCULAR LENS IMPLANT 01489;  Surgeon: Siddhartha Vinson MD;  Location: [facility]    PATIENT DOCUMENTED NOT TO HAVE EXPERIENCED ANY OF THE FOLLOWING EVENTS  11/28/2012    Procedure: RIGHT PHACOEMULSIFICATION OF CATARACT WITH INTRAOCULAR LENS IMPLANT 98025;  Surgeon: Siddhartha Vinson MD;  Location: [facility]  [facility]    PATIENT DOCUMENTED NOT TO HAVE EXPERIENCED ANY OF THE FOLLOWING EVENTS  02/12/2013    Procedure: COLONOSCOPY, POSSIBLE BIOPSY, POSSIBLE POLYPECTOMY 97984;  Surgeon: Jakub Barr MD;  Location: [facility]    PATIENT WITHOUGH PREOPERATIVE ORDER FOR IV ANTIBIOTIC SURGICAL SITE INFECTION PROPHYLAXIS.  12/12/2012    Procedure: LEFT PHACOEMULSIFICATION OF CATARACT WITH INTRAOCULAR LENS IMPLANT 16666;  Surgeon: Siddhartha Vinson MD;  Location: [facility]    PATIENT WITHOUGH PREOPERATIVE ORDER FOR IV ANTIBIOTIC SURGICAL SITE INFECTION PROPHYLAXIS.  11/28/2012    Procedure: RIGHT PHACOEMULSIFICATION OF CATARACT WITH INTRAOCULAR LENS IMPLANT 08972;  Surgeon: Siddhartha Vinson MD;  Location: [facility]    PATIENT WITHOUGH PREOPERATIVE ORDER FOR IV ANTIBIOTIC SURGICAL SITE INFECTION PROPHYLAXIS.  02/12/2013    Procedure: COLONOSCOPY, POSSIBLE BIOPSY, POSSIBLE POLYPECTOMY 47317;  Surgeon: Jakub Barr MD;  Location: [facility]    REMV CATARACT EXTRACAP,INSERT LENS  12/12/2012    Procedure: LEFT PHACOEMULSIFICATION OF CATARACT WITH INTRAOCULAR LENS IMPLANT 12554;  Surgeon: Siddhartha Vinson MD;  Location: [facility]    REMV CATARACT EXTRACAP,INSERT LENS  11/28/2012    Procedure: RIGHT PHACOEMULSIFICATION OF CATARACT WITH INTRAOCULAR LENS IMPLANT 29979;  Surgeon: Siddhartha Vinson MD;  Location: [facility]    TONSILLECTOMY                  Social History     Socioeconomic History    Marital status:    Tobacco Use    Smoking status: Never    Smokeless tobacco: Never   Substance and Sexual Activity    Alcohol use: Yes     Alcohol/week: 1.0 standard drink of alcohol     Types: 1 Standard drinks or equivalent per week     Comment: occasionally    Drug use: No     Social Determinants of Health     Food Insecurity: No Food Insecurity (3/5/2024)    Food Insecurity     Food Insecurity: Never true   Transportation Needs: No  Transportation Needs (3/5/2024)    Transportation Needs     Lack of Transportation: No   Housing Stability: Low Risk  (3/5/2024)    Housing Stability     Housing Instability: No              Review of Systems    Positive for stated complaint: abdominal pain  Other systems are as noted in HPI.  Constitutional and vital signs reviewed.      All other systems reviewed and negative except as noted above.    Physical Exam     ED Triage Vitals [03/19/24 0950]   /69   Pulse 69   Resp 16   Temp 98.4 °F (36.9 °C)   Temp src Temporal   SpO2 99 %   O2 Device None (Room air)       Current:/68   Pulse 66   Temp 98.4 °F (36.9 °C) (Temporal)   Resp 20   Ht 162.6 cm (5' 4\")   Wt 44.5 kg   SpO2 94%   BMI 16.82 kg/m²         Physical Exam  HENT:      Head: Normocephalic.      Mouth/Throat:      Mouth: Mucous membranes are moist.      Pharynx: Oropharynx is clear.   Eyes:      Extraocular Movements: Extraocular movements intact.      Pupils: Pupils are equal, round, and reactive to light.   Cardiovascular:      Rate and Rhythm: Normal rate and regular rhythm.      Heart sounds: Normal heart sounds.   Pulmonary:      Effort: Pulmonary effort is normal.      Breath sounds: Normal breath sounds.   Abdominal:      Palpations: Abdomen is soft.      Tenderness: There is abdominal tenderness.      Comments: Tenderness in the right side of the abdomen but no rebound or guarding.   Musculoskeletal:         General: No swelling or tenderness. Normal range of motion.      Cervical back: Normal range of motion.   Lymphadenopathy:      Cervical: No cervical adenopathy.   Skin:     General: Skin is warm.      Capillary Refill: Capillary refill takes less than 2 seconds.   Neurological:      General: No focal deficit present.      Mental Status: She is alert.               ED Course     Labs Reviewed   URINALYSIS WITH CULTURE REFLEX - Abnormal; Notable for the following components:       Result Value    Ketones Urine 40 (*)      Blood Urine Trace (*)     Protein Urine 20 (*)     RBC Urine 3-5 (*)     All other components within normal limits   HEPATIC FUNCTION PANEL (7) - Abnormal; Notable for the following components:    ALT <7 (*)     Albumin 4.9 (*)     All other components within normal limits   BASIC METABOLIC PANEL (8) - Abnormal; Notable for the following components:    Glucose 115 (*)     BUN/CREA Ratio 21.8 (*)     All other components within normal limits   CBC W/ DIFFERENTIAL - Abnormal; Notable for the following components:    HGB 11.2 (*)     HCT 34.5 (*)     RDW-SD 47.2 (*)     Neutrophil Absolute Prelim 8.17 (*)     Neutrophil Absolute 8.17 (*)     Lymphocyte Absolute 0.60 (*)     All other components within normal limits   LIPASE - Normal   LACTIC ACID, PLASMA - Normal   CBC WITH DIFFERENTIAL WITH PLATELET    Narrative:     The following orders were created for panel order CBC With Differential With Platelet.  Procedure                               Abnormality         Status                     ---------                               -----------         ------                     CBC W/ DIFFERENTIAL[[id number]]          Abnormal            Final result                 Please view results for these tests on the individual orders.          ED Course as of 03/19/24 1720  ------------------------------------------------------------  Time: 03/19 1323  Comment: Labs independently interpreted by me.  CBC shows an anemia of 11.2 but normal white count.  UA does not reveal an infection but there is some hematuria and slight ketones and proteinuria.  Case discussed with Dr. Gomez her surgeon who understand she is here we will be performing a CT.  Fentanyl ordered.  Differential could include ileus, small bowel obstruction and other possibilities.  ------------------------------------------------------------  Time: 03/19 9519  Comment: Ct sbo, maybe early  ischemia  ------------------------------------------------------------  Time: 03/19 1720  Comment: UA independently interpreted by me.  No signs of infection.  No leukocytosis.  Lactate normal.  ------------------------------------------------------------  Time: 03/19 1720  Comment: IV fluids were given as well.              MDM      XR CHEST AP PORTABLE  (CPT=71045)    Result Date: 3/19/2024   The esophagogastric tube distal tip and side port overlie the gastric bubble in good radiographic position.  No new abnormality.      Dictated by (CST): Luther Ruffin MD on 3/19/2024 at 4:38 PM     Finalized by (CST): Luther Ruffin MD on 3/19/2024 at 4:40 PM          CT ABDOMEN+PELVIS(CONTRAST ONLY)(CPT=74177)    Result Date: 3/19/2024  CONCLUSION:   Small bowel obstruction with a transition point within the left lower abdomen.  The small bowel dilation has progressed since 3/5/2024.  Multiple small bowel loops within the left lower quadrant demonstrate areas of dusky and diminished enhancement suspicious for small bowel ischemia.  Correlation with lactate suggested.  No pneumatosis or pneumoperitoneum seen at this point.  Nonobstructing left lower pole caliceal calculus.  Multiple other incidental findings as described in the body of the report which are unchanged.  This report was communicated by telephone to Dr. Rutledge in the emergency room on 3/19/2024 at 1406 hours.        Dictated by (CST): Jay James MD on 3/19/2024 at 1:56 PM     Finalized by (CST): Jay James MD on 3/19/2024 at 2:07 PM           Admission disposition: 3/19/2024  4:36 PM                                        Medical Decision Making  Patient with recent hemicolectomy 6 weeks ago here with lower abdominal pain again.  She did have some bowel movements yesterday but no vomiting yet.  Differential is vast could include perforation, obstruction, pancreatitis, renal colic, cystitis, pyelonephritis and many other possibilities.  CT ordered.  Fluids  fentanyl will be given    Amount and/or Complexity of Data Reviewed  External Data Reviewed: notes.     Details: Discharge summary from 11 days ago reviewed.  She had ileus versus SBO.  Had an NG placed.  Labs: ordered. Decision-making details documented in ED Course.  Radiology: ordered and independent interpretation performed.     Details: Small bowel obstruction  Discussion of management or test interpretation with external provider(s): Patient had NG placed.  I checked the second x-ray which showed good positioning.  Patient was seen by hospitalist as well as surgeon in the ER.    Risk  Parenteral controlled substances.  Decision regarding hospitalization.        Disposition and Plan     Clinical Impression:  1. SBO (small bowel obstruction) ([facility])         Disposition:  Admit  3/19/2024  4:36 pm    Follow-up:  No follow-up provider specified.  We recommend that you schedule follow up care with a primary care provider within the next three months to obtain basic health screening including reassessment of your blood pressure.      Medications Prescribed:  Current Discharge Medication List                            Hospital Problems       Present on Admission  Date Reviewed: 3/29/2022            ICD-10-CM Noted POA    * (Principal) SBO (small bowel obstruction) (HCC) K56.609 3/19/2024 Unknown    Anemia D64.9 3/19/2024 Yes    Azotemia R79.89 3/19/2024 Yes

## 2024-03-19 NOTE — ED INITIAL ASSESSMENT (HPI)
Sent from Dr. Martinez office for evaluation of severe abdominal pain since midnight. Hx of recent bowel resection in early February. Recent hospitalization as well for SBO. + nausea

## 2024-03-20 ENCOUNTER — APPOINTMENT (OUTPATIENT)
Dept: GENERAL RADIOLOGY | Facility: HOSPITAL | Age: 79
End: 2024-03-20
Attending: SURGERY
Payer: MEDICARE

## 2024-03-20 LAB
ANION GAP SERPL CALC-SCNC: 3 MMOL/L (ref 0–18)
BUN BLD-MCNC: 12 MG/DL (ref 9–23)
BUN/CREAT SERPL: 18.8 (ref 10–20)
CALCIUM BLD-MCNC: 8.9 MG/DL (ref 8.7–10.4)
CHLORIDE SERPL-SCNC: 104 MMOL/L (ref 98–112)
CO2 SERPL-SCNC: 26 MMOL/L (ref 21–32)
CREAT BLD-MCNC: 0.64 MG/DL
DEPRECATED RDW RBC AUTO: 45.5 FL (ref 35.1–46.3)
EGFRCR SERPLBLD CKD-EPI 2021: 89 ML/MIN/1.73M2 (ref 60–?)
ERYTHROCYTE [DISTWIDTH] IN BLOOD BY AUTOMATED COUNT: 14.6 % (ref 11–15)
GLUCOSE BLD-MCNC: 145 MG/DL (ref 70–99)
HCT VFR BLD AUTO: 31.3 %
HGB BLD-MCNC: 10.5 G/DL
MAGNESIUM SERPL-MCNC: 1.7 MG/DL (ref 1.6–2.6)
MCH RBC QN AUTO: 28.5 PG (ref 26–34)
MCHC RBC AUTO-ENTMCNC: 33.5 G/DL (ref 31–37)
MCV RBC AUTO: 84.8 FL
OSMOLALITY SERPL CALC.SUM OF ELEC: 278 MOSM/KG (ref 275–295)
PHOSPHATE SERPL-MCNC: 2.9 MG/DL (ref 2.4–5.1)
PLATELET # BLD AUTO: 397 10(3)UL (ref 150–450)
POTASSIUM SERPL-SCNC: 4.3 MMOL/L (ref 3.5–5.1)
RBC # BLD AUTO: 3.69 X10(6)UL
SODIUM SERPL-SCNC: 133 MMOL/L (ref 136–145)
WBC # BLD AUTO: 9.5 X10(3) UL (ref 4–11)

## 2024-03-20 PROCEDURE — 83735 ASSAY OF MAGNESIUM: CPT | Performed by: SURGERY

## 2024-03-20 PROCEDURE — 74270 X-RAY XM COLON 1CNTRST STD: CPT | Performed by: SURGERY

## 2024-03-20 PROCEDURE — 85027 COMPLETE CBC AUTOMATED: CPT | Performed by: INTERNAL MEDICINE

## 2024-03-20 PROCEDURE — 80048 BASIC METABOLIC PNL TOTAL CA: CPT | Performed by: INTERNAL MEDICINE

## 2024-03-20 PROCEDURE — 84100 ASSAY OF PHOSPHORUS: CPT | Performed by: SURGERY

## 2024-03-20 RX ORDER — MAGNESIUM SULFATE HEPTAHYDRATE 40 MG/ML
2 INJECTION, SOLUTION INTRAVENOUS ONCE
Status: DISCONTINUED | OUTPATIENT
Start: 2024-03-20 | End: 2024-03-20

## 2024-03-20 RX ORDER — ALENDRONATE SODIUM 70 MG/1
70 TABLET ORAL WEEKLY
COMMUNITY
Start: 2024-01-31

## 2024-03-20 RX ORDER — DOCUSATE SODIUM 100 MG/1
100 CAPSULE, LIQUID FILLED ORAL 2 TIMES DAILY
COMMUNITY
Start: 2024-02-19

## 2024-03-20 RX ORDER — ONDANSETRON 4 MG/1
1 TABLET, ORALLY DISINTEGRATING ORAL 2 TIMES DAILY PRN
COMMUNITY
Start: 2024-03-12

## 2024-03-20 RX ORDER — CHLORAL HYDRATE 500 MG
1000 CAPSULE ORAL DAILY
COMMUNITY
Start: 2024-03-12

## 2024-03-20 RX ORDER — MORPHINE SULFATE 2 MG/ML
2 INJECTION, SOLUTION INTRAMUSCULAR; INTRAVENOUS EVERY 4 HOURS PRN
Status: DISCONTINUED | OUTPATIENT
Start: 2024-03-20 | End: 2024-03-20

## 2024-03-20 RX ORDER — MAGNESIUM OXIDE 400 MG/1
400 TABLET ORAL ONCE
Status: COMPLETED | OUTPATIENT
Start: 2024-03-20 | End: 2024-03-20

## 2024-03-20 RX ORDER — MORPHINE SULFATE 2 MG/ML
2 INJECTION, SOLUTION INTRAMUSCULAR; INTRAVENOUS
Status: DISCONTINUED | OUTPATIENT
Start: 2024-03-20 | End: 2024-03-22

## 2024-03-20 NOTE — PROGRESS NOTES
[facility]  part of [facility]    General Surgery Progress Note  Mayte Lucas  CSN:[id number]  HD# 1       Subjective:   Complains of  crampy abdominal pain and nausea  Passing no flatus or BM     Exam:     General: awake and alert, in no acute distress, and comfortable  Pulmonary:lungs clear to auscultation bilaterally  Cardiac: RRR, nl S1,S2, no S3, no S4, and no murmur  Abdomen: nontender and moderately distended, NGT nonbilious  Extremities: calves nontender, no edema, SCD's on, motor intact, and sensory intact    Assessment and Plan:   SBO (small bowel obstruction) (HCC)   Recurrent partial SBO     Cont IVF, NGT to LIS  Gastrograffin LGI today  SBFT in AM  Cont to treat conservatively but surgery may be necessary if sbo high grade    I will follow    Objective:     Vitals:    03/19/24 1848 03/19/24 2025 03/19/24 2129 03/20/24 0420   BP:  (!) 119/107 159/64 151/69   Pulse:  70 70 71   Resp:  18  18   Temp:  98.9 °F (37.2 °C)  98.8 °F (37.1 °C)   TempSrc:  Oral  Oral   SpO2:  96% 94% 95%   Weight: 103 lb 12.8 oz (47.1 kg)      Height:         Body mass index is 17.82 kg/m².       Intake/Output Summary (Last 24 hours) at 3/20/2024 0943  Last data filed at 3/20/2024 0420  Gross per 24 hour   Intake --   Output 550 ml   Net -550 ml       XR CHEST AP PORTABLE  (CPT=71045)    Result Date: 3/19/2024   The esophagogastric tube distal tip and side port overlie the gastric bubble in good radiographic position.  No new abnormality.      Dictated by (CST): Luther Ruffin MD on 3/19/2024 at 4:38 PM     Finalized by (CST): Luther Ruffin MD on 3/19/2024 at 4:40 PM          CT ABDOMEN+PELVIS(CONTRAST ONLY)(CPT=74177)    Result Date: 3/19/2024  CONCLUSION:   Small bowel obstruction with a transition point within the left lower abdomen.  The small bowel dilation has progressed since 3/5/2024.  Multiple small bowel loops within the left lower quadrant demonstrate areas of dusky and diminished enhancement  suspicious for small bowel ischemia.  Correlation with lactate suggested.  No pneumatosis or pneumoperitoneum seen at this point.  Nonobstructing left lower pole caliceal calculus.  Multiple other incidental findings as described in the body of the report which are unchanged.  This report was communicated by telephone to Dr. Rutledge in the emergency room on 3/19/2024 at 1406 hours.        Dictated by (CST): Jay James MD on 3/19/2024 at 1:56 PM     Finalized by (CST): Jay James MD on 3/19/2024 at 2:07 PM           Results:     Recent Labs   Lab 03/19/24  1249 03/20/24  0713   RBC 3.98 3.69*   HGB 11.2* 10.5*   HCT 34.5* 31.3*   MCV 86.7 84.8   MCH 28.1 28.5   MCHC 32.5 33.5   RDW 14.7 14.6   NEPRELIM 8.17*  --    WBC 9.2 9.5   .0 397.0       Recent Labs   Lab 03/19/24  1249 03/20/24  0712   * 145*   BUN 17 12   CREATSERUM 0.78 0.64   CA 10.0 8.9    133*   K 4.9 4.3    104   CO2 26.0 26.0       Lab Results   Component Value Date    WBC 9.5 03/20/2024    HGB 10.5 03/20/2024    HCT 31.3 03/20/2024    .0 03/20/2024     03/20/2024    K 4.3 03/20/2024     03/20/2024    CO2 26.0 03/20/2024    BUN 12 03/20/2024    CREATSERUM 0.64 03/20/2024     03/20/2024    MG 1.7 03/20/2024    PHOS 2.9 03/20/2024    BILT 0.5 03/19/2024    AST 16 03/19/2024    ALT <7 03/19/2024    LIP 35 03/19/2024    CA 8.9 03/20/2024    ALB 4.9 03/19/2024    TP 7.6 03/19/2024            Yvan Griggs MD [facility]  03/20/24  9:43 AM    D/w hospitalist Dr. Manuel

## 2024-03-20 NOTE — PLAN OF CARE
Problem: Patient Centered Care  Goal: Patient preferences are identified and integrated in the patient's plan of care  Description: Interventions:  - What would you like us to know as we care for you?   - Provide timely, complete, and accurate information to patient/family  - Incorporate patient and family knowledge, values, beliefs, and cultural backgrounds into the planning and delivery of care  - Encourage patient/family to participate in care and decision-making at the level they choose  - Honor patient and family perspectives and choices  Outcome: Progressing     Problem: Patient/Family Goals  Goal: Patient/Family Long Term Goal  Description: Patient's Long Term Goal:     Interventions:  -   - See additional Care Plan goals for specific interventions  Outcome: Progressing  Goal: Patient/Family Short Term Goal  Description: Patient's Short Term Goal:     Interventions:   -   - See additional Care Plan goals for specific interventions  Outcome: Progressing     Problem: PAIN - ADULT  Goal: Verbalizes/displays adequate comfort level or patient's stated pain goal  Description: INTERVENTIONS:  - Encourage pt to monitor pain and request assistance  - Assess pain using appropriate pain scale  - Administer analgesics based on type and severity of pain and evaluate response  - Implement non-pharmacological measures as appropriate and evaluate response  - Consider cultural and social influences on pain and pain management  - Manage/alleviate anxiety  - Utilize distraction and/or relaxation techniques  - Monitor for opioid side effects  - Notify MD/LIP if interventions unsuccessful or patient reports new pain  - Anticipate increased pain with activity and pre-medicate as appropriate  Outcome: Progressing     Problem: SAFETY ADULT - FALL  Goal: Free from fall injury  Description: INTERVENTIONS:  - Assess pt frequently for physical needs  - Identify cognitive and physical deficits and behaviors that affect risk of  falls.  - [place] fall precautions as indicated by assessment.  - Educate pt/family on patient safety including physical limitations  - Instruct pt to call for assistance with activity based on assessment  - Modify environment to reduce risk of injury  - Provide assistive devices as appropriate  - Consider OT/PT consult to assist with strengthening/mobility  - Encourage toileting schedule  Outcome: Progressing     Problem: DISCHARGE PLANNING  Goal: Discharge to home or other facility with appropriate resources  Description: INTERVENTIONS:  - Identify barriers to discharge w/pt and caregiver  - Include patient/family/discharge partner in discharge planning  - Arrange for needed discharge resources and transportation as appropriate  - Identify discharge learning needs (meds, wound care, etc)  - Arrange for interpreters to assist at discharge as needed  - Consider post-discharge preferences of patient/family/discharge partner  - Complete POLST form as appropriate  - Assess patient's ability to be responsible for managing their own health  - Refer to Case Management Department for coordinating discharge planning if the patient needs post-hospital services based on physician/LIP order or complex needs related to functional status, cognitive ability or social support system  Outcome: Progressing     Problem: GASTROINTESTINAL - ADULT  Goal: Minimal or absence of nausea and vomiting  Description: INTERVENTIONS:  - Maintain adequate hydration with IV or PO as ordered and tolerated  - Nasogastric tube to low intermittent suction as ordered  - Evaluate effectiveness of ordered antiemetic medications  - Provide nonpharmacologic comfort measures as appropriate  - Advance diet as tolerated, if ordered  - Obtain nutritional consult as needed  - Evaluate fluid balance  Outcome: Progressing  Goal: Maintains or returns to baseline bowel function  Description: INTERVENTIONS:  - Assess bowel function  - Maintain adequate hydration  with IV or PO as ordered and tolerated  - Evaluate effectiveness of GI medications  - Encourage mobilization and activity  - Obtain nutritional consult as needed  - Establish a toileting routine/schedule  - Consider collaborating with pharmacy to review patient's medication profile  Outcome: Progressing     Alert and oriented x4. Reports moderate-severe abdominal pain. Pain managed with warm compress application and IV morphine. NG to LIS, + clear, thick output. + bm, - gas. Nausea improved from earlier morning. IV fluids infusing @ 83 ml/hr. Gastrograffin today. Discharge plans pending medical/surgical clearance.

## 2024-03-20 NOTE — PROGRESS NOTES
[facility]   Progress Note  -  Mayte Lucas Patient Status:  Inpatient    1945 MRN [id number]   Location [facility] 4W/SW/SE Attending Daniel Manuel, DO   Hosp Day # 1 PCP Abi Ohara MD     PCP: Abi Ohara MD      SEE ATTENDING NOTE AT BOTTOM OF PAGE    Is this a shared or split note between Advanced Practice Provider and Physician? Yes    Assessment and Plan:  Ms. Lucas is a 79 year old female with PMH sig for SVT, RLS, COPD, chronic pain, HLD, IgA deficiency, was hospitalized from 2/3 -  for acute cecal volvulus, s/p right hemicolectomy on 2/3 per general surgery, and another hospital stay for partial SBO 3/5-3/8 treated with NGT and conservative management.  Presents today with recurrent abdominal pain associated with nausea starting about midnight last night she states she has been having gas and passing stool however she been able to eat today as she feels very nauseous.  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 LA normal   -prn pain med  -IVF, NPO  -NGT to LIS  -SBFT today  -as per surgery, has surgery follow up on 3/26 as outpt     Mild Hyponatremia  -Na now 133, follow      Expected postoperative anemia  -baseline hgb ~12, post surgery hgb ~7-10, admission hgb 11.2-->10.5      Paroxysmal supraventricular tachycardia  -continue home metoprolol, may need to change to IV based on course     GERD   -continue home PPI     RLS  -continue home trileptal      QUE  - PRN benzo at home     OP  -resume home meds at discharge    GOC  -full code     MA/ACO [place]  -Re- Entry: admission 2/3- and 3/5-3/8  -Consults: surgery   -Discharge Needs: none  -Appointments: [ ] PCP Abi Ohara MD  PCP       FEN  -lytes in am  -IVF  -diet-NPO    Prophy  -SCD  -heparin    Dispo  -pending clinical coarse  PCP: Abi Ohara MD      Concerns regarding plan of care were discussed with patient. Patient agrees with plan as detailed above. Discussed plan of care with Dr. Manuel    Note: This chart was prepared using voice recognition software and may contain unintended word substitution errors.      Lucretia Tom RN, NP   [facility] Hospitalist Team  Contact via [employer] (Check Availability)  3/20/2024    SUBJECTIVE:   Having intermittent sharp shooting pain at times. Abdomen distended, no flatus, NGT in place. Going down for SBFT      OBJECTIVE:   Blood pressure 151/69, pulse 71, temperature 98.8 °F (37.1 °C), temperature source Oral, resp. rate 18, height 5' 4\" (1.626 m), weight 103 lb 12.8 oz (47.1 kg), SpO2 95%, not currently breastfeeding.    GENERAL: no apparent distress  NEURO: A/A Ox3  HEENT: NGT w/ non bilious output   RESP: non labored, CTA  CARDIO: Regular, no murmur  ABD: distended  EXTREMITIES: no edema    DIAGNOSTIC DATA:   Labs:     Recent Labs   Lab 03/19/24  1249 03/20/24  0713   WBC 9.2 9.5   HGB 11.2* 10.5*   MCV 86.7 84.8   .0 397.0       Recent Labs   Lab 03/19/24  1249 03/20/24  0712    133*   K 4.9 4.3    104   CO2 26.0 26.0   BUN 17 12   CREATSERUM 0.78 0.64   CA 10.0 8.9   MG  --  1.7   PHOS  --  2.9   * 145*       Recent Labs   Lab 03/19/24  1249   ALT <7*   AST 16   ALB 4.9*       No results for input(s): \"PGLU\" in the last 168 hours.    No results for input(s): \"TROP\" in the last 168 hours.        MEDICATIONS       lansoprazole  30 mg Oral QAM AC    metoprolol succinate  12.5 mg Oral Nightly    heparin  5,000 Units Subcutaneous Q8H SUSIE    OXcarbazepine  300 mg Oral BID      potassium chloride in dextrose 5%-sodium chloride 0.45% 83 mL/hr at 03/20/24 0427     morphINE, acetaminophen, acetaminophen **OR** HYDROcodone-acetaminophen **OR**  HYDROcodone-acetaminophen, polyethylene glycol (PEG 3350), sennosides, bisacodyl, fleet enema, metoclopramide, ondansetron, clonazePAM    Lucretia Tom NP      IMAGING     XR CHEST AP PORTABLE  (CPT=71045)    Result Date: 3/19/2024   The esophagogastric tube distal tip and side port overlie the gastric bubble in good radiographic position.  No new abnormality.      Dictated by (CST): Luther Ruffin MD on 3/19/2024 at 4:38 PM     Finalized by (CST): Luther Ruffin MD on 3/19/2024 at 4:40 PM          CT ABDOMEN+PELVIS(CONTRAST ONLY)(CPT=74177)    Result Date: 3/19/2024  CONCLUSION:   Small bowel obstruction with a transition point within the left lower abdomen.  The small bowel dilation has progressed since 3/5/2024.  Multiple small bowel loops within the left lower quadrant demonstrate areas of dusky and diminished enhancement suspicious for small bowel ischemia.  Correlation with lactate suggested.  No pneumatosis or pneumoperitoneum seen at this point.  Nonobstructing left lower pole caliceal calculus.  Multiple other incidental findings as described in the body of the report which are unchanged.  This report was communicated by telephone to Dr. Rutledge in the emergency room on 3/19/2024 at 1406 hours.        Dictated by (CST): Jay James MD on 3/19/2024 at 1:56 PM     Finalized by (CST): Jay James MD on 3/19/2024 at 2:07 PM             SEE ATTENDING NOTE BELOW:     Patient seen and examined independently.  Discussed with APN and agree with note above.    S:  Ongoing abd pain, and distended, no passing of gas, NGT in place, no fevers but overall pain is the main issue today     Objective:  /69 (BP Location: Right arm)   Pulse 71   Temp 98.8 °F (37.1 °C) (Oral)   Resp 18   Ht 5' 4\" (1.626 m)   Wt 103 lb 12.8 oz (47.1 kg)   SpO2 95%   BMI 17.82 kg/m²     Gen: No acute distress, alert and oriented x3 + NGT   Pulm: Lungs clear, normal respiratory effort  CV: Heart with regular rate and rhythm  Abd:  distended, decreased bowel sounds, tender to touch     Assessment and Plan:  Recurrent SBO   -Lactate normal   NGT  Conservative care trial, NPO   IVFs  SBFT today     Case discussed with Dr. Griggs and NP Vaishali, agree with progress note as document above

## 2024-03-20 NOTE — PLAN OF CARE
Patient is alert and oriented. Room air. Vital signs stable. NPO, sips with meds. Ambulates independently after set up. Norco and morphine given prn for pain. Scheduled reglan given for nausea. IVF continued. NG to LIS. Call light and personal belongings within reach. Safety precautions in place. Plan for gastrografin today.     Problem: Patient Centered Care  Goal: Patient preferences are identified and integrated in the patient's plan of care  Description: Interventions:  - Provide timely, complete, and accurate information to patient/family  - Incorporate patient and family knowledge, values, beliefs, and cultural backgrounds into the planning and delivery of care  - Encourage patient/family to participate in care and decision-making at the level they choose  - Honor patient and family perspectives and choices  Outcome: Progressing     Problem: PAIN - ADULT  Goal: Verbalizes/displays adequate comfort level or patient's stated pain goal  Description: INTERVENTIONS:  - Encourage pt to monitor pain and request assistance  - Assess pain using appropriate pain scale  - Administer analgesics based on type and severity of pain and evaluate response  - Implement non-pharmacological measures as appropriate and evaluate response  - Consider cultural and social influences on pain and pain management  - Manage/alleviate anxiety  - Utilize distraction and/or relaxation techniques  - Monitor for opioid side effects  - Notify MD/LIP if interventions unsuccessful or patient reports new pain  - Anticipate increased pain with activity and pre-medicate as appropriate  Outcome: Progressing     Problem: SAFETY ADULT - FALL  Goal: Free from fall injury  Description: INTERVENTIONS:  - Assess pt frequently for physical needs  - Identify cognitive and physical deficits and behaviors that affect risk of falls.  - [place] fall precautions as indicated by assessment.  - Educate pt/family on patient safety including physical limitations  -  Instruct pt to call for assistance with activity based on assessment  - Modify environment to reduce risk of injury  - Provide assistive devices as appropriate  - Consider OT/PT consult to assist with strengthening/mobility  - Encourage toileting schedule  Outcome: Progressing     Problem: DISCHARGE PLANNING  Goal: Discharge to home or other facility with appropriate resources  Description: INTERVENTIONS:  - Identify barriers to discharge w/pt and caregiver  - Include patient/family/discharge partner in discharge planning  - Arrange for needed discharge resources and transportation as appropriate  - Identify discharge learning needs (meds, wound care, etc)  - Arrange for interpreters to assist at discharge as needed  - Consider post-discharge preferences of patient/family/discharge partner  - Complete POLST form as appropriate  - Assess patient's ability to be responsible for managing their own health  - Refer to Case Management Department for coordinating discharge planning if the patient needs post-hospital services based on physician/LIP order or complex needs related to functional status, cognitive ability or social support system  Outcome: Progressing     Problem: GASTROINTESTINAL - ADULT  Goal: Minimal or absence of nausea and vomiting  Description: INTERVENTIONS:  - Maintain adequate hydration with IV or PO as ordered and tolerated  - Nasogastric tube to low intermittent suction as ordered  - Evaluate effectiveness of ordered antiemetic medications  - Provide nonpharmacologic comfort measures as appropriate  - Advance diet as tolerated, if ordered  - Obtain nutritional consult as needed  - Evaluate fluid balance  Outcome: Progressing  Goal: Maintains or returns to baseline bowel function  Description: INTERVENTIONS:  - Assess bowel function  - Maintain adequate hydration with IV or PO as ordered and tolerated  - Evaluate effectiveness of GI medications  - Encourage mobilization and activity  - Obtain  nutritional consult as needed  - Establish a toileting routine/schedule  - Consider collaborating with pharmacy to review patient's medication profile  Outcome: Progressing

## 2024-03-20 NOTE — PAYOR COMM NOTE
--------------  ADMISSION REVIEW     Payor: [employer]  Subscriber #:  [id number]  Authorization Number: [id number]    Admit date: 3/19/24  Admit time:  6:19 PM       REVIEW DOCUMENTATION:       Patient Seen in: [facility] Emergency Department      History     Chief Complaint   Patient presents with    Abdominal Pain     Stated Complaint: abdominal pain    Subjective:   Ms. Lucas is a 79 year old female with PMH sig for SVT, RLS, COPD, chronic pain, HLD, IgA deficiency, was hospitalized from 2/3 - 2/17 for acute cecal volvulus, s/p right hemicolectomy on 2/3 per general surgery, readmitted, who presents with 12 hours of abdominal pain.  It started at midnight.  It is severe and crampy in the low abdomen and radiates to the right flank like the last time she had to be admitted.  She had nausea but no vomiting.  She is having bowel movements.  She was at the primary office for a follow-up and they told her to come here because she was still in significant pain rated 8 out of 10.  No fever or chills.  No urinary symptoms.  No leg swelling.  No cough or congestion.  No focal weakness, numbness or paresthesias      Objective:   Past Medical History:   Diagnosis Date    Acute exacerbation of chronic obstructive pulmonary disease (COPD) (HCC) 4/17/2017    ALLERGIC RHINITIS     OTHER DISEASES     TMJ     Past Surgical History:   Procedure Laterality Date    ABDOMINAL SURGERY  02/03/2024    Exploratory laparotomy, right hemicolectomy, omentoplasty of anastomosis    CATARACT      COLONOSCOPY  02/12/2013    Procedure: COLONOSCOPY, POSSIBLE BIOPSY, POSSIBLE POLYPECTOMY 00236;  Surgeon: Jakub Barr MD;  Location: [facility]    PATIENT DOCUMENTED NOT TO HAVE EXPERIENCED ANY OF THE FOLLOWING EVENTS  12/12/2012    Procedure: LEFT PHACOEMULSIFICATION OF CATARACT WITH INTRAOCULAR LENS IMPLANT [id number];  Surgeon: Siddhartha Vinson MD;  Location: [facility]    PATIENT DOCUMENTED NOT TO HAVE  EXPERIENCED ANY OF THE FOLLOWING EVENTS  11/28/2012    Procedure: RIGHT PHACOEMULSIFICATION OF CATARACT WITH INTRAOCULAR LENS IMPLANT [id number];  Surgeon: Siddhartha Vinosn MD;  Location: [facility]    PATIENT DOCUMENTED NOT TO HAVE EXPERIENCED ANY OF THE FOLLOWING EVENTS  02/12/2013    Procedure: COLONOSCOPY, POSSIBLE BIOPSY, POSSIBLE POLYPECTOMY 09358;  Surgeon: Jakub Barr MD;  Location: [facility]    PATIENT WITHOUGH PREOPERATIVE ORDER FOR IV ANTIBIOTIC SURGICAL SITE INFECTION PROPHYLAXIS.  12/12/2012    Procedure: LEFT PHACOEMULSIFICATION OF CATARACT WITH INTRAOCULAR LENS IMPLANT [id number];  Surgeon: Siddhartha Vinson MD;  Location: [facility]    PATIENT WITHOUGH PREOPERATIVE ORDER FOR IV ANTIBIOTIC SURGICAL SITE INFECTION PROPHYLAXIS.  11/28/2012    Procedure: RIGHT PHACOEMULSIFICATION OF CATARACT WITH INTRAOCULAR LENS IMPLANT [id number];  Surgeon: Siddhartha Vinson MD;  Location: [facility]    PATIENT WITHOUGH PREOPERATIVE ORDER FOR IV ANTIBIOTIC SURGICAL SITE INFECTION PROPHYLAXIS.  02/12/2013    Procedure: COLONOSCOPY, POSSIBLE BIOPSY, POSSIBLE POLYPECTOMY 67773;  Surgeon: Jakub Barr MD;  Location: [facility]    REMV CATARACT EXTRACAP,INSERT LENS  12/12/2012    Procedure: LEFT PHACOEMULSIFICATION OF CATARACT WITH INTRAOCULAR LENS IMPLANT [id number];  Surgeon: Siddhartha Vinson MD;  Location: [facility]    REMV CATARACT EXTRACAP,INSERT LENS  11/28/2012    Procedure: RIGHT PHACOEMULSIFICATION OF CATARACT WITH INTRAOCULAR LENS IMPLANT [id number];  Surgeon: Siddhartha Vinson MD;  Location: [facility]    TONSILLECTOMY         Physical Exam     ED Triage Vitals [03/19/24 0950]   /69   Pulse 69   Resp 16   Temp 98.4 °F (36.9 °C)   Temp src Temporal   SpO2 99 %   O2 Device None (Room air)       Current:/68   Pulse 66   Temp 98.4 °F (36.9 °C) (Temporal)   Resp 20   Ht 162.6 cm (5' 4\")   Wt 44.5 kg   SpO2 94%   BMI 16.82  kg/m²         Physical Exam  HENT:      Head: Normocephalic.      Mouth/Throat:      Mouth: Mucous membranes are moist.      Pharynx: Oropharynx is clear.   Eyes:      Extraocular Movements: Extraocular movements intact.      Pupils: Pupils are equal, round, and reactive to light.   Cardiovascular:      Rate and Rhythm: Normal rate and regular rhythm.      Heart sounds: Normal heart sounds.   Pulmonary:      Effort: Pulmonary effort is normal.      Breath sounds: Normal breath sounds.   Abdominal:      Palpations: Abdomen is soft.      Tenderness: There is abdominal tenderness.      Comments: Tenderness in the right side of the abdomen but no rebound or guarding.   Musculoskeletal:         General: No swelling or tenderness. Normal range of motion.      Cervical back: Normal range of motion.   Lymphadenopathy:      Cervical: No cervical adenopathy.   Skin:     General: Skin is warm.      Capillary Refill: Capillary refill takes less than 2 seconds.   Neurological:      General: No focal deficit present.      Mental Status: She is alert.       ED Course     Labs Reviewed   URINALYSIS WITH CULTURE REFLEX - Abnormal; Notable for the following components:       Result Value    Ketones Urine 40 (*)     Blood Urine Trace (*)     Protein Urine 20 (*)     RBC Urine 3-5 (*)     All other components within normal limits   HEPATIC FUNCTION PANEL (7) - Abnormal; Notable for the following components:    ALT <7 (*)     Albumin 4.9 (*)     All other components within normal limits   BASIC METABOLIC PANEL (8) - Abnormal; Notable for the following components:    Glucose 115 (*)     BUN/CREA Ratio 21.8 (*)     All other components within normal limits   CBC W/ DIFFERENTIAL - Abnormal; Notable for the following components:    HGB 11.2 (*)     HCT 34.5 (*)     RDW-SD 47.2 (*)     Neutrophil Absolute Prelim 8.17 (*)     Neutrophil Absolute 8.17 (*)     Lymphocyte Absolute 0.60 (*)     All other components within normal limits   LIPASE -  Normal   LACTIC ACID, PLASMA - Normal     MDM      XR CHEST AP PORTABLE  (CPT=71045)    Result Date: 3/19/2024   The esophagogastric tube distal tip and side port overlie the gastric bubble in good radiographic position.  No new abnormality.      Dictated by (CST): Luther Ruffin MD on 3/19/2024 at 4:38 PM     Finalized by (CST): Luther Ruffin MD on 3/19/2024 at 4:40 PM          CT ABDOMEN+PELVIS(CONTRAST ONLY)(CPT=74177)    Result Date: 3/19/2024  CONCLUSION:   Small bowel obstruction with a transition point within the left lower abdomen.  The small bowel dilation has progressed since 3/5/2024.  Multiple small bowel loops within the left lower quadrant demonstrate areas of dusky and diminished enhancement suspicious for small bowel ischemia.  Correlation with lactate suggested.  No pneumatosis or pneumoperitoneum seen at this point.  Nonobstructing left lower pole caliceal calculus.  Multiple other incidental findings as described in the body of the report which are unchanged.  This report was communicated by telephone to Dr. Rutledge in the emergency room on 3/19/2024 at 1406 hours.        Dictated by (CST): Jay James MD on 3/19/2024 at 1:56 PM     Finalized by (CST): Jay James MD on 3/19/2024 at 2:07 PM           Disposition and Plan     Clinical Impression:  1. SBO (small bowel obstruction) ([facility])         Disposition:  Admit  3/19/2024  4:36 pm      Signed by Jared Rutledge MD on 3/19/2024  5:20 PM         HISTORY AND PHYSICAL      ASSESSMENT / PLAN:   Ms. Lucas is a 79 year old female with PMH sig for SVT, RLS, COPD, chronic pain, HLD, IgA deficiency, was hospitalized from 2/3 - 2/17 for acute cecal volvulus, s/p right hemicolectomy on 2/3 per general surgery, and another hospital stay for partial SBO 3/5-3/8 treated with NGT and conservative management.  Presents today with recurrent abdominal pain associated with nausea starting about midnight last night she states she has been having gas and passing  stool however she been able to eat today as she feels very nauseous.  In the emergency room repeat CT scan again revealed small bowel obstruction with transition point the left lower abdomen.     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  Expected postoperative anemia is improving hemoglobin 11.2 on admission     3.  Paroxysmal supraventricular tachycardia  -On home metoprolol     4.  GERD continue home PPI     5.  Restless leg syndrome continue home trileptal      6. QUE  PRN benzo at home         FN:  - IVF:Saline at 100   - Diet:NPO      FULL CODE confirmed on admission       GENERAL SURGERY CONSULTATION     Date of Admission:  3/19/2024  Date of Consult:  3/19/2024      Reason for Consultation: sbo      History of Present Illness:  Mayte Lucas is a a(n) 79 year old female who is status post exploratory laparotomy with right hemicolectomy for cecal volvulus on 2/3/2024 who presents with recurrent lower abdominal crampy pain radiating to the right side. + nausea, no vomiting.     Abdomen: normal BS, soft, nontender, and mildly distended, no guarding     Recurrent SBO     Admit, IVF, NGT to LIS  Gastrograffin LGI in AM  Will treat conservatively since will likely improve          MEDICATIONS ADMINISTERED IN LAST 1 DAY:  clonazePAM (KLONOPIN) disintegrating tab 0.25 mg       Date Action Dose Route User    3/19/2024 2208 Given 0.25 mg Oral Avelina Matthews, RN          fentaNYL (Sublimaze) 50 mcg/mL injection 50 mcg       Date Action Dose Route User    3/19/2024 1319 Given 50 mcg Intravenous Navikaite, Jurate, RN          heparin (Porcine) 5000 UNIT/ML injection 5,000 Units       Date Action Dose Route User    3/20/2024 0508 Given 5,000 Units Subcutaneous (Right Upper Arm) Avelina Matthews RN    3/19/2024 2132 Given 5,000 Units  Subcutaneous (Right Upper Arm) Avelina Matthews RN          HYDROcodone-acetaminophen (Norco) 5-325 MG per tab 1 tablet       Date Action Dose Route User    3/19/2024 1841 Given 1 tablet Oral Anuja Jolley RN          HYDROcodone-acetaminophen (Norco) 5-325 MG per tab 2 tablet       Date Action Dose Route User    3/20/2024 0426 Given 2 tablet Oral Avelina Matthews RN    3/19/2024 2243 Given 2 tablet Oral Avelina Matthews RN          iopamidol 76% (ISOVUE-370) injection for power injector       Date Action Dose Route User    3/19/2024 1349 Given 60 mL Intravenous (Left Antecubital) Hortencia Jorgensen          potassium chloride 20 mEq in dextrose 5%-sodium chloride 0.45% 1000mL infusion premix       Date Action Dose Route User    3/20/2024 0427 New Bag (none) Intravenous Avelina Matthews RN    3/19/2024 1841 New Bag (none) Intravenous Anuja Jolley RN          lansoprazole (Prevacid Solutab) disintegrating tab 30 mg       Date Action Dose Route User    3/20/2024 0509 Given 30 mg Oral Avelina Matthews RN          metoclopramide (Reglan) 5 mg/mL injection 5 mg       Date Action Dose Route User    3/20/2024 0158 Given 5 mg Intravenous Avelina Matthews RN    3/19/2024 1841 Given 5 mg Intravenous Anuja Jolley RN          metoprolol succinate (Toprol XL) partial tablet 12.5 mg       Date Action Dose Route User    3/19/2024 2132 Given 12.5 mg Oral Avelina Matthews RN          morphINE PF 2 MG/ML injection 2 mg       Date Action Dose Route User    3/20/2024 0638 Given 2 mg Intravenous Avelina Matthews RN          ondansetron (Zofran) 4 MG/2ML injection 4 mg       Date Action Dose Route User    3/19/2024 1317 Given 4 mg Intravenous Dahlia Simpson RN          ondansetron (Zofran) 4 MG/2ML injection 4 mg       Date Action Dose Route User    3/19/2024 1735 Given 4 mg Intravenous Dahlia Simpson RN          OXcarbazepine (Trileptal) tab 300 mg       Date Action Dose Route User    3/19/2024 9305  Given 300 mg Oral Avelina Matthews, RN          sodium chloride 0.9% infusion       Date Action Dose Route User    3/19/2024 1314 New Bag 125 mL/hr Intravenous Dahlia Simpson, RN            Vitals (last day)       Date/Time Temp Pulse Resp BP SpO2 Weight O2 Device O2 Flow Rate (L/min) [facility]    03/20/24 0420 98.8 °F (37.1 °C) 71 18 151/69 95 % -- None (Room air) -- AQ    03/19/24 2129 -- 70 -- 159/64 94 % -- None (Room air) -- CM    03/19/24 2025 98.9 °F (37.2 °C) 70 18 119/107 96 % -- None (Room air) -- TI    03/19/24 1848 -- -- -- -- -- 103 lb 12.8 oz -- -- AC    03/19/24 1730 -- 73 20 156/68 93 % -- -- -- JN    03/19/24 1630 -- 66 20 158/68 94 % -- -- -- JN    03/19/24 1545 -- 70 20 154/64 98 % -- -- -- JN    03/19/24 1445 -- 70 20 147/68 95 % -- -- -- JN    03/19/24 1415 -- 74 20 149/68 96 % -- -- -- JN    03/19/24 1345 -- 70 20 143/62 100 % -- -- -- JN    03/19/24 1300 -- 66 20 142/56 95 % -- -- -- JN    03/19/24 0950 98.4 °F (36.9 °C) 69 16 136/69 99 % 98 lb None (Room air) -- TC          CIWA Scores (since admission)       None

## 2024-03-21 ENCOUNTER — APPOINTMENT (OUTPATIENT)
Dept: GENERAL RADIOLOGY | Facility: HOSPITAL | Age: 79
End: 2024-03-21
Attending: SURGERY
Payer: MEDICARE

## 2024-03-21 LAB
ANION GAP SERPL CALC-SCNC: 3 MMOL/L (ref 0–18)
BUN BLD-MCNC: 7 MG/DL (ref 9–23)
BUN/CREAT SERPL: 12.1 (ref 10–20)
CALCIUM BLD-MCNC: 9 MG/DL (ref 8.7–10.4)
CHLORIDE SERPL-SCNC: 105 MMOL/L (ref 98–112)
CO2 SERPL-SCNC: 26 MMOL/L (ref 21–32)
CREAT BLD-MCNC: 0.58 MG/DL
DEPRECATED RDW RBC AUTO: 47 FL (ref 35.1–46.3)
EGFRCR SERPLBLD CKD-EPI 2021: 91 ML/MIN/1.73M2 (ref 60–?)
ERYTHROCYTE [DISTWIDTH] IN BLOOD BY AUTOMATED COUNT: 14.6 % (ref 11–15)
GLUCOSE BLD-MCNC: 95 MG/DL (ref 70–99)
HCT VFR BLD AUTO: 32 %
HGB BLD-MCNC: 10.4 G/DL
MAGNESIUM SERPL-MCNC: 1.7 MG/DL (ref 1.6–2.6)
MCH RBC QN AUTO: 28.2 PG (ref 26–34)
MCHC RBC AUTO-ENTMCNC: 32.5 G/DL (ref 31–37)
MCV RBC AUTO: 86.7 FL
OSMOLALITY SERPL CALC.SUM OF ELEC: 276 MOSM/KG (ref 275–295)
PHOSPHATE SERPL-MCNC: 3.2 MG/DL (ref 2.4–5.1)
PLATELET # BLD AUTO: 330 10(3)UL (ref 150–450)
POTASSIUM SERPL-SCNC: 4.2 MMOL/L (ref 3.5–5.1)
RBC # BLD AUTO: 3.69 X10(6)UL
SODIUM SERPL-SCNC: 134 MMOL/L (ref 136–145)
WBC # BLD AUTO: 5.7 X10(3) UL (ref 4–11)

## 2024-03-21 PROCEDURE — 83735 ASSAY OF MAGNESIUM: CPT | Performed by: INTERNAL MEDICINE

## 2024-03-21 PROCEDURE — 82962 GLUCOSE BLOOD TEST: CPT

## 2024-03-21 PROCEDURE — 84100 ASSAY OF PHOSPHORUS: CPT | Performed by: SURGERY

## 2024-03-21 PROCEDURE — 87040 BLOOD CULTURE FOR BACTERIA: CPT | Performed by: INTERNAL MEDICINE

## 2024-03-21 PROCEDURE — 80048 BASIC METABOLIC PNL TOTAL CA: CPT | Performed by: INTERNAL MEDICINE

## 2024-03-21 PROCEDURE — 74250 X-RAY XM SM INT 1CNTRST STD: CPT | Performed by: SURGERY

## 2024-03-21 PROCEDURE — 85027 COMPLETE CBC AUTOMATED: CPT | Performed by: INTERNAL MEDICINE

## 2024-03-21 RX ORDER — DEXTROSE MONOHYDRATE, SODIUM CHLORIDE, AND POTASSIUM CHLORIDE 50; 1.49; 4.5 G/1000ML; G/1000ML; G/1000ML
INJECTION, SOLUTION INTRAVENOUS CONTINUOUS
Status: DISCONTINUED | OUTPATIENT
Start: 2024-03-21 | End: 2024-03-22

## 2024-03-21 RX ORDER — ALVIMOPAN 12 MG/1
12 CAPSULE ORAL 2 TIMES DAILY
Status: DISCONTINUED | OUTPATIENT
Start: 2024-03-22 | End: 2024-03-25

## 2024-03-21 RX ORDER — MAGNESIUM OXIDE 400 MG/1
400 TABLET ORAL ONCE
Status: COMPLETED | OUTPATIENT
Start: 2024-03-21 | End: 2024-03-21

## 2024-03-21 RX ORDER — ACETAMINOPHEN 10 MG/ML
15 INJECTION, SOLUTION INTRAVENOUS EVERY 6 HOURS PRN
Status: DISCONTINUED | OUTPATIENT
Start: 2024-03-21 | End: 2024-03-26

## 2024-03-21 NOTE — PLAN OF CARE
Patient is alert and oriented. Room air. Vital signs stable. NPO, sips with meds. NG to LIS. +belch/-flatus/+BM. Abdomen distended. States nausea has improved, declines nausea medicine. Pain managed with prn morphine and heat packs. Voiding. Up with SBA. IVF continued. Call light and personal belongings within reach. Safety precautions in place.     Problem: Patient Centered Care  Goal: Patient preferences are identified and integrated in the patient's plan of care  Description: Interventions:  - Provide timely, complete, and accurate information to patient/family  - Incorporate patient and family knowledge, values, beliefs, and cultural backgrounds into the planning and delivery of care  - Encourage patient/family to participate in care and decision-making at the level they choose  - Honor patient and family perspectives and choices  Outcome: Progressing     Problem: PAIN - ADULT  Goal: Verbalizes/displays adequate comfort level or patient's stated pain goal  Description: INTERVENTIONS:  - Encourage pt to monitor pain and request assistance  - Assess pain using appropriate pain scale  - Administer analgesics based on type and severity of pain and evaluate response  - Implement non-pharmacological measures as appropriate and evaluate response  - Consider cultural and social influences on pain and pain management  - Manage/alleviate anxiety  - Utilize distraction and/or relaxation techniques  - Monitor for opioid side effects  - Notify MD/LIP if interventions unsuccessful or patient reports new pain  - Anticipate increased pain with activity and pre-medicate as appropriate  Outcome: Progressing     Problem: SAFETY ADULT - FALL  Goal: Free from fall injury  Description: INTERVENTIONS:  - Assess pt frequently for physical needs  - Identify cognitive and physical deficits and behaviors that affect risk of falls.  - [place] fall precautions as indicated by assessment.  - Educate pt/family on patient safety including  physical limitations  - Instruct pt to call for assistance with activity based on assessment  - Modify environment to reduce risk of injury  - Provide assistive devices as appropriate  - Consider OT/PT consult to assist with strengthening/mobility  - Encourage toileting schedule  Outcome: Progressing     Problem: DISCHARGE PLANNING  Goal: Discharge to home or other facility with appropriate resources  Description: INTERVENTIONS:  - Identify barriers to discharge w/pt and caregiver  - Include patient/family/discharge partner in discharge planning  - Arrange for needed discharge resources and transportation as appropriate  - Identify discharge learning needs (meds, wound care, etc)  - Arrange for interpreters to assist at discharge as needed  - Consider post-discharge preferences of patient/family/discharge partner  - Complete POLST form as appropriate  - Assess patient's ability to be responsible for managing their own health  - Refer to Case Management Department for coordinating discharge planning if the patient needs post-hospital services based on physician/LIP order or complex needs related to functional status, cognitive ability or social support system  Outcome: Progressing     Problem: GASTROINTESTINAL - ADULT  Goal: Minimal or absence of nausea and vomiting  Description: INTERVENTIONS:  - Maintain adequate hydration with IV or PO as ordered and tolerated  - Nasogastric tube to low intermittent suction as ordered  - Evaluate effectiveness of ordered antiemetic medications  - Provide nonpharmacologic comfort measures as appropriate  - Advance diet as tolerated, if ordered  - Obtain nutritional consult as needed  - Evaluate fluid balance  Outcome: Progressing  Goal: Maintains or returns to baseline bowel function  Description: INTERVENTIONS:  - Assess bowel function  - Maintain adequate hydration with IV or PO as ordered and tolerated  - Evaluate effectiveness of GI medications  - Encourage mobilization and  activity  - Obtain nutritional consult as needed  - Establish a toileting routine/schedule  - Consider collaborating with pharmacy to review patient's medication profile  Outcome: Progressing

## 2024-03-21 NOTE — PROGRESS NOTES
Count includes the [facility] AND CARE   Progress Note  -  Mayte Lucas Patient Status:  Inpatient    1945 MRN [id number]   Location [facility] 4W/SW/SE Attending Daniel Manuel,    Hosp Day # 2 PCP Abi Ohara MD     PCP: Abi Ohara MD      SEE ATTENDING NOTE AT BOTTOM OF PAGE    Is this a shared or split note between Advanced Practice Provider and Physician? Yes    Assessment and Plan:  Ms. Lucas is a 79 year old female with PMH sig for SVT, RLS, COPD, chronic pain, HLD, IgA deficiency, was hospitalized from 2/3 -  for acute cecal volvulus, s/p right hemicolectomy on 2/3 per general surgery, and another hospital stay for partial SBO 3/5-3/8 treated with NGT and conservative management.  Presents today with recurrent abdominal pain associated with nausea starting about midnight last night she states she has been having gas and passing stool however she been able to eat today as she feels very nauseous.  In the emergency room repeat CT scan again revealed small bowel obstruction with transition point the left lower abdomen, S/P NGT however pt did not improve and plans for Exp. Lap, possible lysis of adhesions, possible bowel resection. PPN to start today,see below for details      Recurrent small bowel obstruction  History of cecal volvulus and extensive mesenteric ischemia with hemicolectomy 2/3/24  -afebrile. Normal wbc. LA normal   -prn pain med  -IVF, NPO  -NGT to LIS  -SBFT pending   -plans for PPN and surgery tomorrow at 1500- Exp Lap, possible lysis of adhesions possible SB resection   -as per surgery ( has surgery follow up on 3/26 as outpt )    Mild Hyponatremia-improved  -Na now 133-->134     Expected postoperative anemia  -baseline hgb ~12, post surgery hgb ~7-10, admission hgb 11.2 now 10.4     Paroxysmal supraventricular tachycardia  -continue home metoprolol, may need to change to IV based on course     GERD   -continue home PPI     RLS  -continue home trileptal      QUE  - PRN benzo at  home     OP  -resume home meds at discharge    GO  -full code     MA/ACO Reach  -Re- Entry: admission 2/3-2/17 and 3/5-3/8  -Consults: surgery   -Discharge Needs: none  -Appointments: [ ] PCP Aib Ohara MD  PCP      ZAKIYA june in am  -IVF  -diet-NPO    Prophy  -SCD  -heparin    Dispo  -pending clinical coarse  -update given to  and sister at    PCP: Abi Ohara MD      Concerns regarding plan of care were discussed with patient. Patient agrees with plan as detailed above. Discussed plan of care with Dr. Manuel    Note: This chart was prepared using voice recognition software and may contain unintended word substitution errors.      Lucretia Tom RN, NP   [facility] Hospitalist Team  Contact via [employer] (Check Availability)    SUBJECTIVE:   Throat hurting more from NGT, does no like the viscous lidocaine. Pain in abdomen is slightly better but has been taking morphine. Pt Agrees to ppn and is aware of surgery tomorrow.    OBJECTIVE:   Blood pressure 144/64, pulse 78, temperature 98.8 °F (37.1 °C), temperature source Oral, resp. rate 18, height 5' 4\" (1.626 m), weight 103 lb 12.8 oz (47.1 kg), SpO2 97%, not currently breastfeeding.    GENERAL: no apparent distress  NEURO: A/A Ox3  HEENT: NGT   RESP: non labored, CTA  CARDIO: Regular, no murmur  ABD: distended with high pitched BS  EXTREMITIES: no edema    DIAGNOSTIC DATA:   Labs:     Recent Labs   Lab 03/19/24  1249 03/20/24  0713 03/21/24  0615   WBC 9.2 9.5 5.7   HGB 11.2* 10.5* 10.4*   MCV 86.7 84.8 86.7   .0 397.0 330.0       Recent Labs   Lab 03/19/24  1249 03/20/24  0712 03/21/24  0614    133* 134*   K 4.9 4.3 4.2    104 105   CO2 26.0 26.0 26.0   BUN 17 12 7*   CREATSERUM 0.78 0.64 0.58   CA 10.0 8.9 9.0   MG  --  1.7 1.7   PHOS  --  2.9 3.2   * 145* 95       Recent Labs   Lab 03/19/24  1249   ALT <7*   AST 16   ALB 4.9*       No results for input(s): \"PGLU\" in the last 168 hours.    No results  for input(s): \"TROP\" in the last 168 hours.        MEDICATIONS       lansoprazole  30 mg Oral QAM AC    metoprolol succinate  12.5 mg Oral Nightly    heparin  5,000 Units Subcutaneous Q8H SUSIE    OXcarbazepine  300 mg Oral BID      potassium chloride in dextrose 5%-sodium chloride 0.45% 83 mL/hr at 03/21/24 0047     morphINE, acetaminophen, acetaminophen **OR** HYDROcodone-acetaminophen **OR** HYDROcodone-acetaminophen, polyethylene glycol (PEG 3350), sennosides, bisacodyl, fleet enema, metoclopramide, ondansetron, clonazePAM    Lucretia Tom, JAYME      IMAGING     XR COLON SINGLE CONTRAST (CPT=74270)    Result Date: 3/20/2024  CONCLUSION:  1. Markedly redundant colon with scattered colonic diverticulosis involving the sigmoid region. 2. No colonic obstruction or constricting lesions..  3. Small amount of contrast refluxed into the distal ileum. 4. Superimposed background of persistent marked small bowel distention suggest distal small-bowel obstruction. 5. Moderate residual fecal material throughout the colon limiting evaluation for colonic polypoid lesions   Dictated by (CST): Andres Driver MD on 3/20/2024 at 12:07 PM     Finalized by (CST): Andres Driver MD on 3/20/2024 at 12:15 PM          XR CHEST AP PORTABLE  (CPT=71045)    Result Date: 3/19/2024   The esophagogastric tube distal tip and side port overlie the gastric bubble in good radiographic position.  No new abnormality.      Dictated by (CST): Luther Ruffin MD on 3/19/2024 at 4:38 PM     Finalized by (CST): Luther Ruffin MD on 3/19/2024 at 4:40 PM          CT ABDOMEN+PELVIS(CONTRAST ONLY)(CPT=74177)    Result Date: 3/19/2024  CONCLUSION:   Small bowel obstruction with a transition point within the left lower abdomen.  The small bowel dilation has progressed since 3/5/2024.  Multiple small bowel loops within the left lower quadrant demonstrate areas of dusky and diminished enhancement suspicious for small bowel ischemia.  Correlation with lactate  suggested.  No pneumatosis or pneumoperitoneum seen at this point.  Nonobstructing left lower pole caliceal calculus.  Multiple other incidental findings as described in the body of the report which are unchanged.  This report was communicated by telephone to Dr. Rutledge in the emergency room on 3/19/2024 at 1406 hours.        Dictated by (CST): Jay James MD on 3/19/2024 at 1:56 PM     Finalized by (CST): Jay James MD on 3/19/2024 at 2:07 PM             SEE ATTENDING NOTE BELOW:       Patient seen and examined independently.  Discussed with APN and agree with note above.    S:  Ongoing abd pain     Objective:  BP (P) 135/72 (BP Location: Right arm)   Pulse (P) 87   Temp (P) 99.3 °F (37.4 °C) (Oral)   Resp (P) 16   Ht 5' 4\" (1.626 m)   Wt 103 lb 12.8 oz (47.1 kg)   SpO2 (P) 98%   BMI 17.82 kg/m²     Gen: No acute distress  Pulm: Lungs clear,   CV: Heart with regular rate and rhythm,   Abd: Distended     Assessment and Plan:  Ongoing SBO- small bowel follow through today   Will need enteral nutrition   Awaiting bowel recovery

## 2024-03-21 NOTE — PLAN OF CARE
Problem: Patient Centered Care  Goal: Patient preferences are identified and integrated in the patient's plan of care  Description: Interventions:  - What would you like us to know as we care for you?   - Provide timely, complete, and accurate information to patient/family  - Incorporate patient and family knowledge, values, beliefs, and cultural backgrounds into the planning and delivery of care  - Encourage patient/family to participate in care and decision-making at the level they choose  - Honor patient and family perspectives and choices  3/21/2024 1709 by Jakub Gray RN  Outcome: Not Progressing  3/21/2024 1709 by Jakub Gray RN  Outcome: Progressing     Problem: Patient/Family Goals  Goal: Patient/Family Long Term Goal  Description: Patient's Long Term Goal:     Interventions:  -   - See additional Care Plan goals for specific interventions  3/21/2024 1709 by Jakub Gray RN  Outcome: Not Progressing  3/21/2024 1709 by Jakub Gray RN  Outcome: Progressing  Goal: Patient/Family Short Term Goal  Description: Patient's Short Term Goal:     Interventions:   -   - See additional Care Plan goals for specific interventions  3/21/2024 1709 by Jakub Gray RN  Outcome: Not Progressing  3/21/2024 1709 by Jakub Gray RN  Outcome: Progressing     Problem: PAIN - ADULT  Goal: Verbalizes/displays adequate comfort level or patient's stated pain goal  Description: INTERVENTIONS:  - Encourage pt to monitor pain and request assistance  - Assess pain using appropriate pain scale  - Administer analgesics based on type and severity of pain and evaluate response  - Implement non-pharmacological measures as appropriate and evaluate response  - Consider cultural and social influences on pain and pain management  - Manage/alleviate anxiety  - Utilize distraction and/or relaxation techniques  - Monitor for opioid side effects  - Notify MD/LIP if interventions unsuccessful or patient reports new pain  - Anticipate increased pain with  activity and pre-medicate as appropriate  3/21/2024 1709 by Jakub Gray RN  Outcome: Not Progressing  3/21/2024 1709 by Jakub Gray RN  Outcome: Progressing     Problem: SAFETY ADULT - FALL  Goal: Free from fall injury  Description: INTERVENTIONS:  - Assess pt frequently for physical needs  - Identify cognitive and physical deficits and behaviors that affect risk of falls.  - [place] fall precautions as indicated by assessment.  - Educate pt/family on patient safety including physical limitations  - Instruct pt to call for assistance with activity based on assessment  - Modify environment to reduce risk of injury  - Provide assistive devices as appropriate  - Consider OT/PT consult to assist with strengthening/mobility  - Encourage toileting schedule  3/21/2024 1709 by Jakub Gray RN  Outcome: Not Progressing  3/21/2024 1709 by Jakub Gray RN  Outcome: Progressing     Problem: DISCHARGE PLANNING  Goal: Discharge to home or other facility with appropriate resources  Description: INTERVENTIONS:  - Identify barriers to discharge w/pt and caregiver  - Include patient/family/discharge partner in discharge planning  - Arrange for needed discharge resources and transportation as appropriate  - Identify discharge learning needs (meds, wound care, etc)  - Arrange for interpreters to assist at discharge as needed  - Consider post-discharge preferences of patient/family/discharge partner  - Complete POLST form as appropriate  - Assess patient's ability to be responsible for managing their own health  - Refer to Case Management Department for coordinating discharge planning if the patient needs post-hospital services based on physician/LIP order or complex needs related to functional status, cognitive ability or social support system  3/21/2024 1709 by Jakub Gray, RN  Outcome: Not Progressing  3/21/2024 1709 by Jakub Gray RN  Outcome: Progressing     Problem: GASTROINTESTINAL - ADULT  Goal: Minimal or absence of nausea and  vomiting  Description: INTERVENTIONS:  - Maintain adequate hydration with IV or PO as ordered and tolerated  - Nasogastric tube to low intermittent suction as ordered  - Evaluate effectiveness of ordered antiemetic medications  - Provide nonpharmacologic comfort measures as appropriate  - Advance diet as tolerated, if ordered  - Obtain nutritional consult as needed  - Evaluate fluid balance  3/21/2024 1709 by Jakub Gray RN  Outcome: Not Progressing  3/21/2024 1709 by Jakub Gray RN  Outcome: Progressing  Goal: Maintains or returns to baseline bowel function  Description: INTERVENTIONS:  - Assess bowel function  - Maintain adequate hydration with IV or PO as ordered and tolerated  - Evaluate effectiveness of GI medications  - Encourage mobilization and activity  - Obtain nutritional consult as needed  - Establish a toileting routine/schedule  - Consider collaborating with pharmacy to review patient's medication profile  3/21/2024 1709 by Jakub Gray RN  Outcome: Not Progressing  3/21/2024 1709 by Jakub Gray RN  Outcome: Progressing     Alert and oriented x4. Reports mild-moderate abdominal gas pain. Denies medication. NG to LIS, minimal clear outuput. SBFT this morning, per surgery plans for exp lap tomorrow. Consent signed in chart. IVF infusing, to discontinue once PPN to start tonight. Standby assist with ADLS. Discharge plans pending medical/surgical clearance

## 2024-03-21 NOTE — PROGRESS NOTES
[facility]  part of [facility]    General Surgery Progress Note  Mayte Lucas  CSN:[id number]  HD# 2       Subjective:   Complains of  less crampy abdominal pain and nausea  Passing flatus and BM(s) post LGI, still bloated some     Exam:     General: awake and alert, in no acute distress, and comfortable  Pulmonary:lungs clear to auscultation bilaterally  Cardiac: RRR, nl S1,S2, no S3, no S4, and no murmur  Abdomen: nontender and moderately distended, NGT nonbilious  Extremities: calves nontender, no edema, SCD's on, motor intact, and sensory intact    Assessment and Plan:   SBO (small bowel obstruction) (HCC)   Recurrent partial SBO     Cont IVF, NGT to LIS  Gastrograffin LGI showed no colon obstruction  SBFT ongoing but showing contrast in small bowel without progression into colon at 90 minutes    The patient will need exploratory laparotomy, lysis of adhesions, possible bowel resection in AM if obstruction persistent as it looks so far on SBFT study.  The patient and her  understood the indications, details and potential risks and complications including bleeding, infection, sepsis, poor healing, prolonged ileus, return to operating room, DVT/PE, etc. The patient and her  consented to the procedure      I will follow    Objective:     Vitals:    03/20/24 1241 03/20/24 1954 03/20/24 2157 03/21/24 0448   BP: 145/69 133/65 155/75 144/64   Pulse: 71 70 81 78   Resp: 16 18  18   Temp: 98.3 °F (36.8 °C) 98.7 °F (37.1 °C)  98.8 °F (37.1 °C)   TempSrc: Oral Oral  Oral   SpO2: 100% 93% 97% 97%   Weight:       Height:         Body mass index is 17.82 kg/m².       Intake/Output Summary (Last 24 hours) at 3/21/2024 1230  Last data filed at 3/21/2024 0923  Gross per 24 hour   Intake --   Output 1275 ml   Net -1275 ml       No results found.    Results:     Recent Labs   Lab 03/19/24  1249 03/20/24  0713 03/21/24  0615   RBC 3.98 3.69* 3.69*   HGB 11.2* 10.5* 10.4*   HCT 34.5* 31.3* 32.0*    MCV 86.7 84.8 86.7   MCH 28.1 28.5 28.2   MCHC 32.5 33.5 32.5   RDW 14.7 14.6 14.6   NEPRELIM 8.17*  --   --    WBC 9.2 9.5 5.7   .0 397.0 330.0       Recent Labs   Lab 03/19/24  1249 03/20/24  0712 03/21/24  0614   * 145* 95   BUN 17 12 7*   CREATSERUM 0.78 0.64 0.58   CA 10.0 8.9 9.0    133* 134*   K 4.9 4.3 4.2    104 105   CO2 26.0 26.0 26.0       Lab Results   Component Value Date    WBC 5.7 03/21/2024    HGB 10.4 03/21/2024    HCT 32.0 03/21/2024    .0 03/21/2024     03/21/2024    K 4.2 03/21/2024     03/21/2024    CO2 26.0 03/21/2024    BUN 7 03/21/2024    CREATSERUM 0.58 03/21/2024    GLU 95 03/21/2024    MG 1.7 03/21/2024    PHOS 3.2 03/21/2024    CA 9.0 03/21/2024            Yvan Griggs MD [facility]  03/21/24  12:35 PM

## 2024-03-21 NOTE — PAYOR COMM NOTE
--------------  CONTINUED STAY REVIEW    Payor: [employer]  Subscriber #:  [id number]  Authorization Number: [id number]    Admit date: 3/19/24  Admit time:  6:19 PM    Admitting Physician: Daniel Manuel DO  Attending Physician:  Daniel Manuel DO  Primary Care Physician: Abi Ohara MD    REVIEW DOCUMENTATION:   3-20-24     Complains of  crampy abdominal pain and nausea  Passing no flatus or BM      Exam:      General: awake and alert, in no acute distress, and comfortable  Pulmonary:lungs clear to auscultation bilaterally  Cardiac: RRR, nl S1,S2, no S3, no S4, and no murmur  Abdomen: nontender and moderately distended, NGT nonbilious  Extremities: calves nontender, no edema, SCD's on, motor intact, and sensory intact     Assessment and Plan:   SBO (small bowel obstruction) (HCC)   Recurrent partial SBO     Cont IVF, NGT to LIS  Gastrograffin LGI today  SBFT in AM  Cont to treat conservatively but surgery may be necessary if sbo high grade     I will follow     Objective:             Vitals:     03/19/24 1848 03/19/24 2025 03/19/24 2129 03/20/24 0420   BP:   (!) 119/107 159/64 151/69   Pulse:   70 70 71   Resp:   18   18   Temp:   98.9 °F (37.2 °C)   98.8 °F (37.1 °C)   TempSrc:   Oral   Oral   SpO2:   96% 94% 95%   Weight: 103 lb 12.8 oz (47.1 kg)           Lab Results   Component Value Date     WBC 9.5 03/20/2024     HGB 10.5 03/20/2024     HCT 31.3 03/20/2024     .0 03/20/2024      03/20/2024     K 4.3 03/20/2024      03/20/2024     CO2 26.0 03/20/2024     BUN 12 03/20/2024     CREATSERUM 0.64 03/20/2024      03/20/2024     MG 1.7 03/20/2024     PHOS 2.9 03/20/2024     PROCEDURE: XR COLON, SINGLE CONTRAST   CONCLUSION:   1. Markedly redundant colon with scattered colonic diverticulosis involving the sigmoid region.   2. No colonic obstruction or constricting lesions..    3. Small amount of contrast refluxed into the distal ileum.   4. Superimposed background of persistent  marked small bowel distention suggest distal small-bowel obstruction.   5. Moderate residual fecal material throughout the colon limiting evaluation for colonic polypoid lesions              MEDICATIONS ADMINISTERED IN LAST 1 DAY:  clonazePAM (KLONOPIN) disintegrating tab 0.25 mg       Date Action Dose Route User    3/20/2024 2204 Given 0.25 mg Oral Avelina Matthews RN          heparin (Porcine) 5000 UNIT/ML injection 5,000 Units       Date Action Dose Route User    3/21/2024 0503 Given 5,000 Units Subcutaneous (Right Upper Arm) Avelina Matthews RN    3/20/2024 2158 Given 5,000 Units Subcutaneous (Right Upper Arm) Avelina Matthews RN    3/20/2024 1432 Given 5,000 Units Subcutaneous (Right Upper Arm) Jakub Gray RN          potassium chloride 20 mEq in dextrose 5%-sodium chloride 0.45% 1000mL infusion premix       Date Action Dose Route User    3/21/2024 0047 New Bag (none) Intravenous Avelina Matthews RN    3/20/2024 1432 New Bag (none) Intravenous Jakub Gray RN          lansoprazole (Prevacid Solutab) disintegrating tab 30 mg       Date Action Dose Route User    3/21/2024 0503 Given 30 mg Oral Avelina Matthews RN          magnesium oxide (Mag-Ox) tab 400 mg       Date Action Dose Route User    3/20/2024 0955 Given 400 mg Oral Jakub Gray RN          metoclopramide (Reglan) 5 mg/mL injection 5 mg       Date Action Dose Route User    3/20/2024 0939 Given 5 mg Intravenous Jakub Gray RN          metoprolol succinate (Toprol XL) partial tablet 12.5 mg       Date Action Dose Route User    3/20/2024 2158 Given 12.5 mg Oral Avelina Matthews RN          morphINE PF 2 MG/ML injection 2 mg       Date Action Dose Route User    3/20/2024 1035 Given 2 mg Intravenous Jakub Gray RN          morphINE PF 2 MG/ML injection 2 mg       Date Action Dose Route User    3/21/2024 0046 Given 2 mg Intravenous Avelina Matthews RN    3/20/2024 1546 Given 2 mg Intravenous Jakub Gray RN          ondansetron (Zofran) 4  MG/2ML injection 4 mg       Date Action Dose Route User    3/20/2024 1546 Given 4 mg Intravenous Jakub Gray, TL          OXcarbazepine (Trileptal) tab 300 mg       Date Action Dose Route User    3/20/2024 2158 Given 300 mg Oral Avelina Matthews RN    3/20/2024 0939 Given 300 mg Oral Jakub Gray RN            Vitals (last day)       Date/Time Temp Pulse Resp BP SpO2 Weight O2 Device O2 Flow Rate (L/min) [facility]    03/21/24 0448 98.8 °F (37.1 °C) 78 18 144/64 97 % -- None (Room air) -- CM    03/20/24 2157 -- 81 -- 155/75 97 % -- None (Room air) -- CM    03/20/24 1954 98.7 °F (37.1 °C) 70 18 133/65 93 % -- None (Room air) -- JH    03/20/24 1241 98.3 °F (36.8 °C) 71 16 145/69 100 % -- None (Room air) -- AK    03/20/24 0420 98.8 °F (37.1 °C) 71 18 151/69 95 % -- None (Room air) -- AQ

## 2024-03-21 NOTE — DIETARY NOTE
ADULT NUTRITION INITIAL ASSESSMENT    Pt is at high nutrition risk.  Pt meets severe malnutrition criteria.      CRITERIA FOR MALNUTRITION DIAGNOSIS:  Criteria for severe malnutrition diagnosis: acute illness/injury related to body fat moderate depletion and muscle mass moderate depletion.    RECOMMENDATIONS TO MD: See Nutrition Intervention for PPN specifics     ADMITTING DIAGNOSIS:  SBO (small bowel obstruction) ([facility]) [K56.609]    PERTINENT PAST MEDICAL HISTORY:  has a past medical history of Acute exacerbation of chronic obstructive pulmonary disease (COPD) ([facility]) (4/17/2017), ALLERGIC RHINITIS, and OTHER DISEASES.     PATIENT STATUS: Initial 03/21/24: chart review due to low BMI. Pt known from recent last 2 admissions (2/6/24 and 3/6/24) and pt was on TPN during 2/6/24 admission. Pt presents this admission with recurrent abdominal pain associated with nausea. CT in ED revealed SBO. NGT in place. Pt down for SBFT today. Surgeon reports that surgery may be necessary. Therefore discussed with MD's initiating nutrition support in the interim while plans being decided.  Discussed with  and he doubted she would decline initiation, therefore PPN ordered after receiving MD consult to place order.       FOOD/NUTRITION RELATED HISTORY:  Appetite:  per  good up until onset of symptoms Monday (3 days ago)  Intake: NPO  Intake Meeting Needs:  PPN to meet needs as able--some limitations with PPN formula to meet nutritional needs.    Percent Meals Eaten (last 6 days)       None           Food Allergies: No Known Food Allergies (NKFA)  Cultural/Ethnic/Confucianist Preferences: Not Obtained    GASTROINTESTINAL: NGT to LIS and down for SBFT today.      MEDICATIONS: reviewed IVF provides 340 dextrose calories and 40 meq of K+   dextrose 10%      adult 3 in 1 TPN      potassium chloride in dextrose 5%-sodium chloride 0.45% 83 mL/hr at 03/21/24 0047        lansoprazole  30 mg Oral QAM AC    metoprolol succinate  12.5 mg  Oral Nightly    heparin  5,000 Units Subcutaneous Q8H [facility]    OXcarbazepine  300 mg Oral BID       LABS: reviewed  Recent Labs     03/19/24  1249 03/20/24  0712 03/21/24  0614   * 145* 95   BUN 17 12 7*   CREATSERUM 0.78 0.64 0.58   CA 10.0 8.9 9.0   MG  --  1.7 1.7    133* 134*   K 4.9 4.3 4.2    104 105   CO2 26.0 26.0 26.0   PHOS  --  2.9 3.2   OSMOCALC 286 278 276       NUTRITION RELATED PHYSICAL FINDINGS:  - Nutrition Focused Physical Exam (NFPE): taken from recent admission given that pt is off the unit at this time--moderate depletion body fat triceps region, moderate depletion muscle mass clavicle region, scapular region, and shoulder region, and severe depletion muscle mass temple region   - Fluid Accumulation: none  see RN documentation for details  - Skin Integrity: intact see RN documentation for details    ANTHROPOMETRICS:  HT: 162.6 cm (5' 4\")  WT: 47.1 kg (103 lb 12.8 oz)   BMI: Body mass index is 17.82 kg/m².  BMI CLASSIFICATION: <22 considered underweight for advanced age  IBW: 120 lbs        86% IBW  Usual Body Wt: 100-105 lbs for the past few yrs, high of 110# in 2018      100% UBW    WEIGHT HISTORY:  Patient Weight(s) for the past 336 hrs:   Weight   03/19/24 1848 47.1 kg (103 lb 12.8 oz)   03/19/24 0950 44.5 kg (98 lb)     Wt Readings from Last 10 Encounters:   03/19/24 47.1 kg (103 lb 12.8 oz)   03/05/24 45.8 kg (100 lb 14.4 oz)   02/03/24 47.2 kg (104 lb)   01/04/22 47.6 kg (105 lb)   12/10/21 47.2 kg (104 lb)   12/01/21 48.5 kg (107 lb)   08/05/21 47.3 kg (104 lb 4 oz)   06/24/21 46.7 kg (103 lb)   06/10/21 47.2 kg (104 lb)   10/15/20 47.6 kg (105 lb)       NUTRITION DIAGNOSIS/PROBLEM:    Severe Malnutrition related to Acute - Physiological causes resulting in anorexia or diminished intake in the setting of SBO as evidenced by no po intake for the past 3 days and moderate muscle and fat deficits.      NUTRITION INTERVENTION:     NUTRITION PRESCRIPTION:   Estimated Nutrition  needs: --dosing wt of 47 kg - wt taken on 3/19  Calories: 2678-3538 calories/day (30-35 calories per kg Dosing wt)  Protein: 61-71 g protein/day (1.3-1.5 g protein/kg Dosing wt)  Fluid Needs: 0868-4823 1ml/jim     - Diet:       Procedures    NPO      - RD Malnutrition Care Plan:  PN to meet >80% of needs while GI tract not available.   - Parenteral Nutrition: via peripheral line:  1600 ml volume, 58 g protein, 935 non protein calories (270 calories from dextrose and 665 calories from fat).  Provides 1166 total calories with 58 g protein to meet 82% min calorie and 95% min protein based on estimated nutritional needs.  - Meals and snacks: NPO  - Medical Food Supplements-RD added NPO--pt has declined in the past. Rational/use of oral supplements discussed.  - Vitamin and mineral supplements: none  - Feeding assistance: NPO  - Nutrition education: assess education needs has had low fiber diet ed a few times before.    - Coordination of nutrition care: collaboration with other providers  - Discharge and transfer of nutrition care to new setting or provider: to be determined    MONITOR AND EVALUATE/NUTRITION GOALS:  - Food and Nutrient Intake:      Monitor: for PO initiation  - Food and Nutrient Administration:      Monitor:  PPN initiate, PPN tolerance--change to TPN if unable to initiate po diet in next 3-4 days.    - Anthropometric Measurement:    Monitor weight  - Nutrition Goals:      gradual wt gain as able, TPN to meet greater than 80% nutrition needs, labs within acceptable limits, support body systems, and improved GI status    DIETITIAN FOLLOW UP: RD to follow and monitor nutrition status/Manage PPN/TPN daily.        Katie Oneill RD, LDN   Clinical Dietitian [id number]

## 2024-03-22 ENCOUNTER — APPOINTMENT (OUTPATIENT)
Dept: GENERAL RADIOLOGY | Facility: HOSPITAL | Age: 79
End: 2024-03-22
Attending: NURSE PRACTITIONER
Payer: MEDICARE

## 2024-03-22 ENCOUNTER — ANESTHESIA (OUTPATIENT)
Dept: SURGERY | Facility: HOSPITAL | Age: 79
End: 2024-03-22
Payer: MEDICARE

## 2024-03-22 ENCOUNTER — ANESTHESIA EVENT (OUTPATIENT)
Dept: SURGERY | Facility: HOSPITAL | Age: 79
End: 2024-03-22
Payer: MEDICARE

## 2024-03-22 ENCOUNTER — APPOINTMENT (OUTPATIENT)
Dept: PICC SERVICES | Facility: HOSPITAL | Age: 79
End: 2024-03-22
Attending: NURSE PRACTITIONER
Payer: MEDICARE

## 2024-03-22 LAB
ALBUMIN SERPL-MCNC: 3.9 G/DL (ref 3.2–4.8)
ALBUMIN/GLOB SERPL: 2.1 {RATIO} (ref 1–2)
ALP LIVER SERPL-CCNC: 82 U/L
ALT SERPL-CCNC: <7 U/L
ANION GAP SERPL CALC-SCNC: 3 MMOL/L (ref 0–18)
ANTIBODY SCREEN: NEGATIVE
APTT PPP: 31.8 SECONDS (ref 23.3–35.6)
AST SERPL-CCNC: 9 U/L (ref ?–34)
BILIRUB SERPL-MCNC: 0.3 MG/DL (ref 0.2–1.1)
BUN BLD-MCNC: 7 MG/DL (ref 9–23)
BUN/CREAT SERPL: 12.1 (ref 10–20)
CALCIUM BLD-MCNC: 9.2 MG/DL (ref 8.7–10.4)
CHLORIDE SERPL-SCNC: 105 MMOL/L (ref 98–112)
CO2 SERPL-SCNC: 28 MMOL/L (ref 21–32)
CREAT BLD-MCNC: 0.58 MG/DL
DEPRECATED RDW RBC AUTO: 45.8 FL (ref 35.1–46.3)
EGFRCR SERPLBLD CKD-EPI 2021: 91 ML/MIN/1.73M2 (ref 60–?)
ERYTHROCYTE [DISTWIDTH] IN BLOOD BY AUTOMATED COUNT: 14.5 % (ref 11–15)
GLOBULIN PLAS-MCNC: 1.9 G/DL (ref 2.8–4.4)
GLUCOSE BLD-MCNC: 99 MG/DL (ref 70–99)
GLUCOSE BLDC GLUCOMTR-MCNC: 102 MG/DL (ref 70–99)
GLUCOSE BLDC GLUCOMTR-MCNC: 103 MG/DL (ref 70–99)
GLUCOSE BLDC GLUCOMTR-MCNC: 111 MG/DL (ref 70–99)
GLUCOSE BLDC GLUCOMTR-MCNC: 126 MG/DL (ref 70–99)
HCT VFR BLD AUTO: 26.3 %
HGB BLD-MCNC: 8.7 G/DL
INR BLD: 1.11 (ref 0.8–1.2)
MAGNESIUM SERPL-MCNC: 1.9 MG/DL (ref 1.6–2.6)
MCH RBC QN AUTO: 28.3 PG (ref 26–34)
MCHC RBC AUTO-ENTMCNC: 33.1 G/DL (ref 31–37)
MCV RBC AUTO: 85.7 FL
OSMOLALITY SERPL CALC.SUM OF ELEC: 280 MOSM/KG (ref 275–295)
PHOSPHATE SERPL-MCNC: 3.7 MG/DL (ref 2.4–5.1)
PLATELET # BLD AUTO: 261 10(3)UL (ref 150–450)
POTASSIUM SERPL-SCNC: 3.6 MMOL/L (ref 3.5–5.1)
PROT SERPL-MCNC: 5.8 G/DL (ref 5.7–8.2)
PROTHROMBIN TIME: 15 SECONDS (ref 11.6–14.8)
RBC # BLD AUTO: 3.07 X10(6)UL
RH BLOOD TYPE: POSITIVE
SODIUM SERPL-SCNC: 136 MMOL/L (ref 136–145)
TRIGL SERPL-MCNC: 82 MG/DL (ref 30–149)
WBC # BLD AUTO: 7.8 X10(3) UL (ref 4–11)

## 2024-03-22 PROCEDURE — 83735 ASSAY OF MAGNESIUM: CPT | Performed by: NURSE PRACTITIONER

## 2024-03-22 PROCEDURE — 3E0436Z INTRODUCTION OF NUTRITIONAL SUBSTANCE INTO CENTRAL VEIN, PERCUTANEOUS APPROACH: ICD-10-PCS | Performed by: INTERNAL MEDICINE

## 2024-03-22 PROCEDURE — 85027 COMPLETE CBC AUTOMATED: CPT | Performed by: INTERNAL MEDICINE

## 2024-03-22 PROCEDURE — 71045 X-RAY EXAM CHEST 1 VIEW: CPT | Performed by: NURSE PRACTITIONER

## 2024-03-22 PROCEDURE — 94799 UNLISTED PULMONARY SVC/PX: CPT

## 2024-03-22 PROCEDURE — 82962 GLUCOSE BLOOD TEST: CPT

## 2024-03-22 PROCEDURE — 84478 ASSAY OF TRIGLYCERIDES: CPT | Performed by: NURSE PRACTITIONER

## 2024-03-22 PROCEDURE — 86900 BLOOD TYPING SEROLOGIC ABO: CPT | Performed by: SURGERY

## 2024-03-22 PROCEDURE — 84100 ASSAY OF PHOSPHORUS: CPT | Performed by: NURSE PRACTITIONER

## 2024-03-22 PROCEDURE — 02HV33Z INSERTION OF INFUSION DEVICE INTO SUPERIOR VENA CAVA, PERCUTANEOUS APPROACH: ICD-10-PCS | Performed by: INTERNAL MEDICINE

## 2024-03-22 PROCEDURE — 85730 THROMBOPLASTIN TIME PARTIAL: CPT | Performed by: SURGERY

## 2024-03-22 PROCEDURE — 85610 PROTHROMBIN TIME: CPT | Performed by: SURGERY

## 2024-03-22 PROCEDURE — 88305 TISSUE EXAM BY PATHOLOGIST: CPT | Performed by: SURGERY

## 2024-03-22 PROCEDURE — 86901 BLOOD TYPING SEROLOGIC RH(D): CPT | Performed by: SURGERY

## 2024-03-22 PROCEDURE — 80053 COMPREHEN METABOLIC PANEL: CPT | Performed by: NURSE PRACTITIONER

## 2024-03-22 PROCEDURE — 86850 RBC ANTIBODY SCREEN: CPT | Performed by: SURGERY

## 2024-03-22 PROCEDURE — 0DN80ZZ RELEASE SMALL INTESTINE, OPEN APPROACH: ICD-10-PCS | Performed by: SURGERY

## 2024-03-22 PROCEDURE — 36569 INSJ PICC 5 YR+ W/O IMAGING: CPT

## 2024-03-22 RX ORDER — SODIUM CHLORIDE, SODIUM LACTATE, POTASSIUM CHLORIDE, CALCIUM CHLORIDE 600; 310; 30; 20 MG/100ML; MG/100ML; MG/100ML; MG/100ML
INJECTION, SOLUTION INTRAVENOUS CONTINUOUS
Status: DISCONTINUED | OUTPATIENT
Start: 2024-03-22 | End: 2024-03-22 | Stop reason: HOSPADM

## 2024-03-22 RX ORDER — HYDROMORPHONE HYDROCHLORIDE 1 MG/ML
0.2 INJECTION, SOLUTION INTRAMUSCULAR; INTRAVENOUS; SUBCUTANEOUS EVERY 2 HOUR PRN
Status: DISCONTINUED | OUTPATIENT
Start: 2024-03-22 | End: 2024-03-22

## 2024-03-22 RX ORDER — MORPHINE SULFATE 2 MG/ML
2 INJECTION, SOLUTION INTRAMUSCULAR; INTRAVENOUS EVERY 10 MIN PRN
Status: DISCONTINUED | OUTPATIENT
Start: 2024-03-22 | End: 2024-03-22 | Stop reason: HOSPADM

## 2024-03-22 RX ORDER — HYDROMORPHONE HYDROCHLORIDE 1 MG/ML
0.2 INJECTION, SOLUTION INTRAMUSCULAR; INTRAVENOUS; SUBCUTANEOUS EVERY 5 MIN PRN
Status: DISCONTINUED | OUTPATIENT
Start: 2024-03-22 | End: 2024-03-22 | Stop reason: HOSPADM

## 2024-03-22 RX ORDER — MORPHINE SULFATE 4 MG/ML
4 INJECTION, SOLUTION INTRAMUSCULAR; INTRAVENOUS EVERY 10 MIN PRN
Status: DISCONTINUED | OUTPATIENT
Start: 2024-03-22 | End: 2024-03-22 | Stop reason: HOSPADM

## 2024-03-22 RX ORDER — HYDROMORPHONE HYDROCHLORIDE 1 MG/ML
0.6 INJECTION, SOLUTION INTRAMUSCULAR; INTRAVENOUS; SUBCUTANEOUS EVERY 5 MIN PRN
Status: DISCONTINUED | OUTPATIENT
Start: 2024-03-22 | End: 2024-03-22 | Stop reason: HOSPADM

## 2024-03-22 RX ORDER — METOPROLOL TARTRATE 1 MG/ML
2.5 INJECTION, SOLUTION INTRAVENOUS ONCE
Status: DISCONTINUED | OUTPATIENT
Start: 2024-03-22 | End: 2024-03-22 | Stop reason: HOSPADM

## 2024-03-22 RX ORDER — HYDROMORPHONE HYDROCHLORIDE 1 MG/ML
0.4 INJECTION, SOLUTION INTRAMUSCULAR; INTRAVENOUS; SUBCUTANEOUS EVERY 5 MIN PRN
Status: DISCONTINUED | OUTPATIENT
Start: 2024-03-22 | End: 2024-03-22 | Stop reason: HOSPADM

## 2024-03-22 RX ORDER — MORPHINE SULFATE 2 MG/ML
0.5 INJECTION, SOLUTION INTRAMUSCULAR; INTRAVENOUS EVERY 2 HOUR PRN
Status: DISCONTINUED | OUTPATIENT
Start: 2024-03-22 | End: 2024-03-23

## 2024-03-22 RX ORDER — HYDROMORPHONE HYDROCHLORIDE 1 MG/ML
0.4 INJECTION, SOLUTION INTRAMUSCULAR; INTRAVENOUS; SUBCUTANEOUS EVERY 2 HOUR PRN
Status: DISCONTINUED | OUTPATIENT
Start: 2024-03-22 | End: 2024-03-22

## 2024-03-22 RX ORDER — ROCURONIUM BROMIDE 10 MG/ML
INJECTION, SOLUTION INTRAVENOUS AS NEEDED
Status: DISCONTINUED | OUTPATIENT
Start: 2024-03-22 | End: 2024-03-22 | Stop reason: SURG

## 2024-03-22 RX ORDER — MORPHINE SULFATE 2 MG/ML
1 INJECTION, SOLUTION INTRAMUSCULAR; INTRAVENOUS EVERY 2 HOUR PRN
Status: DISCONTINUED | OUTPATIENT
Start: 2024-03-22 | End: 2024-03-23

## 2024-03-22 RX ORDER — METOCLOPRAMIDE HYDROCHLORIDE 5 MG/ML
10 INJECTION INTRAMUSCULAR; INTRAVENOUS EVERY 8 HOURS PRN
Status: DISCONTINUED | OUTPATIENT
Start: 2024-03-22 | End: 2024-03-22 | Stop reason: HOSPADM

## 2024-03-22 RX ORDER — HEPARIN SODIUM 5000 [USP'U]/ML
5000 INJECTION, SOLUTION INTRAVENOUS; SUBCUTANEOUS EVERY 8 HOURS SCHEDULED
Status: DISCONTINUED | OUTPATIENT
Start: 2024-03-23 | End: 2024-03-29

## 2024-03-22 RX ORDER — SODIUM CHLORIDE, SODIUM LACTATE, POTASSIUM CHLORIDE, CALCIUM CHLORIDE 600; 310; 30; 20 MG/100ML; MG/100ML; MG/100ML; MG/100ML
INJECTION, SOLUTION INTRAVENOUS CONTINUOUS PRN
Status: DISCONTINUED | OUTPATIENT
Start: 2024-03-22 | End: 2024-03-22 | Stop reason: SURG

## 2024-03-22 RX ORDER — LIDOCAINE HYDROCHLORIDE 10 MG/ML
5 INJECTION, SOLUTION EPIDURAL; INFILTRATION; INTRACAUDAL; PERINEURAL
Status: COMPLETED | OUTPATIENT
Start: 2024-03-22 | End: 2024-03-22

## 2024-03-22 RX ORDER — BUPIVACAINE HYDROCHLORIDE AND EPINEPHRINE 5; 5 MG/ML; UG/ML
INJECTION, SOLUTION PERINEURAL AS NEEDED
Status: DISCONTINUED | OUTPATIENT
Start: 2024-03-22 | End: 2024-03-22

## 2024-03-22 RX ORDER — NALOXONE HYDROCHLORIDE 0.4 MG/ML
80 INJECTION, SOLUTION INTRAMUSCULAR; INTRAVENOUS; SUBCUTANEOUS AS NEEDED
Status: DISCONTINUED | OUTPATIENT
Start: 2024-03-22 | End: 2024-03-22 | Stop reason: HOSPADM

## 2024-03-22 RX ORDER — ONDANSETRON 2 MG/ML
INJECTION INTRAMUSCULAR; INTRAVENOUS AS NEEDED
Status: DISCONTINUED | OUTPATIENT
Start: 2024-03-22 | End: 2024-03-22 | Stop reason: SURG

## 2024-03-22 RX ORDER — MORPHINE SULFATE 10 MG/ML
6 INJECTION, SOLUTION INTRAMUSCULAR; INTRAVENOUS EVERY 10 MIN PRN
Status: DISCONTINUED | OUTPATIENT
Start: 2024-03-22 | End: 2024-03-22 | Stop reason: HOSPADM

## 2024-03-22 RX ORDER — ONDANSETRON 2 MG/ML
4 INJECTION INTRAMUSCULAR; INTRAVENOUS EVERY 6 HOURS PRN
Status: DISCONTINUED | OUTPATIENT
Start: 2024-03-22 | End: 2024-03-22 | Stop reason: HOSPADM

## 2024-03-22 RX ADMIN — ONDANSETRON 4 MG: 2 INJECTION INTRAMUSCULAR; INTRAVENOUS at 16:29:00

## 2024-03-22 RX ADMIN — ROCURONIUM BROMIDE 40 MG: 10 INJECTION, SOLUTION INTRAVENOUS at 15:31:00

## 2024-03-22 RX ADMIN — SODIUM CHLORIDE, SODIUM LACTATE, POTASSIUM CHLORIDE, CALCIUM CHLORIDE: 600; 310; 30; 20 INJECTION, SOLUTION INTRAVENOUS at 16:44:00

## 2024-03-22 RX ADMIN — SODIUM CHLORIDE, SODIUM LACTATE, POTASSIUM CHLORIDE, CALCIUM CHLORIDE: 600; 310; 30; 20 INJECTION, SOLUTION INTRAVENOUS at 15:33:00

## 2024-03-22 NOTE — PAYOR COMM NOTE
--------------  CONTINUED STAY REVIEW    Payor: [employer]  Subscriber #:  [id number]  Authorization Number: [id number]    Admit date: 3/19/24  Admit time:  6:19 PM    Admitting Physician: Daniel Manuel DO  Attending Physician:  Rebecca Boyce MD  Primary Care Physician: Abi Ohara MD    REVIEW DOCUMENTATION:    3-21-24     Complains of  less crampy abdominal pain and nausea  Passing flatus and BM(s) post LGI, still bloated some      Exam:      General: awake and alert, in no acute distress, and comfortable  Pulmonary:lungs clear to auscultation bilaterally  Cardiac: RRR, nl S1,S2, no S3, no S4, and no murmur  Abdomen: nontender and moderately distended, NGT nonbilious  Extremities: calves nontender, no edema, SCD's on, motor intact, and sensory intact     Assessment and Plan:   SBO (small bowel obstruction) (HCC)   Recurrent partial SBO     Cont IVF, NGT to LIS  Gastrograffin LGI showed no colon obstruction  SBFT ongoing but showing contrast in small bowel without progression into colon at 90 minutes     The patient will need exploratory laparotomy, lysis of adhesions, possible bowel resection in AM if obstruction persistent as it looks so far on SBFT study.  The patient and her  understood the indications, details and potential risks and complications including bleeding, infection, sepsis, poor healing, prolonged ileus, return to operating room, DVT/PE, etc. The patient and her  consented to the procedure    XR SMALL BOWEL SINGLE CONTRAST (CPT=74250)     Result Date: 3/21/2024  CONCLUSION:         1. Moderate grade partial small bowel obstruction involving the ileum with transition zone in the pelvis.  Decompressed distal/terminal ileum.  Barium reaches the colon within 3 hours     Dictated by (CST): Andres Driver MD on 3/21/2024 at 2:59 PM     Finalized by (CST): Andres Driver MD on 3/21/2024 at 3:09 PM       REVIEW DOCUMENTATION:    3-22-24     Assessment and Plan:  Ms.  Lance is a 79 year old female with PMH sig for SVT, RLS, COPD, chronic pain, HLD, IgA deficiency, was hospitalized from 2/3 - 2/17 for acute cecal volvulus, s/p right hemicolectomy on 2/3 per general surgery, and another hospital stay for partial SBO 3/5-3/8 treated with NGT and conservative management.  Presents today with recurrent abdominal pain associated with nausea starting about midnight last night she states she has been having gas and passing stool however she been able to eat today as she feels very nauseous.  In the emergency room repeat CT scan again revealed small bowel obstruction with transition point the left lower abdomen, S/P NGT however pt did not improve and plans for Exp. Lap, possible lysis of adhesions, possible bowel resection today. 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 Normal wbc. LA normal   -imaging noted  -prn pain med  -IVF, NPO  -NGT to LIS  -entereg planned post op   -PPN, transition to TPN, picc line ordered   -OR today for Exp Lap, possible lysis of adhesions possible SB resection   -as per surgery      Low Grade Temp  -tmax 100.5. WBC normal  -c/o cough, CXR negative for acute process   -no urinary symptoms  -had loose stools after contrast with SBFT  -blood cx pending     Mild Hyponatremia-resolved      Expected postoperative anemia  -baseline hgb ~12, post surgery hgb ~7-10, admission hgb 11.2 now 10.4-->8.7  -continue to trend post op      Paroxysmal supraventricular tachycardia  -continue home metoprolol, may need to change to IV based on course     GERD   -continue home PPI     RLS  -continue home trileptal      QUE  - PRN benzo at home      OP  -resume home meds at discharge     GOC  -full code      MA/SARAH Reach  -Re- Entry: admission 2/3-2/17 and 3/5-3/8  -Consults: surgery   -Discharge Needs:  none  -Appointments: [ ] PCP Abi Ohara MD  PCP       ZAKIYA  -gianluca in am  -diet-NPO, TPN     Prophy  -SCD  -heparin held for OR      Dispo  -pending clinical coarse    GENERAL: no apparent distress  NEURO: A/A Ox3  HEENT: NGT   RESP: non labored, CTA  CARDIO: Regular  ABD: less distended,  with mild palpation   EXTREMITIES: no edema     DIAGNOSTIC DATA:   Labs:           Recent Labs   Lab 03/21/24  0615 03/22/24  0529   WBC 5.7 7.8   HGB 10.4* 8.7*   MCV 86.7 85.7   .0 261.0   INR  --  1.11              Recent Labs   Lab 03/21/24  0614 03/22/24  0529   * 136   K 4.2 3.6    105   CO2 26.0 28.0   BUN 7* 7*   CREATSERUM 0.58 0.58   CA 9.0 9.2   MG 1.7 1.9   PHOS 3.2 3.7   GLU 95 99      CXR        Findings and impression:       Stable heart, which may be mildly enlarged.  No edema      Hyperinflated lungs with symmetric upper lobe pleural thickening and pulmonary scarring and volume loss      No pulmonary infection       MEDICATIONS ADMINISTERED IN LAST 1 DAY:  acetaminophen (Ofirmev) 10 mg/mL infusion premix 700 mg       Date Action Dose Route User    3/22/2024 0606 New Bag 700 mg Intravenous Avelina Matthews RN    3/21/2024 2156 New Bag 700 mg Intravenous Avelina Matthews RN          adult 3 in 1 TPN 66.7 ML HR        Date Action Dose Route User    3/21/2024 2221 New Bag (none) Intravenous Avelina Matthews RN          clonazePAM (KLONOPIN) disintegrating tab 0.25 mg       Date Action Dose Route User    3/21/2024 2006 Given 0.25 mg Oral Avelina Matthews RN          potassium chloride 20 mEq in dextrose 5%-sodium chloride 0.45% 1000mL infusion premix       Date Action Dose Route User    3/21/2024 1420 New Bag (none) Intravenous Jakub Gray RN          lansoprazole (Prevacid Solutab) disintegrating tab 30 mg       Date Action Dose Route User    3/22/2024 0605 Given 30 mg Oral Avelina Matthews RN          metoprolol succinate (Toprol XL) partial tablet 12.5 mg       Date  Action Dose Route User    3/21/2024 2006 Given 12.5 mg Oral Avelina Matthews, RN          OXcarbazepine (Trileptal) tab 300 mg       Date Action Dose Route User    3/22/2024 0842 Given 300 mg Oral Winter Richardson, RN    3/21/2024 2006 Given 300 mg Oral Avelina Matthews, RN            Vitals (last day)       Date/Time Temp Pulse Resp BP SpO2 Weight O2 Device O2 Flow Rate (L/min) [facility]    03/22/24 0844 98.1 °F (36.7 °C) -- -- -- -- -- -- -- JORGE    03/22/24 0552 -- 77 18 141/65 95 % -- None (Room air) -- SR    03/22/24 0552 100.5 °F (38.1 °C) -- -- -- -- -- -- -- CM    03/21/24 2228 99.7 °F (37.6 °C) -- -- -- -- -- -- -- CM    03/21/24 1947 -- 100 18 139/76 96 % -- None (Room air) -- TI    03/21/24 1947 100.5 °F (38.1 °C) -- -- -- -- -- -- -- CM    03/21/24 0448 98.8 °F (37.1 °C) 78 18 144/64 97 % -- None (Room air) -- CM

## 2024-03-22 NOTE — CDS QUERY
How to Answer this Query    1.) Click \"Edit Button\" on the toolbar  2.) Type an \"X\" in the bracket for the diagnosis that applies. (You may also add additional clinical details as you feel necessary to substantiate your response).  3.) Finally click \"Sign\" to complete response.    Thank you       Dear Doctor Olivares:  Clinical information suggests potential for impaired nutritional status.    ( X )  Severe Protein-Calorie Malnutrition      ( )  Other (please specify):               Clinical Indicators: Dietary evaluation:  Pt meets severe malnutrition criteria per [place] criteria.    Nutrition Focused Physical Exam (NFPE): taken from recent admission given that pt is off the unit at this time--moderate depletion body fat triceps region, moderate depletion muscle mass clavicle region, scapular region, and shoulder region, and severe depletion muscle mass temple region     ANTHROPOMETRICS:  HT: 162.6 cm (5' 4\")  WT: 47.1 kg (103 lb 12.8 oz)   BMI: Body mass index is 17.82 kg/m².  BMI CLASSIFICATION: <22 considered underweight for advanced age  IBW: 120 lbs        86% IBW  Usual Body Wt: 100-105 lbs for the past few yrs, high of 110# in 2018      100% UBW    RD Malnutrition Care Plan:  PN to meet >80% of needs while GI tract not available.   - Parenteral Nutrition: via peripheral line:     Treatment:    MONITOR AND EVALUATE/NUTRITION GOALS:  - Food and Nutrient Intake:      Monitor: for PO initiation  - Food and Nutrient Administration:      Monitor:  PPN initiate, PPN tolerance--change to TPN if unable to initiate po diet in next 3-4 days.    - Anthropometric Measurement:    Monitor weight  - Nutrition Goals:      gradual wt gain as able, TPN to meet greater than 80% nutrition needs, labs within acceptable limits, support body systems, and improved GI status     DIETITIAN FOLLOW UP: RD to follow and monitor nutrition status/Manage PPN/TPN daily.      Risk factors: Small bowel obstruction, COPD  If you have any  questions, please contact Clinical :  Doris Woodruff RN  CCDS at [phone]     Thank You!    THIS FORM IS A PERMANENT PART OF THE MEDICAL RECORD

## 2024-03-22 NOTE — PROGRESS NOTES
[facility]  part of [facility]    General Surgery Progress Note  Mayte Lucas  CSN:[id number]  HD# 3       Subjective:   Complains of  less crampy abdominal pain and nausea  Passing flatus and BM(s), still bloated some     Exam:     General: awake and alert, in no acute distress, and comfortable  Pulmonary:lungs clear to auscultation bilaterally  Cardiac: RRR, nl S1,S2, no S3, no S4, and no murmur  Abdomen: nontender and moderately distended, NGT nonbilious  Extremities: calves nontender, no edema, SCD's on, motor intact, and sensory intact    Assessment and Plan:   SBO (small bowel obstruction) (HCC)   Recurrent partial SBO     Cont IVF, NGT to LIS  Gastrograffin LGI showed no colon obstruction  SBFT ongoing but showed partial SBO with contrast into right colon at 3 hours    The patient will need exploratory laparotomy, lysis of adhesions, possible bowel resection since partial obstruction has been persistent as it looks on SBFT.  The patient and her  understood the indications, details and potential risks and complications including bleeding, infection, sepsis, poor healing, prolonged ileus, return to operating room, DVT/PE, etc. The patient and her  consented to the procedure    PICC line inserted, TPN to start tonight  I will follow    Objective:     Vitals:    03/21/24 2228 03/22/24 0552 03/22/24 0844 03/22/24 1425   BP:  141/65  135/53   Pulse:  77  87   Resp:  18  18   Temp: 99.7 °F (37.6 °C) (!) 100.5 °F (38.1 °C) 98.1 °F (36.7 °C) 99.9 °F (37.7 °C)   TempSrc: Oral Oral Oral Oral   SpO2:  95%  98%   Weight:       Height:         Body mass index is 17.82 kg/m².       Intake/Output Summary (Last 24 hours) at 3/22/2024 1514  Last data filed at 3/22/2024 1300  Gross per 24 hour   Intake 0 ml   Output 2700 ml   Net -2700 ml       XR CHEST AP PORTABLE  (CPT=71045)    Result Date: 3/22/2024  PROCEDURE: XR CHEST AP PORTABLE  (CPT=71045) TIME: 949  COMPARISON: [facility]  [facility], XR CHEST AP PORTABLE (CPT=71045), 3/19/2024, 3:57 PM.  INDICATIONS: Cough and fever today.  TECHNIQUE:   Single view.   Findings and impression:   Stable heart, which may be mildly enlarged.  No edema  Hyperinflated lungs with symmetric upper lobe pleural thickening and pulmonary scarring and volume loss  No pulmonary infection  No pneumothorax or significant effusion Dictated by (CST): Macho Garcia MD on 3/22/2024 at 10:09 AM     Finalized by (CST): Macho Garcia MD on 3/22/2024 at 10:11 AM           Results:     Recent Labs   Lab 03/19/24  1249 03/20/24  0713 03/21/24  0615 03/22/24  0529   RBC 3.98 3.69* 3.69* 3.07*   HGB 11.2* 10.5* 10.4* 8.7*   HCT 34.5* 31.3* 32.0* 26.3*   MCV 86.7 84.8 86.7 85.7   MCH 28.1 28.5 28.2 28.3   MCHC 32.5 33.5 32.5 33.1   RDW 14.7 14.6 14.6 14.5   NEPRELIM 8.17*  --   --   --    WBC 9.2 9.5 5.7 7.8   .0 397.0 330.0 261.0       Recent Labs   Lab 03/20/24  0712 03/21/24  0614 03/22/24  0529   * 95 99   BUN 12 7* 7*   CREATSERUM 0.64 0.58 0.58   CA 8.9 9.0 9.2   * 134* 136   K 4.3 4.2 3.6    105 105   CO2 26.0 26.0 28.0       Lab Results   Component Value Date    WBC 7.8 03/22/2024    HGB 8.7 03/22/2024    HCT 26.3 03/22/2024    .0 03/22/2024     03/22/2024    K 3.6 03/22/2024     03/22/2024    CO2 28.0 03/22/2024    BUN 7 03/22/2024    CREATSERUM 0.58 03/22/2024    GLU 99 03/22/2024    MG 1.9 03/22/2024    PHOS 3.7 03/22/2024    BILT 0.3 03/22/2024    AST 9 03/22/2024    ALT <7 03/22/2024    INR 1.11 03/22/2024    PTT 31.8 03/22/2024    CA 9.2 03/22/2024    ALB 3.9 03/22/2024    TP 5.8 03/22/2024            Yvan Griggs MD [facility]  03/22/24  3:16 PM

## 2024-03-22 NOTE — PROGRESS NOTES
[facility] AND CARE   Progress Note  -  Mayte Lucas Patient Status:  Inpatient    1945 MRN [id number]   Location [facility] 4W/SW/SE Attending Daniel Manuel, DO   Hosp Day # 3 PCP Abi Ohara MD     PCP: Abi Ohara MD      SEE ATTENDING NOTE AT BOTTOM OF PAGE    Is this a shared or split note between Advanced Practice Provider and Physician? Yes    Assessment and Plan:  Ms. Lucas is a 79 year old female with PMH sig for SVT, RLS, COPD, chronic pain, HLD, IgA deficiency, was hospitalized from 2/3 -  for acute cecal volvulus, s/p right hemicolectomy on 2/3 per general surgery, and another hospital stay for partial SBO 3/5-3/8 treated with NGT and conservative management.  Presents today with recurrent abdominal pain associated with nausea starting about midnight last night she states she has been having gas and passing stool however she been able to eat today as she feels very nauseous.  In the emergency room repeat CT scan again revealed small bowel obstruction with transition point the left lower abdomen, S/P NGT however pt did not improve and plans for Exp. Lap, possible lysis of adhesions, possible bowel resection today. 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 Normal wbc. LA normal   -imaging noted  -prn pain med  -IVF, NPO  -NGT to LIS  -entereg planned post op   -PPN, transition to TPN, picc line ordered   -OR today for Exp Lap, possible lysis of adhesions possible SB resection   -as per surgery     Low Grade Temp  -tmax 100.5. WBC normal  -c/o cough, CXR negative for acute process   -no urinary symptoms  -had loose stools after contrast with SBFT  -blood cx pending    Mild Hyponatremia-resolved      Expected postoperative anemia  -baseline hgb ~12, post surgery hgb ~7-10, admission hgb 11.2 now 10.4-->8.7  -continue to  trend post op      Paroxysmal supraventricular tachycardia  -continue home metoprolol, may need to change to IV based on course     GERD   -continue home PPI     RLS  -continue home trileptal      QUE  - PRN benzo at home     OP  -resume home meds at discharge    GOC  -full code     MA/SARAH Reach  -Re- Entry: admission 2/3-2/17 and 3/5-3/8  -Consults: surgery   -Discharge Needs: none  -Appointments: [ ] PCP Abi Ohara MD  PCP      ZAKIYA  -gianluca in am  -diet-NPO, TPN    Prophy  -SCD  -heparin held for OR     Dispo  -pending clinical coarse  PCP: Abi Ohara MD      Concerns regarding plan of care were discussed with patient. Patient agrees with plan as detailed above. Discussed plan of care with      Note: This chart was prepared using voice recognition software and may contain unintended word substitution errors.      Lucretia Tom RN, NP   [facility] Hospitalist Team  Contact via [employer] (Check Availability)    SUBJECTIVE:   No family at BS. Throat hurts. Coughing at night which she tells me happens when she has NGT. Having low grade temp of 100.5. denies dysuria or frequency. Had loose stools after contrast     OBJECTIVE:   Blood pressure 141/65, pulse 77, temperature 98.1 °F (36.7 °C), temperature source Oral, resp. rate 18, height 5' 4\" (1.626 m), weight 103 lb 12.8 oz (47.1 kg), SpO2 95%, not currently breastfeeding.    GENERAL: no apparent distress  NEURO: A/A Ox3  HEENT: NGT   RESP: non labored, CTA  CARDIO: Regular  ABD: less distended,  with mild palpation   EXTREMITIES: no edema    DIAGNOSTIC DATA:   Labs:     Recent Labs   Lab 03/19/24  1249 03/20/24  0713 03/21/24  0615 03/22/24  0529   WBC 9.2 9.5 5.7 7.8   HGB 11.2* 10.5* 10.4* 8.7*   MCV 86.7 84.8 86.7 85.7   .0 397.0 330.0 261.0   INR  --   --   --  1.11       Recent Labs   Lab 03/19/24  1249 03/20/24  0712 03/21/24  0614 03/22/24  0529    133* 134* 136   K 4.9 4.3 4.2 3.6     104 105 105   CO2 26.0 26.0 26.0 28.0   BUN 17 12 7* 7*   CREATSERUM 0.78 0.64 0.58 0.58   CA 10.0 8.9 9.0 9.2   MG  --  1.7 1.7 1.9   PHOS  --  2.9 3.2 3.7   * 145* 95 99       Recent Labs   Lab 03/19/24  1249 03/22/24  0529   ALT <7* <7*   AST 16 9   ALB 4.9* 3.9       Recent Labs   Lab 03/21/24  2358 03/22/24  0552   PGLU 126* 102*       No results for input(s): \"TROP\" in the last 168 hours.        MEDICATIONS       meropenem  500 mg Intravenous On Call to OR    alvimopan  12 mg Oral BID    lansoprazole  30 mg Oral QAM AC    metoprolol succinate  12.5 mg Oral Nightly    [Held by provider] heparin  5,000 Units Subcutaneous Q8H SUSIE    OXcarbazepine  300 mg Oral BID      dextrose 10%      adult 3 in 1 TPN 66.7 mL/hr at 03/21/24 2221     dextrose 10%, acetaminophen, morphINE, acetaminophen, acetaminophen **OR** HYDROcodone-acetaminophen **OR** HYDROcodone-acetaminophen, polyethylene glycol (PEG 3350), sennosides, bisacodyl, fleet enema, metoclopramide, ondansetron, clonazePAM    Lucretia Tom, JAYME      IMAGING     XR SMALL BOWEL SINGLE CONTRAST (CPT=74250)    Result Date: 3/21/2024  CONCLUSION:  1. Moderate grade partial small bowel obstruction involving the ileum with transition zone in the pelvis.  Decompressed distal/terminal ileum.  Barium reaches the colon within 3 hours     Dictated by (CST): Andres Driver MD on 3/21/2024 at 2:59 PM     Finalized by (CST): Andres Driver MD on 3/21/2024 at 3:09 PM          XR COLON SINGLE CONTRAST (CPT=74270)    Result Date: 3/20/2024  CONCLUSION:  1. Markedly redundant colon with scattered colonic diverticulosis involving the sigmoid region. 2. No colonic obstruction or constricting lesions..  3. Small amount of contrast refluxed into the distal ileum. 4. Superimposed background of persistent marked small bowel distention suggest distal small-bowel obstruction. 5. Moderate residual fecal material throughout the colon limiting evaluation for colonic polypoid  lesions   Dictated by (CST): Andres Driver MD on 3/20/2024 at 12:07 PM     Finalized by (CST): Andres Driver MD on 3/20/2024 at 12:15 PM          XR CHEST AP PORTABLE  (CPT=71045)    Result Date: 3/19/2024   The esophagogastric tube distal tip and side port overlie the gastric bubble in good radiographic position.  No new abnormality.      Dictated by (CST): Luther Ruffin MD on 3/19/2024 at 4:38 PM     Finalized by (CST): Luther Ruffin MD on 3/19/2024 at 4:40 PM          XR CHEST AP PORTABLE  (CPT=71045)    Result Date: 3/19/2024  CONCLUSION:   Enteric tube coils within the proximal esophagus and terminates within the proximal esophagus.  Recommend repositioning/replacement.   The above findings were discussed with Maty BAUTISTA at 3:49 p.m. on 03/19/2024.   Dictated by (CST): Daron Rinaldi MD on 3/19/2024 at 3:47 PM     Finalized by (CST): Daron Rinaldi MD on 3/19/2024 at 3:53 PM          CT ABDOMEN+PELVIS(CONTRAST ONLY)(CPT=74177)    Result Date: 3/19/2024  CONCLUSION:   Small bowel obstruction with a transition point within the left lower abdomen.  The small bowel dilation has progressed since 3/5/2024.  Multiple small bowel loops within the left lower quadrant demonstrate areas of dusky and diminished enhancement suspicious for small bowel ischemia.  Correlation with lactate suggested.  No pneumatosis or pneumoperitoneum seen at this point.  Nonobstructing left lower pole caliceal calculus.  Multiple other incidental findings as described in the body of the report which are unchanged.  This report was communicated by telephone to Dr. Rutledge in the emergency room on 3/19/2024 at 1406 hours.        Dictated by (CST): Jay James MD on 3/19/2024 at 1:56 PM     Finalized by (CST): Jay James MD on 3/19/2024 at 2:07 PM             SEE ATTENDING NOTE BELOW:     Patient seen and examined independently.  Discussed with APN and agree with note above.    S:  Got picc line, low grade temps but no localizing  sx and so far work up neg. low risk for bacteremia.  Will continue to monitor but if PICC line not placed today would not build to get any nutrition through PICC line until Monday.  Discussed with patient if blood cultures are positive left hold into antibiotics and full workup.  No current nausea vomiting.  No chest pain or shortness of breath.    Objective:  /65 (BP Location: Right arm)   Pulse 77   Temp 98.1 °F (36.7 °C) (Oral)   Resp 18   Ht 5' 4\" (1.626 m)   Wt 103 lb 12.8 oz (47.1 kg)   SpO2 95%   BMI 17.82 kg/m²     Gen: No acute distress, alert and oriented x3, NGT  Neck Supple, no JVD  Pulm: Lungs clear, normal respiratory effort, No wheezing or crackles  CV: Heart with regular rate and rhythm, No murmurs, rubs, gallops  Abd: Abdomen soft,hypoactive BS  MSK:  no clubbing, no cyanosis.  No Lower extremity edema  Skin: no rashes or lesions, well perfused  Psych: mood stable, cooperative  Neuro: no focal deficits    Assessment and Plan  Ms. Lucas is a 79 year old female with PMH sig for SVT, RLS, COPD, chronic pain, HLD, IgA deficiency, was hospitalized from 2/3 - 2/17 for acute cecal volvulus, s/p right hemicolectomy on 2/3 per general surgery, and another hospital stay for partial SBO 3/5-3/8 treated with NGT and conservative management.  Presents today with recurrent abdominal pain associated with nausea starting about midnight last night she states she has been having gas and passing stool however she been able to eat today as she feels very nauseous.  In the emergency room repeat CT scan again revealed small bowel obstruction with transition point the left lower abdomen, S/P NGT however pt did not improve and plans for Exp. Lap, possible lysis of adhesions, possible bowel resection today. 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  Normal wbc. LA normal   -imaging noted  -prn pain med  -IVF, NPO  -NGT to LIS  -entereg planned post op   -PPN, transition to TPN, picc line ordered   -OR today for Exp Lap, possible lysis of adhesions possible SB resection   -as per surgery      Low Grade Temp  -tmax 100.5. WBC normal  -c/o cough, CXR negative for acute process   -no urinary symptoms  -had loose stools after contrast with SBFT  -blood cx pending    Rest as above with above    Rebecca Boyce MD

## 2024-03-22 NOTE — DIETARY NOTE
Brief Nutrition Note:     3/22/24 UPDATE: surgical plans today. PICC line placed for TPN vs PPN due to now prolonged need. Nutritional assessment available dated 3/21/24--refer to for details. TPN ordered as follows:     Medication Rate Frequency Start End   adult 3 in 1 TPN 66.7 mL/hr Continuous TPN 3/22/2024 2100 3/23/2024 2059   Infusion Site:   Central     Route:   Intravenous     Volume:   1,600 mL     Admin Duration:   24 Hours     Order #:   [id number]      Dose   Amino Acids Components   amino acid infusion (Plenamine) 15 % 70 g   Dextrose Components   dextrose 70% 600 kcal   Lipids Components   fat emulsion (SMOFlipid - fish oil/plant based) 20 % 500 kcal   QS Base Components   sterile water 531.2739 mL   Electrolytes Components   potassium chloride 2 mEq/mL 65 mEq   sodium chloride 4 MEQ/ mEq   sodium phosphate 3 mmol/mL 25 mmol   calcium gluconate 10 % 5 mEq   magnesium sulfate 50 % 20 mEq   Additives Components   multivitamin adult 10 mL   thiamine 100 mg/mL 100 mg   trace minerals Cu-Mn-Se-Zn 999-12- MCG/ML 1 mL          RD to manage TPN daily.      Katie Oneill RD, LDN   Clinical Dietitian [id number]

## 2024-03-22 NOTE — ANESTHESIA POSTPROCEDURE EVALUATION
Patient: Mayte Lucas    Procedure Summary       Date: 03/22/24 Room / Location: [facility] MAIN OR  / [facility] MAIN OR    Anesthesia Start: 1522 Anesthesia Stop:     Procedure: Exploratory laparotomy lysis of adhesions (Abdomen) Diagnosis: (Bowel obstruction)    Surgeons: Yvan Griggs MD Anesthesiologist: Jose Raul Fitzpatrick MD    Anesthesia Type: general ASA Status: 2            Anesthesia Type: general    Vitals Value Taken Time   /67 03/22/24 1656   Temp 97.3 °F (36.3 °C) 03/22/24 1656   Pulse 83 03/22/24 1656   Resp 17 03/22/24 1656   SpO2 100 % 03/22/24 1656   Vitals shown include unfiled device data.    EM AN Post Evaluation:   Patient Evaluated in PACU  Patient Participation: complete - patient participated  Level of Consciousness: sleepy but conscious  Pain Score: 2  Pain Management: adequate  Airway Patency:patent  Dental exam unchanged from preop  Yes    Nausea/Vomiting: minimal  Cardiovascular Status: blood pressure returned to baseline  Respiratory Status: nasal cannula  Postoperative Hydration acceptable      Josefina Mcclain CRNA  3/22/2024 4:57 PM

## 2024-03-22 NOTE — ANESTHESIA PROCEDURE NOTES
Airway  Date/Time: 3/22/2024 3:31 PM  Urgency: Elective    Airway not difficult    General Information and Staff    Patient location during procedure: OR  Anesthesiologist: Jose Raul Fitzpatrick MD  Performed: anesthesiologist   Performed by: Jose Raul Fitzpatrick MD  Authorized by: Jose Raul Fitzpatrick MD      Indications and Patient Condition  Indications for airway management: anesthesia  Sedation level: deep  Preoxygenated: yes  Patient position: sniffing  Mask difficulty assessment: 1 - vent by mask    Final Airway Details  Final airway type: endotracheal airway      Successful airway: ETT  Cuffed: yes   Successful intubation technique: Video laryngoscopy  Endotracheal tube insertion site: oral    Placement verified by: capnometry   Measured from: teeth  Number of attempts at approach: 1

## 2024-03-22 NOTE — ANESTHESIA PREPROCEDURE EVALUATION
Anesthesia PreOp Note    HPI:     Mayte Lucas is a 79 year old female who presents for preoperative consultation requested by: Yvan Griggs MD    Date of Surgery: 3/19/2024 - 3/22/2024    Procedure(s):  Exploratory laparotomy, possible lysis of adhesions, possible bowel resection  Indication: Bowel obstruction    Relevant Problems   No relevant active problems       NPO:  Last Liquid Consumption Date: 03/22/24  Last Liquid Consumption Time: 0800 (sips with med)  Last Solid Consumption Date: 03/18/24  Last Solid Consumption Time: 0000  Last Liquid Consumption Date: 03/22/24          History Review:  Patient Active Problem List    Diagnosis Date Noted    Anemia 03/19/2024    Azotemia 03/19/2024    SBO (small bowel obstruction) ([facility]) 03/19/2024    Abdominal pain, acute 03/05/2024    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[facility]) 02/03/2024    Chronic fatigue 07/11/2021    Mood insomnia ([facility]) 06/10/2021    [place] nodes ([facility] hand) 06/10/2021    Weight loss 06/10/2021    Mixed simple and mucopurulent chronic bronchitis ([facility]) 03/05/2020    Supraventricular tachycardia 03/05/2020    Anemia, chronic disease 09/25/2018    Neuropathy 09/24/2018    Iron deficiency anemia due to chronic blood loss 06/28/2018    Essential tremor 05/22/2018    Dyslipidemia, goal LDL below 160 04/20/2018    Age-related osteoporosis without current pathological fracture 04/17/2018    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    Aortic atherosclerosis ([facility]) 04/16/2018    Hand arthritis 04/17/2017    Pseudophakia of both eyes 03/22/2016    RLS (restless legs syndrome) 12/17/2013       Past Medical History:   Diagnosis Date    Acute exacerbation of chronic obstructive pulmonary disease (COPD) ([facility]) 4/17/2017    ALLERGIC RHINITIS     OTHER DISEASES     TMJ       Past Surgical History:   Procedure Laterality Date    ABDOMINAL SURGERY  02/03/2024    Exploratory laparotomy, right hemicolectomy, omentoplasty of anastomosis    CATARACT      COLONOSCOPY  02/12/2013     Procedure: COLONOSCOPY, POSSIBLE BIOPSY, POSSIBLE POLYPECTOMY 15662;  Surgeon: Jakub Barr MD;  Location: [facility]    PATIENT DOCUMENTED NOT TO HAVE EXPERIENCED ANY OF THE FOLLOWING EVENTS  12/12/2012    Procedure: LEFT PHACOEMULSIFICATION OF CATARACT WITH INTRAOCULAR LENS IMPLANT [id number];  Surgeon: Siddhartha Vinson MD;  Location: [facility]    PATIENT DOCUMENTED NOT TO HAVE EXPERIENCED ANY OF THE FOLLOWING EVENTS  11/28/2012    Procedure: RIGHT PHACOEMULSIFICATION OF CATARACT WITH INTRAOCULAR LENS IMPLANT [id number];  Surgeon: Siddhartha Vinson MD;  Location: [facility]    PATIENT DOCUMENTED NOT TO HAVE EXPERIENCED ANY OF THE FOLLOWING EVENTS  02/12/2013    Procedure: COLONOSCOPY, POSSIBLE BIOPSY, POSSIBLE POLYPECTOMY 84888;  Surgeon: Jakub Barr MD;  Location: [facility]    PATIENT WITHOUGH PREOPERATIVE ORDER FOR IV ANTIBIOTIC SURGICAL SITE INFECTION PROPHYLAXIS.  12/12/2012    Procedure: LEFT PHACOEMULSIFICATION OF CATARACT WITH INTRAOCULAR LENS IMPLANT [id number];  Surgeon: Siddhartha Vinson MD;  Location: [facility]    PATIENT WITHOUGH PREOPERATIVE ORDER FOR IV ANTIBIOTIC SURGICAL SITE INFECTION PROPHYLAXIS.  11/28/2012    Procedure: RIGHT PHACOEMULSIFICATION OF CATARACT WITH INTRAOCULAR LENS IMPLANT [id number];  Surgeon: Siddhartha Vinson MD;  Location: [facility]    PATIENT WITHOUGH PREOPERATIVE ORDER FOR IV ANTIBIOTIC SURGICAL SITE INFECTION PROPHYLAXIS.  02/12/2013    Procedure: COLONOSCOPY, POSSIBLE BIOPSY, POSSIBLE POLYPECTOMY 95225;  Surgeon: Jakub Barr MD;  Location: [facility]    REMV CATARACT EXTRACAP,INSERT LENS  12/12/2012    Procedure: LEFT PHACOEMULSIFICATION OF CATARACT WITH INTRAOCULAR LENS IMPLANT [id number];  Surgeon: Siddhartha Vinson MD;  Location: [facility]    REMV CATARACT EXTRACAP,INSERT LENS  11/28/2012    Procedure: RIGHT PHACOEMULSIFICATION OF CATARACT WITH INTRAOCULAR LENS IMPLANT  [id number];  Surgeon: Siddhartha Vinson MD;  Location: [facility] [place]    TONSILLECTOMY         Medications Prior to Admission   Medication Sig Dispense Refill Last Dose    alendronate 70 MG Oral Tab Take 1 tablet (70 mg total) by mouth once a week.       docusate sodium 100 MG Oral Cap Take 1 capsule (100 mg total) by mouth 2 (two) times daily.       omega-3 fatty acids 1000 MG Oral Cap Take 1,000 mg by mouth daily.       ondansetron 4 MG Oral Tablet Dispersible Take 1 tablet (4 mg total) by mouth 2 (two) times daily as needed for Nausea.       simethicone 80 MG Oral Chew Tab Chew 1 tablet (80 mg total) by mouth 3 (three) times daily. 90 tablet 0     lansoprazole 30 MG Oral Tablet Delayed Release Dispersible Take 1 tablet (30 mg total) by mouth every morning before breakfast. 30 tablet 0     aspirin (ASPIRIN LOW DOSE) 81 MG Oral Tab EC Take 1 tablet (81 mg total) by mouth daily.       metoprolol succinate 12.5 mg Oral Tab Take 1 Partial Tablet (12.5 mg total) by mouth nightly.       ergocalciferol 1.25 MG (48942 UT) Oral Cap Take 1 capsule (50,000 Units total) by mouth every 14 (fourteen) days.       clonazePAM 0.5 MG Oral Tab Take 1 tablet (0.5 mg total) by mouth nightly. (Patient taking differently: Take 0.5 tablets (0.25 mg total) by mouth nightly as needed for Anxiety.) 90 tablet 0     OXcarbazepine 150 MG Oral Tab Take 2 tablets (300 mg total) by mouth 2 (two) times daily. 360 tablet 3     Denosumab 60 MG/ML Subcutaneous Solution Prefilled Syringe Inject 1 mL (60 mg total) into the skin once. Historical documentation - see Epic Immunization Activity for administration details 1 mL 0      Current Facility-Administered Medications Ordered in Epic   Medication Dose Route Frequency Provider Last Rate Last Admin    adult 3 in 1 TPN   Intravenous Continuous TPN Lucretia Tom NP        dextrose 10% infusion (TPN no rate)   Intravenous Continuous PRN Lucretia Tom NP        adult 3 in 1 TPN   Intravenous  Continuous TPN Lucretia Tom NP 66.7 mL/hr at 03/21/24 2221 New Bag at 03/21/24 2221    meropenem (Merrem) 500 mg in sodium chloride 0.9% 100 mL IVPB-MBP  500 mg Intravenous On Call to OR Yvan Griggs MD        [MAR Hold] alvimopan (ENTEREG) capsule 12 mg  12 mg Oral BID Yvan Griggs MD        [MAR Hold] acetaminophen (Ofirmev) 10 mg/mL infusion premix 700 mg  15 mg/kg Intravenous Q6H PRN Lucretia Tom  mL/hr at 03/22/24 0606 700 mg at 03/22/24 0606    [MAR Hold] morphINE PF 2 MG/ML injection 2 mg  2 mg Intravenous Q3H PRN Lucretia Tom NP   2 mg at 03/21/24 0046    [MAR Hold] lansoprazole (Prevacid Solutab) disintegrating tab 30 mg  30 mg Oral QAM AC Long, Daniel, DO   30 mg at 03/22/24 0605    [MAR Hold] metoprolol succinate (Toprol XL) partial tablet 12.5 mg  12.5 mg Oral Nightly Long, Daniel, DO   12.5 mg at 03/21/24 2006    [Held by provider] heparin (Porcine) 5000 UNIT/ML injection 5,000 Units  5,000 Units Subcutaneous Q8H SUSIE Long, Daniel, DO   5,000 Units at 03/21/24 0503    [MAR Hold] acetaminophen (Tylenol Extra Strength) tab 500 mg  500 mg Oral Q4H PRN Long, Daniel, DO        [MAR Hold] acetaminophen (Tylenol) tab 650 mg  650 mg Oral Q4H PRN Long, Daniel, DO        Or    [MAR Hold] HYDROcodone-acetaminophen (Norco) 5-325 MG per tab 1 tablet  1 tablet Oral Q4H PRN Long, Daniel, DO   1 tablet at 03/19/24 1841    Or    [MAR Hold] HYDROcodone-acetaminophen (Norco) 5-325 MG per tab 2 tablet  2 tablet Oral Q4H PRN Long, Daniel, DO   2 tablet at 03/20/24 0426    [MAR Hold] polyethylene glycol (PEG 3350) (Miralax) 17 g oral packet 17 g  17 g Oral Daily PRN Long, Daniel, DO        [MAR Hold] sennosides (Senokot) tab 17.2 mg  17.2 mg Oral Nightly PRN Daniel Manuel DO        [MAR Hold] bisacodyl (Dulcolax) 10 MG rectal suppository 10 mg  10 mg Rectal Daily PRN Kaleb, DO Daniel        [MAR Hold] fleet enema (Fleet) 7-19 GM/118ML rectal enema 133 mL  1 enema Rectal Once PRN Long,  DO Daniel        [MAR Hold] metoclopramide (Reglan) 5 mg/mL injection 5 mg  5 mg Intravenous Q8H PRN Daniel Manuel DO        [MAR Hold] ondansetron (Zofran) 4 MG/2ML injection 4 mg  4 mg Intravenous Q4H PRN Yvan Griggs MD   4 mg at 03/20/24 1546    [MAR Hold] OXcarbazepine (Trileptal) tab 300 mg  300 mg Oral BID Rebecca Boyce MD   300 mg at 03/22/24 0842    [MAR Hold] clonazePAM (KLONOPIN) disintegrating tab 0.25 mg  0.25 mg Oral Nightly PRN Rebecca Boyce MD   0.25 mg at 03/21/24 2006     No current Epic-ordered outpatient medications on file.       Allergies   Allergen Reactions    Bactrim [Sulfamethoxazole W/Trimethoprim] FEVER     X 4 days    Bicillin L-A      unknown    Mucinex Coughing    Fentanyl NAUSEA ONLY       Family History   Problem Relation Age of Onset    Other (Other) Father         PD    Other (Other) Sister         cramps in body    Cancer Sister      Social History     Socioeconomic History    Marital status:    Tobacco Use    Smoking status: Never    Smokeless tobacco: Never   Substance and Sexual Activity    Alcohol use: Yes     Alcohol/week: 1.0 standard drink of alcohol     Types: 1 Standard drinks or equivalent per week     Comment: occasionally    Drug use: No       Available pre-op labs reviewed.  Lab Results   Component Value Date    WBC 7.8 03/22/2024    RBC 3.07 (L) 03/22/2024    HGB 8.7 (L) 03/22/2024    HCT 26.3 (L) 03/22/2024    MCV 85.7 03/22/2024    MCH 28.3 03/22/2024    MCHC 33.1 03/22/2024    RDW 14.5 03/22/2024    .0 03/22/2024     Lab Results   Component Value Date     03/22/2024    K 3.6 03/22/2024     03/22/2024    CO2 28.0 03/22/2024    BUN 7 (L) 03/22/2024    CREATSERUM 0.58 03/22/2024    GLU 99 03/22/2024    PGLU 111 (H) 03/22/2024    CA 9.2 03/22/2024     Lab Results   Component Value Date    INR 1.11 03/22/2024       Vital Signs:  Body mass index is 17.82 kg/m².   height is 1.626 m (5' 4\") and weight is 47.1 kg (103 lb 12.8 oz).  Her oral temperature is 99.9 °F (37.7 °C). Her blood pressure is 135/53 and her pulse is 87. Her respiration is 18 and oxygen saturation is 98%.   Vitals:    03/21/24 2228 03/22/24 0552 03/22/24 0844 03/22/24 1425   BP:  141/65  135/53   Pulse:  77  87   Resp:  18  18   Temp: 99.7 °F (37.6 °C) (!) 100.5 °F (38.1 °C) 98.1 °F (36.7 °C) 99.9 °F (37.7 °C)   TempSrc: Oral Oral Oral Oral   SpO2:  95%  98%   Weight:       Height:            Anesthesia Evaluation     Patient summary reviewed and Nursing notes reviewed    Airway   Mallampati: I  Dental      Pulmonary - negative ROS and normal exam   Cardiovascular - normal exam  Exercise tolerance: good    NYHA Classification: I    Neuro/Psych - negative ROS     GI/Hepatic/Renal - negative ROS     Endo/Other - negative ROS   Abdominal                  Anesthesia Plan:   ASA:  2  Plan:   General  Airway:  ETT  Post-op Pain Management: IV analgesics      I have informed Mayte Carterrett and/or legal guardian or family member of the nature of the anesthetic plan, benefits, risks including possible dental damage if relevant, major complications, and any alternative forms of anesthetic management.   All of the patient's questions were answered to the best of my ability. The patient desires the anesthetic management as planned.  CECILE HERNANDEZ MD  3/22/2024 2:43 PM  Present on Admission:   Anemia   Azotemia

## 2024-03-22 NOTE — OPERATIVE REPORT
[facility] PRE OP RECOVERY OPERATIVE REPORT:     PATIENT NAME: Mayte Lucas  : 1945   MRN: [id number]    DATE OF OPERATION:   24    PREOPERATIVE DIAGNOSIS: Recurrent Partial Small bowel obstruction     POSTOPERATIVE DIAGNOSIS: Same     PROCEDURE PERFORMED: Exploratory laparotomy, lysis of adhesions, release of small bowel obstruction    SURGEON:  Yvan Griggs MD    ASST: Rebecca Ernst CSA (Assistant helped position patient and helped with positioning, intraoperative retraction, suturing, wound closure, etc)    ANESTHESIA: General anesthesia     ESTIMATED BLOOD LOSS:   10 ml     The patient and her  understood the indications, details, potential risks and complications of the procedure including bleeding, infection, hematoma, leak, sepsis, DVT/PE, return to the operating room, etc. The patient and her  consented to the procedure    The patient was brought to the operating room and was induced under anesthesia. The abdomen was prepped and draped in the usual sterile fashion. Ioban was utilized.  A longitudinal incision was made and dissection was carried down to the fascia which was incised carefully.   Findings included adhesions at undersurface of fascia were taken down carefully. There were 3 thick adhesions forming internal hernias with no bowel within them but they could have caused intermittent obstructions. The previous anastomosis was normal and widely patent.  Lysis of adhesions was performed throughout very carefully. No enterotomies were formed.  Small bowel was run in its entirety and returned back in the abdomen in a normal stepwise fashion. Sepra film was placed between the small bowel and the transverse colon and another sheet placed between the fascia and the transverse colon and diminutive omentum.  The fascia was closed using 0 PDS looped with interrupted #1 Vicryl sutures.  Then the wound was copiously irrigated with saline and careful hemostasis was obtained.   Then, 4-0 Vicryl subcuticular suture was used to close the skin. Steri-Strips and sterile Tegaderm dressing was placed. The patient tolerated the procedure well and went to recovery room in stable condition.    Yvan Griggs MD

## 2024-03-22 NOTE — PLAN OF CARE
Patient is alert and oriented. Room air. NPO, sips with meds. NG to LIS. +belch/+flatus/+BM. PPN started overnight. Q6 accuchecks. Voiding. Ambulated in halls. Patient had fevers overnight. Notified MD. See orders. Gave prn IV tylenol. Call light and personal belongings within reach. Safety precautions in place. Plan for surgery today.     Problem: Patient Centered Care  Goal: Patient preferences are identified and integrated in the patient's plan of care  Description: Interventions:  - Provide timely, complete, and accurate information to patient/family  - Incorporate patient and family knowledge, values, beliefs, and cultural backgrounds into the planning and delivery of care  - Encourage patient/family to participate in care and decision-making at the level they choose  - Honor patient and family perspectives and choices  Outcome: Progressing     Problem: PAIN - ADULT  Goal: Verbalizes/displays adequate comfort level or patient's stated pain goal  Description: INTERVENTIONS:  - Encourage pt to monitor pain and request assistance  - Assess pain using appropriate pain scale  - Administer analgesics based on type and severity of pain and evaluate response  - Implement non-pharmacological measures as appropriate and evaluate response  - Consider cultural and social influences on pain and pain management  - Manage/alleviate anxiety  - Utilize distraction and/or relaxation techniques  - Monitor for opioid side effects  - Notify MD/LIP if interventions unsuccessful or patient reports new pain  - Anticipate increased pain with activity and pre-medicate as appropriate  Outcome: Progressing     Problem: SAFETY ADULT - FALL  Goal: Free from fall injury  Description: INTERVENTIONS:  - Assess pt frequently for physical needs  - Identify cognitive and physical deficits and behaviors that affect risk of falls.  - [place] fall precautions as indicated by assessment.  - Educate pt/family on patient safety including physical  limitations  - Instruct pt to call for assistance with activity based on assessment  - Modify environment to reduce risk of injury  - Provide assistive devices as appropriate  - Consider OT/PT consult to assist with strengthening/mobility  - Encourage toileting schedule  Outcome: Progressing     Problem: DISCHARGE PLANNING  Goal: Discharge to home or other facility with appropriate resources  Description: INTERVENTIONS:  - Identify barriers to discharge w/pt and caregiver  - Include patient/family/discharge partner in discharge planning  - Arrange for needed discharge resources and transportation as appropriate  - Identify discharge learning needs (meds, wound care, etc)  - Arrange for interpreters to assist at discharge as needed  - Consider post-discharge preferences of patient/family/discharge partner  - Complete POLST form as appropriate  - Assess patient's ability to be responsible for managing their own health  - Refer to Case Management Department for coordinating discharge planning if the patient needs post-hospital services based on physician/LIP order or complex needs related to functional status, cognitive ability or social support system  Outcome: Progressing     Problem: GASTROINTESTINAL - ADULT  Goal: Minimal or absence of nausea and vomiting  Description: INTERVENTIONS:  - Maintain adequate hydration with IV or PO as ordered and tolerated  - Nasogastric tube to low intermittent suction as ordered  - Evaluate effectiveness of ordered antiemetic medications  - Provide nonpharmacologic comfort measures as appropriate  - Advance diet as tolerated, if ordered  - Obtain nutritional consult as needed  - Evaluate fluid balance  Outcome: Progressing  Goal: Maintains or returns to baseline bowel function  Description: INTERVENTIONS:  - Assess bowel function  - Maintain adequate hydration with IV or PO as ordered and tolerated  - Evaluate effectiveness of GI medications  - Encourage mobilization and activity  -  Obtain nutritional consult as needed  - Establish a toileting routine/schedule  - Consider collaborating with pharmacy to review patient's medication profile  Outcome: Progressing

## 2024-03-23 LAB
ALBUMIN SERPL-MCNC: 3.5 G/DL (ref 3.2–4.8)
ALBUMIN/GLOB SERPL: 1.9 {RATIO} (ref 1–2)
ALP LIVER SERPL-CCNC: 77 U/L
ALT SERPL-CCNC: <7 U/L
ANION GAP SERPL CALC-SCNC: 6 MMOL/L (ref 0–18)
AST SERPL-CCNC: 9 U/L (ref ?–34)
BASOPHILS # BLD AUTO: 0.02 X10(3) UL (ref 0–0.2)
BASOPHILS NFR BLD AUTO: 0.3 %
BILIRUB SERPL-MCNC: 0.3 MG/DL (ref 0.2–1.1)
BUN BLD-MCNC: 17 MG/DL (ref 9–23)
BUN/CREAT SERPL: 31.5 (ref 10–20)
CALCIUM BLD-MCNC: 8.4 MG/DL (ref 8.7–10.4)
CHLORIDE SERPL-SCNC: 105 MMOL/L (ref 98–112)
CO2 SERPL-SCNC: 27 MMOL/L (ref 21–32)
CREAT BLD-MCNC: 0.54 MG/DL
DEPRECATED RDW RBC AUTO: 46.8 FL (ref 35.1–46.3)
EGFRCR SERPLBLD CKD-EPI 2021: 93 ML/MIN/1.73M2 (ref 60–?)
EOSINOPHIL # BLD AUTO: 0.22 X10(3) UL (ref 0–0.7)
EOSINOPHIL NFR BLD AUTO: 2.8 %
ERYTHROCYTE [DISTWIDTH] IN BLOOD BY AUTOMATED COUNT: 14.9 % (ref 11–15)
GLOBULIN PLAS-MCNC: 1.8 G/DL (ref 2.8–4.4)
GLUCOSE BLD-MCNC: 112 MG/DL (ref 70–99)
GLUCOSE BLDC GLUCOMTR-MCNC: 129 MG/DL (ref 70–99)
GLUCOSE BLDC GLUCOMTR-MCNC: 133 MG/DL (ref 70–99)
GLUCOSE BLDC GLUCOMTR-MCNC: 142 MG/DL (ref 70–99)
HCT VFR BLD AUTO: 28.2 %
HGB BLD-MCNC: 9.1 G/DL
IMM GRANULOCYTES # BLD AUTO: 0.03 X10(3) UL (ref 0–1)
IMM GRANULOCYTES NFR BLD: 0.4 %
LYMPHOCYTES # BLD AUTO: 0.69 X10(3) UL (ref 1–4)
LYMPHOCYTES NFR BLD AUTO: 8.8 %
MCH RBC QN AUTO: 27.6 PG (ref 26–34)
MCHC RBC AUTO-ENTMCNC: 32.3 G/DL (ref 31–37)
MCV RBC AUTO: 85.5 FL
MONOCYTES # BLD AUTO: 0.77 X10(3) UL (ref 0.1–1)
MONOCYTES NFR BLD AUTO: 9.9 %
NEUTROPHILS # BLD AUTO: 6.08 X10 (3) UL (ref 1.5–7.7)
NEUTROPHILS # BLD AUTO: 6.08 X10(3) UL (ref 1.5–7.7)
NEUTROPHILS NFR BLD AUTO: 77.8 %
OSMOLALITY SERPL CALC.SUM OF ELEC: 288 MOSM/KG (ref 275–295)
PHOSPHATE SERPL-MCNC: 3.8 MG/DL (ref 2.4–5.1)
PLATELET # BLD AUTO: 276 10(3)UL (ref 150–450)
POTASSIUM SERPL-SCNC: 3.9 MMOL/L (ref 3.5–5.1)
PROT SERPL-MCNC: 5.3 G/DL (ref 5.7–8.2)
RBC # BLD AUTO: 3.3 X10(6)UL
SODIUM SERPL-SCNC: 138 MMOL/L (ref 136–145)
WBC # BLD AUTO: 7.8 X10(3) UL (ref 4–11)

## 2024-03-23 PROCEDURE — 80053 COMPREHEN METABOLIC PANEL: CPT | Performed by: SURGERY

## 2024-03-23 PROCEDURE — 82962 GLUCOSE BLOOD TEST: CPT

## 2024-03-23 PROCEDURE — 84100 ASSAY OF PHOSPHORUS: CPT | Performed by: SURGERY

## 2024-03-23 PROCEDURE — 85025 COMPLETE CBC W/AUTO DIFF WBC: CPT | Performed by: SURGERY

## 2024-03-23 RX ORDER — CLONAZEPAM 0.5 MG/1
0.5 TABLET ORAL NIGHTLY PRN
Status: DISCONTINUED | OUTPATIENT
Start: 2024-03-23 | End: 2024-03-29

## 2024-03-23 RX ORDER — MORPHINE SULFATE 2 MG/ML
2 INJECTION, SOLUTION INTRAMUSCULAR; INTRAVENOUS EVERY 2 HOUR PRN
Status: DISCONTINUED | OUTPATIENT
Start: 2024-03-23 | End: 2024-03-23

## 2024-03-23 RX ORDER — MORPHINE SULFATE 2 MG/ML
1 INJECTION, SOLUTION INTRAMUSCULAR; INTRAVENOUS EVERY 2 HOUR PRN
Status: DISCONTINUED | OUTPATIENT
Start: 2024-03-23 | End: 2024-03-23

## 2024-03-23 RX ORDER — HYDROMORPHONE HYDROCHLORIDE 1 MG/ML
0.4 INJECTION, SOLUTION INTRAMUSCULAR; INTRAVENOUS; SUBCUTANEOUS EVERY 2 HOUR PRN
Status: DISCONTINUED | OUTPATIENT
Start: 2024-03-23 | End: 2024-03-23

## 2024-03-23 RX ORDER — MORPHINE SULFATE 2 MG/ML
2 INJECTION, SOLUTION INTRAMUSCULAR; INTRAVENOUS EVERY 2 HOUR PRN
Status: DISCONTINUED | OUTPATIENT
Start: 2024-03-23 | End: 2024-03-26

## 2024-03-23 RX ORDER — MORPHINE SULFATE 4 MG/ML
4 INJECTION, SOLUTION INTRAMUSCULAR; INTRAVENOUS EVERY 2 HOUR PRN
Status: DISCONTINUED | OUTPATIENT
Start: 2024-03-23 | End: 2024-03-26

## 2024-03-23 RX ORDER — MORPHINE SULFATE 2 MG/ML
1 INJECTION, SOLUTION INTRAMUSCULAR; INTRAVENOUS EVERY 2 HOUR PRN
Status: DISCONTINUED | OUTPATIENT
Start: 2024-03-23 | End: 2024-03-26

## 2024-03-23 RX ORDER — SIMETHICONE 80 MG
80 TABLET,CHEWABLE ORAL 3 TIMES DAILY PRN
Status: DISCONTINUED | OUTPATIENT
Start: 2024-03-23 | End: 2024-03-29

## 2024-03-23 RX ORDER — HYDROMORPHONE HYDROCHLORIDE 1 MG/ML
0.2 INJECTION, SOLUTION INTRAMUSCULAR; INTRAVENOUS; SUBCUTANEOUS EVERY 2 HOUR PRN
Status: DISCONTINUED | OUTPATIENT
Start: 2024-03-23 | End: 2024-03-23

## 2024-03-23 RX ORDER — CLONAZEPAM 0.25 MG/1
0.25 TABLET, ORALLY DISINTEGRATING ORAL NIGHTLY PRN
Status: DISCONTINUED | OUTPATIENT
Start: 2024-03-23 | End: 2024-03-29

## 2024-03-23 NOTE — PLAN OF CARE
Ng/lis 100ml output, morphine,zofran given, npo, TPN/monitoring blood sugars, right picc line, upto chair and ambulating in room with standby assist, tele, voiding freely, no gas or bm.   Patient and family updated on care plan.    Problem: Patient Centered Care  Goal: Patient preferences are identified and integrated in the patient's plan of care  Description: Interventions:  - What would you like us to know as we care for you? -  - Provide timely, complete, and accurate information to patient/family  - Incorporate patient and family knowledge, values, beliefs, and cultural backgrounds into the planning and delivery of care  - Encourage patient/family to participate in care and decision-making at the level they choose  - Honor patient and family perspectives and choices  Outcome: Progressing     Problem: Patient/Family Goals  Goal: Patient/Family Long Term Goal  Description: Patient's Long Term Goal: -    Interventions:  - -  - See additional Care Plan goals for specific interventions  Outcome: Progressing  Goal: Patient/Family Short Term Goal  Description: Patient's Short Term Goal: -    Interventions:   - -  - See additional Care Plan goals for specific interventions  Outcome: Progressing     Problem: PAIN - ADULT  Goal: Verbalizes/displays adequate comfort level or patient's stated pain goal  Description: INTERVENTIONS:  - Encourage pt to monitor pain and request assistance  - Assess pain using appropriate pain scale  - Administer analgesics based on type and severity of pain and evaluate response  - Implement non-pharmacological measures as appropriate and evaluate response  - Consider cultural and social influences on pain and pain management  - Manage/alleviate anxiety  - Utilize distraction and/or relaxation techniques  - Monitor for opioid side effects  - Notify MD/LIP if interventions unsuccessful or patient reports new pain  - Anticipate increased pain with activity and pre-medicate as  appropriate  Outcome: Progressing     Problem: SAFETY ADULT - FALL  Goal: Free from fall injury  Description: INTERVENTIONS:  - Assess pt frequently for physical needs  - Identify cognitive and physical deficits and behaviors that affect risk of falls.  - [employer] fall precautions as indicated by assessment.  - Educate pt/family on patient safety including physical limitations  - Instruct pt to call for assistance with activity based on assessment  - Modify environment to reduce risk of injury  - Provide assistive devices as appropriate  - Consider OT/PT consult to assist with strengthening/mobility  - Encourage toileting schedule  Outcome: Progressing     Problem: DISCHARGE PLANNING  Goal: Discharge to home or other facility with appropriate resources  Description: INTERVENTIONS:  - Identify barriers to discharge w/pt and caregiver  - Include patient/family/discharge partner in discharge planning  - Arrange for needed discharge resources and transportation as appropriate  - Identify discharge learning needs (meds, wound care, etc)  - Arrange for interpreters to assist at discharge as needed  - Consider post-discharge preferences of patient/family/discharge partner  - Complete POLST form as appropriate  - Assess patient's ability to be responsible for managing their own health  - Refer to Case Management Department for coordinating discharge planning if the patient needs post-hospital services based on physician/LIP order or complex needs related to functional status, cognitive ability or social support system  Outcome: Progressing     Problem: GASTROINTESTINAL - ADULT  Goal: Minimal or absence of nausea and vomiting  Description: INTERVENTIONS:  - Maintain adequate hydration with IV or PO as ordered and tolerated  - Nasogastric tube to low intermittent suction as ordered  - Evaluate effectiveness of ordered antiemetic medications  - Provide nonpharmacologic comfort measures as appropriate  - Advance diet as  tolerated, if ordered  - Obtain nutritional consult as needed  - Evaluate fluid balance  Outcome: Progressing  Goal: Maintains or returns to baseline bowel function  Description: INTERVENTIONS:  - Assess bowel function  - Maintain adequate hydration with IV or PO as ordered and tolerated  - Evaluate effectiveness of GI medications  - Encourage mobilization and activity  - Obtain nutritional consult as needed  - Establish a toileting routine/schedule  - Consider collaborating with pharmacy to review patient's medication profile  Outcome: Progressing     Problem: SKIN/TISSUE INTEGRITY - ADULT  Goal: Incision(s), wounds(s) or drain site(s) healing without S/S of infection  Description: INTERVENTIONS:  - Assess and document risk factors for pressure ulcer development  - Assess and document skin integrity  - Assess and document dressing/incision, wound bed, drain sites and surrounding tissue  - Implement wound care per orders  - Initiate isolation precautions as appropriate  - Initiate Pressure Ulcer prevention bundle as indicated  Outcome: Progressing

## 2024-03-23 NOTE — PROGRESS NOTES
[facility]  part of [facility]    Progress Note    Mayte Lucas Patient Status:  Inpatient    1945 MRN [id number]   Location [facility] 4W/SW/SE Attending Rebecca Boyce MD   Hosp Day # 4 PCP Abi Ohara MD        Subjective:   Mayte Lucas is a(n) 79 year old female     POD#1 ex lap, LILLIANA for SBO  Adhesive bands capable of creating internal hernia noted, without herniation at time of exploration    Postop pain treated with IV Tylenol, helpful but lasts about half of dosage interval  Pain is less than preop, primarily brief sharp pains in RLQ  She does not tolerate dilaudid well, causes headaches    Denies nausea, flatus or BM            Allergies/Medications:   Allergies:   Allergies   Allergen Reactions    Bactrim [Sulfamethoxazole W/Trimethoprim] FEVER     X 4 days    Bicillin L-A      unknown    Mucinex Coughing    Fentanyl NAUSEA ONLY      adult 3 in 1 TPN   Intravenous Continuous TPN    [COMPLETED] lidocaine PF (Xylocaine-MPF) 1% injection  5 mL Intradermal Once    heparin (Porcine) 5000 UNIT/ML injection 5,000 Units  5,000 Units Subcutaneous Q8H SUSIE    morphINE PF 2 MG/ML injection 1 mg  1 mg Intravenous Q2H PRN    Or    morphINE PF 2 MG/ML injection 0.5 mg  0.5 mg Intravenous Q2H PRN    [COMPLETED] magnesium oxide (Mag-Ox) tab 400 mg  400 mg Oral Once    dextrose 10% infusion (TPN no rate)   Intravenous Continuous PRN    [] adult 3 in 1 TPN   Intravenous Continuous TPN    [COMPLETED] meropenem (Merrem) 500 mg in sodium chloride 0.9% 100 mL IVPB-MBP  500 mg Intravenous On Call to OR    alvimopan (ENTEREG) capsule 12 mg  12 mg Oral BID    acetaminophen (Ofirmev) 10 mg/mL infusion premix 700 mg  15 mg/kg Intravenous Q6H PRN    [COMPLETED] magnesium oxide (Mag-Ox) tab 400 mg  400 mg Oral Once    [COMPLETED] fentaNYL (Sublimaze) 50 mcg/mL injection 50 mcg  50 mcg Intravenous Once    [COMPLETED] ondansetron (Zofran) 4 MG/2ML injection 4 mg  4 mg Intravenous Once     [COMPLETED] iopamidol 76% (ISOVUE-370) injection for power injector  60 mL Intravenous ONCE PRN    lansoprazole (Prevacid Solutab) disintegrating tab 30 mg  30 mg Oral QAM AC    metoprolol succinate (Toprol XL) partial tablet 12.5 mg  12.5 mg Oral Nightly    metoclopramide (Reglan) 5 mg/mL injection 5 mg  5 mg Intravenous Q8H PRN    [COMPLETED] ondansetron (Zofran) 4 MG/2ML injection 4 mg  4 mg Intravenous Once    ondansetron (Zofran) 4 MG/2ML injection 4 mg  4 mg Intravenous Q4H PRN    OXcarbazepine (Trileptal) tab 300 mg  300 mg Oral BID    clonazePAM (KLONOPIN) disintegrating tab 0.25 mg  0.25 mg Oral Nightly PRN           Objective:   Vital Signs:  Blood pressure 124/56, pulse 83, temperature 100.3 °F (37.9 °C), temperature source Oral, resp. rate 16, height 5' 4\" (1.626 m), weight 103 lb 12.8 oz (47.1 kg), SpO2 97%, not currently breastfeeding.     I/O last 3 completed shifts:  In: 2025 [I.V.:1140; IV PIGGYBACK:240]  Out: 3380 [Urine:1825; Emesis/NG output:1550; Blood:5]    General: No acute distress. Alert and oriented x 3.  HEENT: Moist mucous membranes. EOM-I. PERRL  Neck: No lymphadenopathy.  No JVD. No carotid bruits.  Respiratory: Clear to auscultation bilaterally.  No wheezes. No rhonchi.  Cardiovascular: S1, S2.  Regular rate and rhythm.  No murmurs. Equal pulses   Abdomen: Soft, nontender, nondistended.  Positive bowel sounds. No rebound tenderness  Incision(s): dressing C/D/I    Neurologic: No focal neurological deficits.  Musculoskeletal: Full range of motion of all extremities.  No swelling noted.  Integument: No lesions. No erythema.  Psychiatric: Appropriate mood and affect.        Assessment and Plan:     SBO (small bowel obstruction) (HCC)  POD#1 s/p ex lap, LILLIANA    Expected progress  Analgesic options limited  No bowel function, will keep NG today            Results:     Lab Results   Component Value Date    WBC 7.8 03/23/2024    HGB 9.1 (L) 03/23/2024    HCT 28.2 (L) 03/23/2024    .0  03/23/2024    CREATSERUM 0.54 (L) 03/23/2024    BUN 17 03/23/2024     03/23/2024    K 3.9 03/23/2024     03/23/2024    CO2 27.0 03/23/2024     (H) 03/23/2024    CA 8.4 (L) 03/23/2024    ALB 3.5 03/23/2024    ALKPHO 77 03/23/2024    BILT 0.3 03/23/2024    TP 5.3 (L) 03/23/2024    AST 9 03/23/2024    ALT <7 (L) 03/23/2024    PTT 31.8 03/22/2024    INR 1.11 03/22/2024    T4F 0.88 (L) 06/10/2021    TSH 4.119 03/08/2024    LIP 35 03/19/2024    DDIMER 0.32 12/10/2021    ESRML 4 02/03/2022    CRP <0.03 10/08/2020    MG 1.9 03/22/2024    PHOS 3.8 03/23/2024    TROP <0.017 08/22/2017    RPR Non Reactive 01/23/2013    B12 422 02/03/2022       XR CHEST AP PORTABLE  (CPT=71045)    Result Date: 3/22/2024  PROCEDURE: XR CHEST AP PORTABLE  (CPT=71045) TIME: 949  COMPARISON: [facility], XR CHEST AP PORTABLE (CPT=71045), 3/19/2024, 3:57 PM.  INDICATIONS: Cough and fever today.  TECHNIQUE:   Single view.   Findings and impression:   Stable heart, which may be mildly enlarged.  No edema  Hyperinflated lungs with symmetric upper lobe pleural thickening and pulmonary scarring and volume loss  No pulmonary infection  No pneumothorax or significant effusion Dictated by (CST): Macho Garcia MD on 3/22/2024 at 10:09 AM     Finalized by (CST): Macho Garcia MD on 3/22/2024 at 10:11 AM          XR SMALL BOWEL SINGLE CONTRAST (CPT=74250)    Result Date: 3/21/2024  CONCLUSION:  1. Moderate grade partial small bowel obstruction involving the ileum with transition zone in the pelvis.  Decompressed distal/terminal ileum.  Barium reaches the colon within 3 hours     Dictated by (CST): Andres Driver MD on 3/21/2024 at 2:59 PM     Finalized by (CST): Andres Driver MD on 3/21/2024 at 3:09 PM                      Pito Van MD  3/23/2024

## 2024-03-23 NOTE — PROGRESS NOTES
[facility] AND CARE   Progress Note  -  Mayte Lucas Patient Status:  Inpatient    1945 MRN [id number]   Location [facility] 4W/SW/SE Attending Daniel Manuel, DO   Hosp Day # 4 PCP Abi Ohara MD     PCP: Abi Ohara MD         Assessment and Plan:  Ms. Lucas is a 79 year old female with PMH sig for SVT, RLS, COPD, chronic pain, HLD, IgA deficiency, was hospitalized from 2/3 -  for acute cecal volvulus, s/p right hemicolectomy on 2/3 per general surgery, and another hospital stay for partial SBO 3/5-3/8 treated with NGT and conservative management.  Presents today with recurrent abdominal pain associated with nausea starting about midnight last night she states she has been having gas and passing stool however she been able to eat today as she feels very nauseous.  In the emergency room repeat CT scan again revealed small bowel obstruction with transition point the left lower abdomen, S/P NGT however pt did not improve and plans for Exp. Lap, possible lysis of adhesions, possible bowel resection today. 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 Normal wbc. LA normal   -imaging noted  -prn pain med  -IVF, NPO  -NGT to LIS  -entereg planned post op   -PPN, transition to TPN, picc line ordered   -OR 3/22/24 for Exp Lap, possible lysis of adhesions possible SB resection   -as per surgery   -As needed IV pain meds, increased range from 1 to 4 mg his pain is not controlled today, as needed antiemetics  -monitor expected acute blood loss anemia postop, hemoglobin 9.1, repeat in a.m.    Low Grade Temp  -tmax 100.5. WBC normal  -c/o cough, CXR negative for acute process   -no urinary symptoms  -had loose stools after contrast with SBFT  -blood cx pending-> so far negative, recurred x 1, continue to monitor    Mild Hyponatremia-resolved       Expected postoperative anemia  -baseline hgb ~12, post surgery hgb ~7-10, admission hgb 11.2 now 10.4-->8.7->9.7  -continue to trend post op      Paroxysmal supraventricular tachycardia  -continue home metoprolol, may need to change to IV based on course     GERD   -continue home PPI     RLS  -continue home trileptal      QUE  - PRN benzo at home     OP  -resume home meds at discharge    GOC  -full code     MA/ACO [place]  -Re- Entry: admission 2/3-2/17 and 3/5-3/8  -Consults: surgery   -Discharge Needs: none  -Appointments: [ ] PCP Abi Ohara MD  PCP      FEN  -lytes in am  -diet-NPO, TPN    Prophy  -SCD  -heparin held for OR     Dispo  -pending clinical coarse  PCP: Abi Ohara MD      Concerns regarding plan of care were discussed with patient. Patient agrees with plan as detailed above. Discussed plan of care with      Note: This chart was prepared using voice recognition software and may contain unintended word substitution errors.      Rebecca Boyce MD  Hospitalist  [facility]   Answering service: [phone]      SUBJECTIVE:   A lot of pain postop, no nausea, no chest pain or shortness of breath    OBJECTIVE:   Blood pressure 123/58, pulse 83, temperature 98.1 °F (36.7 °C), temperature source Oral, resp. rate 18, height 5' 4\" (1.626 m), weight 103 lb 12.8 oz (47.1 kg), SpO2 98%, not currently breastfeeding.    GENERAL: no apparent distress  NEURO: A/A Ox3  HEENT: NGT   RESP: non labored, CTA  CARDIO: Regular  ABD: Very tender postop.  hypoactive bowel sounds  EXTREMITIES: no edema    DIAGNOSTIC DATA:   Labs:     Recent Labs   Lab 03/19/24  1249 03/20/24  0713 03/21/24  0615 03/22/24  0529 03/23/24  0526   WBC 9.2 9.5 5.7 7.8 7.8   HGB 11.2* 10.5* 10.4* 8.7* 9.1*   MCV 86.7 84.8 86.7 85.7 85.5   .0 397.0 330.0 261.0 276.0   INR  --   --   --  1.11  --        Recent Labs   Lab 03/19/24  1249 03/20/24  0712 03/21/24  0614 03/22/24  0529 03/23/24  0526    133* 134*  136 138   K 4.9 4.3 4.2 3.6 3.9    104 105 105 105   CO2 26.0 26.0 26.0 28.0 27.0   BUN 17 12 7* 7* 17   CREATSERUM 0.78 0.64 0.58 0.58 0.54*   CA 10.0 8.9 9.0 9.2 8.4*   MG  --  1.7 1.7 1.9  --    PHOS  --  2.9 3.2 3.7 3.8   * 145* 95 99 112*       Recent Labs   Lab 03/19/24  1249 03/22/24  0529 03/23/24  0526   ALT <7* <7* <7*   AST 16 9 9   ALB 4.9* 3.9 3.5       Recent Labs   Lab 03/22/24  0552 03/22/24  1158 03/22/24  1740 03/23/24  0459 03/23/24  1212   PGLU 102* 111* 103* 129* 142*       No results for input(s): \"TROP\" in the last 168 hours.        MEDICATIONS       heparin  5,000 Units Subcutaneous Q8H SUSIE    alvimopan  12 mg Oral BID    lansoprazole  30 mg Oral QAM AC    metoprolol succinate  12.5 mg Oral Nightly    OXcarbazepine  300 mg Oral BID      adult 3 in 1 TPN      adult 3 in 1 TPN 66.7 mL/hr at 03/22/24 2201    dextrose 10%       morphINE **OR** morphINE **OR** morphINE, clonazePAM **OR** clonazePAM, dextrose 10%, acetaminophen, metoclopramide, ondansetron    Lucretia Tom, JAYME      IMAGING     XR CHEST AP PORTABLE  (CPT=71045)    Result Date: 3/22/2024  PROCEDURE: XR CHEST AP PORTABLE  (CPT=71045) TIME: 949  COMPARISON: [facility], XR CHEST AP PORTABLE (CPT=71045), 3/19/2024, 3:57 PM.  INDICATIONS: Cough and fever today.  TECHNIQUE:   Single view.   Findings and impression:   Stable heart, which may be mildly enlarged.  No edema  Hyperinflated lungs with symmetric upper lobe pleural thickening and pulmonary scarring and volume loss  No pulmonary infection  No pneumothorax or significant effusion Dictated by (CST): Macho Garcia MD on 3/22/2024 at 10:09 AM     Finalized by (CST): Macho Garcia MD on 3/22/2024 at 10:11 AM          XR SMALL BOWEL SINGLE CONTRAST (CPT=74250)    Result Date: 3/21/2024  CONCLUSION:  1. Moderate grade partial small bowel obstruction involving the ileum with transition zone in the pelvis.  Decompressed distal/terminal ileum.  Barium reaches  the colon within 3 hours     Dictated by (CST): Andres Driver MD on 3/21/2024 at 2:59 PM     Finalized by (CST): Andres Driver MD on 3/21/2024 at 3:09 PM             SEE ATTENDING NOTE BELOW:     Patient seen and examined independently.  Discussed with APN and agree with note above.    S:  Got picc line, low grade temps but no localizing sx and so far work up neg. low risk for bacteremia.  Will continue to monitor but if PICC line not placed today would not build to get any nutrition through PICC line until Monday.  Discussed with patient if blood cultures are positive left hold into antibiotics and full workup.  No current nausea vomiting.  No chest pain or shortness of breath.    Objective:  /58 (BP Location: Left arm)   Pulse 83   Temp 98.1 °F (36.7 °C) (Oral)   Resp 18   Ht 5' 4\" (1.626 m)   Wt 103 lb 12.8 oz (47.1 kg)   SpO2 98%   BMI 17.82 kg/m²     Gen: No acute distress, alert and oriented x3, NGT  Neck Supple, no JVD  Pulm: Lungs clear, normal respiratory effort, No wheezing or crackles  CV: Heart with regular rate and rhythm, No murmurs, rubs, gallops  Abd: Abdomen soft,hypoactive BS  MSK:  no clubbing, no cyanosis.  No Lower extremity edema  Skin: no rashes or lesions, well perfused  Psych: mood stable, cooperative  Neuro: no focal deficits    Assessment and Plan  Ms. Lucas is a 79 year old female with PMH sig for SVT, RLS, COPD, chronic pain, HLD, IgA deficiency, was hospitalized from 2/3 - 2/17 for acute cecal volvulus, s/p right hemicolectomy on 2/3 per general surgery, and another hospital stay for partial SBO 3/5-3/8 treated with NGT and conservative management.  Presents today with recurrent abdominal pain associated with nausea starting about midnight last night she states she has been having gas and passing stool however she been able to eat today as she feels very nauseous.  In the emergency room repeat CT scan again revealed small bowel obstruction with transition  point the left lower abdomen, S/P NGT however pt did not improve and plans for Exp. Lap, possible lysis of adhesions, possible bowel resection today. 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 Normal wbc. LA normal   -imaging noted  -prn pain med  -IVF, NPO  -NGT to LIS  -entereg planned post op   -PPN, transition to TPN, picc line ordered   -OR today for Exp Lap, possible lysis of adhesions possible SB resection   -as per surgery      Low Grade Temp  -tmax 100.5. WBC normal  -c/o cough, CXR negative for acute process   -no urinary symptoms  -had loose stools after contrast with SBFT  -blood cx pending    Rest as above with above    Rebecca Boyce MD

## 2024-03-23 NOTE — PLAN OF CARE
Problem: Patient Centered Care  Goal: Patient preferences are identified and integrated in the patient's plan of care  Description: Interventions:  - What would you like us to know as we care for you?   - Provide timely, complete, and accurate information to patient/family  - Incorporate patient and family knowledge, values, beliefs, and cultural backgrounds into the planning and delivery of care  - Encourage patient/family to participate in care and decision-making at the level they choose  - Honor patient and family perspectives and choices  Outcome: Progressing     Problem: Patient/Family Goals  Goal: Patient/Family Long Term Goal  Description: Patient's Long Term Goal:     Interventions:  - See additional Care Plan goals for specific interventions  Outcome: Progressing  Goal: Patient/Family Short Term Goal  Description: Patient's Short Term Goal:     Interventions:   - See additional Care Plan goals for specific interventions  Outcome: Progressing     Problem: PAIN - ADULT  Goal: Verbalizes/displays adequate comfort level or patient's stated pain goal  Description: INTERVENTIONS:  - Encourage pt to monitor pain and request assistance  - Assess pain using appropriate pain scale  - Administer analgesics based on type and severity of pain and evaluate response  - Implement non-pharmacological measures as appropriate and evaluate response  - Consider cultural and social influences on pain and pain management  - Manage/alleviate anxiety  - Utilize distraction and/or relaxation techniques  - Monitor for opioid side effects  - Notify MD/LIP if interventions unsuccessful or patient reports new pain  - Anticipate increased pain with activity and pre-medicate as appropriate  Outcome: Progressing     Problem: SAFETY ADULT - FALL  Goal: Free from fall injury  Description: INTERVENTIONS:  - Assess pt frequently for physical needs  - Identify cognitive and physical deficits and behaviors that affect risk of falls.  -  [place] fall precautions as indicated by assessment.  - Educate pt/family on patient safety including physical limitations  - Instruct pt to call for assistance with activity based on assessment  - Modify environment to reduce risk of injury  - Provide assistive devices as appropriate  - Consider OT/PT consult to assist with strengthening/mobility  - Encourage toileting schedule  Outcome: Progressing     Problem: DISCHARGE PLANNING  Goal: Discharge to home or other facility with appropriate resources  Description: INTERVENTIONS:  - Identify barriers to discharge w/pt and caregiver  - Include patient/family/discharge partner in discharge planning  - Arrange for needed discharge resources and transportation as appropriate  - Identify discharge learning needs (meds, wound care, etc)  - Arrange for interpreters to assist at discharge as needed  - Consider post-discharge preferences of patient/family/discharge partner  - Complete POLST form as appropriate  - Assess patient's ability to be responsible for managing their own health  - Refer to Case Management Department for coordinating discharge planning if the patient needs post-hospital services based on physician/LIP order or complex needs related to functional status, cognitive ability or social support system  Outcome: Progressing     Problem: GASTROINTESTINAL - ADULT  Goal: Minimal or absence of nausea and vomiting  Description: INTERVENTIONS:  - Maintain adequate hydration with IV or PO as ordered and tolerated  - Nasogastric tube to low intermittent suction as ordered  - Evaluate effectiveness of ordered antiemetic medications  - Provide nonpharmacologic comfort measures as appropriate  - Advance diet as tolerated, if ordered  - Obtain nutritional consult as needed  - Evaluate fluid balance  Outcome: Progressing  Goal: Maintains or returns to baseline bowel function  Description: INTERVENTIONS:  - Assess bowel function  - Maintain adequate hydration with IV or  PO as ordered and tolerated  - Evaluate effectiveness of GI medications  - Encourage mobilization and activity  - Obtain nutritional consult as needed  - Establish a toileting routine/schedule  - Consider collaborating with pharmacy to review patient's medication profile  Outcome: Progressing     Problem: SKIN/TISSUE INTEGRITY - ADULT  Goal: Incision(s), wounds(s) or drain site(s) healing without S/S of infection  Description: INTERVENTIONS:  - Assess and document risk factors for pressure ulcer development  - Assess and document skin integrity  - Assess and document dressing/incision, wound bed, drain sites and surrounding tissue  - Implement wound care per orders  - Initiate isolation precautions as appropriate  - Initiate Pressure Ulcer prevention bundle as indicated  Outcome: Progressing     Mayte is alert/oriented x4. RA. Ambulating with SBA. Pain managed with PRN ofirmev. Pt declined dilaudid, stating it made her \"feel funny\" but did not relieve pain. Pt requested morphine. Morphine ordered by MD however pt declined stating she was worried it would slow down her bowel recovery. NG to LIS. TPN infusing through R PICC.  SCDs and heparin sq for DVT prophylaxis. Voiding freely. Fall precautions in place: nonskid socks on, bed low, bed alarm active. Call light within reach, pt calls appropriately.

## 2024-03-24 LAB
ALBUMIN SERPL-MCNC: 3.5 G/DL (ref 3.2–4.8)
ALBUMIN/GLOB SERPL: 1.8 {RATIO} (ref 1–2)
ALP LIVER SERPL-CCNC: 75 U/L
ALT SERPL-CCNC: <7 U/L
ANION GAP SERPL CALC-SCNC: 5 MMOL/L (ref 0–18)
AST SERPL-CCNC: <8 U/L (ref ?–34)
BASOPHILS # BLD AUTO: 0.04 X10(3) UL (ref 0–0.2)
BASOPHILS NFR BLD AUTO: 0.5 %
BILIRUB SERPL-MCNC: 0.4 MG/DL (ref 0.2–1.1)
BUN BLD-MCNC: 18 MG/DL (ref 9–23)
BUN/CREAT SERPL: 34.6 (ref 10–20)
CALCIUM BLD-MCNC: 8.8 MG/DL (ref 8.7–10.4)
CHLORIDE SERPL-SCNC: 109 MMOL/L (ref 98–112)
CO2 SERPL-SCNC: 26 MMOL/L (ref 21–32)
CREAT BLD-MCNC: 0.52 MG/DL
DEPRECATED RDW RBC AUTO: 48.2 FL (ref 35.1–46.3)
EGFRCR SERPLBLD CKD-EPI 2021: 94 ML/MIN/1.73M2 (ref 60–?)
EOSINOPHIL # BLD AUTO: 0.33 X10(3) UL (ref 0–0.7)
EOSINOPHIL NFR BLD AUTO: 3.7 %
ERYTHROCYTE [DISTWIDTH] IN BLOOD BY AUTOMATED COUNT: 15.1 % (ref 11–15)
GLOBULIN PLAS-MCNC: 2 G/DL (ref 2.8–4.4)
GLUCOSE BLD-MCNC: 98 MG/DL (ref 70–99)
GLUCOSE BLDC GLUCOMTR-MCNC: 117 MG/DL (ref 70–99)
GLUCOSE BLDC GLUCOMTR-MCNC: 135 MG/DL (ref 70–99)
GLUCOSE BLDC GLUCOMTR-MCNC: 91 MG/DL (ref 70–99)
GLUCOSE BLDC GLUCOMTR-MCNC: 97 MG/DL (ref 70–99)
HCT VFR BLD AUTO: 26.5 %
HGB BLD-MCNC: 8.7 G/DL
IMM GRANULOCYTES # BLD AUTO: 0.02 X10(3) UL (ref 0–1)
IMM GRANULOCYTES NFR BLD: 0.2 %
LYMPHOCYTES # BLD AUTO: 1.01 X10(3) UL (ref 1–4)
LYMPHOCYTES NFR BLD AUTO: 11.4 %
MAGNESIUM SERPL-MCNC: 2 MG/DL (ref 1.6–2.6)
MCH RBC QN AUTO: 28.5 PG (ref 26–34)
MCHC RBC AUTO-ENTMCNC: 32.8 G/DL (ref 31–37)
MCV RBC AUTO: 86.9 FL
MONOCYTES # BLD AUTO: 1.25 X10(3) UL (ref 0.1–1)
MONOCYTES NFR BLD AUTO: 14.1 %
NEUTROPHILS # BLD AUTO: 6.2 X10 (3) UL (ref 1.5–7.7)
NEUTROPHILS # BLD AUTO: 6.2 X10(3) UL (ref 1.5–7.7)
NEUTROPHILS NFR BLD AUTO: 70.1 %
OSMOLALITY SERPL CALC.SUM OF ELEC: 292 MOSM/KG (ref 275–295)
PHOSPHATE SERPL-MCNC: 3.5 MG/DL (ref 2.4–5.1)
PLATELET # BLD AUTO: 256 10(3)UL (ref 150–450)
PLATELETS.RETICULATED NFR BLD AUTO: 2.4 % (ref 0–7)
POTASSIUM SERPL-SCNC: 4 MMOL/L (ref 3.5–5.1)
PROT SERPL-MCNC: 5.5 G/DL (ref 5.7–8.2)
RBC # BLD AUTO: 3.05 X10(6)UL
SODIUM SERPL-SCNC: 140 MMOL/L (ref 136–145)
WBC # BLD AUTO: 8.9 X10(3) UL (ref 4–11)

## 2024-03-24 PROCEDURE — 85025 COMPLETE CBC W/AUTO DIFF WBC: CPT | Performed by: SURGERY

## 2024-03-24 PROCEDURE — 83735 ASSAY OF MAGNESIUM: CPT | Performed by: NURSE PRACTITIONER

## 2024-03-24 PROCEDURE — 84100 ASSAY OF PHOSPHORUS: CPT | Performed by: SURGERY

## 2024-03-24 PROCEDURE — 82962 GLUCOSE BLOOD TEST: CPT

## 2024-03-24 PROCEDURE — 80053 COMPREHEN METABOLIC PANEL: CPT | Performed by: SURGERY

## 2024-03-24 NOTE — PROGRESS NOTES
[facility]  part of [facility]    Progress Note    Mayte Lucas Patient Status:  Inpatient    1945 MRN [id number]   Location [facility] 4W/SW/SE Attending Rebecca Boyce MD   Hosp Day # 5 PCP Abi Ohara MD        Subjective:   Mayte Lucas is a(n) 79 year old female     POD#2 ex lap, LILLIANA for SBO  Adhesive bands capable of creating internal hernia noted, without herniation at time of exploration    Postop pain treated with IV Tylenol, helpful but lasts about half of dosage interval  Pain has improved after using simethicone and a few doses of morphine yesterday  Pain is less than preop, primarily brief sharp pains in RLQ  She does not tolerate dilaudid well, causes headaches    She is passing flatus, denies nausea.  Frequent belching, no BM.            Allergies/Medications:   Allergies:   Allergies   Allergen Reactions    Bactrim [Sulfamethoxazole W/Trimethoprim] FEVER     X 4 days    Bicillin L-A      unknown    Mucinex Coughing    Fentanyl NAUSEA ONLY      adult 3 in 1 TPN   Intravenous Continuous TPN    adult 3 in 1 TPN   Intravenous Continuous TPN    morphINE PF 2 MG/ML injection 1 mg  1 mg Intravenous Q2H PRN    Or    morphINE PF 2 MG/ML injection 2 mg  2 mg Intravenous Q2H PRN    Or    morphINE PF 4 MG/ML injection 4 mg  4 mg Intravenous Q2H PRN    clonazePAM (KLONOPIN) disintegrating tab 0.25 mg  0.25 mg Oral Nightly PRN    Or    clonazePAM (KlonoPIN) tab 0.5 mg  0.5 mg Oral Nightly PRN    simethicone (Mylicon) chewable tab 80 mg  80 mg Oral TID PRN    [] adult 3 in 1 TPN   Intravenous Continuous TPN    [COMPLETED] lidocaine PF (Xylocaine-MPF) 1% injection  5 mL Intradermal Once    heparin (Porcine) 5000 UNIT/ML injection 5,000 Units  5,000 Units Subcutaneous Q8H SUSIE    [COMPLETED] magnesium oxide (Mag-Ox) tab 400 mg  400 mg Oral Once    dextrose 10% infusion (TPN no rate)   Intravenous Continuous PRN    [] adult 3 in 1 TPN   Intravenous Continuous  TPN    [COMPLETED] meropenem (Merrem) 500 mg in sodium chloride 0.9% 100 mL IVPB-MBP  500 mg Intravenous On Call to OR    alvimopan (ENTEREG) capsule 12 mg  12 mg Oral BID    acetaminophen (Ofirmev) 10 mg/mL infusion premix 700 mg  15 mg/kg Intravenous Q6H PRN    [COMPLETED] magnesium oxide (Mag-Ox) tab 400 mg  400 mg Oral Once    [COMPLETED] fentaNYL (Sublimaze) 50 mcg/mL injection 50 mcg  50 mcg Intravenous Once    [COMPLETED] ondansetron (Zofran) 4 MG/2ML injection 4 mg  4 mg Intravenous Once    [COMPLETED] iopamidol 76% (ISOVUE-370) injection for power injector  60 mL Intravenous ONCE PRN    lansoprazole (Prevacid Solutab) disintegrating tab 30 mg  30 mg Oral QAM AC    metoprolol succinate (Toprol XL) partial tablet 12.5 mg  12.5 mg Oral Nightly    metoclopramide (Reglan) 5 mg/mL injection 5 mg  5 mg Intravenous Q8H PRN    [COMPLETED] ondansetron (Zofran) 4 MG/2ML injection 4 mg  4 mg Intravenous Once    ondansetron (Zofran) 4 MG/2ML injection 4 mg  4 mg Intravenous Q4H PRN    OXcarbazepine (Trileptal) tab 300 mg  300 mg Oral BID           Objective:   Vital Signs:  Blood pressure 130/65, pulse 86, temperature 98.3 °F (36.8 °C), temperature source Oral, resp. rate 18, height 5' 4\" (1.626 m), weight 103 lb 12.8 oz (47.1 kg), SpO2 100%, not currently breastfeeding.     I/O last 3 completed shifts:  In: 1318.6 [IV PIGGYBACK:140]  Out: 1630 [Urine:1280; Emesis/NG output:350]    General: No acute distress. Alert and oriented x 3.  HEENT: Moist mucous membranes. EOM-I. PERRL  Neck: No lymphadenopathy.  No JVD. No carotid bruits.  Respiratory: Clear to auscultation bilaterally.  No wheezes. No rhonchi.  Cardiovascular: S1, S2.  Regular rate and rhythm.  No murmurs. Equal pulses   Abdomen: Soft, nontender, nondistended.  Positive bowel sounds. No rebound tenderness  Incision(s): dressing C/D/I    Neurologic: No focal neurological deficits.  Musculoskeletal: Full range of motion of all extremities.  No swelling  noted.  Integument: No lesions. No erythema.  Psychiatric: Appropriate mood and affect.        Assessment and Plan:     SBO (small bowel obstruction) (HCC)  POD#2 s/p ex lap, LILLIANA    Expected progress  Pain has improved after using simethicone ad a few doses of morphine  She is passing some gas but is also belching frequently, she prefers to keep NG today as she does not want to need it replaced            Results:     Lab Results   Component Value Date    WBC 8.9 03/24/2024    HGB 8.7 (L) 03/24/2024    HCT 26.5 (L) 03/24/2024    .0 03/24/2024    CREATSERUM 0.52 (L) 03/24/2024    BUN 18 03/24/2024     03/24/2024    K 4.0 03/24/2024     03/24/2024    CO2 26.0 03/24/2024    GLU 98 03/24/2024    CA 8.8 03/24/2024    ALB 3.5 03/24/2024    ALKPHO 75 03/24/2024    BILT 0.4 03/24/2024    TP 5.5 (L) 03/24/2024    AST <8 03/24/2024    ALT <7 (L) 03/24/2024    PTT 31.8 03/22/2024    INR 1.11 03/22/2024    T4F 0.88 (L) 06/10/2021    TSH 4.119 03/08/2024    LIP 35 03/19/2024    DDIMER 0.32 12/10/2021    ESRML 4 02/03/2022    CRP <0.03 10/08/2020    MG 2.0 03/24/2024    PHOS 3.5 03/24/2024    TROP <0.017 08/22/2017    RPR Non Reactive 01/23/2013    B12 422 02/03/2022       No results found.               Pito Van MD  3/24/2024

## 2024-03-24 NOTE — PLAN OF CARE
Ng/lis, npo, TPN, monitoring blood sugars, simethicone prn/ heat packs,  +gas, +belching, voiding freely, tele, up to chair and ambulating in hallway with standby assist.  Continue to monitor, updated on care plan.    Problem: Patient Centered Care  Goal: Patient preferences are identified and integrated in the patient's plan of care  Description: Interventions:  - What would you like us to know as we care for you? -  - Provide timely, complete, and accurate information to patient/family  - Incorporate patient and family knowledge, values, beliefs, and cultural backgrounds into the planning and delivery of care  - Encourage patient/family to participate in care and decision-making at the level they choose  - Honor patient and family perspectives and choices  Outcome: Progressing     Problem: Patient/Family Goals  Goal: Patient/Family Long Term Goal  Description: Patient's Long Term Goal: -    Interventions:  - -  - See additional Care Plan goals for specific interventions  Outcome: Progressing  Goal: Patient/Family Short Term Goal  Description: Patient's Short Term Goal: -    Interventions:   - -  - See additional Care Plan goals for specific interventions  Outcome: Progressing     Problem: PAIN - ADULT  Goal: Verbalizes/displays adequate comfort level or patient's stated pain goal  Description: INTERVENTIONS:  - Encourage pt to monitor pain and request assistance  - Assess pain using appropriate pain scale  - Administer analgesics based on type and severity of pain and evaluate response  - Implement non-pharmacological measures as appropriate and evaluate response  - Consider cultural and social influences on pain and pain management  - Manage/alleviate anxiety  - Utilize distraction and/or relaxation techniques  - Monitor for opioid side effects  - Notify MD/LIP if interventions unsuccessful or patient reports new pain  - Anticipate increased pain with activity and pre-medicate as appropriate  Outcome:  Progressing     Problem: SAFETY ADULT - FALL  Goal: Free from fall injury  Description: INTERVENTIONS:  - Assess pt frequently for physical needs  - Identify cognitive and physical deficits and behaviors that affect risk of falls.  - [place] fall precautions as indicated by assessment.  - Educate pt/family on patient safety including physical limitations  - Instruct pt to call for assistance with activity based on assessment  - Modify environment to reduce risk of injury  - Provide assistive devices as appropriate  - Consider OT/PT consult to assist with strengthening/mobility  - Encourage toileting schedule  Outcome: Progressing     Problem: DISCHARGE PLANNING  Goal: Discharge to home or other facility with appropriate resources  Description: INTERVENTIONS:  - Identify barriers to discharge w/pt and caregiver  - Include patient/family/discharge partner in discharge planning  - Arrange for needed discharge resources and transportation as appropriate  - Identify discharge learning needs (meds, wound care, etc)  - Arrange for interpreters to assist at discharge as needed  - Consider post-discharge preferences of patient/family/discharge partner  - Complete POLST form as appropriate  - Assess patient's ability to be responsible for managing their own health  - Refer to Case Management Department for coordinating discharge planning if the patient needs post-hospital services based on physician/LIP order or complex needs related to functional status, cognitive ability or social support system  Outcome: Progressing     Problem: GASTROINTESTINAL - ADULT  Goal: Minimal or absence of nausea and vomiting  Description: INTERVENTIONS:  - Maintain adequate hydration with IV or PO as ordered and tolerated  - Nasogastric tube to low intermittent suction as ordered  - Evaluate effectiveness of ordered antiemetic medications  - Provide nonpharmacologic comfort measures as appropriate  - Advance diet as tolerated, if ordered  -  Obtain nutritional consult as needed  - Evaluate fluid balance  Outcome: Progressing  Goal: Maintains or returns to baseline bowel function  Description: INTERVENTIONS:  - Assess bowel function  - Maintain adequate hydration with IV or PO as ordered and tolerated  - Evaluate effectiveness of GI medications  - Encourage mobilization and activity  - Obtain nutritional consult as needed  - Establish a toileting routine/schedule  - Consider collaborating with pharmacy to review patient's medication profile  Outcome: Progressing     Problem: SKIN/TISSUE INTEGRITY - ADULT  Goal: Incision(s), wounds(s) or drain site(s) healing without S/S of infection  Description: INTERVENTIONS:  - Assess and document risk factors for pressure ulcer development  - Assess and document skin integrity  - Assess and document dressing/incision, wound bed, drain sites and surrounding tissue  - Implement wound care per orders  - Initiate isolation precautions as appropriate  - Initiate Pressure Ulcer prevention bundle as indicated  Outcome: Progressing

## 2024-03-24 NOTE — PLAN OF CARE
Problem: Patient Centered Care  Goal: Patient preferences are identified and integrated in the patient's plan of care  Description: Interventions:  - What would you like us to know as we care for you?   - Provide timely, complete, and accurate information to patient/family  - Incorporate patient and family knowledge, values, beliefs, and cultural backgrounds into the planning and delivery of care  - Encourage patient/family to participate in care and decision-making at the level they choose  - Honor patient and family perspectives and choices  Outcome: Progressing     Problem: Patient/Family Goals  Goal: Patient/Family Long Term Goal  Description: Patient's Long Term Goal:     Interventions:  - See additional Care Plan goals for specific interventions  Outcome: Progressing  Goal: Patient/Family Short Term Goal  Description: Patient's Short Term Goal:     Interventions:   - See additional Care Plan goals for specific interventions  Outcome: Progressing     Problem: PAIN - ADULT  Goal: Verbalizes/displays adequate comfort level or patient's stated pain goal  Description: INTERVENTIONS:  - Encourage pt to monitor pain and request assistance  - Assess pain using appropriate pain scale  - Administer analgesics based on type and severity of pain and evaluate response  - Implement non-pharmacological measures as appropriate and evaluate response  - Consider cultural and social influences on pain and pain management  - Manage/alleviate anxiety  - Utilize distraction and/or relaxation techniques  - Monitor for opioid side effects  - Notify MD/LIP if interventions unsuccessful or patient reports new pain  - Anticipate increased pain with activity and pre-medicate as appropriate  Outcome: Progressing     Problem: SAFETY ADULT - FALL  Goal: Free from fall injury  Description: INTERVENTIONS:  - Assess pt frequently for physical needs  - Identify cognitive and physical deficits and behaviors that affect risk of falls.  -  [place] fall precautions as indicated by assessment.  - Educate pt/family on patient safety including physical limitations  - Instruct pt to call for assistance with activity based on assessment  - Modify environment to reduce risk of injury  - Provide assistive devices as appropriate  - Consider OT/PT consult to assist with strengthening/mobility  - Encourage toileting schedule  Outcome: Progressing     Problem: DISCHARGE PLANNING  Goal: Discharge to home or other facility with appropriate resources  Description: INTERVENTIONS:  - Identify barriers to discharge w/pt and caregiver  - Include patient/family/discharge partner in discharge planning  - Arrange for needed discharge resources and transportation as appropriate  - Identify discharge learning needs (meds, wound care, etc)  - Arrange for interpreters to assist at discharge as needed  - Consider post-discharge preferences of patient/family/discharge partner  - Complete POLST form as appropriate  - Assess patient's ability to be responsible for managing their own health  - Refer to Case Management Department for coordinating discharge planning if the patient needs post-hospital services based on physician/LIP order or complex needs related to functional status, cognitive ability or social support system  Outcome: Progressing     Problem: GASTROINTESTINAL - ADULT  Goal: Minimal or absence of nausea and vomiting  Description: INTERVENTIONS:  - Maintain adequate hydration with IV or PO as ordered and tolerated  - Nasogastric tube to low intermittent suction as ordered  - Evaluate effectiveness of ordered antiemetic medications  - Provide nonpharmacologic comfort measures as appropriate  - Advance diet as tolerated, if ordered  - Obtain nutritional consult as needed  - Evaluate fluid balance  Outcome: Progressing  Goal: Maintains or returns to baseline bowel function  Description: INTERVENTIONS:  - Assess bowel function  - Maintain adequate hydration with IV or  PO as ordered and tolerated  - Evaluate effectiveness of GI medications  - Encourage mobilization and activity  - Obtain nutritional consult as needed  - Establish a toileting routine/schedule  - Consider collaborating with pharmacy to review patient's medication profile  Outcome: Progressing     Problem: SKIN/TISSUE INTEGRITY - ADULT  Goal: Incision(s), wounds(s) or drain site(s) healing without S/S of infection  Description: INTERVENTIONS:  - Assess and document risk factors for pressure ulcer development  - Assess and document skin integrity  - Assess and document dressing/incision, wound bed, drain sites and surrounding tissue  - Implement wound care per orders  - Initiate isolation precautions as appropriate  - Initiate Pressure Ulcer prevention bundle as indicated  Outcome: Progressing     Mayte is alert/oriented x4. Vital signs stable on room air. Encouraged IS use. Afebrile. NGT in place. NPO. TPN infusing through R PICC. Ambulating with SBA. Encouraged ambulation. Pt ambulated in hallway in evening. Pain managed with PRN ofirmev and morphine. PT c/o gas pains. MD notified. PRN simethicone added. SCDs and heparin sq for DVT prophylaxis. Voiding freely. Fall precautions in place: nonskid socks on, bed low, bed alarm active. Call light within reach, pt calls appropriately.

## 2024-03-25 LAB
ALBUMIN SERPL-MCNC: 3.1 G/DL (ref 3.2–4.8)
ALBUMIN/GLOB SERPL: 1.7 {RATIO} (ref 1–2)
ALP LIVER SERPL-CCNC: 67 U/L
ALT SERPL-CCNC: 13 U/L
ANION GAP SERPL CALC-SCNC: 5 MMOL/L (ref 0–18)
AST SERPL-CCNC: <8 U/L (ref ?–34)
BASOPHILS # BLD AUTO: 0.04 X10(3) UL (ref 0–0.2)
BASOPHILS NFR BLD AUTO: 0.7 %
BILIRUB SERPL-MCNC: 0.2 MG/DL (ref 0.2–1.1)
BUN BLD-MCNC: 18 MG/DL (ref 9–23)
BUN/CREAT SERPL: 40.9 (ref 10–20)
CALCIUM BLD-MCNC: 8.4 MG/DL (ref 8.7–10.4)
CHLORIDE SERPL-SCNC: 111 MMOL/L (ref 98–112)
CO2 SERPL-SCNC: 24 MMOL/L (ref 21–32)
CREAT BLD-MCNC: 0.44 MG/DL
DEPRECATED RDW RBC AUTO: 47.9 FL (ref 35.1–46.3)
EGFRCR SERPLBLD CKD-EPI 2021: 98 ML/MIN/1.73M2 (ref 60–?)
EOSINOPHIL # BLD AUTO: 0.33 X10(3) UL (ref 0–0.7)
EOSINOPHIL NFR BLD AUTO: 5.5 %
ERYTHROCYTE [DISTWIDTH] IN BLOOD BY AUTOMATED COUNT: 15 % (ref 11–15)
GLOBULIN PLAS-MCNC: 1.8 G/DL (ref 2.8–4.4)
GLUCOSE BLD-MCNC: 103 MG/DL (ref 70–99)
GLUCOSE BLDC GLUCOMTR-MCNC: 111 MG/DL (ref 70–99)
GLUCOSE BLDC GLUCOMTR-MCNC: 112 MG/DL (ref 70–99)
HCT VFR BLD AUTO: 22 %
HGB BLD-MCNC: 7.3 G/DL
IMM GRANULOCYTES # BLD AUTO: 0.05 X10(3) UL (ref 0–1)
IMM GRANULOCYTES NFR BLD: 0.8 %
LYMPHOCYTES # BLD AUTO: 0.94 X10(3) UL (ref 1–4)
LYMPHOCYTES NFR BLD AUTO: 15.7 %
MAGNESIUM SERPL-MCNC: 1.8 MG/DL (ref 1.6–2.6)
MCH RBC QN AUTO: 28.7 PG (ref 26–34)
MCHC RBC AUTO-ENTMCNC: 33.2 G/DL (ref 31–37)
MCV RBC AUTO: 86.6 FL
MONOCYTES # BLD AUTO: 0.77 X10(3) UL (ref 0.1–1)
MONOCYTES NFR BLD AUTO: 12.8 %
NEUTROPHILS # BLD AUTO: 3.87 X10 (3) UL (ref 1.5–7.7)
NEUTROPHILS # BLD AUTO: 3.87 X10(3) UL (ref 1.5–7.7)
NEUTROPHILS NFR BLD AUTO: 64.5 %
OSMOLALITY SERPL CALC.SUM OF ELEC: 292 MOSM/KG (ref 275–295)
PHOSPHATE SERPL-MCNC: 3.7 MG/DL (ref 2.4–5.1)
PLATELET # BLD AUTO: 216 10(3)UL (ref 150–450)
POTASSIUM SERPL-SCNC: 4.1 MMOL/L (ref 3.5–5.1)
PROT SERPL-MCNC: 4.9 G/DL (ref 5.7–8.2)
RBC # BLD AUTO: 2.54 X10(6)UL
SODIUM SERPL-SCNC: 140 MMOL/L (ref 136–145)
WBC # BLD AUTO: 6 X10(3) UL (ref 4–11)

## 2024-03-25 PROCEDURE — 80053 COMPREHEN METABOLIC PANEL: CPT | Performed by: SURGERY

## 2024-03-25 PROCEDURE — 84100 ASSAY OF PHOSPHORUS: CPT | Performed by: NURSE PRACTITIONER

## 2024-03-25 PROCEDURE — 85025 COMPLETE CBC W/AUTO DIFF WBC: CPT | Performed by: SURGERY

## 2024-03-25 PROCEDURE — 82962 GLUCOSE BLOOD TEST: CPT

## 2024-03-25 PROCEDURE — 83735 ASSAY OF MAGNESIUM: CPT | Performed by: NURSE PRACTITIONER

## 2024-03-25 RX ORDER — MAGNESIUM OXIDE 400 MG/1
400 TABLET ORAL ONCE
Status: COMPLETED | OUTPATIENT
Start: 2024-03-25 | End: 2024-03-25

## 2024-03-25 NOTE — PLAN OF CARE
Pt Ax0x4 on room air. Pt on TPN and full liquid diet after removal pf NG per order. States she had 2 bowel movements, described as diarrhea. Bowel sounds present and pt passing gas. Incision site dressing clean and intact. Pain managed with PRN IV acetaminophen and heat packs. Call light in reach.    Problem: Patient Centered Care  Goal: Patient preferences are identified and integrated in the patient's plan of care  Description: Interventions:  - What would you like us to know as we care for you?   - Provide timely, complete, and accurate information to patient/family  - Incorporate patient and family knowledge, values, beliefs, and cultural backgrounds into the planning and delivery of care  - Encourage patient/family to participate in care and decision-making at the level they choose  - Honor patient and family perspectives and choices  Outcome: Progressing     Problem: Patient/Family Goals  Goal: Patient/Family Long Term Goal  Description: Patient's Long Term Goal:     Interventions:    - See additional Care Plan goals for specific interventions  Outcome: Progressing  Goal: Patient/Family Short Term Goal  Description: Patient's Short Term Goal:    Interventions:   -  - See additional Care Plan goals for specific interventions  Outcome: Progressing     Problem: PAIN - ADULT  Goal: Verbalizes/displays adequate comfort level or patient's stated pain goal  Description: INTERVENTIONS:  - Encourage pt to monitor pain and request assistance  - Assess pain using appropriate pain scale  - Administer analgesics based on type and severity of pain and evaluate response  - Implement non-pharmacological measures as appropriate and evaluate response  - Consider cultural and social influences on pain and pain management  - Manage/alleviate anxiety  - Utilize distraction and/or relaxation techniques  - Monitor for opioid side effects  - Notify MD/LIP if interventions unsuccessful or patient reports new pain  - Anticipate  increased pain with activity and pre-medicate as appropriate  Outcome: Progressing     Problem: SAFETY ADULT - FALL  Goal: Free from fall injury  Description: INTERVENTIONS:  - Assess pt frequently for physical needs  - Identify cognitive and physical deficits and behaviors that affect risk of falls.  - [place] fall precautions as indicated by assessment.  - Educate pt/family on patient safety including physical limitations  - Instruct pt to call for assistance with activity based on assessment  - Modify environment to reduce risk of injury  - Provide assistive devices as appropriate  - Consider OT/PT consult to assist with strengthening/mobility  - Encourage toileting schedule  Outcome: Progressing     Problem: DISCHARGE PLANNING  Goal: Discharge to home or other facility with appropriate resources  Description: INTERVENTIONS:  - Identify barriers to discharge w/pt and caregiver  - Include patient/family/discharge partner in discharge planning  - Arrange for needed discharge resources and transportation as appropriate  - Identify discharge learning needs (meds, wound care, etc)  - Arrange for interpreters to assist at discharge as needed  - Consider post-discharge preferences of patient/family/discharge partner  - Complete POLST form as appropriate  - Assess patient's ability to be responsible for managing their own health  - Refer to Case Management Department for coordinating discharge planning if the patient needs post-hospital services based on physician/LIP order or complex needs related to functional status, cognitive ability or social support system  Outcome: Progressing     Problem: GASTROINTESTINAL - ADULT  Goal: Minimal or absence of nausea and vomiting  Description: INTERVENTIONS:  - Maintain adequate hydration with IV or PO as ordered and tolerated  - Nasogastric tube to low intermittent suction as ordered  - Evaluate effectiveness of ordered antiemetic medications  - Provide nonpharmacologic comfort  measures as appropriate  - Advance diet as tolerated, if ordered  - Obtain nutritional consult as needed  - Evaluate fluid balance  Outcome: Progressing  Goal: Maintains or returns to baseline bowel function  Description: INTERVENTIONS:  - Assess bowel function  - Maintain adequate hydration with IV or PO as ordered and tolerated  - Evaluate effectiveness of GI medications  - Encourage mobilization and activity  - Obtain nutritional consult as needed  - Establish a toileting routine/schedule  - Consider collaborating with pharmacy to review patient's medication profile  Outcome: Progressing     Problem: SKIN/TISSUE INTEGRITY - ADULT  Goal: Incision(s), wounds(s) or drain site(s) healing without S/S of infection  Description: INTERVENTIONS:  - Assess and document risk factors for pressure ulcer development  - Assess and document skin integrity  - Assess and document dressing/incision, wound bed, drain sites and surrounding tissue  - Implement wound care per orders  - Initiate isolation precautions as appropriate  - Initiate Pressure Ulcer prevention bundle as indicated  Outcome: Progressing

## 2024-03-25 NOTE — PROGRESS NOTES
[facility] Hospitalist Progress Note     CC: Hospital Follow up    PCP: Abi Ohara MD       Assessment/Plan:     Principal Problem:    SBO (small bowel obstruction) (HCC)  Active Problems:    Anemia    Azotemia    Ms. Lucas is a 79 year old female with PMH sig for SVT, RLS, COPD, chronic pain, HLD, IgA deficiency, was hospitalized from 2/3 - 2/17 for acute cecal volvulus, s/p right hemicolectomy on 2/3 per general surgery, and another hospital stay for partial SBO 3/5-3/8 treated with NGT and conservative management.  Presents today with recurrent abdominal pain associated with nausea starting about midnight last night she states she has been having gas and passing stool however she been able to eat today as she feels very nauseous.  In the emergency room repeat CT scan again revealed small bowel obstruction with transition point the left lower abdomen, S/P NGT however pt did not improve and plans for Exp. Lap, possible lysis of adhesions, possible bowel resection today. PPN started now transitioned to TPN.      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 Normal wbc. LA normal   -imaging noted  -prn pain med  -IVF, NPO to CLD  -NGT to LIS removed 3/25/24  -PPN, transition to TPN, picc line ordered   -OR 3/22/24 for Exp Lap,lysis of adhesions   -as per surgery   -As needed IV pain meds  -as needed antiemetics  -monitor expected acute blood loss anemia postop     Low Grade Temp  -tmax 100.5. WBC normal  -c/o cough, CXR negative for acute process   -no urinary symptoms  -had loose stools after contrast with SBFT  -blood cx pending-> so far negative, recurred x 1, continue to monitor  -afebrile for last 48 hours, cont to monitor      Mild Hyponatremia-resolved      Expected postoperative anemia  -baseline hgb ~12, post surgery hgb ~7-10, admission hgb 11.2,down trended this admit  -continue to trend post op and transfuse as needed       Paroxysmal supraventricular tachycardia  -continue home metoprolol, may need to change to IV based on course     GERD   -continue home PPI     RLS  -continue home trileptal      QUE  - PRN benzo at home      OP  -resume home meds at discharge     GOC  -full code      MA/ACDIDIER Reach  -Re- Entry: admission 2/3-2/17 and 3/5-3/8  -Consults: surgery   -Discharge Needs: none  -Appointments: [ ] PCP Abi Ohara MD  PCP       FEN  -lytes in am  -diet-CLD, TPN     Prophy  -SCD  -heparin     Dispo  -pending clinical coarse  PCP: Abi Ohara MD     Concerns regarding plan of care were discussed with patient, sister and . Patient agrees with plan as detailed above.       Oz Olivares MD  [facility]   Answering service: [phone]     Subjective:     No CP, SOB, or N/V.  Tolerating CLD.   Feels much weaker than she has on previous admissions.      OBJECTIVE:    Blood pressure 135/56, pulse 89, temperature 99.1 °F (37.3 °C), temperature source Oral, resp. rate 22, height 5' 4\" (1.626 m), weight 103 lb 12.8 oz (47.1 kg), SpO2 100%, not currently breastfeeding.    Temp:  [98.3 °F (36.8 °C)-99.1 °F (37.3 °C)] 99.1 °F (37.3 °C)  Pulse:  [86-99] 89  Resp:  [18-22] 22  BP: (130-135)/(56-65) 135/56  SpO2:  [100 %] 100 %      Intake/Output:    Intake/Output Summary (Last 24 hours) at 3/25/2024 1140  Last data filed at 3/25/2024 1102  Gross per 24 hour   Intake 603.6 ml   Output 950 ml   Net -346.4 ml       Last 3 Weights   03/19/24 1848 103 lb 12.8 oz (47.1 kg)   03/19/24 0950 98 lb (44.5 kg)   03/05/24 1706 100 lb 14.4 oz (45.8 kg)   03/05/24 1032 100 lb (45.4 kg)   02/03/24 0948 104 lb (47.2 kg)   02/03/24 0140 104 lb (47.2 kg)       Exam   GENERAL: no apparent distress  NEURO: A/A Ox3  HEENT: oral mucosa dry   RESP: non labored, CTA  CARDIO: Regular, S1, S2   ABD: +bowel sounds, soft  Exts: no edema  Data Review:       Labs:     Recent Labs   Lab 03/23/24  0526 03/24/24  0641 03/25/24  0604    RBC 3.30* 3.05* 2.54*   HGB 9.1* 8.7* 7.3*   HCT 28.2* 26.5* 22.0*   MCV 85.5 86.9 86.6   MCH 27.6 28.5 28.7   MCHC 32.3 32.8 33.2   RDW 14.9 15.1* 15.0   NEPRELIM 6.08 6.20 3.87   WBC 7.8 8.9 6.0   .0 256.0 216.0         Recent Labs   Lab 03/23/24  0526 03/24/24  0641 03/25/24  0604   * 98 103*   BUN 17 18 18   CREATSERUM 0.54* 0.52* 0.44*   EGFRCR 93 94 98   CA 8.4* 8.8 8.4*    140 140   K 3.9 4.0 4.1    109 111   CO2 27.0 26.0 24.0       Recent Labs   Lab 03/19/24  1249 03/22/24  0529 03/23/24  0526 03/24/24  0641 03/25/24  0604   ALT <7* <7* <7* <7* 13   AST 16 9 9 <8 <8   ALB 4.9* 3.9 3.5 3.5 3.1*       Imaging:  No results found.      Meds:      heparin  5,000 Units Subcutaneous Q8H SUSIE    lansoprazole  30 mg Oral QAM AC    metoprolol succinate  12.5 mg Oral Nightly    OXcarbazepine  300 mg Oral BID      adult 3 in 1 TPN      adult 3 in 1 TPN 66.7 mL/hr at 03/24/24 2108    dextrose 10%       morphINE **OR** morphINE **OR** morphINE, clonazePAM **OR** clonazePAM, simethicone, dextrose 10%, acetaminophen, metoclopramide, ondansetron

## 2024-03-25 NOTE — DIETARY NOTE
ADULT NUTRITION INITIAL ASSESSMENT    Pt is at high nutrition risk.  Pt meets severe malnutrition criteria.      CRITERIA FOR MALNUTRITION DIAGNOSIS:  Criteria for severe malnutrition diagnosis: acute illness/injury related to body fat moderate depletion and muscle mass moderate depletion.    RECOMMENDATIONS TO MD: See Nutrition Intervention for PPN specifics     ADMITTING DIAGNOSIS:  SBO (small bowel obstruction) ([facility]) [K56.609]    PERTINENT PAST MEDICAL HISTORY:  has a past medical history of Acute exacerbation of chronic obstructive pulmonary disease (COPD) ([facility]) (4/17/2017), ALLERGIC RHINITIS, and OTHER DISEASES.     PATIENT STATUS: Initial 03/21/24: chart review due to low BMI. Pt known from recent last 2 admissions (2/6/24 and 3/6/24) and pt was on TPN during 2/6/24 admission. Pt presents this admission with recurrent abdominal pain associated with nausea. CT in ED revealed SBO. NGT in place. Pt down for SBFT today. Surgeon reports that surgery may be necessary. Therefore discussed with MD's initiating nutrition support in the interim while plans being decided.  Discussed with  and he doubted she would decline initiation, therefore PPN ordered after receiving MD consult to place order.     3/22/24 UPDATE: surgical plans today. PICC line placed for TPN vs PPN due to now prolonged need.    3/25 UPDATE: NGT removed today and diet increased to FLD--negligible po intake yet, therefore continue current TPN.  Pt did agree to ONS start of ensure clear, but will defer initiation until tomorrow to assess shikha to intake.  TPN continue. POD #4 lysis of adhesions.      FOOD/NUTRITION RELATED HISTORY:  Appetite:  3/21-per  good up until onset of symptoms Monday (3 days ago)  Intake:  sips of cranberry juice today.    Intake Meeting Needs:  TPN with small po to supplement meeting nutritional needs.        Percent Meals Eaten (last 6 days)       Date/Time Percent Meals Eaten (%)    03/21/24 1535 0 %            Food Allergies: No Known Food Allergies (NKFA)  Cultural/Ethnic/Jain Preferences: Not Obtained    GASTROINTESTINAL:  NGT removed this am, throat sore. Pt reports surgical abd pain, denies N/V. Watery green BM this am.       MEDICATIONS: reviewed   adult 3 in 1 TPN      adult 3 in 1 TPN 66.7 mL/hr at 03/24/24 2108    dextrose 10%          heparin  5,000 Units Subcutaneous Q8H SUSIE    lansoprazole  30 mg Oral QAM AC    metoprolol succinate  12.5 mg Oral Nightly    OXcarbazepine  300 mg Oral BID       LABS: reviewed  Recent Labs     03/22/24  0529 03/23/24  0526 03/24/24  0641 03/25/24  0604   GLU 99 112* 98 103*   BUN 7* 17 18 18   CREATSERUM 0.58 0.54* 0.52* 0.44*   CA 9.2 8.4* 8.8 8.4*   MG 1.9  --  2.0 1.8    138 140 140   K 3.6 3.9 4.0 4.1    105 109 111   CO2 28.0 27.0 26.0 24.0   PHOS 3.7 3.8 3.5 3.7   OSMOCALC 280 288 292 292       NUTRITION RELATED PHYSICAL FINDINGS:  - Nutrition Focused Physical Exam (NFPE): 3/21 taken from recent admission given that pt is off the unit at this time--moderate depletion body fat triceps region, moderate depletion muscle mass clavicle region, scapular region, and shoulder region, and severe depletion muscle mass temple region   - Fluid Accumulation: none  see RN documentation for details  - Skin Integrity: surgical wound(s) see RN documentation for details    ANTHROPOMETRICS:  HT: 162.6 cm (5' 4\")  WT: 47.1 kg (103 lb 12.8 oz)   BMI: Body mass index is 17.82 kg/m².  BMI CLASSIFICATION: <22 considered underweight for advanced age  IBW: 120 lbs        86% IBW  Usual Body Wt: 100-105 lbs for the past few yrs, high of 110# in 2018      100% UBW    WEIGHT HISTORY:  Patient Weight(s) for the past 336 hrs:   Weight   03/19/24 1848 47.1 kg (103 lb 12.8 oz)   03/19/24 0950 44.5 kg (98 lb)     Wt Readings from Last 10 Encounters:   03/19/24 47.1 kg (103 lb 12.8 oz)   03/05/24 45.8 kg (100 lb 14.4 oz)   02/03/24 47.2 kg (104 lb)   01/04/22 47.6 kg (105 lb)   12/10/21  47.2 kg (104 lb)   12/01/21 48.5 kg (107 lb)   08/05/21 47.3 kg (104 lb 4 oz)   06/24/21 46.7 kg (103 lb)   06/10/21 47.2 kg (104 lb)   10/15/20 47.6 kg (105 lb)       NUTRITION DIAGNOSIS/PROBLEM:    Severe Malnutrition related to Acute - Physiological causes resulting in anorexia or diminished intake in the setting of SBO as evidenced by no po intake for the past 3 days and moderate muscle and fat deficits.      NUTRITION DIAGNOSIS PROGRESS:  Improvement (unresolved)--TPN meeting needs and po starting.        NUTRITION INTERVENTION:     NUTRITION PRESCRIPTION:   Estimated Nutrition needs: --dosing wt of 47 kg - wt taken on 3/19  Calories: 9534-0054 calories/day (30-35 calories per kg Dosing wt)  Protein: 61-71 g protein/day (1.3-1.5 g protein/kg Dosing wt)  Fluid Needs: 3006-9105 1ml/jim     - Diet:       Procedures    Clear liquid diet Is Patient on Accuchecks? No    Full liquid diet Is Patient on Accuchecks? No      - RD Malnutrition Care Plan:  PN to meet >80% of needs while GI tract not available--po starting and ONS initiated.   - Parenteral Nutrition: via picc line:  1600 ml volume, 70 g protein, 1250 non protein calories (800 calories from dextrose and 450 calories from fat).  Provides 1530 total calories with 70g protein to meet 100% calorie and 100% protein based on estimated nutritional needs. Small po to supplement. Plan to taper or DC over the next 24-48 hrs depending on adequacy of po intake.    - Meals and snacks: Encouraged increased PO intake  - Medical Food Supplements-RD added Ensure Clear (240 calories/ 8 g protein each) Apple BID-pt agreed will start tomorrow--pt has declined in the past. Rational/use of oral supplements discussed.  - Vitamin and mineral supplements: none  - Feeding assistance: meal set up  - Nutrition education: assess education needs has had low fiber diet ed a few times before.    - Coordination of nutrition care: collaboration with other providers  - Discharge and transfer  of nutrition care to new setting or provider: to be determined    MONITOR AND EVALUATE/NUTRITION GOALS:  - Food and Nutrient Intake:      Monitor: adequacy of PO intake, tolerance of PO intake, adequacy of supplement intake, tolerance of supplement intake, and for PO diet advancement  - Food and Nutrient Administration:      Monitor: TPN tolerance, adequacy of TPN, for TPN adjustment, and    - Anthropometric Measurement:    Monitor weight  - Nutrition Goals:      gradual wt gain as able, PO and supplement greater than 75% of needs, TPN to meet greater than 80% nutrition needs, labs within acceptable limits, support body systems, and improved GI status    DIETITIAN FOLLOW UP: RD to follow and monitor nutrition status/Manage TPN daily.        Katie Oneill RD, LDN   Clinical Dietitian [id number]

## 2024-03-25 NOTE — PROGRESS NOTES
[facility]  part of [facility]    General Surgery Progress Note  Mayte Lucas  : 1945  CSN: [id number]  HD# [id number]    Subjective:   POD#3 lysis of adhesions   Complains of  episodic sharp right flank abdominal pain that lasts 30 seconds  Denies N/V  Passing flatus and BM(s)     Exam:     General: awake and alert, in no acute distress, comfortable, and in good spirits  Pulmonary:lungs clear to auscultation bilaterally  Cardiac: RRR, nl S1,S2, no S3, no S4, and no murmur  Abdomen: normal BS, nondistended, and nontender   Extremities: calves nontender, no edema, SCD's on, motor intact, and sensory intact  Wounds: clean, dry and intact     Assessment and Plan:   SBO (small bowel obstruction) ([facility])  S/p adhesiolysis    Doing well  Diet: Clear liquid diet  IVF: cont TPN  Antibiotics: no need for further antibiotics beyond perioperative doses    Activity: OOB to chair, more ambulation encouraged, and Ambulation in halls at least TID  DVT prophylaxis: SCDs and chemoprophylaxis as ordered    Discharge disposition: probable discharge in 1-2 days       Objective:     Vitals:    24 0556 24 1159 24 1902 24 2105   BP: 114/54 130/65  135/56   Pulse:  86 99 89   Resp: 18 18  22   Temp: 98.6 °F (37 °C) 98.3 °F (36.8 °C)  99.1 °F (37.3 °C)   TempSrc: Oral Oral  Oral   SpO2: 96% 100%  100%   Weight:       Height:         Body mass index is 17.82 kg/m².    Intake/Output Summary (Last 24 hours) at 3/25/2024 0641  Last data filed at 3/25/2024 0456  Gross per 24 hour   Intake 753.6 ml   Output 700 ml   Net 53.6 ml       Results:     Recent Labs   Lab 24  0526 24  0641 24  0604   RBC 3.30* 3.05* 2.54*   HGB 9.1* 8.7* 7.3*   HCT 28.2* 26.5* 22.0*   MCV 85.5 86.9 86.6   MCH 27.6 28.5 28.7   MCHC 32.3 32.8 33.2   RDW 14.9 15.1* 15.0   NEPRELIM 6.08 6.20 3.87   WBC 7.8 8.9 6.0   .0 256.0 216.0       Recent Labs   Lab 24  0529 24  0526 24  0641   GLU 99  112* 98   BUN 7* 17 18   CREATSERUM 0.58 0.54* 0.52*   CA 9.2 8.4* 8.8    138 140   K 3.6 3.9 4.0    105 109   CO2 28.0 27.0 26.0       Lab Results   Component Value Date    WBC 6.0 03/25/2024    HGB 7.3 03/25/2024    HCT 22.0 03/25/2024    .0 03/25/2024       No results found.          Yvan Griggs MD [facility]  03/25/24  6:41 AM

## 2024-03-25 NOTE — PLAN OF CARE
Problem: Patient Centered Care  Goal: Patient preferences are identified and integrated in the patient's plan of care  Description: Interventions:  - What would you like us to know as we care for you?   - Provide timely, complete, and accurate information to patient/family  - Incorporate patient and family knowledge, values, beliefs, and cultural backgrounds into the planning and delivery of care  - Encourage patient/family to participate in care and decision-making at the level they choose  - Honor patient and family perspectives and choices  Outcome: Progressing     Problem: Patient/Family Goals  Goal: Patient/Family Long Term Goal  Description: Patient's Long Term Goal:    Interventions:  - See additional Care Plan goals for specific interventions  Outcome: Progressing  Goal: Patient/Family Short Term Goal  Description: Patient's Short Term Goal:     Interventions:   - See additional Care Plan goals for specific interventions  Outcome: Progressing     Problem: PAIN - ADULT  Goal: Verbalizes/displays adequate comfort level or patient's stated pain goal  Description: INTERVENTIONS:  - Encourage pt to monitor pain and request assistance  - Assess pain using appropriate pain scale  - Administer analgesics based on type and severity of pain and evaluate response  - Implement non-pharmacological measures as appropriate and evaluate response  - Consider cultural and social influences on pain and pain management  - Manage/alleviate anxiety  - Utilize distraction and/or relaxation techniques  - Monitor for opioid side effects  - Notify MD/LIP if interventions unsuccessful or patient reports new pain  - Anticipate increased pain with activity and pre-medicate as appropriate  Outcome: Progressing     Problem: SAFETY ADULT - FALL  Goal: Free from fall injury  Description: INTERVENTIONS:  - Assess pt frequently for physical needs  - Identify cognitive and physical deficits and behaviors that affect risk of falls.  -  [place] fall precautions as indicated by assessment.  - Educate pt/family on patient safety including physical limitations  - Instruct pt to call for assistance with activity based on assessment  - Modify environment to reduce risk of injury  - Provide assistive devices as appropriate  - Consider OT/PT consult to assist with strengthening/mobility  - Encourage toileting schedule  Outcome: Progressing     Problem: DISCHARGE PLANNING  Goal: Discharge to home or other facility with appropriate resources  Description: INTERVENTIONS:  - Identify barriers to discharge w/pt and caregiver  - Include patient/family/discharge partner in discharge planning  - Arrange for needed discharge resources and transportation as appropriate  - Identify discharge learning needs (meds, wound care, etc)  - Arrange for interpreters to assist at discharge as needed  - Consider post-discharge preferences of patient/family/discharge partner  - Complete POLST form as appropriate  - Assess patient's ability to be responsible for managing their own health  - Refer to Case Management Department for coordinating discharge planning if the patient needs post-hospital services based on physician/LIP order or complex needs related to functional status, cognitive ability or social support system  Outcome: Progressing     Problem: GASTROINTESTINAL - ADULT  Goal: Minimal or absence of nausea and vomiting  Description: INTERVENTIONS:  - Maintain adequate hydration with IV or PO as ordered and tolerated  - Nasogastric tube to low intermittent suction as ordered  - Evaluate effectiveness of ordered antiemetic medications  - Provide nonpharmacologic comfort measures as appropriate  - Advance diet as tolerated, if ordered  - Obtain nutritional consult as needed  - Evaluate fluid balance  Outcome: Progressing  Goal: Maintains or returns to baseline bowel function  Description: INTERVENTIONS:  - Assess bowel function  - Maintain adequate hydration with IV or  PO as ordered and tolerated  - Evaluate effectiveness of GI medications  - Encourage mobilization and activity  - Obtain nutritional consult as needed  - Establish a toileting routine/schedule  - Consider collaborating with pharmacy to review patient's medication profile  Outcome: Progressing     Problem: SKIN/TISSUE INTEGRITY - ADULT  Goal: Incision(s), wounds(s) or drain site(s) healing without S/S of infection  Description: INTERVENTIONS:  - Assess and document risk factors for pressure ulcer development  - Assess and document skin integrity  - Assess and document dressing/incision, wound bed, drain sites and surrounding tissue  - Implement wound care per orders  - Initiate isolation precautions as appropriate  - Initiate Pressure Ulcer prevention bundle as indicated  Outcome: Progressing     Mayte is alert/oriented x4. Vital signs stable on room air. Encouraged IS use. Afebrile. NGT to LIS. TPN infusing through R PICC. Accuchecks q6, WNL. Ambulating with SBA. Encouraged pt to ambulate in hallway. Pain managed with PRN ofirmev. SCDs and heparin sq for DVT prophylaxis. Voiding freely. Fall precautions in place: nonskid socks on, bed low, bed alarm active. Call light within reach, pt calls appropriately.

## 2024-03-26 LAB
ALBUMIN SERPL-MCNC: 3.7 G/DL (ref 3.2–4.8)
ALBUMIN/GLOB SERPL: 1.6 {RATIO} (ref 1–2)
ALP LIVER SERPL-CCNC: 100 U/L
ALT SERPL-CCNC: <7 U/L
ANION GAP SERPL CALC-SCNC: 7 MMOL/L (ref 0–18)
AST SERPL-CCNC: 10 U/L (ref ?–34)
BASOPHILS # BLD AUTO: 0.05 X10(3) UL (ref 0–0.2)
BASOPHILS NFR BLD AUTO: 0.8 %
BILIRUB SERPL-MCNC: 0.2 MG/DL (ref 0.2–1.1)
BUN BLD-MCNC: 13 MG/DL (ref 9–23)
BUN/CREAT SERPL: 27.7 (ref 10–20)
CALCIUM BLD-MCNC: 9.6 MG/DL (ref 8.7–10.4)
CHLORIDE SERPL-SCNC: 109 MMOL/L (ref 98–112)
CO2 SERPL-SCNC: 24 MMOL/L (ref 21–32)
CREAT BLD-MCNC: 0.47 MG/DL
DEPRECATED RDW RBC AUTO: 47.2 FL (ref 35.1–46.3)
EGFRCR SERPLBLD CKD-EPI 2021: 96 ML/MIN/1.73M2 (ref 60–?)
EOSINOPHIL # BLD AUTO: 0.39 X10(3) UL (ref 0–0.7)
EOSINOPHIL NFR BLD AUTO: 6.4 %
ERYTHROCYTE [DISTWIDTH] IN BLOOD BY AUTOMATED COUNT: 14.9 % (ref 11–15)
GLOBULIN PLAS-MCNC: 2.3 G/DL (ref 2.8–4.4)
GLUCOSE BLD-MCNC: 88 MG/DL (ref 70–99)
HCT VFR BLD AUTO: 25.5 %
HGB BLD-MCNC: 8.4 G/DL
IMM GRANULOCYTES # BLD AUTO: 0.03 X10(3) UL (ref 0–1)
IMM GRANULOCYTES NFR BLD: 0.5 %
LYMPHOCYTES # BLD AUTO: 1.12 X10(3) UL (ref 1–4)
LYMPHOCYTES NFR BLD AUTO: 18.5 %
MAGNESIUM SERPL-MCNC: 1.8 MG/DL (ref 1.6–2.6)
MCH RBC QN AUTO: 28.4 PG (ref 26–34)
MCHC RBC AUTO-ENTMCNC: 32.9 G/DL (ref 31–37)
MCV RBC AUTO: 86.1 FL
MONOCYTES # BLD AUTO: 0.71 X10(3) UL (ref 0.1–1)
MONOCYTES NFR BLD AUTO: 11.7 %
NEUTROPHILS # BLD AUTO: 3.77 X10 (3) UL (ref 1.5–7.7)
NEUTROPHILS # BLD AUTO: 3.77 X10(3) UL (ref 1.5–7.7)
NEUTROPHILS NFR BLD AUTO: 62.1 %
OSMOLALITY SERPL CALC.SUM OF ELEC: 290 MOSM/KG (ref 275–295)
PHOSPHATE SERPL-MCNC: 4.7 MG/DL (ref 2.4–5.1)
PLATELET # BLD AUTO: 267 10(3)UL (ref 150–450)
POTASSIUM SERPL-SCNC: 4.4 MMOL/L (ref 3.5–5.1)
PROT SERPL-MCNC: 6 G/DL (ref 5.7–8.2)
RBC # BLD AUTO: 2.96 X10(6)UL
SODIUM SERPL-SCNC: 140 MMOL/L (ref 136–145)
WBC # BLD AUTO: 6.1 X10(3) UL (ref 4–11)

## 2024-03-26 PROCEDURE — 80053 COMPREHEN METABOLIC PANEL: CPT | Performed by: INTERNAL MEDICINE

## 2024-03-26 PROCEDURE — 85025 COMPLETE CBC W/AUTO DIFF WBC: CPT | Performed by: INTERNAL MEDICINE

## 2024-03-26 PROCEDURE — 84100 ASSAY OF PHOSPHORUS: CPT | Performed by: NURSE PRACTITIONER

## 2024-03-26 PROCEDURE — 83735 ASSAY OF MAGNESIUM: CPT | Performed by: INTERNAL MEDICINE

## 2024-03-26 RX ORDER — TRAMADOL HYDROCHLORIDE 50 MG/1
50 TABLET ORAL EVERY 6 HOURS PRN
Status: DISCONTINUED | OUTPATIENT
Start: 2024-03-26 | End: 2024-03-29

## 2024-03-26 RX ORDER — MAGNESIUM OXIDE 400 MG/1
400 TABLET ORAL ONCE
Status: COMPLETED | OUTPATIENT
Start: 2024-03-26 | End: 2024-03-26

## 2024-03-26 RX ORDER — ACETAMINOPHEN 325 MG/1
650 TABLET ORAL EVERY 6 HOURS PRN
Status: DISCONTINUED | OUTPATIENT
Start: 2024-03-26 | End: 2024-03-29

## 2024-03-26 NOTE — SPIRITUAL CARE NOTE
Spiritual Care Visit Note    Patient Name: Mayte Lucas Date of Spiritual Care Visit: 24   : 1945 Primary Dx: SBO (small bowel obstruction) (HCC)       Referred By: Referral From:     Spiritual Care Taxonomy:    Intended Effects: Demonstrate caring and concern    Methods: Offer support    Interventions: Active listening;Ask guided questions    Visit Type/Summary:   visited with patient.  Patient shared that her  had just visited.  She thanked writer for the visit and there were no stated needs at this time.  Spiritual care available upon request.    Chaplain Hammond [phone]

## 2024-03-26 NOTE — PLAN OF CARE
A/ox4, on RA, up self, ambulates room independently, denies pain/nausea, tolerating diet, TPN continued via right arm picc, received PRN Klonopin and Simethicone, voiding freely, no BM overnight, vital signs stable, no acute changes overnight. Call light within reach, safety measures in place, frequent rounding done, plan of care continued.      Problem: PAIN - ADULT  Goal: Verbalizes/displays adequate comfort level or patient's stated pain goal  Description: INTERVENTIONS:  - Encourage pt to monitor pain and request assistance  - Assess pain using appropriate pain scale  - Administer analgesics based on type and severity of pain and evaluate response  - Implement non-pharmacological measures as appropriate and evaluate response  - Consider cultural and social influences on pain and pain management  - Manage/alleviate anxiety  - Utilize distraction and/or relaxation techniques  - Monitor for opioid side effects  - Notify MD/LIP if interventions unsuccessful or patient reports new pain  - Anticipate increased pain with activity and pre-medicate as appropriate  Outcome: Progressing     Problem: SAFETY ADULT - FALL  Goal: Free from fall injury  Description: INTERVENTIONS:  - Assess pt frequently for physical needs  - Identify cognitive and physical deficits and behaviors that affect risk of falls.  - [place] fall precautions as indicated by assessment.  - Educate pt/family on patient safety including physical limitations  - Instruct pt to call for assistance with activity based on assessment  - Modify environment to reduce risk of injury  - Provide assistive devices as appropriate  - Consider OT/PT consult to assist with strengthening/mobility  - Encourage toileting schedule  Outcome: Progressing     Problem: DISCHARGE PLANNING  Goal: Discharge to home or other facility with appropriate resources  Description: INTERVENTIONS:  - Identify barriers to discharge w/pt and caregiver  - Include patient/family/discharge  partner in discharge planning  - Arrange for needed discharge resources and transportation as appropriate  - Identify discharge learning needs (meds, wound care, etc)  - Arrange for interpreters to assist at discharge as needed  - Consider post-discharge preferences of patient/family/discharge partner  - Complete POLST form as appropriate  - Assess patient's ability to be responsible for managing their own health  - Refer to Case Management Department for coordinating discharge planning if the patient needs post-hospital services based on physician/LIP order or complex needs related to functional status, cognitive ability or social support system  Outcome: Progressing     Problem: GASTROINTESTINAL - ADULT  Goal: Minimal or absence of nausea and vomiting  Description: INTERVENTIONS:  - Maintain adequate hydration with IV or PO as ordered and tolerated  - Nasogastric tube to low intermittent suction as ordered  - Evaluate effectiveness of ordered antiemetic medications  - Provide nonpharmacologic comfort measures as appropriate  - Advance diet as tolerated, if ordered  - Obtain nutritional consult as needed  - Evaluate fluid balance  Outcome: Progressing  Goal: Maintains or returns to baseline bowel function  Description: INTERVENTIONS:  - Assess bowel function  - Maintain adequate hydration with IV or PO as ordered and tolerated  - Evaluate effectiveness of GI medications  - Encourage mobilization and activity  - Obtain nutritional consult as needed  - Establish a toileting routine/schedule  - Consider collaborating with pharmacy to review patient's medication profile  Outcome: Progressing     Problem: SKIN/TISSUE INTEGRITY - ADULT  Goal: Incision(s), wounds(s) or drain site(s) healing without S/S of infection  Description: INTERVENTIONS:  - Assess and document risk factors for pressure ulcer development  - Assess and document skin integrity  - Assess and document dressing/incision, wound bed, drain sites and  surrounding tissue  - Implement wound care per orders  - Initiate isolation precautions as appropriate  - Initiate Pressure Ulcer prevention bundle as indicated  Outcome: Progressing

## 2024-03-26 NOTE — PAYOR COMM NOTE
--------------  CONTINUED STAY REVIEW    Payor: [employer]  Subscriber #:  [id number]  Authorization Number: [id number]    Admit date: 3/19/24  Admit time:  6:19 PM    Admitting Physician: Daniel Manuel DO  Attending Physician:  Oz Olivares MD  Primary Care Physician: Abi Ohara MD    REVIEW DOCUMENTATION:    3-26-24     POD#4 lysis of adhesions   No new complaints  episodic sharp right flank abdominal pain that lasts 30 seconds  Denies N/V  Passing flatus and BM(s)      Exam:      General: awake and alert, in no acute distress, comfortable, and in good spirits  Pulmonary:lungs clear to auscultation bilaterally  Cardiac: RRR, nl S1,S2, no S3, no S4, and no murmur  Abdomen: normal BS, nondistended, and nontender   Extremities: calves nontender, no edema, SCD's on, motor intact, and sensory intact  Wounds: clean, dry and intact      Assessment and Plan:   SBO (small bowel obstruction) ([facility])  S/p adhesiolysis     Doing well  Diet: Low residue soft diet  IVF: Saline lock  Antibiotics: no need for further antibiotics beyond perioperative doses     Activity: OOB to chair, more ambulation encouraged, and Ambulation in halls at least TID  DVT prophylaxis: SCDs and chemoprophylaxis as ordered     Discharge disposition: probable discharge in 1-2 days     Lab Results   Component Value Date     WBC 6.1 03/26/2024     HGB 8.4 03/26/2024     HCT 25.5 03/26/2024     .0 03/26/2024      03/26/2024     K 4.4 03/26/2024      03/26/2024     CO2 24.0 03/26/2024     BUN 13 03/26/2024     CREATSERUM 0.47 03/26/2024     GLU 88 03/26/2024     MG 1.8 03/26/2024     PHOS 4.7 03/26/2024     BILT 0.2 03/26/2024     AST 10 03/26/2024     ALT <7 03/26/2024     CA 9.6 03/26/2024     ALB 3.7 03/26/2024     TP 6.0 03/26/2024                  MEDICATIONS ADMINISTERED IN LAST 1 DAY:  acetaminophen (Ofirmev) 10 mg/mL infusion premix 700 mg       Date Action Dose Route User    3/25/2024 1522 New Bag 700 mg  Intravenous Cecelia Tiwari RN          acetaminophen (Tylenol) tab 650 mg       Date Action Dose Route User    3/26/2024 0909 Given 650 mg Oral Cecelia Tiwari RN          adult 3 in 1 TPN       Date Action Dose Route User    3/25/2024 2129 New Bag (none) Intravenous Romel Padilla RN          clonazePAM (KlonoPIN) tab 0.5 mg       Date Action Dose Route User    3/25/2024 2129 Given 0.5 mg Oral Romel Padilla RN          heparin (Porcine) 5000 UNIT/ML injection 5,000 Units       Date Action Dose Route User    3/26/2024 0649 Given 5,000 Units Subcutaneous (Left Upper Arm) Romel Padilla RN    3/25/2024 2129 Given 5,000 Units Subcutaneous (Right Upper Arm) Romel Padilla RN    3/25/2024 1505 Given 5,000 Units Subcutaneous (Right Upper Arm) Cecelia Tiwari RN          lansoprazole (Prevacid Solutab) disintegrating tab 30 mg       Date Action Dose Route User    3/26/2024 0649 Given 30 mg Oral Romel Padilla RN          magnesium oxide (Mag-Ox) tab 400 mg       Date Action Dose Route User    3/26/2024 0832 Given 400 mg Oral Cecelia Tiwari RN          metoprolol succinate (Toprol XL) partial tablet 12.5 mg       Date Action Dose Route User    3/25/2024 2129 Given 12.5 mg Oral Romel Padilla RN          OXcarbazepine (Trileptal) tab 300 mg       Date Action Dose Route User    3/26/2024 0831 Given 300 mg Oral Cecelia Tiwari RN    3/25/2024 2129 Given 300 mg Oral Romel Padilla RN          simethicone (Mylicon) chewable tab 80 mg       Date Action Dose Route User    3/25/2024 2129 Given 80 mg Oral Romel Padilla RN            Vitals (last day)       Date/Time Temp Pulse Resp BP SpO2 Weight O2 Device O2 Flow Rate (L/min) [facility]    03/26/24 0651 97.9 °F (36.6 °C) -- 16 131/61 96 % -- None (Room air) --     03/25/24 2000 -- 80 -- -- -- -- -- -- KD    03/25/24 1949 98.5 °F (36.9 °C) 91 18 129/61 100 % -- None (Room air) -- AQ    03/25/24 1210 98.6 °F (37 °C) -- 18 116/57 97 % -- None (Room air)  -- KMA

## 2024-03-26 NOTE — PROGRESS NOTES
[facility]  part of [facility]    General Surgery Progress Note  Mayte Lucas  : 1945  CSN: [id number]  HD# [id number]    Subjective:   POD#4 lysis of adhesions   No new complaints  episodic sharp right flank abdominal pain that lasts 30 seconds  Denies N/V  Passing flatus and BM(s)     Exam:     General: awake and alert, in no acute distress, comfortable, and in good spirits  Pulmonary:lungs clear to auscultation bilaterally  Cardiac: RRR, nl S1,S2, no S3, no S4, and no murmur  Abdomen: normal BS, nondistended, and nontender   Extremities: calves nontender, no edema, SCD's on, motor intact, and sensory intact  Wounds: clean, dry and intact     Assessment and Plan:   SBO (small bowel obstruction) (Formerly [facility])  S/p adhesiolysis    Doing well  Diet: Low residue soft diet  IVF: Saline lock  Antibiotics: no need for further antibiotics beyond perioperative doses    Activity: OOB to chair, more ambulation encouraged, and Ambulation in halls at least TID  DVT prophylaxis: SCDs and chemoprophylaxis as ordered    Discharge disposition: probable discharge in 1-2 days       Objective:     Vitals:    24 1210 24 1949 24 0651   BP: 116/57 129/61  131/61   Pulse:  91 80    Resp: 18 18  16   Temp: 98.6 °F (37 °C) 98.5 °F (36.9 °C)  97.9 °F (36.6 °C)   TempSrc: Oral Oral  Oral   SpO2: 97% 100%  96%   Weight:       Height:         Body mass index is 17.82 kg/m².    Intake/Output Summary (Last 24 hours) at 3/26/2024 0842  Last data filed at 3/25/2024 2323  Gross per 24 hour   Intake 595 ml   Output 350 ml   Net 245 ml       Results:     Recent Labs   Lab 24  0641 24  0604 24  0657   RBC 3.05* 2.54* 2.96*   HGB 8.7* 7.3* 8.4*   HCT 26.5* 22.0* 25.5*   MCV 86.9 86.6 86.1   MCH 28.5 28.7 28.4   MCHC 32.8 33.2 32.9   RDW 15.1* 15.0 14.9   NEPRELIM 6.20 3.87 3.77   WBC 8.9 6.0 6.1   .0 216.0 267.0       Recent Labs   Lab 24  0641 24  0604 24  0657    GLU 98 103* 88   BUN 18 18 13   CREATSERUM 0.52* 0.44* 0.47*   CA 8.8 8.4* 9.6    140 140   K 4.0 4.1 4.4    111 109   CO2 26.0 24.0 24.0       Lab Results   Component Value Date    WBC 6.1 03/26/2024    HGB 8.4 03/26/2024    HCT 25.5 03/26/2024    .0 03/26/2024     03/26/2024    K 4.4 03/26/2024     03/26/2024    CO2 24.0 03/26/2024    BUN 13 03/26/2024    CREATSERUM 0.47 03/26/2024    GLU 88 03/26/2024    MG 1.8 03/26/2024    PHOS 4.7 03/26/2024    BILT 0.2 03/26/2024    AST 10 03/26/2024    ALT <7 03/26/2024    CA 9.6 03/26/2024    ALB 3.7 03/26/2024    TP 6.0 03/26/2024       No results found.          Yvan Griggs MD [facility]  03/26/24  8:43 AM

## 2024-03-26 NOTE — PLAN OF CARE
Pt AxOx4. Ambulates independently to bathroom and throughout hallways. Pt on remote tele. Complains of intermittent abdominal pain in her right side, administered tylenol PRN and applied heat packs. Incision sit clean, dry, and intact, steri strips in place. Administered magnesium per electrolyte protocol. No reports of nausea or vomiting. Pt on TPN until orders  tonight, diet has progressed to soft low fiber, tolerating well. Call light in reach    Problem: Patient Centered Care  Goal: Patient preferences are identified and integrated in the patient's plan of care  Description: Interventions:  - What would you like us to know as we care for you?   - Provide timely, complete, and accurate information to patient/family  - Incorporate patient and family knowledge, values, beliefs, and cultural backgrounds into the planning and delivery of care  - Encourage patient/family to participate in care and decision-making at the level they choose  - Honor patient and family perspectives and choices  Outcome: Progressing     Problem: Patient/Family Goals  Goal: Patient/Family Long Term Goal  Description: Patient's Long Term Goal:     Interventions:  -   - See additional Care Plan goals for specific interventions  Outcome: Progressing  Goal: Patient/Family Short Term Goal  Description: Patient's Short Term Goal:     Interventions:   -   - See additional Care Plan goals for specific interventions  Outcome: Progressing     Problem: PAIN - ADULT  Goal: Verbalizes/displays adequate comfort level or patient's stated pain goal  Description: INTERVENTIONS:  - Encourage pt to monitor pain and request assistance  - Assess pain using appropriate pain scale  - Administer analgesics based on type and severity of pain and evaluate response  - Implement non-pharmacological measures as appropriate and evaluate response  - Consider cultural and social influences on pain and pain management  - Manage/alleviate anxiety  - Utilize  distraction and/or relaxation techniques  - Monitor for opioid side effects  - Notify MD/LIP if interventions unsuccessful or patient reports new pain  - Anticipate increased pain with activity and pre-medicate as appropriate  Outcome: Progressing     Problem: SAFETY ADULT - FALL  Goal: Free from fall injury  Description: INTERVENTIONS:  - Assess pt frequently for physical needs  - Identify cognitive and physical deficits and behaviors that affect risk of falls.  - [place] fall precautions as indicated by assessment.  - Educate pt/family on patient safety including physical limitations  - Instruct pt to call for assistance with activity based on assessment  - Modify environment to reduce risk of injury  - Provide assistive devices as appropriate  - Consider OT/PT consult to assist with strengthening/mobility  - Encourage toileting schedule  Outcome: Progressing     Problem: DISCHARGE PLANNING  Goal: Discharge to home or other facility with appropriate resources  Description: INTERVENTIONS:  - Identify barriers to discharge w/pt and caregiver  - Include patient/family/discharge partner in discharge planning  - Arrange for needed discharge resources and transportation as appropriate  - Identify discharge learning needs (meds, wound care, etc)  - Arrange for interpreters to assist at discharge as needed  - Consider post-discharge preferences of patient/family/discharge partner  - Complete POLST form as appropriate  - Assess patient's ability to be responsible for managing their own health  - Refer to Case Management Department for coordinating discharge planning if the patient needs post-hospital services based on physician/LIP order or complex needs related to functional status, cognitive ability or social support system  Outcome: Progressing     Problem: GASTROINTESTINAL - ADULT  Goal: Minimal or absence of nausea and vomiting  Description: INTERVENTIONS:  - Maintain adequate hydration with IV or PO as ordered and  tolerated  - Nasogastric tube to low intermittent suction as ordered  - Evaluate effectiveness of ordered antiemetic medications  - Provide nonpharmacologic comfort measures as appropriate  - Advance diet as tolerated, if ordered  - Obtain nutritional consult as needed  - Evaluate fluid balance  Outcome: Progressing  Goal: Maintains or returns to baseline bowel function  Description: INTERVENTIONS:  - Assess bowel function  - Maintain adequate hydration with IV or PO as ordered and tolerated  - Evaluate effectiveness of GI medications  - Encourage mobilization and activity  - Obtain nutritional consult as needed  - Establish a toileting routine/schedule  - Consider collaborating with pharmacy to review patient's medication profile  Outcome: Progressing     Problem: SKIN/TISSUE INTEGRITY - ADULT  Goal: Incision(s), wounds(s) or drain site(s) healing without S/S of infection  Description: INTERVENTIONS:  - Assess and document risk factors for pressure ulcer development  - Assess and document skin integrity  - Assess and document dressing/incision, wound bed, drain sites and surrounding tissue  - Implement wound care per orders  - Initiate isolation precautions as appropriate  - Initiate Pressure Ulcer prevention bundle as indicated  Outcome: Progressing

## 2024-03-26 NOTE — PROGRESS NOTES
[facility] Hospitalist Progress Note     CC: Hospital Follow up    PCP: Abi Ohara MD       Assessment/Plan:     Principal Problem:    SBO (small bowel obstruction) (HCC)  Active Problems:    Anemia    Azotemia    Ms. Lucas is a 79 year old female with PMH sig for SVT, RLS, COPD, chronic pain, HLD, IgA deficiency, was hospitalized from 2/3 - 2/17 for acute cecal volvulus, s/p right hemicolectomy on 2/3 per general surgery, and another hospital stay for partial SBO 3/5-3/8 treated with NGT and conservative management.  Presents today with recurrent abdominal pain associated with nausea starting about midnight last night she states she has been having gas and passing stool however she been able to eat today as she feels very nauseous.  In the emergency room repeat CT scan again revealed small bowel obstruction with transition point the left lower abdomen, S/P NGT however pt did not improve and plans for Exp. Lap, possible lysis of adhesions, possible bowel resection today. PPN started now transitioned to TPN.      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 Normal wbc. LA normal   -imaging noted  -prn pain med  -IVF, NPO to advancing diet   -NGT to LIS removed 3/25/24  -PPN, transition to TPN, picc line ordered   -OR 3/22/24 for Exp Lap,lysis of adhesions   -as per surgery   -As needed IV pain meds  -as needed antiemetics  -monitor expected acute blood loss anemia postop     Low Grade Temp  -tmax 100.5. WBC normal  -c/o cough, CXR negative for acute process   -no urinary symptoms  -had loose stools after contrast with SBFT  -blood cx pending-> so far negative, recurred x 1, continue to monitor  -afebrile for last 3 days, cont to monitor      Mild Hyponatremia-resolved      Expected postoperative anemia  -baseline hgb ~12, post surgery hgb ~7-10, admission hgb 11.2,down trended this admit  -continue to trend post op and transfuse as  needed      Paroxysmal supraventricular tachycardia  -continue home metoprolol, may need to change to IV based on course     GERD   -continue home PPI     RLS  -continue home trileptal      QUE  - PRN benzo at home      OP  -resume home meds at discharge     GOC  -full code      MA/SARAH Reach  -Re- Entry: admission 2/3-2/17 and 3/5-3/8  -Consults: surgery   -Discharge Needs: none  -Appointments: [ ] PCP Abi Ohara MD  PCP       ZAKIYA  -gianluca in am  -diet-CLD, TPN     Prophy  -SCD  -heparin     Dispo  -pending clinical coarse  PCP: Abi Ohara MD     Concerns regarding plan of care were discussed with patient, sister and . Patient agrees with plan as detailed above.       Oz Olivares MD  [facility]   Answering service: [phone]     Subjective:     No CP, SOB, or N/V.  Tolerating diet.    Feels stronger today.     OBJECTIVE:    Blood pressure 122/53, pulse 80, temperature 98.2 °F (36.8 °C), temperature source Oral, resp. rate 18, height 5' 4\" (1.626 m), weight 103 lb 12.8 oz (47.1 kg), SpO2 99%, not currently breastfeeding.    Temp:  [97.9 °F (36.6 °C)-98.5 °F (36.9 °C)] 98.2 °F (36.8 °C)  Pulse:  [80-91] 80  Resp:  [16-18] 18  BP: (122-131)/(53-61) 122/53  SpO2:  [96 %-100 %] 99 %      Intake/Output:    Intake/Output Summary (Last 24 hours) at 3/26/2024 1241  Last data filed at 3/26/2024 1000  Gross per 24 hour   Intake 835 ml   Output 100 ml   Net 735 ml       Last 3 Weights   03/19/24 1848 103 lb 12.8 oz (47.1 kg)   03/19/24 0950 98 lb (44.5 kg)   03/05/24 1706 100 lb 14.4 oz (45.8 kg)   03/05/24 1032 100 lb (45.4 kg)   02/03/24 0948 104 lb (47.2 kg)   02/03/24 0140 104 lb (47.2 kg)       Exam   GENERAL: no apparent distress  NEURO: A/A Ox3  HEENT: oral mucosa dry   RESP: non labored, CTA  CARDIO: Regular, S1, S2   ABD: +bowel sounds, soft  Exts: no edema  Data Review:       Labs:     Recent Labs   Lab 03/24/24  0641 03/25/24  0604 03/26/24  0657   RBC 3.05* 2.54* 2.96*    HGB 8.7* 7.3* 8.4*   HCT 26.5* 22.0* 25.5*   MCV 86.9 86.6 86.1   MCH 28.5 28.7 28.4   MCHC 32.8 33.2 32.9   RDW 15.1* 15.0 14.9   NEPRELIM 6.20 3.87 3.77   WBC 8.9 6.0 6.1   .0 216.0 267.0         Recent Labs   Lab 03/24/24  0641 03/25/24  0604 03/26/24  0657   GLU 98 103* 88   BUN 18 18 13   CREATSERUM 0.52* 0.44* 0.47*   EGFRCR 94 98 96   CA 8.8 8.4* 9.6    140 140   K 4.0 4.1 4.4    111 109   CO2 26.0 24.0 24.0       Recent Labs   Lab 03/22/24  0529 03/23/24  0526 03/24/24  0641 03/25/24  0604 03/26/24  0657   ALT <7* <7* <7* 13 <7*   AST 9 9 <8 <8 10   ALB 3.9 3.5 3.5 3.1* 3.7       Imaging:  No results found.      Meds:      heparin  5,000 Units Subcutaneous Q8H SUSIE    lansoprazole  30 mg Oral QAM AC    metoprolol succinate  12.5 mg Oral Nightly    OXcarbazepine  300 mg Oral BID      adult 3 in 1 TPN 66.7 mL/hr at 03/25/24 2129    dextrose 10%       acetaminophen, traMADol, clonazePAM **OR** clonazePAM, simethicone, dextrose 10%, metoclopramide, ondansetron

## 2024-03-27 ENCOUNTER — APPOINTMENT (OUTPATIENT)
Dept: CT IMAGING | Facility: HOSPITAL | Age: 79
End: 2024-03-27
Attending: SURGERY
Payer: MEDICARE

## 2024-03-27 LAB
ALBUMIN SERPL-MCNC: 3.7 G/DL (ref 3.2–4.8)
ALBUMIN/GLOB SERPL: 1.8 {RATIO} (ref 1–2)
ALP LIVER SERPL-CCNC: 94 U/L
ALT SERPL-CCNC: <7 U/L
ANION GAP SERPL CALC-SCNC: 6 MMOL/L (ref 0–18)
AST SERPL-CCNC: 10 U/L (ref ?–34)
BASOPHILS # BLD AUTO: 0.03 X10(3) UL (ref 0–0.2)
BASOPHILS NFR BLD AUTO: 0.5 %
BILIRUB SERPL-MCNC: 0.2 MG/DL (ref 0.2–1.1)
BUN BLD-MCNC: 16 MG/DL (ref 9–23)
BUN/CREAT SERPL: 29.6 (ref 10–20)
CALCIUM BLD-MCNC: 9.8 MG/DL (ref 8.7–10.4)
CHLORIDE SERPL-SCNC: 109 MMOL/L (ref 98–112)
CO2 SERPL-SCNC: 26 MMOL/L (ref 21–32)
CREAT BLD-MCNC: 0.54 MG/DL
DEPRECATED RDW RBC AUTO: 47.3 FL (ref 35.1–46.3)
EGFRCR SERPLBLD CKD-EPI 2021: 93 ML/MIN/1.73M2 (ref 60–?)
EOSINOPHIL # BLD AUTO: 0.41 X10(3) UL (ref 0–0.7)
EOSINOPHIL NFR BLD AUTO: 7.3 %
ERYTHROCYTE [DISTWIDTH] IN BLOOD BY AUTOMATED COUNT: 14.9 % (ref 11–15)
GLOBULIN PLAS-MCNC: 2.1 G/DL (ref 2.8–4.4)
GLUCOSE BLD-MCNC: 89 MG/DL (ref 70–99)
HCT VFR BLD AUTO: 25 %
HGB BLD-MCNC: 7.9 G/DL
IMM GRANULOCYTES # BLD AUTO: 0.03 X10(3) UL (ref 0–1)
IMM GRANULOCYTES NFR BLD: 0.5 %
LYMPHOCYTES # BLD AUTO: 1.61 X10(3) UL (ref 1–4)
LYMPHOCYTES NFR BLD AUTO: 28.9 %
MAGNESIUM SERPL-MCNC: 1.8 MG/DL (ref 1.6–2.6)
MCH RBC QN AUTO: 27.3 PG (ref 26–34)
MCHC RBC AUTO-ENTMCNC: 31.6 G/DL (ref 31–37)
MCV RBC AUTO: 86.5 FL
MONOCYTES # BLD AUTO: 0.63 X10(3) UL (ref 0.1–1)
MONOCYTES NFR BLD AUTO: 11.3 %
NEUTROPHILS # BLD AUTO: 2.87 X10 (3) UL (ref 1.5–7.7)
NEUTROPHILS # BLD AUTO: 2.87 X10(3) UL (ref 1.5–7.7)
NEUTROPHILS NFR BLD AUTO: 51.5 %
OSMOLALITY SERPL CALC.SUM OF ELEC: 293 MOSM/KG (ref 275–295)
PLATELET # BLD AUTO: 280 10(3)UL (ref 150–450)
POTASSIUM SERPL-SCNC: 4.2 MMOL/L (ref 3.5–5.1)
PROT SERPL-MCNC: 5.8 G/DL (ref 5.7–8.2)
RBC # BLD AUTO: 2.89 X10(6)UL
SODIUM SERPL-SCNC: 141 MMOL/L (ref 136–145)
WBC # BLD AUTO: 5.6 X10(3) UL (ref 4–11)

## 2024-03-27 PROCEDURE — 85025 COMPLETE CBC W/AUTO DIFF WBC: CPT | Performed by: INTERNAL MEDICINE

## 2024-03-27 PROCEDURE — 80053 COMPREHEN METABOLIC PANEL: CPT | Performed by: INTERNAL MEDICINE

## 2024-03-27 PROCEDURE — 83735 ASSAY OF MAGNESIUM: CPT | Performed by: INTERNAL MEDICINE

## 2024-03-27 PROCEDURE — 74177 CT ABD & PELVIS W/CONTRAST: CPT | Performed by: SURGERY

## 2024-03-27 RX ORDER — MAGNESIUM OXIDE 400 MG/1
400 TABLET ORAL ONCE
Status: COMPLETED | OUTPATIENT
Start: 2024-03-27 | End: 2024-03-27

## 2024-03-27 NOTE — PLAN OF CARE
Patient alert and oriented. Tolerating diet, denies nausea. RLQ pain, CT abdomen ordered, relief with heat packs. Tele, no calls. Voiding. Up ad christopher. Call light within reach, using appropriately.     Problem: PAIN - ADULT  Goal: Verbalizes/displays adequate comfort level or patient's stated pain goal  Description: INTERVENTIONS:  - Encourage pt to monitor pain and request assistance  - Assess pain using appropriate pain scale  - Administer analgesics based on type and severity of pain and evaluate response  - Implement non-pharmacological measures as appropriate and evaluate response  - Consider cultural and social influences on pain and pain management  - Manage/alleviate anxiety  - Utilize distraction and/or relaxation techniques  - Monitor for opioid side effects  - Notify MD/LIP if interventions unsuccessful or patient reports new pain  - Anticipate increased pain with activity and pre-medicate as appropriate  Outcome: Progressing     Problem: SAFETY ADULT - FALL  Goal: Free from fall injury  Description: INTERVENTIONS:  - Assess pt frequently for physical needs  - Identify cognitive and physical deficits and behaviors that affect risk of falls.  - [place] fall precautions as indicated by assessment.  - Educate pt/family on patient safety including physical limitations  - Instruct pt to call for assistance with activity based on assessment  - Modify environment to reduce risk of injury  - Provide assistive devices as appropriate  - Consider OT/PT consult to assist with strengthening/mobility  - Encourage toileting schedule  Outcome: Progressing     Problem: DISCHARGE PLANNING  Goal: Discharge to home or other facility with appropriate resources  Description: INTERVENTIONS:  - Identify barriers to discharge w/pt and caregiver  - Include patient/family/discharge partner in discharge planning  - Arrange for needed discharge resources and transportation as appropriate  - Identify discharge learning needs (meds,  wound care, etc)  - Arrange for interpreters to assist at discharge as needed  - Consider post-discharge preferences of patient/family/discharge partner  - Complete POLST form as appropriate  - Assess patient's ability to be responsible for managing their own health  - Refer to Case Management Department for coordinating discharge planning if the patient needs post-hospital services based on physician/LIP order or complex needs related to functional status, cognitive ability or social support system  Outcome: Progressing     Problem: GASTROINTESTINAL - ADULT  Goal: Minimal or absence of nausea and vomiting  Description: INTERVENTIONS:  - Maintain adequate hydration with IV or PO as ordered and tolerated  - Nasogastric tube to low intermittent suction as ordered  - Evaluate effectiveness of ordered antiemetic medications  - Provide nonpharmacologic comfort measures as appropriate  - Advance diet as tolerated, if ordered  - Obtain nutritional consult as needed  - Evaluate fluid balance  Outcome: Progressing     Problem: GASTROINTESTINAL - ADULT  Goal: Maintains or returns to baseline bowel function  Description: INTERVENTIONS:  - Assess bowel function  - Maintain adequate hydration with IV or PO as ordered and tolerated  - Evaluate effectiveness of GI medications  - Encourage mobilization and activity  - Obtain nutritional consult as needed  - Establish a toileting routine/schedule  - Consider collaborating with pharmacy to review patient's medication profile  Outcome: Progressing     Problem: SKIN/TISSUE INTEGRITY - ADULT  Goal: Incision(s), wounds(s) or drain site(s) healing without S/S of infection  Description: INTERVENTIONS:  - Assess and document risk factors for pressure ulcer development  - Assess and document skin integrity  - Assess and document dressing/incision, wound bed, drain sites and surrounding tissue  - Implement wound care per orders  - Initiate isolation precautions as appropriate  - Initiate  Pressure Ulcer prevention bundle as indicated  Outcome: Progressing     Problem: CARDIOVASCULAR - ADULT  Goal: Maintains optimal cardiac output and hemodynamic stability  Description: INTERVENTIONS:  - Monitor vital signs, rhythm, and trends  - Monitor for bleeding, hypotension and signs of decreased cardiac output  - Evaluate effectiveness of vasoactive medications to optimize hemodynamic stability  - Monitor arterial and/or venous puncture sites for bleeding and/or hematoma  - Assess quality of pulses, skin color and temperature  - Assess for signs of decreased coronary artery perfusion - ex. Angina  - Evaluate fluid balance, assess for edema, trend weights  Outcome: Progressing  Goal: Absence of cardiac arrhythmias or at baseline  Description: INTERVENTIONS:  - Continuous cardiac monitoring, monitor vital signs, obtain 12 lead EKG if indicated  - Evaluate effectiveness of antiarrhythmic and heart rate control medications as ordered  - Initiate emergency measures for life threatening arrhythmias  - Monitor electrolytes and administer replacement therapy as ordered  Outcome: Progressing

## 2024-03-27 NOTE — PLAN OF CARE
Patient is alert and oriented x4. Patient is on room air and vitals are stable. Patient is on remote tele. Patient is saline locked. Patient is a daily weight. Patient is ambulating with SBA. Patient is on a low fiber/soft diet. Patient denies nausea, vomiting, diarrhea and shortness of breath. Patient did complain of moderate abdominal pain, prn tramadol x1 was administered and was minimally effective. Patient requested prn klonopin 0.5mg x1 for sleep. Patient is voiding and last had a bowel movement 3/26/24. Patient has a midline incision with sutures and steri strips. Steri strips are clean, dry and intact. VTE prophylaxis and safety measures are in place.     Problem: Patient Centered Care  Goal: Patient preferences are identified and integrated in the patient's plan of care  Description: Interventions:  - What would you like us to know as we care for you? I am from home.   - Provide timely, complete, and accurate information to patient/family  - Incorporate patient and family knowledge, values, beliefs, and cultural backgrounds into the planning and delivery of care  - Encourage patient/family to participate in care and decision-making at the level they choose  - Honor patient and family perspectives and choices  Outcome: Progressing     Problem: Patient/Family Goals  Goal: Patient/Family Long Term Goal  Description: Patient's Long Term Goal: Discharge home     Interventions:  - Manage pain  - Monitor vitals  - Monitor labs  - Daily weight  - General surgery on consult   - See additional Care Plan goals for specific interventions  Outcome: Progressing  Goal: Patient/Family Short Term Goal  Description: Patient's Short Term Goal: Manage pain     Interventions:   - Frequent pain assessments  - Non-pharmacological interventions  - Pain medications as needed/indicated  - Early ambulation if clinically appropriate  - See additional Care Plan goals for specific interventions  Outcome: Progressing     Problem: PAIN -  ADULT  Goal: Verbalizes/displays adequate comfort level or patient's stated pain goal  Description: INTERVENTIONS:  - Encourage pt to monitor pain and request assistance  - Assess pain using appropriate pain scale  - Administer analgesics based on type and severity of pain and evaluate response  - Implement non-pharmacological measures as appropriate and evaluate response  - Consider cultural and social influences on pain and pain management  - Manage/alleviate anxiety  - Utilize distraction and/or relaxation techniques  - Monitor for opioid side effects  - Notify MD/LIP if interventions unsuccessful or patient reports new pain  - Anticipate increased pain with activity and pre-medicate as appropriate  Outcome: Progressing     Problem: SAFETY ADULT - FALL  Goal: Free from fall injury  Description: INTERVENTIONS:  - Assess pt frequently for physical needs  - Identify cognitive and physical deficits and behaviors that affect risk of falls.  - [place] fall precautions as indicated by assessment.  - Educate pt/family on patient safety including physical limitations  - Instruct pt to call for assistance with activity based on assessment  - Modify environment to reduce risk of injury  - Provide assistive devices as appropriate  - Consider OT/PT consult to assist with strengthening/mobility  - Encourage toileting schedule  Outcome: Progressing     Problem: DISCHARGE PLANNING  Goal: Discharge to home or other facility with appropriate resources  Description: INTERVENTIONS:  - Identify barriers to discharge w/pt and caregiver  - Include patient/family/discharge partner in discharge planning  - Arrange for needed discharge resources and transportation as appropriate  - Identify discharge learning needs (meds, wound care, etc)  - Arrange for interpreters to assist at discharge as needed  - Consider post-discharge preferences of patient/family/discharge partner  - Complete POLST form as appropriate  - Assess patient's ability  to be responsible for managing their own health  - Refer to Case Management Department for coordinating discharge planning if the patient needs post-hospital services based on physician/LIP order or complex needs related to functional status, cognitive ability or social support system  Outcome: Progressing     Problem: GASTROINTESTINAL - ADULT  Goal: Minimal or absence of nausea and vomiting  Description: INTERVENTIONS:  - Maintain adequate hydration with IV or PO as ordered and tolerated  - Nasogastric tube to low intermittent suction as ordered  - Evaluate effectiveness of ordered antiemetic medications  - Provide nonpharmacologic comfort measures as appropriate  - Advance diet as tolerated, if ordered  - Obtain nutritional consult as needed  - Evaluate fluid balance  Outcome: Progressing  Goal: Maintains or returns to baseline bowel function  Description: INTERVENTIONS:  - Assess bowel function  - Maintain adequate hydration with IV or PO as ordered and tolerated  - Evaluate effectiveness of GI medications  - Encourage mobilization and activity  - Obtain nutritional consult as needed  - Establish a toileting routine/schedule  - Consider collaborating with pharmacy to review patient's medication profile  Outcome: Progressing     Problem: SKIN/TISSUE INTEGRITY - ADULT  Goal: Incision(s), wounds(s) or drain site(s) healing without S/S of infection  Description: INTERVENTIONS:  - Assess and document risk factors for pressure ulcer development  - Assess and document skin integrity  - Assess and document dressing/incision, wound bed, drain sites and surrounding tissue  - Implement wound care per orders  - Initiate isolation precautions as appropriate  - Initiate Pressure Ulcer prevention bundle as indicated  Outcome: Progressing

## 2024-03-27 NOTE — PLAN OF CARE
Patient is alert and oriented. Room air. Vital signs stable. Remote tele in place. +belch/+flatus/+BM. Up with SB. Tolerating LFS diet. Denies nausea. R PICC in place and saline locked. Surgical incision clean/dry/intact. Call light and personal belongings within reach. Safety precautions in place.

## 2024-03-27 NOTE — PROGRESS NOTES
[facility] Hospitalist Progress Note     CC: Hospital Follow up    PCP: Abi Ohara MD       Assessment/Plan:     Principal Problem:    SBO (small bowel obstruction) (HCC)  Active Problems:    Anemia    Azotemia    Ms. Lucas is a 79 year old female with PMH sig for SVT, RLS, COPD, chronic pain, HLD, IgA deficiency, was hospitalized from 2/3 - 2/17 for acute cecal volvulus, s/p right hemicolectomy on 2/3 per general surgery, and another hospital stay for partial SBO 3/5-3/8 treated with NGT and conservative management.  Presents today with recurrent abdominal pain associated with nausea starting about midnight last night she states she has been having gas and passing stool however she been able to eat today as she feels very nauseous.  In the emergency room repeat CT scan again revealed small bowel obstruction with transition point the left lower abdomen, S/P NGT however pt did not improve and plans for Exp. Lap, possible lysis of adhesions, possible bowel resection today. PPN to TPN complete.  Tolerating diet.       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 Normal wbc. LA normal   -imaging noted  -prn pain med  -IVF, NPO to advancing diet   -NGT to LIS removed 3/25/24  -PPN, transition to TPN, picc line ordered   -OR 3/22/24 for Exp Lap,lysis of adhesions   -as per surgery   -As needed IV pain meds  -as needed antiemetics  -monitor expected acute blood loss anemia postop     Low Grade Temp  -tmax 100.5. WBC normal  -c/o cough, CXR negative for acute process   -no urinary symptoms  -had loose stools after contrast with SBFT  -blood cx pending-> so far negative, recurred x 1, continue to monitor  -afebrile for last 4 days, cont to monitor      Mild Hyponatremia-resolved      Expected postoperative anemia  -baseline hgb ~12, post surgery hgb ~7-10, admission hgb 11.2,down trended this admit  -continue to trend post op and transfuse  as needed      Paroxysmal supraventricular tachycardia  -continue home metoprolol, may need to change to IV based on course     GERD   -continue home PPI     RLS  -continue home trileptal      QUE  - PRN benzo at home      OP  -resume home meds at discharge     GOC  -full code      MA/SARAH Reach  -Re- Entry: admission 2/3-2/17 and 3/5-3/8  -Consults: surgery   -Discharge Needs: none  -Appointments: [ ] PCP Aib Ohara MD  PCP       ZAKIYA  -lydeanne in am  -diet-ADAT per surgery      Prophy  -SCD  -heparin     Dispo  -pending clinical coarse  PCP: Abi Ohara MD     Concerns regarding plan of care were discussed with patient, sister and . Patient agrees with plan as detailed above.       Oz Olivares MD  [facility]   Answering service: [phone]     Subjective:     No CP, SOB, or N/V.  Tolerating diet.    Still has the right sided sharp pain.  Tried tramadol which helped but worries about constipation.      OBJECTIVE:    Blood pressure 107/58, pulse 74, temperature 98.2 °F (36.8 °C), temperature source Oral, resp. rate 16, height 5' 4\" (1.626 m), weight 99 lb 1.6 oz (45 kg), SpO2 98%, not currently breastfeeding.    Temp:  [97.8 °F (36.6 °C)-98.3 °F (36.8 °C)] 98.2 °F (36.8 °C)  Pulse:  [74-94] 74  Resp:  [16-18] 16  BP: ()/(43-66) 107/58  SpO2:  [98 %-99 %] 98 %      Intake/Output:    Intake/Output Summary (Last 24 hours) at 3/27/2024 1132  Last data filed at 3/27/2024 0600  Gross per 24 hour   Intake --   Output 1400 ml   Net -1400 ml       Last 3 Weights   03/27/24 0508 99 lb 1.6 oz (45 kg)   03/19/24 1848 103 lb 12.8 oz (47.1 kg)   03/19/24 0950 98 lb (44.5 kg)   03/05/24 1706 100 lb 14.4 oz (45.8 kg)   03/05/24 1032 100 lb (45.4 kg)   02/03/24 0948 104 lb (47.2 kg)   02/03/24 0140 104 lb (47.2 kg)       Exam   GENERAL: no apparent distress  NEURO: A/A Ox3  HEENT: oral mucosa dry   RESP: non labored, CTA  CARDIO: Regular, S1, S2   ABD: +bowel sounds, soft  Exts: no  edema  Data Review:       Labs:     Recent Labs   Lab 03/25/24  0604 03/26/24  0657 03/27/24  0527   RBC 2.54* 2.96* 2.89*   HGB 7.3* 8.4* 7.9*   HCT 22.0* 25.5* 25.0*   MCV 86.6 86.1 86.5   MCH 28.7 28.4 27.3   MCHC 33.2 32.9 31.6   RDW 15.0 14.9 14.9   NEPRELIM 3.87 3.77 2.87   WBC 6.0 6.1 5.6   .0 267.0 280.0         Recent Labs   Lab 03/25/24  0604 03/26/24  0657 03/27/24  0527   * 88 89   BUN 18 13 16   CREATSERUM 0.44* 0.47* 0.54*   EGFRCR 98 96 93   CA 8.4* 9.6 9.8    140 141   K 4.1 4.4 4.2    109 109   CO2 24.0 24.0 26.0       Recent Labs   Lab 03/23/24  0526 03/24/24  0641 03/25/24  0604 03/26/24  0657 03/27/24  0527   ALT <7* <7* 13 <7* <7*   AST 9 <8 <8 10 10   ALB 3.5 3.5 3.1* 3.7 3.7       Imaging:  No results found.      Meds:      heparin  5,000 Units Subcutaneous Q8H SUSIE    lansoprazole  30 mg Oral QAM AC    metoprolol succinate  12.5 mg Oral Nightly    OXcarbazepine  300 mg Oral BID      dextrose 10%       acetaminophen, traMADol, clonazePAM **OR** clonazePAM, simethicone, dextrose 10%, metoclopramide, ondansetron

## 2024-03-27 NOTE — PROGRESS NOTES
[facility]  part of [facility]    General Surgery Progress Note  Mayte Lucas  : 1945  CSN: [id number]  HD# [id number]    Subjective:   POD#5 lysis of adhesions   No new complaints  episodic sharp right flank abdominal pain that lasts 30 seconds still present and is main complaint  Denies N/V and tolerating diet  Passing flatus and BM(s)     Exam:     General: awake and alert, in no acute distress, comfortable, and in good spirits  Pulmonary:lungs clear to auscultation bilaterally  Cardiac: RRR, nl S1,S2, no S3, no S4, and no murmur  Abdomen: normal BS, nondistended, and nontender   Extremities: calves nontender, no edema, SCD's on, motor intact, and sensory intact  Wounds: clean, dry and intact     Assessment and Plan:   SBO (small bowel obstruction) (HCC)  S/p adhesiolysis    Doing well  Will obtain CT a/p due to persistent c/o right flank pain - r/o nephrolithiasis  Diet: Low residue soft diet  IVF: Saline lock  Antibiotics: no need for further antibiotics beyond perioperative doses    Activity: OOB to chair, more ambulation encouraged, and Ambulation in halls at least TID  DVT prophylaxis: SCDs and chemoprophylaxis as ordered    Discharge disposition: possible discharge in AM       Objective:     Vitals:    24 1932 24 2130 24 0508 24 1056   BP: 145/43 138/60 96/66 107/58   Pulse: 94 86 74    Resp: 16  16 16   Temp: 97.8 °F (36.6 °C)  98.3 °F (36.8 °C) 98.2 °F (36.8 °C)   TempSrc: Oral  Oral Oral   SpO2: 99%  99% 98%   Weight:   99 lb 1.6 oz (45 kg)    Height:         Body mass index is 17.01 kg/m².    Intake/Output Summary (Last 24 hours) at 3/27/2024 1358  Last data filed at 3/27/2024 0600  Gross per 24 hour   Intake --   Output 1400 ml   Net -1400 ml       Results:     Recent Labs   Lab 24  0604 24  0657 24  0527   RBC 2.54* 2.96* 2.89*   HGB 7.3* 8.4* 7.9*   HCT 22.0* 25.5* 25.0*   MCV 86.6 86.1 86.5   MCH 28.7 28.4 27.3   MCHC 33.2 32.9 31.6   RDW  15.0 14.9 14.9   NEPRELIM 3.87 3.77 2.87   WBC 6.0 6.1 5.6   .0 267.0 280.0       Recent Labs   Lab 03/25/24  0604 03/26/24  0657 03/27/24  0527   * 88 89   BUN 18 13 16   CREATSERUM 0.44* 0.47* 0.54*   CA 8.4* 9.6 9.8    140 141   K 4.1 4.4 4.2    109 109   CO2 24.0 24.0 26.0       Lab Results   Component Value Date    WBC 5.6 03/27/2024    HGB 7.9 03/27/2024    HCT 25.0 03/27/2024    .0 03/27/2024     03/27/2024    K 4.2 03/27/2024     03/27/2024    CO2 26.0 03/27/2024    BUN 16 03/27/2024    CREATSERUM 0.54 03/27/2024    GLU 89 03/27/2024    MG 1.8 03/27/2024    BILT 0.2 03/27/2024    AST 10 03/27/2024    ALT <7 03/27/2024    CA 9.8 03/27/2024    ALB 3.7 03/27/2024    TP 5.8 03/27/2024       CT ABDOMEN+PELVIS(CONTRAST ONLY)(CPT=74177)    Result Date: 3/27/2024  CONCLUSION:   Narrowing of small bowel in the RLQ with air fluid levels in the small bowel proximal to this that measure up to 2.7 cm.  Small thin wall rim enhancing fluid collection in the anterior aspect of the right lower quadrant/right hemipelvis.     Dictated by (CST): Macho Garcia MD on 3/27/2024 at 1:45 PM     Finalized by (CST): Macho Garcia MD on 3/27/2024 at 1:56 PM                 Yvan Griggs MD [facility]  03/26/24  8:43 AM

## 2024-03-28 LAB
ALBUMIN SERPL-MCNC: 3.8 G/DL (ref 3.2–4.8)
ALBUMIN/GLOB SERPL: 1.8 {RATIO} (ref 1–2)
ALP LIVER SERPL-CCNC: 98 U/L
ALT SERPL-CCNC: 7 U/L
ANION GAP SERPL CALC-SCNC: 7 MMOL/L (ref 0–18)
AST SERPL-CCNC: 13 U/L (ref ?–34)
BASOPHILS # BLD AUTO: 0.05 X10(3) UL (ref 0–0.2)
BASOPHILS NFR BLD AUTO: 0.8 %
BILIRUB SERPL-MCNC: 0.3 MG/DL (ref 0.2–1.1)
BUN BLD-MCNC: 14 MG/DL (ref 9–23)
BUN/CREAT SERPL: 22.2 (ref 10–20)
CALCIUM BLD-MCNC: 9.9 MG/DL (ref 8.7–10.4)
CHLORIDE SERPL-SCNC: 108 MMOL/L (ref 98–112)
CO2 SERPL-SCNC: 28 MMOL/L (ref 21–32)
CREAT BLD-MCNC: 0.63 MG/DL
DEPRECATED RDW RBC AUTO: 46.2 FL (ref 35.1–46.3)
EGFRCR SERPLBLD CKD-EPI 2021: 90 ML/MIN/1.73M2 (ref 60–?)
EOSINOPHIL # BLD AUTO: 0.39 X10(3) UL (ref 0–0.7)
EOSINOPHIL NFR BLD AUTO: 5.9 %
ERYTHROCYTE [DISTWIDTH] IN BLOOD BY AUTOMATED COUNT: 14.8 % (ref 11–15)
GLOBULIN PLAS-MCNC: 2.1 G/DL (ref 2.8–4.4)
GLUCOSE BLD-MCNC: 97 MG/DL (ref 70–99)
HCT VFR BLD AUTO: 24.5 %
HGB BLD-MCNC: 8 G/DL
IMM GRANULOCYTES # BLD AUTO: 0.06 X10(3) UL (ref 0–1)
IMM GRANULOCYTES NFR BLD: 0.9 %
LYMPHOCYTES # BLD AUTO: 1.19 X10(3) UL (ref 1–4)
LYMPHOCYTES NFR BLD AUTO: 18.1 %
MAGNESIUM SERPL-MCNC: 1.8 MG/DL (ref 1.6–2.6)
MCH RBC QN AUTO: 27.6 PG (ref 26–34)
MCHC RBC AUTO-ENTMCNC: 32.7 G/DL (ref 31–37)
MCV RBC AUTO: 84.5 FL
MONOCYTES # BLD AUTO: 0.67 X10(3) UL (ref 0.1–1)
MONOCYTES NFR BLD AUTO: 10.2 %
NEUTROPHILS # BLD AUTO: 4.23 X10 (3) UL (ref 1.5–7.7)
NEUTROPHILS # BLD AUTO: 4.23 X10(3) UL (ref 1.5–7.7)
NEUTROPHILS NFR BLD AUTO: 64.1 %
OSMOLALITY SERPL CALC.SUM OF ELEC: 296 MOSM/KG (ref 275–295)
PLATELET # BLD AUTO: 302 10(3)UL (ref 150–450)
POTASSIUM SERPL-SCNC: 4.1 MMOL/L (ref 3.5–5.1)
PROT SERPL-MCNC: 5.9 G/DL (ref 5.7–8.2)
RBC # BLD AUTO: 2.9 X10(6)UL
SODIUM SERPL-SCNC: 143 MMOL/L (ref 136–145)
WBC # BLD AUTO: 6.6 X10(3) UL (ref 4–11)

## 2024-03-28 PROCEDURE — 80053 COMPREHEN METABOLIC PANEL: CPT | Performed by: INTERNAL MEDICINE

## 2024-03-28 PROCEDURE — 85025 COMPLETE CBC W/AUTO DIFF WBC: CPT | Performed by: INTERNAL MEDICINE

## 2024-03-28 PROCEDURE — 83735 ASSAY OF MAGNESIUM: CPT | Performed by: INTERNAL MEDICINE

## 2024-03-28 RX ORDER — MAGNESIUM OXIDE 400 MG/1
400 TABLET ORAL ONCE
Status: COMPLETED | OUTPATIENT
Start: 2024-03-28 | End: 2024-03-28

## 2024-03-28 NOTE — PLAN OF CARE
Pt A&Ox4. Room air. Reported pain treated with heat packs, pt does not want to take pain medication. No BM this shift. Passing gas. Tolerating soft/low fiber diet. Walking the hallways ad chrisotpher. Call light within reach.     Problem: Patient Centered Care  Goal: Patient preferences are identified and integrated in the patient's plan of care  Description: Interventions:  - What would you like us to know as we care for you? I am from home.   - Provide timely, complete, and accurate information to patient/family  - Incorporate patient and family knowledge, values, beliefs, and cultural backgrounds into the planning and delivery of care  - Encourage patient/family to participate in care and decision-making at the level they choose  - Honor patient and family perspectives and choices  Outcome: Progressing     Problem: Patient/Family Goals  Goal: Patient/Family Long Term Goal  Description: Patient's Long Term Goal: Discharge home     Interventions:  - Manage pain  - Monitor vitals  - Monitor labs  - Daily weight  - General surgery on consult   - See additional Care Plan goals for specific interventions  Outcome: Progressing  Goal: Patient/Family Short Term Goal  Description: Patient's Short Term Goal: Manage pain     Interventions:   - Frequent pain assessments  - Non-pharmacological interventions  - Pain medications as needed/indicated  - Early ambulation if clinically appropriate  - See additional Care Plan goals for specific interventions  Outcome: Progressing     Problem: PAIN - ADULT  Goal: Verbalizes/displays adequate comfort level or patient's stated pain goal  Description: INTERVENTIONS:  - Encourage pt to monitor pain and request assistance  - Assess pain using appropriate pain scale  - Administer analgesics based on type and severity of pain and evaluate response  - Implement non-pharmacological measures as appropriate and evaluate response  - Consider cultural and social influences on pain and pain management  -  Manage/alleviate anxiety  - Utilize distraction and/or relaxation techniques  - Monitor for opioid side effects  - Notify MD/LIP if interventions unsuccessful or patient reports new pain  - Anticipate increased pain with activity and pre-medicate as appropriate  Outcome: Progressing     Problem: SAFETY ADULT - FALL  Goal: Free from fall injury  Description: INTERVENTIONS:  - Assess pt frequently for physical needs  - Identify cognitive and physical deficits and behaviors that affect risk of falls.  - [place] fall precautions as indicated by assessment.  - Educate pt/family on patient safety including physical limitations  - Instruct pt to call for assistance with activity based on assessment  - Modify environment to reduce risk of injury  - Provide assistive devices as appropriate  - Consider OT/PT consult to assist with strengthening/mobility  - Encourage toileting schedule  Outcome: Progressing     Problem: DISCHARGE PLANNING  Goal: Discharge to home or other facility with appropriate resources  Description: INTERVENTIONS:  - Identify barriers to discharge w/pt and caregiver  - Include patient/family/discharge partner in discharge planning  - Arrange for needed discharge resources and transportation as appropriate  - Identify discharge learning needs (meds, wound care, etc)  - Arrange for interpreters to assist at discharge as needed  - Consider post-discharge preferences of patient/family/discharge partner  - Complete POLST form as appropriate  - Assess patient's ability to be responsible for managing their own health  - Refer to Case Management Department for coordinating discharge planning if the patient needs post-hospital services based on physician/LIP order or complex needs related to functional status, cognitive ability or social support system  Outcome: Progressing     Problem: GASTROINTESTINAL - ADULT  Goal: Minimal or absence of nausea and vomiting  Description: INTERVENTIONS:  - Maintain adequate  hydration with IV or PO as ordered and tolerated  - Nasogastric tube to low intermittent suction as ordered  - Evaluate effectiveness of ordered antiemetic medications  - Provide nonpharmacologic comfort measures as appropriate  - Advance diet as tolerated, if ordered  - Obtain nutritional consult as needed  - Evaluate fluid balance  Outcome: Progressing  Goal: Maintains or returns to baseline bowel function  Description: INTERVENTIONS:  - Assess bowel function  - Maintain adequate hydration with IV or PO as ordered and tolerated  - Evaluate effectiveness of GI medications  - Encourage mobilization and activity  - Obtain nutritional consult as needed  - Establish a toileting routine/schedule  - Consider collaborating with pharmacy to review patient's medication profile  Outcome: Progressing     Problem: SKIN/TISSUE INTEGRITY - ADULT  Goal: Incision(s), wounds(s) or drain site(s) healing without S/S of infection  Description: INTERVENTIONS:  - Assess and document risk factors for pressure ulcer development  - Assess and document skin integrity  - Assess and document dressing/incision, wound bed, drain sites and surrounding tissue  - Implement wound care per orders  - Initiate isolation precautions as appropriate  - Initiate Pressure Ulcer prevention bundle as indicated  Outcome: Progressing     Problem: CARDIOVASCULAR - ADULT  Goal: Maintains optimal cardiac output and hemodynamic stability  Description: INTERVENTIONS:  - Monitor vital signs, rhythm, and trends  - Monitor for bleeding, hypotension and signs of decreased cardiac output  - Evaluate effectiveness of vasoactive medications to optimize hemodynamic stability  - Monitor arterial and/or venous puncture sites for bleeding and/or hematoma  - Assess quality of pulses, skin color and temperature  - Assess for signs of decreased coronary artery perfusion - ex. Angina  - Evaluate fluid balance, assess for edema, trend weights  Outcome: Progressing  Goal: Absence  of cardiac arrhythmias or at baseline  Description: INTERVENTIONS:  - Continuous cardiac monitoring, monitor vital signs, obtain 12 lead EKG if indicated  - Evaluate effectiveness of antiarrhythmic and heart rate control medications as ordered  - Initiate emergency measures for life threatening arrhythmias  - Monitor electrolytes and administer replacement therapy as ordered  Outcome: Progressing

## 2024-03-28 NOTE — PROGRESS NOTES
[facility] Hospitalist Progress Note     CC: Hospital Follow up    PCP: Abi Ohara MD       Assessment/Plan:     Principal Problem:    SBO (small bowel obstruction) (HCC)  Active Problems:    Anemia    Azotemia    Ms. Lucas is a 79 year old female with PMH sig for SVT, RLS, COPD, chronic pain, HLD, IgA deficiency, was hospitalized from 2/3 - 2/17 for acute cecal volvulus, s/p right hemicolectomy on 2/3 per general surgery, and another hospital stay for partial SBO 3/5-3/8 treated with NGT and conservative management.  Presents today with recurrent abdominal pain associated with nausea starting about midnight last night she states she has been having gas and passing stool however she been able to eat today as she feels very nauseous.  In the emergency room repeat CT scan again revealed small bowel obstruction with transition point the left lower abdomen, S/P NGT however pt did not improve and plans for Exp. Lap, possible lysis of adhesions, possible bowel resection today. PPN to TPN complete.  Repeat CT reviewed.  Tolerating diet.       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 Normal wbc. LA normal   -imaging noted  -prn pain med  -IVF, NPO to advancing diet   -NGT to LIS removed 3/25/24  -PPN, transition to TPN, picc line ordered   -OR 3/22/24 for Exp Lap,lysis of adhesions   -as per surgery   -As needed IV pain meds  -as needed antiemetics  -monitor expected acute blood loss anemia postop  -repeat CT 3/27/24 reviewed      Low Grade Temp  -tmax 100.5. WBC normal  -c/o cough, CXR negative for acute process   -no urinary symptoms  -had loose stools after contrast with SBFT  -blood cx pending-> so far negative, recurred x 1, continue to monitor  -afebrile for last 4 days, cont to monitor      Mild Hyponatremia-resolved      Expected postoperative anemia  -baseline hgb ~12, post surgery hgb ~7-10, admission hgb 11.2,down trended  this admit  -continue to trend post op and transfuse as needed      Paroxysmal supraventricular tachycardia  -continue home metoprolol, may need to change to IV based on course     GERD   -continue home PPI     RLS  -continue home trileptal      QUE  - PRN benzo at home      OP  -resume home meds at discharge     GOC  -full code      MA/ACDIDIER [place]  -Re- Entry: admission 2/3-2/17 and 3/5-3/8  -Consults: surgery   -Discharge Needs: none  -Appointments: [ ] PCP Abi Ohara MD  PCP       FEN  -lytes in am  -diet-ADAT per surgery      Prophy  -SCD  -heparin     Dispo  -pending clinical coarse  PCP: Abi Ohara MD     Concerns regarding plan of care were discussed with patient, sister and . Patient agrees with plan as detailed above.       Oz Olivares MD  Hospitalist  [facility]   Answering service: [phone]     Subjective:     No CP, SOB, or N/V.  Tolerating diet.    Right sided sharp pain is a little better.   Has not taken anymore tramadol.   at bedside.       OBJECTIVE:    Blood pressure 130/61, pulse 84, temperature 98.1 °F (36.7 °C), temperature source Oral, resp. rate 18, height 5' 4\" (1.626 m), weight 99 lb 1.6 oz (45 kg), SpO2 95%, not currently breastfeeding.    Temp:  [98.1 °F (36.7 °C)-98.3 °F (36.8 °C)] 98.1 °F (36.7 °C)  Pulse:  [82-84] 84  Resp:  [16-18] 18  BP: (120-137)/(52-61) 130/61  SpO2:  [94 %-95 %] 95 %      Intake/Output:    Intake/Output Summary (Last 24 hours) at 3/28/2024 1241  Last data filed at 3/28/2024 1151  Gross per 24 hour   Intake 480 ml   Output 1300 ml   Net -820 ml       Last 3 Weights   03/27/24 0508 99 lb 1.6 oz (45 kg)   03/19/24 1848 103 lb 12.8 oz (47.1 kg)   03/19/24 0950 98 lb (44.5 kg)   03/05/24 1706 100 lb 14.4 oz (45.8 kg)   03/05/24 1032 100 lb (45.4 kg)   02/03/24 0948 104 lb (47.2 kg)   02/03/24 0140 104 lb (47.2 kg)       Exam   GENERAL: no apparent distress  NEURO: A/A Ox3  HEENT: oral mucosa dry   RESP: non labored, CTA  CARDIO:  Regular, S1, S2   ABD: +bowel sounds, soft  Exts: no edema  Data Review:       Labs:     Recent Labs   Lab 03/26/24  0657 03/27/24  0527 03/28/24  0600   RBC 2.96* 2.89* 2.90*   HGB 8.4* 7.9* 8.0*   HCT 25.5* 25.0* 24.5*   MCV 86.1 86.5 84.5   MCH 28.4 27.3 27.6   MCHC 32.9 31.6 32.7   RDW 14.9 14.9 14.8   NEPRELIM 3.77 2.87 4.23   WBC 6.1 5.6 6.6   .0 280.0 302.0         Recent Labs   Lab 03/26/24  0657 03/27/24  0527 03/28/24  0600   GLU 88 89 97   BUN 13 16 14   CREATSERUM 0.47* 0.54* 0.63   EGFRCR 96 93 90   CA 9.6 9.8 9.9    141 143   K 4.4 4.2 4.1    109 108   CO2 24.0 26.0 28.0       Recent Labs   Lab 03/24/24  0641 03/25/24  0604 03/26/24  0657 03/27/24  0527 03/28/24  0600   ALT <7* 13 <7* <7* 7*   AST <8 <8 10 10 13   ALB 3.5 3.1* 3.7 3.7 3.8       Imaging:  CT ABDOMEN+PELVIS(CONTRAST ONLY)(CPT=74177)    Result Date: 3/27/2024  CONCLUSION:   Narrowing of small bowel in the RLQ with air fluid levels in the small bowel proximal to this that measure up to 2.7 cm.  Small thin wall rim enhancing fluid collection in the anterior aspect of the right lower quadrant/right hemipelvis.     Dictated by (CST): Macho Garcia MD on 3/27/2024 at 1:45 PM     Finalized by (CST): Macho Garcia MD on 3/27/2024 at 1:56 PM             Meds:      heparin  5,000 Units Subcutaneous Q8H SUSIE    lansoprazole  30 mg Oral QAM AC    metoprolol succinate  12.5 mg Oral Nightly    OXcarbazepine  300 mg Oral BID      dextrose 10%       acetaminophen, traMADol, clonazePAM **OR** clonazePAM, simethicone, dextrose 10%, metoclopramide, ondansetron

## 2024-03-28 NOTE — PLAN OF CARE
Mayte is Aox4 on room air. C/o RLQ pain, but does not want to take pain medication, managing pain with hot packs. Ambulating the halls frequently independently. She is on Low fiber/soft diet, lack of appetite. Family at bedside. Possible discharge tomorrow per MD. Safety plan is in place, call light near patient calls appropriately.     Problem: Patient Centered Care  Goal: Patient preferences are identified and integrated in the patient's plan of care  Description: Interventions:  - What would you like us to know as we care for you? I am from home.   - Provide timely, complete, and accurate information to patient/family  - Incorporate patient and family knowledge, values, beliefs, and cultural backgrounds into the planning and delivery of care  - Encourage patient/family to participate in care and decision-making at the level they choose  - Honor patient and family perspectives and choices  Outcome: Progressing     Problem: Patient/Family Goals  Goal: Patient/Family Short Term Goal  Description: Patient's Short Term Goal: Manage pain     Interventions:   - Frequent pain assessments  - Non-pharmacological interventions  - Pain medications as needed/indicated  - Early ambulation if clinically appropriate  - See additional Care Plan goals for specific interventions  Outcome: Progressing     Problem: PAIN - ADULT  Goal: Verbalizes/displays adequate comfort level or patient's stated pain goal  Description: INTERVENTIONS:  - Encourage pt to monitor pain and request assistance  - Assess pain using appropriate pain scale  - Administer analgesics based on type and severity of pain and evaluate response  - Implement non-pharmacological measures as appropriate and evaluate response  - Consider cultural and social influences on pain and pain management  - Manage/alleviate anxiety  - Utilize distraction and/or relaxation techniques  - Monitor for opioid side effects  - Notify MD/LIP if interventions unsuccessful or patient  reports new pain  - Anticipate increased pain with activity and pre-medicate as appropriate  Outcome: Progressing     Problem: SAFETY ADULT - FALL  Goal: Free from fall injury  Description: INTERVENTIONS:  - Assess pt frequently for physical needs  - Identify cognitive and physical deficits and behaviors that affect risk of falls.  - [place] fall precautions as indicated by assessment.  - Educate pt/family on patient safety including physical limitations  - Instruct pt to call for assistance with activity based on assessment  - Modify environment to reduce risk of injury  - Provide assistive devices as appropriate  - Consider OT/PT consult to assist with strengthening/mobility  - Encourage toileting schedule  Outcome: Progressing     Problem: DISCHARGE PLANNING  Goal: Discharge to home or other facility with appropriate resources  Description: INTERVENTIONS:  - Identify barriers to discharge w/pt and caregiver  - Include patient/family/discharge partner in discharge planning  - Arrange for needed discharge resources and transportation as appropriate  - Identify discharge learning needs (meds, wound care, etc)  - Arrange for interpreters to assist at discharge as needed  - Consider post-discharge preferences of patient/family/discharge partner  - Complete POLST form as appropriate  - Assess patient's ability to be responsible for managing their own health  - Refer to Case Management Department for coordinating discharge planning if the patient needs post-hospital services based on physician/LIP order or complex needs related to functional status, cognitive ability or social support system  Outcome: Progressing     Problem: GASTROINTESTINAL - ADULT  Goal: Minimal or absence of nausea and vomiting  Description: INTERVENTIONS:  - Maintain adequate hydration with IV or PO as ordered and tolerated  - Nasogastric tube to low intermittent suction as ordered  - Evaluate effectiveness of ordered antiemetic medications  -  Provide nonpharmacologic comfort measures as appropriate  - Advance diet as tolerated, if ordered  - Obtain nutritional consult as needed  - Evaluate fluid balance  Outcome: Progressing  Goal: Maintains or returns to baseline bowel function  Description: INTERVENTIONS:  - Assess bowel function  - Maintain adequate hydration with IV or PO as ordered and tolerated  - Evaluate effectiveness of GI medications  - Encourage mobilization and activity  - Obtain nutritional consult as needed  - Establish a toileting routine/schedule  - Consider collaborating with pharmacy to review patient's medication profile  Outcome: Progressing     Problem: SKIN/TISSUE INTEGRITY - ADULT  Goal: Incision(s), wounds(s) or drain site(s) healing without S/S of infection  Description: INTERVENTIONS:  - Assess and document risk factors for pressure ulcer development  - Assess and document skin integrity  - Assess and document dressing/incision, wound bed, drain sites and surrounding tissue  - Implement wound care per orders  - Initiate isolation precautions as appropriate  - Initiate Pressure Ulcer prevention bundle as indicated  Outcome: Progressing     Problem: CARDIOVASCULAR - ADULT  Goal: Maintains optimal cardiac output and hemodynamic stability  Description: INTERVENTIONS:  - Monitor vital signs, rhythm, and trends  - Monitor for bleeding, hypotension and signs of decreased cardiac output  - Evaluate effectiveness of vasoactive medications to optimize hemodynamic stability  - Monitor arterial and/or venous puncture sites for bleeding and/or hematoma  - Assess quality of pulses, skin color and temperature  - Assess for signs of decreased coronary artery perfusion - ex. Angina  - Evaluate fluid balance, assess for edema, trend weights  Outcome: Progressing  Goal: Absence of cardiac arrhythmias or at baseline  Description: INTERVENTIONS:  - Continuous cardiac monitoring, monitor vital signs, obtain 12 lead EKG if indicated  - Evaluate  effectiveness of antiarrhythmic and heart rate control medications as ordered  - Initiate emergency measures for life threatening arrhythmias  - Monitor electrolytes and administer replacement therapy as ordered  Outcome: Progressing

## 2024-03-28 NOTE — PROGRESS NOTES
[facility]  part of [facility]    General Surgery Progress Note  Mayte Lucas  : 1945  CSN: [id number]  HD# [id number]    Subjective:   POD#6 lysis of adhesions   No new complaints  episodic sharp right flank abdominal pain that lasts 30 seconds still present but has improved a little  Denies N/V and tolerating diet but poor appetite  Passing flatus and BM(s)     Exam:     General: awake and alert, in no acute distress, comfortable, and in good spirits  Pulmonary:lungs clear to auscultation bilaterally  Cardiac: RRR, nl S1,S2, no S3, no S4, and no murmur  Abdomen: normal BS, nondistended, and nontender   Extremities: calves nontender, no edema, SCD's on, motor intact, and sensory intact  Wounds: clean, dry and intact     Assessment and Plan:   SBO (small bowel obstruction) (HCC)  S/p adhesiolysis  CT scan showed no pathology to explain right flank pain which is improving    Doing well  Diet: Low residue soft diet  IVF: Saline lock  Antibiotics: no need for further antibiotics beyond perioperative doses    Activity: OOB to chair, more ambulation encouraged, and Ambulation in halls at least TID  DVT prophylaxis: SCDs and chemoprophylaxis as ordered    Discharge disposition: possible discharge in AM not ready for disch today      Objective:     Vitals:    24 1056 24 2053 24 0939 24 1151   BP: 107/58 137/52 120/57 130/61   Pulse:  82 84    Resp: 16 16  18   Temp: 98.2 °F (36.8 °C) 98.3 °F (36.8 °C)  98.1 °F (36.7 °C)   TempSrc: Oral Oral  Oral   SpO2: 98% 94%  95%   Weight:       Height:         Body mass index is 17.01 kg/m².    Intake/Output Summary (Last 24 hours) at 3/28/2024 1321  Last data filed at 3/28/2024 1151  Gross per 24 hour   Intake 480 ml   Output 1300 ml   Net -820 ml       Results:     Recent Labs   Lab 24  0657 24  0527 24  0600   RBC 2.96* 2.89* 2.90*   HGB 8.4* 7.9* 8.0*   HCT 25.5* 25.0* 24.5*   MCV 86.1 86.5 84.5   MCH 28.4 27.3 27.6    MCHC 32.9 31.6 32.7   RDW 14.9 14.9 14.8   NEPRELIM 3.77 2.87 4.23   WBC 6.1 5.6 6.6   .0 280.0 302.0       Recent Labs   Lab 03/26/24  0657 03/27/24  0527 03/28/24  0600   GLU 88 89 97   BUN 13 16 14   CREATSERUM 0.47* 0.54* 0.63   CA 9.6 9.8 9.9    141 143   K 4.4 4.2 4.1    109 108   CO2 24.0 26.0 28.0       Lab Results   Component Value Date    WBC 6.6 03/28/2024    HGB 8.0 03/28/2024    HCT 24.5 03/28/2024    .0 03/28/2024     03/28/2024    K 4.1 03/28/2024     03/28/2024    CO2 28.0 03/28/2024    BUN 14 03/28/2024    CREATSERUM 0.63 03/28/2024    GLU 97 03/28/2024    MG 1.8 03/28/2024    BILT 0.3 03/28/2024    AST 13 03/28/2024    ALT 7 03/28/2024    CA 9.9 03/28/2024    ALB 3.8 03/28/2024    TP 5.9 03/28/2024       CT ABDOMEN+PELVIS(CONTRAST ONLY)(CPT=74177)    Result Date: 3/27/2024  CONCLUSION:   Narrowing of small bowel in the RLQ with air fluid levels in the small bowel proximal to this that measure up to 2.7 cm.  Small thin wall rim enhancing fluid collection in the anterior aspect of the right lower quadrant/right hemipelvis.     Dictated by (CST): Macho Garcia MD on 3/27/2024 at 1:45 PM     Finalized by (CST): Macho Garcia MD on 3/27/2024 at 1:56 PM                 Yvan Griggs MD [facility]  03/28/24  1:22 PM

## 2024-03-29 VITALS
HEIGHT: 64 IN | HEART RATE: 71 BPM | TEMPERATURE: 98 F | RESPIRATION RATE: 18 BRPM | DIASTOLIC BLOOD PRESSURE: 69 MMHG | BODY MASS INDEX: 16.92 KG/M2 | WEIGHT: 99.13 LBS | SYSTOLIC BLOOD PRESSURE: 114 MMHG | OXYGEN SATURATION: 98 %

## 2024-03-29 LAB
ALBUMIN SERPL-MCNC: 3.6 G/DL (ref 3.2–4.8)
ALBUMIN/GLOB SERPL: 2 {RATIO} (ref 1–2)
ALP LIVER SERPL-CCNC: 83 U/L
ALT SERPL-CCNC: <7 U/L
ANION GAP SERPL CALC-SCNC: 3 MMOL/L (ref 0–18)
AST SERPL-CCNC: 10 U/L (ref ?–34)
BASOPHILS # BLD AUTO: 0.05 X10(3) UL (ref 0–0.2)
BASOPHILS NFR BLD AUTO: 1 %
BILIRUB SERPL-MCNC: 0.2 MG/DL (ref 0.2–1.1)
BUN BLD-MCNC: 16 MG/DL (ref 9–23)
BUN/CREAT SERPL: 25.4 (ref 10–20)
CALCIUM BLD-MCNC: 9.2 MG/DL (ref 8.7–10.4)
CHLORIDE SERPL-SCNC: 108 MMOL/L (ref 98–112)
CO2 SERPL-SCNC: 30 MMOL/L (ref 21–32)
CREAT BLD-MCNC: 0.63 MG/DL
DEPRECATED RDW RBC AUTO: 46.3 FL (ref 35.1–46.3)
EGFRCR SERPLBLD CKD-EPI 2021: 90 ML/MIN/1.73M2 (ref 60–?)
EOSINOPHIL # BLD AUTO: 0.34 X10(3) UL (ref 0–0.7)
EOSINOPHIL NFR BLD AUTO: 6.6 %
ERYTHROCYTE [DISTWIDTH] IN BLOOD BY AUTOMATED COUNT: 14.8 % (ref 11–15)
GLOBULIN PLAS-MCNC: 1.8 G/DL (ref 2.8–4.4)
GLUCOSE BLD-MCNC: 89 MG/DL (ref 70–99)
HCT VFR BLD AUTO: 21.8 %
HGB BLD-MCNC: 7.3 G/DL
IMM GRANULOCYTES # BLD AUTO: 0.06 X10(3) UL (ref 0–1)
IMM GRANULOCYTES NFR BLD: 1.2 %
LYMPHOCYTES # BLD AUTO: 1.37 X10(3) UL (ref 1–4)
LYMPHOCYTES NFR BLD AUTO: 26.6 %
MAGNESIUM SERPL-MCNC: 1.6 MG/DL (ref 1.6–2.6)
MCH RBC QN AUTO: 28.4 PG (ref 26–34)
MCHC RBC AUTO-ENTMCNC: 33.5 G/DL (ref 31–37)
MCV RBC AUTO: 84.8 FL
MONOCYTES # BLD AUTO: 0.53 X10(3) UL (ref 0.1–1)
MONOCYTES NFR BLD AUTO: 10.3 %
NEUTROPHILS # BLD AUTO: 2.8 X10 (3) UL (ref 1.5–7.7)
NEUTROPHILS # BLD AUTO: 2.8 X10(3) UL (ref 1.5–7.7)
NEUTROPHILS NFR BLD AUTO: 54.3 %
OSMOLALITY SERPL CALC.SUM OF ELEC: 293 MOSM/KG (ref 275–295)
PLATELET # BLD AUTO: 277 10(3)UL (ref 150–450)
POTASSIUM SERPL-SCNC: 3.7 MMOL/L (ref 3.5–5.1)
PROT SERPL-MCNC: 5.4 G/DL (ref 5.7–8.2)
RBC # BLD AUTO: 2.57 X10(6)UL
SODIUM SERPL-SCNC: 141 MMOL/L (ref 136–145)
TRIGL SERPL-MCNC: 67 MG/DL (ref 30–149)
WBC # BLD AUTO: 5.2 X10(3) UL (ref 4–11)

## 2024-03-29 PROCEDURE — 85025 COMPLETE CBC W/AUTO DIFF WBC: CPT | Performed by: INTERNAL MEDICINE

## 2024-03-29 PROCEDURE — 80053 COMPREHEN METABOLIC PANEL: CPT | Performed by: INTERNAL MEDICINE

## 2024-03-29 PROCEDURE — 84478 ASSAY OF TRIGLYCERIDES: CPT | Performed by: SURGERY

## 2024-03-29 PROCEDURE — 83735 ASSAY OF MAGNESIUM: CPT | Performed by: INTERNAL MEDICINE

## 2024-03-29 RX ORDER — MAGNESIUM OXIDE 400 MG/1
400 TABLET ORAL ONCE
Status: COMPLETED | OUTPATIENT
Start: 2024-03-29 | End: 2024-03-29

## 2024-03-29 NOTE — DISCHARGE SUMMARY
General Medicine Discharge Summary     Patient ID:  Mayte Lucas  79 year old  2/4/1945    Admit date: 3/19/2024    Discharge date and time: 3/29/24    Attending Physician: Oz Olivares MD     Consults: IP CONSULT TO GENERAL SURGERY  NURSING CONSULT TO DIETITIAN  IP CONSULT TO DIETARY FOR TPN  IP CONSULT TO PHARMACY  IP CONSULT TO VASCULAR ACCESS TEAM  IP CONSULT TO RESPIRATORY CARE    Primary Care Physician: Abi Ohara MD     Reason for admission: Recurrent small bowel obstruction     Risk For Readmission: low    Discharge Diagnoses: SBO (small bowel obstruction) (HCC) [K56.609]  See Additional Discharge Diagnoses in Hospital Course    Discharged Condition: stable    Follow-up with labs/images appointments: PCP, Surgery     Risk patient: coordinator contacted for PCP appointment with JOANIE Villanueva on 4/1/24 @ 9A     Exam  GENERAL: no apparent distress  NEURO: A/A Ox3  HEENT: oral mucosa dry   RESP: non labored, CTA  CARDIO: Regular, S1, S2   ABD: +bowel sounds, soft  Exts: no edema      HPI: per chart  Ms. Lucas is a 79 year old female with PMH sig for SVT, RLS, COPD, chronic pain, HLD, IgA deficiency, was hospitalized from 2/3 - 2/17 for acute cecal volvulus, s/p right hemicolectomy on 2/3 per general surgery, and another hospital stay for partial SBO 3/5-3/8 treated with NGT and conservative management.  Presents today with recurrent abdominal pain associated with nausea starting about midnight last night she states she has been having gas and passing stool however she been able to eat today as she feels very nauseous.  In the emergency room repeat CT scan again revealed small bowel obstruction with transition point the left lower abdomen dilated multiple loops of small bowel with raising the concern for rim enhancement suggesting bowel ischemia.  Lactic acid was ordered but is still pending General surgery was consulted recommended again routine NG tube with IV fluids and bowel rest.      Patient denies  associated fevers or chills no chest pain, headache dizziness or other complaints.     Hospital Course:   Ms. Lucas is a 79 year old female with PMH sig for SVT, RLS, COPD, chronic pain, HLD, IgA deficiency, was hospitalized from 2/3 - 2/17 for acute cecal volvulus, s/p right hemicolectomy on 2/3 per general surgery, and another hospital stay for partial SBO 3/5-3/8 treated with NGT and conservative management.  Presented again with recurrent abdominal pain associated with nausea.  In the emergency room repeat CT scan again revealed small bowel obstruction with transition point the left lower abdomen, S/P NGT however pt did not improve s/p Exp. Lap lysis of adhesions. PPN to TPN complete. Repeat CT reviewed. Tolerating diet and will resume ASA on dc.      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 Normal wbc. LA normal   -imaging noted  -prn pain med  -IVF, NPO to advancing diet   -NGT to LIS removed 3/25/24  -PPN, transition to TPN, picc line ordered   -OR 3/22/24 for Exp Lap,lysis of adhesions   -as per surgery   -As needed IV pain meds  -as needed antiemetics  -monitor expected acute blood loss anemia postop  -repeat CT 3/27/24 reviewed      Low Grade Temps resolved   -tmax 100.5. WBC normal  -c/o cough, CXR negative for acute process   -no urinary symptoms  -had loose stools after contrast with SBFT  -blood cx pending-> so far negative, recurred x 1, continue to monitor  -afebrile for last 5 days    Mild Hyponatremia-resolved      Expected postoperative anemia  -baseline hgb ~12, post surgery hgb ~7-10, admission hgb 11.2,down trended this admit  -continue to trend post op and transfuse as needed      Paroxysmal supraventricular tachycardia  -continue home metoprolol, may need to change to IV based on course     GERD   -continue home PPI     RLS  -continue home trileptal      QUE  - PRN benzo at home      OP  -resume home  meds at discharge    Operative Procedures: Procedure(s) (LRB):  Exploratory laparotomy lysis of adhesions (N/A)     Imaging: CT ABDOMEN+PELVIS(CONTRAST ONLY)(CPT=74177)    Result Date: 3/27/2024  CONCLUSION:   Narrowing of small bowel in the RLQ with air fluid levels in the small bowel proximal to this that measure up to 2.7 cm.  Small thin wall rim enhancing fluid collection in the anterior aspect of the right lower quadrant/right hemipelvis.     Dictated by (CST): Macho Garcia MD on 3/27/2024 at 1:45 PM     Finalized by (CST): Macho Garcia MD on 3/27/2024 at 1:56 PM           Disposition: home    Activity: as tolerated   Diet: as tolerated   Wound Care: no needs  Code Status: Full Code  O2: no needs    Home Medication Changes: as below   All discharge medications have been reconciled with current medication list.     Med list     Medication List        CONTINUE taking these medications      alendronate 70 MG Tabs  Commonly known as: Fosamax     Aspirin Low Dose 81 MG Tbec  Generic drug: aspirin     clonazePAM 0.5 MG Tabs  Commonly known as: KlonoPIN  Take 1 tablet (0.5 mg total) by mouth nightly.     denosumab 60 MG/ML Sosy  Commonly known as: Prolia     docusate sodium 100 MG Caps  Commonly known as: Colace     ergocalciferol 1.25 MG (42689 UT) Caps  Commonly known as: Vitamin D2     lansoprazole 30 MG Tbdd  Commonly known as: Prevacid Solutab  Take 1 tablet (30 mg total) by mouth every morning before breakfast.     metoprolol succinate 12.5 mg Tabs  Commonly known as: Toprol XL     omega-3 fatty acids 1000 MG Caps  Commonly known as: Fish Oil     ondansetron 4 MG Tbdp  Commonly known as: Zofran-ODT     OXcarbazepine 150 MG Tabs  Commonly known as: Trileptal  Take 2 tablets (300 mg total) by mouth 2 (two) times daily.     simethicone 80 MG Chew  Commonly known as: Mylicon  Chew 1 tablet (80 mg total) by mouth 3 (three) times daily.              FU   Follow-up Information       Abi Ohara MD Follow up in 3  day(s).    Specialty: Internal Medicine  Contact information:  [address] [facility] [address]  [place] [address]  [phone]               Yvan Griggs MD Follow up in 1 week(s).    Specialty: SURGERY, GENERAL  Contact information:  [address] [facility] [address]  [facility] [id number] [phone]                             DC instructions:      Other Discharge Instructions:         Keep all follow up appointments and continue current medications.         Patient had opportunity to ask questions and state understand and agree with therapeutic plan as outlined    Thank You,    Oz Olivares M.D.  [facility]

## 2024-03-29 NOTE — PLAN OF CARE
Pt vitals are stable. AxOx4. Pt had BM this AM and is passing gas and belching. Pt on room air. Incision site is clean and intact with steri strips. Abdominal pain tolerated with heat packs. Education on discharge and medications complete. Pt verbalizes understanding of instructions as explained to her. Discharged home with .    Problem: Patient Centered Care  Goal: Patient preferences are identified and integrated in the patient's plan of care  Description: Interventions:  - What would you like us to know as we care for you? I am from home.   - Provide timely, complete, and accurate information to patient/family  - Incorporate patient and family knowledge, values, beliefs, and cultural backgrounds into the planning and delivery of care  - Encourage patient/family to participate in care and decision-making at the level they choose  - Honor patient and family perspectives and choices  Outcome: Adequate for Discharge     Problem: Patient/Family Goals  Goal: Patient/Family Long Term Goal  Description: Patient's Long Term Goal: Discharge home     Interventions:  - Manage pain  - Monitor vitals  - Monitor labs  - Daily weight  - General surgery on consult   - See additional Care Plan goals for specific interventions  Outcome: Adequate for Discharge  Goal: Patient/Family Short Term Goal  Description: Patient's Short Term Goal: Manage pain     Interventions:   - Frequent pain assessments  - Non-pharmacological interventions  - Pain medications as needed/indicated  - Early ambulation if clinically appropriate  - See additional Care Plan goals for specific interventions  Outcome: Adequate for Discharge     Problem: PAIN - ADULT  Goal: Verbalizes/displays adequate comfort level or patient's stated pain goal  Description: INTERVENTIONS:  - Encourage pt to monitor pain and request assistance  - Assess pain using appropriate pain scale  - Administer analgesics based on type and severity of pain and evaluate response  -  Implement non-pharmacological measures as appropriate and evaluate response  - Consider cultural and social influences on pain and pain management  - Manage/alleviate anxiety  - Utilize distraction and/or relaxation techniques  - Monitor for opioid side effects  - Notify MD/LIP if interventions unsuccessful or patient reports new pain  - Anticipate increased pain with activity and pre-medicate as appropriate  Outcome: Adequate for Discharge     Problem: SAFETY ADULT - FALL  Goal: Free from fall injury  Description: INTERVENTIONS:  - Assess pt frequently for physical needs  - Identify cognitive and physical deficits and behaviors that affect risk of falls.  - [place] fall precautions as indicated by assessment.  - Educate pt/family on patient safety including physical limitations  - Instruct pt to call for assistance with activity based on assessment  - Modify environment to reduce risk of injury  - Provide assistive devices as appropriate  - Consider OT/PT consult to assist with strengthening/mobility  - Encourage toileting schedule  Outcome: Adequate for Discharge     Problem: DISCHARGE PLANNING  Goal: Discharge to home or other facility with appropriate resources  Description: INTERVENTIONS:  - Identify barriers to discharge w/pt and caregiver  - Include patient/family/discharge partner in discharge planning  - Arrange for needed discharge resources and transportation as appropriate  - Identify discharge learning needs (meds, wound care, etc)  - Arrange for interpreters to assist at discharge as needed  - Consider post-discharge preferences of patient/family/discharge partner  - Complete POLST form as appropriate  - Assess patient's ability to be responsible for managing their own health  - Refer to Case Management Department for coordinating discharge planning if the patient needs post-hospital services based on physician/LIP order or complex needs related to functional status, cognitive ability or social  support system  Outcome: Adequate for Discharge     Problem: GASTROINTESTINAL - ADULT  Goal: Minimal or absence of nausea and vomiting  Description: INTERVENTIONS:  - Maintain adequate hydration with IV or PO as ordered and tolerated  - Nasogastric tube to low intermittent suction as ordered  - Evaluate effectiveness of ordered antiemetic medications  - Provide nonpharmacologic comfort measures as appropriate  - Advance diet as tolerated, if ordered  - Obtain nutritional consult as needed  - Evaluate fluid balance  Outcome: Adequate for Discharge  Goal: Maintains or returns to baseline bowel function  Description: INTERVENTIONS:  - Assess bowel function  - Maintain adequate hydration with IV or PO as ordered and tolerated  - Evaluate effectiveness of GI medications  - Encourage mobilization and activity  - Obtain nutritional consult as needed  - Establish a toileting routine/schedule  - Consider collaborating with pharmacy to review patient's medication profile  Outcome: Adequate for Discharge     Problem: SKIN/TISSUE INTEGRITY - ADULT  Goal: Incision(s), wounds(s) or drain site(s) healing without S/S of infection  Description: INTERVENTIONS:  - Assess and document risk factors for pressure ulcer development  - Assess and document skin integrity  - Assess and document dressing/incision, wound bed, drain sites and surrounding tissue  - Implement wound care per orders  - Initiate isolation precautions as appropriate  - Initiate Pressure Ulcer prevention bundle as indicated  Outcome: Adequate for Discharge     Problem: CARDIOVASCULAR - ADULT  Goal: Maintains optimal cardiac output and hemodynamic stability  Description: INTERVENTIONS:  - Monitor vital signs, rhythm, and trends  - Monitor for bleeding, hypotension and signs of decreased cardiac output  - Evaluate effectiveness of vasoactive medications to optimize hemodynamic stability  - Monitor arterial and/or venous puncture sites for bleeding and/or hematoma  -  Assess quality of pulses, skin color and temperature  - Assess for signs of decreased coronary artery perfusion - ex. Angina  - Evaluate fluid balance, assess for edema, trend weights  Outcome: Adequate for Discharge  Goal: Absence of cardiac arrhythmias or at baseline  Description: INTERVENTIONS:  - Continuous cardiac monitoring, monitor vital signs, obtain 12 lead EKG if indicated  - Evaluate effectiveness of antiarrhythmic and heart rate control medications as ordered  - Initiate emergency measures for life threatening arrhythmias  - Monitor electrolytes and administer replacement therapy as ordered  Outcome: Adequate for Discharge

## 2024-03-29 NOTE — PLAN OF CARE
Pt A&Ox4. Room air. Reported pain treated with heat packs, pt does not want to take pain medication. No BM this shift. No reports n/v. Passing gas. Tolerating soft/low fiber diet. Walking the hallways ad christopher. Call light within reach.     Problem: Patient Centered Care  Goal: Patient preferences are identified and integrated in the patient's plan of care  Description: Interventions:  - What would you like us to know as we care for you? I am from home.   - Provide timely, complete, and accurate information to patient/family  - Incorporate patient and family knowledge, values, beliefs, and cultural backgrounds into the planning and delivery of care  - Encourage patient/family to participate in care and decision-making at the level they choose  - Honor patient and family perspectives and choices  Outcome: Progressing     Problem: Patient/Family Goals  Goal: Patient/Family Long Term Goal  Description: Patient's Long Term Goal: Discharge home     Interventions:  - Manage pain  - Monitor vitals  - Monitor labs  - Daily weight  - General surgery on consult   - See additional Care Plan goals for specific interventions  Outcome: Progressing  Goal: Patient/Family Short Term Goal  Description: Patient's Short Term Goal: Manage pain     Interventions:   - Frequent pain assessments  - Non-pharmacological interventions  - Pain medications as needed/indicated  - Early ambulation if clinically appropriate  - See additional Care Plan goals for specific interventions  Outcome: Progressing     Problem: PAIN - ADULT  Goal: Verbalizes/displays adequate comfort level or patient's stated pain goal  Description: INTERVENTIONS:  - Encourage pt to monitor pain and request assistance  - Assess pain using appropriate pain scale  - Administer analgesics based on type and severity of pain and evaluate response  - Implement non-pharmacological measures as appropriate and evaluate response  - Consider cultural and social influences on pain and pain  management  - Manage/alleviate anxiety  - Utilize distraction and/or relaxation techniques  - Monitor for opioid side effects  - Notify MD/LIP if interventions unsuccessful or patient reports new pain  - Anticipate increased pain with activity and pre-medicate as appropriate  Outcome: Progressing     Problem: SAFETY ADULT - FALL  Goal: Free from fall injury  Description: INTERVENTIONS:  - Assess pt frequently for physical needs  - Identify cognitive and physical deficits and behaviors that affect risk of falls.  - [place] fall precautions as indicated by assessment.  - Educate pt/family on patient safety including physical limitations  - Instruct pt to call for assistance with activity based on assessment  - Modify environment to reduce risk of injury  - Provide assistive devices as appropriate  - Consider OT/PT consult to assist with strengthening/mobility  - Encourage toileting schedule  Outcome: Progressing     Problem: DISCHARGE PLANNING  Goal: Discharge to home or other facility with appropriate resources  Description: INTERVENTIONS:  - Identify barriers to discharge w/pt and caregiver  - Include patient/family/discharge partner in discharge planning  - Arrange for needed discharge resources and transportation as appropriate  - Identify discharge learning needs (meds, wound care, etc)  - Arrange for interpreters to assist at discharge as needed  - Consider post-discharge preferences of patient/family/discharge partner  - Complete POLST form as appropriate  - Assess patient's ability to be responsible for managing their own health  - Refer to Case Management Department for coordinating discharge planning if the patient needs post-hospital services based on physician/LIP order or complex needs related to functional status, cognitive ability or social support system  Outcome: Progressing     Problem: GASTROINTESTINAL - ADULT  Goal: Minimal or absence of nausea and vomiting  Description: INTERVENTIONS:  -  Maintain adequate hydration with IV or PO as ordered and tolerated  - Nasogastric tube to low intermittent suction as ordered  - Evaluate effectiveness of ordered antiemetic medications  - Provide nonpharmacologic comfort measures as appropriate  - Advance diet as tolerated, if ordered  - Obtain nutritional consult as needed  - Evaluate fluid balance  Outcome: Progressing  Goal: Maintains or returns to baseline bowel function  Description: INTERVENTIONS:  - Assess bowel function  - Maintain adequate hydration with IV or PO as ordered and tolerated  - Evaluate effectiveness of GI medications  - Encourage mobilization and activity  - Obtain nutritional consult as needed  - Establish a toileting routine/schedule  - Consider collaborating with pharmacy to review patient's medication profile  Outcome: Progressing     Problem: SKIN/TISSUE INTEGRITY - ADULT  Goal: Incision(s), wounds(s) or drain site(s) healing without S/S of infection  Description: INTERVENTIONS:  - Assess and document risk factors for pressure ulcer development  - Assess and document skin integrity  - Assess and document dressing/incision, wound bed, drain sites and surrounding tissue  - Implement wound care per orders  - Initiate isolation precautions as appropriate  - Initiate Pressure Ulcer prevention bundle as indicated  Outcome: Progressing     Problem: CARDIOVASCULAR - ADULT  Goal: Maintains optimal cardiac output and hemodynamic stability  Description: INTERVENTIONS:  - Monitor vital signs, rhythm, and trends  - Monitor for bleeding, hypotension and signs of decreased cardiac output  - Evaluate effectiveness of vasoactive medications to optimize hemodynamic stability  - Monitor arterial and/or venous puncture sites for bleeding and/or hematoma  - Assess quality of pulses, skin color and temperature  - Assess for signs of decreased coronary artery perfusion - ex. Angina  - Evaluate fluid balance, assess for edema, trend weights  Outcome:  Progressing  Goal: Absence of cardiac arrhythmias or at baseline  Description: INTERVENTIONS:  - Continuous cardiac monitoring, monitor vital signs, obtain 12 lead EKG if indicated  - Evaluate effectiveness of antiarrhythmic and heart rate control medications as ordered  - Initiate emergency measures for life threatening arrhythmias  - Monitor electrolytes and administer replacement therapy as ordered  Outcome: Progressing

## 2024-03-30 NOTE — PAYOR COMM NOTE
--------------  DISCHARGE REVIEW    Payor: [employer]  Subscriber #:  [id number]  Authorization Number: [id number]    Admit date: 3/19/24  Admit time:   6:19 PM  Discharge Date: 3/29/2024 12:34 PM     Admitting Physician: Daniel Manuel DO  Attending Physician:  No att. providers found  Primary Care Physician: Abi Ohara MD          Discharge Summary Notes        Discharge Summary signed by Oz Olivares MD at 3/29/2024 11:38 AM       Author: Oz Olivares MD Specialty: HOSPITALIST Author Type: Physician    Filed: 3/29/2024 11:38 AM Date of Service: 3/29/2024 11:32 AM Status: Signed    : Oz Olivares MD (Physician)           General Medicine Discharge Summary     Patient ID:  Mayte Lucas  79 year old  2/4/1945    Admit date: 3/19/2024    Discharge date and time: 3/29/24    Attending Physician: Oz Olivares MD     Consults: IP CONSULT TO GENERAL SURGERY  NURSING CONSULT TO DIETITIAN  IP CONSULT TO DIETARY FOR TPN  IP CONSULT TO PHARMACY  IP CONSULT TO VASCULAR ACCESS TEAM  IP CONSULT TO RESPIRATORY CARE    Primary Care Physician: Abi Ohara MD     Reason for admission: Recurrent small bowel obstruction     Risk For Readmission: low    Discharge Diagnoses: SBO (small bowel obstruction) ([facility]) [K56.609]  See Additional Discharge Diagnoses in Hospital Course    Discharged Condition: stable    Follow-up with labs/images appointments: PCP, Surgery     Risk patient: coordinator contacted for PCP appointment with JOANIE Villanueva on 4/1/24 @ 9A     Exam  GENERAL: no apparent distress  NEURO: A/A Ox3  HEENT: oral mucosa dry   RESP: non labored, CTA  CARDIO: Regular, S1, S2   ABD: +bowel sounds, soft  Exts: no edema      HPI: per chart  Ms. Lucas is a 79 year old female with PMH sig for SVT, RLS, COPD, chronic pain, HLD, IgA deficiency, was hospitalized from 2/3 - 2/17 for acute cecal volvulus, s/p right hemicolectomy on 2/3 per general surgery, and another hospital stay for partial SBO 3/5-3/8  treated with NGT and conservative management.  Presents today with recurrent abdominal pain associated with nausea starting about midnight last night she states she has been having gas and passing stool however she been able to eat today as she feels very nauseous.  In the emergency room repeat CT scan again revealed small bowel obstruction with transition point the left lower abdomen dilated multiple loops of small bowel with raising the concern for rim enhancement suggesting bowel ischemia.  Lactic acid was ordered but is still pending General surgery was consulted recommended again routine NG tube with IV fluids and bowel rest.      Patient denies associated fevers or chills no chest pain, headache dizziness or other complaints.     Hospital Course:   Ms. Lucas is a 79 year old female with PMH sig for SVT, RLS, COPD, chronic pain, HLD, IgA deficiency, was hospitalized from 2/3 - 2/17 for acute cecal volvulus, s/p right hemicolectomy on 2/3 per general surgery, and another hospital stay for partial SBO 3/5-3/8 treated with NGT and conservative management.  Presented again with recurrent abdominal pain associated with nausea.  In the emergency room repeat CT scan again revealed small bowel obstruction with transition point the left lower abdomen, S/P NGT however pt did not improve s/p Exp. Lap lysis of adhesions. PPN to TPN complete. Repeat CT reviewed. Tolerating diet and will resume ASA on dc.      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 Normal wbc. LA normal   -imaging noted  -prn pain med  -IVF, NPO to advancing diet   -NGT to LIS removed 3/25/24  -PPN, transition to TPN, picc line ordered   -OR 3/22/24 for Exp Lap,lysis of adhesions   -as per surgery   -As needed IV pain meds  -as needed antiemetics  -monitor expected acute blood loss anemia postop  -repeat CT 3/27/24 reviewed      Low Grade Temps resolved   -tmax  100.5. WBC normal  -c/o cough, CXR negative for acute process   -no urinary symptoms  -had loose stools after contrast with SBFT  -blood cx pending-> so far negative, recurred x 1, continue to monitor  -afebrile for last 5 days    Mild Hyponatremia-resolved      Expected postoperative anemia  -baseline hgb ~12, post surgery hgb ~7-10, admission hgb 11.2,down trended this admit  -continue to trend post op and transfuse as needed      Paroxysmal supraventricular tachycardia  -continue home metoprolol, may need to change to IV based on course     GERD   -continue home PPI     RLS  -continue home trileptal      QUE  - PRN benzo at home      OP  -resume home meds at discharge    Operative Procedures: Procedure(s) (LRB):  Exploratory laparotomy lysis of adhesions (N/A)     Imaging: CT ABDOMEN+PELVIS(CONTRAST ONLY)(CPT=74177)    Result Date: 3/27/2024  CONCLUSION:   Narrowing of small bowel in the RLQ with air fluid levels in the small bowel proximal to this that measure up to 2.7 cm.  Small thin wall rim enhancing fluid collection in the anterior aspect of the right lower quadrant/right hemipelvis.     Dictated by (CST): Macho Garcia MD on 3/27/2024 at 1:45 PM     Finalized by (CST): Macho Garcia MD on 3/27/2024 at 1:56 PM           Disposition: home    Activity: as tolerated   Diet: as tolerated   Wound Care: no needs  Code Status: Full Code  O2: no needs    Home Medication Changes: as below   All discharge medications have been reconciled with current medication list.     Med list     Medication List        CONTINUE taking these medications      alendronate 70 MG Tabs  Commonly known as: Fosamax     Aspirin Low Dose 81 MG Tbec  Generic drug: aspirin     clonazePAM 0.5 MG Tabs  Commonly known as: KlonoPIN  Take 1 tablet (0.5 mg total) by mouth nightly.     denosumab 60 MG/ML Sosy  Commonly known as: Prolia     docusate sodium 100 MG Caps  Commonly known as: Colace     ergocalciferol 1.25 MG (21851 UT) Caps  Commonly  known as: Vitamin D2     lansoprazole 30 MG Tbdd  Commonly known as: Prevacid Solutab  Take 1 tablet (30 mg total) by mouth every morning before breakfast.     metoprolol succinate 12.5 mg Tabs  Commonly known as: Toprol XL     omega-3 fatty acids 1000 MG Caps  Commonly known as: Fish Oil     ondansetron 4 MG Tbdp  Commonly known as: Zofran-ODT     OXcarbazepine 150 MG Tabs  Commonly known as: Trileptal  Take 2 tablets (300 mg total) by mouth 2 (two) times daily.     simethicone 80 MG Chew  Commonly known as: Mylicon  Chew 1 tablet (80 mg total) by mouth 3 (three) times daily.              FU   Follow-up Information       Abi Ohara MD Follow up in 3 day(s).    Specialty: Internal Medicine  Contact information:  [address]  [place] [id number] [phone]               Yvan Griggs MD Follow up in 1 week(s).    Specialty: SURGERY, GENERAL  Contact information:  [address] [facility] [address]  [facility] [id number] [phone]                             DC instructions:      Other Discharge Instructions:         Keep all follow up appointments and continue current medications.         Patient had opportunity to ask questions and state understand and agree with therapeutic plan as outlined    Thank You,    Oz Olivares M.D.  [facility] Hospitalist      Electronically signed by Oz Olivares MD on 3/29/2024 11:38 AM         REVIEWER COMMENTS

## 2024-04-05 ENCOUNTER — HOSPITAL ENCOUNTER (EMERGENCY)
Facility: HOSPITAL | Age: 79
Discharge: HOME | End: 2024-04-05
Attending: EMERGENCY MEDICINE
Payer: MEDICARE

## 2024-04-05 ENCOUNTER — APPOINTMENT (OUTPATIENT)
Dept: GENERAL RADIOLOGY | Facility: HOSPITAL | Age: 79
End: 2024-04-05
Payer: MEDICARE

## 2024-04-05 ENCOUNTER — APPOINTMENT (OUTPATIENT)
Dept: CT IMAGING | Facility: HOSPITAL | Age: 79
End: 2024-04-05
Attending: EMERGENCY MEDICINE
Payer: MEDICARE

## 2024-04-05 VITALS
OXYGEN SATURATION: 96 % | SYSTOLIC BLOOD PRESSURE: 109 MMHG | TEMPERATURE: 98 F | HEIGHT: 64 IN | DIASTOLIC BLOOD PRESSURE: 53 MMHG | BODY MASS INDEX: 17.07 KG/M2 | WEIGHT: 100 LBS | HEART RATE: 88 BPM | RESPIRATION RATE: 20 BRPM

## 2024-04-05 DIAGNOSIS — R07.89 CHEST PAIN, ATYPICAL: Primary | ICD-10-CM

## 2024-04-05 LAB
ANION GAP SERPL CALC-SCNC: 4 MMOL/L (ref 0–18)
ATRIAL RATE: 85 BPM
BASOPHILS # BLD AUTO: 0.05 X10(3) UL (ref 0–0.2)
BASOPHILS NFR BLD AUTO: 0.4 %
BUN BLD-MCNC: 11 MG/DL (ref 9–23)
BUN/CREAT SERPL: 17.5 (ref 10–20)
CALCIUM BLD-MCNC: 9.6 MG/DL (ref 8.7–10.4)
CHLORIDE SERPL-SCNC: 106 MMOL/L (ref 98–112)
CO2 SERPL-SCNC: 29 MMOL/L (ref 21–32)
CREAT BLD-MCNC: 0.63 MG/DL
D DIMER PPP FEU-MCNC: 4.67 UG/ML FEU (ref ?–0.8)
DEPRECATED RDW RBC AUTO: 47.9 FL (ref 35.1–46.3)
EGFRCR SERPLBLD CKD-EPI 2021: 90 ML/MIN/1.73M2 (ref 60–?)
EOSINOPHIL # BLD AUTO: 0.07 X10(3) UL (ref 0–0.7)
EOSINOPHIL NFR BLD AUTO: 0.5 %
ERYTHROCYTE [DISTWIDTH] IN BLOOD BY AUTOMATED COUNT: 15.4 % (ref 11–15)
GLUCOSE BLD-MCNC: 108 MG/DL (ref 70–99)
HCT VFR BLD AUTO: 29.4 %
HGB BLD-MCNC: 9.5 G/DL
IMM GRANULOCYTES # BLD AUTO: 0.08 X10(3) UL (ref 0–1)
IMM GRANULOCYTES NFR BLD: 0.6 %
LYMPHOCYTES # BLD AUTO: 0.76 X10(3) UL (ref 1–4)
LYMPHOCYTES NFR BLD AUTO: 5.8 %
MCH RBC QN AUTO: 27.8 PG (ref 26–34)
MCHC RBC AUTO-ENTMCNC: 32.3 G/DL (ref 31–37)
MCV RBC AUTO: 86 FL
MONOCYTES # BLD AUTO: 0.68 X10(3) UL (ref 0.1–1)
MONOCYTES NFR BLD AUTO: 5.2 %
NEUTROPHILS # BLD AUTO: 11.36 X10 (3) UL (ref 1.5–7.7)
NEUTROPHILS # BLD AUTO: 11.36 X10(3) UL (ref 1.5–7.7)
NEUTROPHILS NFR BLD AUTO: 87.5 %
OSMOLALITY SERPL CALC.SUM OF ELEC: 288 MOSM/KG (ref 275–295)
P AXIS: 81 DEGREES
P-R INTERVAL: 184 MS
PLATELET # BLD AUTO: 535 10(3)UL (ref 150–450)
POTASSIUM SERPL-SCNC: 3.4 MMOL/L (ref 3.5–5.1)
Q-T INTERVAL: 342 MS
QRS DURATION: 88 MS
QTC CALCULATION (BEZET): 406 MS
R AXIS: -12 DEGREES
RBC # BLD AUTO: 3.42 X10(6)UL
SODIUM SERPL-SCNC: 139 MMOL/L (ref 136–145)
T AXIS: 52 DEGREES
TROPONIN I SERPL HS-MCNC: 12 NG/L
TROPONIN I SERPL HS-MCNC: 20 NG/L
VENTRICULAR RATE: 85 BPM
WBC # BLD AUTO: 13 X10(3) UL (ref 4–11)

## 2024-04-05 PROCEDURE — 96374 THER/PROPH/DIAG INJ IV PUSH: CPT

## 2024-04-05 PROCEDURE — 80048 BASIC METABOLIC PNL TOTAL CA: CPT

## 2024-04-05 PROCEDURE — 80048 BASIC METABOLIC PNL TOTAL CA: CPT | Performed by: EMERGENCY MEDICINE

## 2024-04-05 PROCEDURE — 84484 ASSAY OF TROPONIN QUANT: CPT | Performed by: EMERGENCY MEDICINE

## 2024-04-05 PROCEDURE — 99285 EMERGENCY DEPT VISIT HI MDM: CPT

## 2024-04-05 PROCEDURE — 71045 X-RAY EXAM CHEST 1 VIEW: CPT | Performed by: EMERGENCY MEDICINE

## 2024-04-05 PROCEDURE — 71260 CT THORAX DX C+: CPT | Performed by: EMERGENCY MEDICINE

## 2024-04-05 PROCEDURE — 85379 FIBRIN DEGRADATION QUANT: CPT | Performed by: EMERGENCY MEDICINE

## 2024-04-05 PROCEDURE — 85025 COMPLETE CBC W/AUTO DIFF WBC: CPT | Performed by: EMERGENCY MEDICINE

## 2024-04-05 PROCEDURE — 93005 ELECTROCARDIOGRAM TRACING: CPT

## 2024-04-05 PROCEDURE — S0028 INJECTION, FAMOTIDINE, 20 MG: HCPCS | Performed by: EMERGENCY MEDICINE

## 2024-04-05 PROCEDURE — 99284 EMERGENCY DEPT VISIT MOD MDM: CPT

## 2024-04-05 PROCEDURE — 93010 ELECTROCARDIOGRAM REPORT: CPT

## 2024-04-05 PROCEDURE — 84484 ASSAY OF TROPONIN QUANT: CPT

## 2024-04-05 PROCEDURE — 85025 COMPLETE CBC W/AUTO DIFF WBC: CPT

## 2024-04-05 RX ORDER — FAMOTIDINE 10 MG/ML
20 INJECTION, SOLUTION INTRAVENOUS ONCE
Status: COMPLETED | OUTPATIENT
Start: 2024-04-05 | End: 2024-04-05

## 2024-04-05 RX ORDER — FAMOTIDINE 20 MG/1
20 TABLET, FILM COATED ORAL 2 TIMES DAILY PRN
Qty: 30 TABLET | Refills: 0 | Status: SHIPPED | OUTPATIENT
Start: 2024-04-05 | End: 2024-05-05

## 2024-04-05 RX ORDER — MAGNESIUM HYDROXIDE/ALUMINUM HYDROXICE/SIMETHICONE 120; 1200; 1200 MG/30ML; MG/30ML; MG/30ML
10 SUSPENSION ORAL 4 TIMES DAILY PRN
Qty: 355 ML | Refills: 0 | Status: SHIPPED | OUTPATIENT
Start: 2024-04-05

## 2024-04-05 RX ORDER — TRAMADOL HYDROCHLORIDE 50 MG/1
50 TABLET ORAL EVERY 6 HOURS PRN
Qty: 10 TABLET | Refills: 0 | Status: SHIPPED | OUTPATIENT
Start: 2024-04-05 | End: 2024-04-12

## 2024-04-05 NOTE — ED PROVIDER NOTES
Patient Seen in: [facility] Emergency Department    History     Chief Complaint   Patient presents with    Chest Pain Angina       HPI    History is provided by patient/independent historian: patient  79 year old female with recent admission for small bowel obstruction 2 weeks ago, here with complaints of chest pain that started in the middle of the night.  Patient reports the pain radiated to her back and up her neck.  It is coming in spasms.  No numbness or tingling or weakness of the extremities.  No diaphoresis, nausea.  No associated shortness of breath.  She did have a slight headache.  No fevers    History reviewed.   Past Medical History:   Diagnosis Date    Acute exacerbation of chronic obstructive pulmonary disease (COPD) ([facility]) 4/17/2017    ALLERGIC RHINITIS     OTHER DISEASES     TMJ         History reviewed.   Past Surgical History:   Procedure Laterality Date    ABDOMINAL SURGERY  02/03/2024    Exploratory laparotomy, right hemicolectomy, omentoplasty of anastomosis    CATARACT      COLONOSCOPY  02/12/2013    Procedure: COLONOSCOPY, POSSIBLE BIOPSY, POSSIBLE POLYPECTOMY 58583;  Surgeon: Jakub Barr MD;  Location: [facility]    PATIENT DOCUMENTED NOT TO HAVE EXPERIENCED ANY OF THE FOLLOWING EVENTS  12/12/2012    Procedure: LEFT PHACOEMULSIFICATION OF CATARACT WITH INTRAOCULAR LENS IMPLANT 43601;  Surgeon: Siddhartha Vinson MD;  Location: [facility]    PATIENT DOCUMENTED NOT TO HAVE EXPERIENCED ANY OF THE FOLLOWING EVENTS  11/28/2012    Procedure: RIGHT PHACOEMULSIFICATION OF CATARACT WITH INTRAOCULAR LENS IMPLANT 79117;  Surgeon: Siddhartha Vinson MD;  Location: [facility]    PATIENT DOCUMENTED NOT TO HAVE EXPERIENCED ANY OF THE FOLLOWING EVENTS  02/12/2013    Procedure: COLONOSCOPY, POSSIBLE BIOPSY, POSSIBLE POLYPECTOMY 43908;  Surgeon: Jakub Barr MD;  Location: [facility]    PATIENT WITHOUGH PREOPERATIVE ORDER FOR IV ANTIBIOTIC  SURGICAL SITE INFECTION PROPHYLAXIS.  12/12/2012    Procedure: LEFT PHACOEMULSIFICATION OF CATARACT WITH INTRAOCULAR LENS IMPLANT 41943;  Surgeon: Siddhartha Vinson MD;  Location: [facility]    PATIENT WITHOUGH PREOPERATIVE ORDER FOR IV ANTIBIOTIC SURGICAL SITE INFECTION PROPHYLAXIS.  11/28/2012    Procedure: RIGHT PHACOEMULSIFICATION OF CATARACT WITH INTRAOCULAR LENS IMPLANT 03628;  Surgeon: Siddhartha Vinson MD;  Location: [facility]    PATIENT WITHOUGH PREOPERATIVE ORDER FOR IV ANTIBIOTIC SURGICAL SITE INFECTION PROPHYLAXIS.  02/12/2013    Procedure: COLONOSCOPY, POSSIBLE BIOPSY, POSSIBLE POLYPECTOMY 83698;  Surgeon: Jakub Barr MD;  Location: [facility]    REMV CATARACT EXTRACAP,INSERT LENS  12/12/2012    Procedure: LEFT PHACOEMULSIFICATION OF CATARACT WITH INTRAOCULAR LENS IMPLANT 49099;  Surgeon: Siddhartha Vinson MD;  Location: [facility]    REMV CATARACT EXTRACAP,INSERT LENS  11/28/2012    Procedure: RIGHT PHACOEMULSIFICATION OF CATARACT WITH INTRAOCULAR LENS IMPLANT 04490;  Surgeon: Siddhartha Vinson MD;  Location: [facility]    TONSILLECTOMY           Home Medications reviewed :  (Not in a hospital admission)        History reviewed.   Social History     Socioeconomic History    Marital status:    Tobacco Use    Smoking status: Never    Smokeless tobacco: Never   Substance and Sexual Activity    Alcohol use: Yes     Alcohol/week: 1.0 standard drink of alcohol     Types: 1 Standard drinks or equivalent per week     Comment: occasionally    Drug use: No         ROS  Review of Systems   Respiratory:  Negative for shortness of breath.    Cardiovascular:  Positive for chest pain.   Musculoskeletal:  Positive for back pain.   Neurological:  Positive for headaches.   All other systems reviewed and are negative.     All other pertinent organ systems are reviewed and are negative.      Physical Exam     ED Triage Vitals [04/05/24 0539]   BP  124/56   Pulse 94   Resp 20   Temp 98 °F (36.7 °C)   Temp src Oral   SpO2 95 %   O2 Device None (Room air)     Vital signs reviewed.      Physical Exam  Vitals and nursing note reviewed.   Neck:      Comments: No carotid bruit  Cardiovascular:      Pulses: Normal pulses.   Pulmonary:      Effort: No respiratory distress.   Abdominal:      General: There is no distension.   Neurological:      Mental Status: She is alert.         ED Course       Labs:     Labs Reviewed   BASIC METABOLIC PANEL (8) - Abnormal; Notable for the following components:       Result Value    Glucose 108 (*)     Potassium 3.4 (*)     All other components within normal limits   D-DIMER - Abnormal; Notable for the following components:    D-Dimer 4.67 (*)     All other components within normal limits   CBC W/ DIFFERENTIAL - Abnormal; Notable for the following components:    WBC 13.0 (*)     RBC 3.42 (*)     HGB 9.5 (*)     HCT 29.4 (*)     RDW-SD 47.9 (*)     RDW 15.4 (*)     .0 (*)     Neutrophil Absolute Prelim 11.36 (*)     Neutrophil Absolute 11.36 (*)     Lymphocyte Absolute 0.76 (*)     All other components within normal limits   TROPONIN I HIGH SENSITIVITY - Normal   TROPONIN I HIGH SENSITIVITY - Normal   CBC WITH DIFFERENTIAL WITH PLATELET    Narrative:     The following orders were created for panel order CBC With Differential With Platelet.                  Procedure                               Abnormality         Status                                     ---------                               -----------         ------                                     CBC W/ DIFFERENTIAL[[id number]]          Abnormal            Final result                                                 Please view results for these tests on the individual orders.         My EKG Interpretation: EKG    Rate, intervals and axes as noted on EKG Report.  Rate: 85  Rhythm: Sinus Rhythm with PVC  Reading: normal for rate, abnormal for rhythm, no acute ST  changes           As reviewed and Interpreted by me      Imaging Results Available and Reviewed while in ED:   CT CHEST PE AORTA (IV ONLY) (CPT=71260)    Result Date: 4/5/2024  CONCLUSION:  1. No evidence of acute pulmonary embolism to the level of the first order subsegmental pulmonary artery branches.  2. Trace pleural effusions and associated basilar atelectasis, with or without superimposed pneumonia.  3. Minimal branching nodularity in the periphery the right middle lobe, which could be infectious/inflammatory in nature.  4. Unchanged mild superior endplate compression deformity of T12.   5. Nonspecific patulous appearance of the esophagus, which may reflect dysmotility.  6. Lesser incidental findings as above.    Dictated by (CST): Jeremy Covarrubias MD on 4/05/2024 at 9:04 AM     Finalized by (CST): Jeremy Covarrubias MD on 4/05/2024 at 9:13 AM          XR CHEST AP PORTABLE  (CPT=71045)    Result Date: 4/5/2024  CONCLUSION: No radiographic evidence of acute cardiopulmonary abnormality.  Chronic scarring/COPD.     elm-remote   Dictated by (CST): Luther Ruffin MD on 4/05/2024 at 6:37 AM     Finalized by (CST): Luther Ruffin MD on 4/05/2024 at 6:38 AM         My review and independent interpretation of XR, CT images: no infiltrate, no saddle PE. Radiology report corroborates this in addition to other details as reported by them.      Decision rules/scores evaluated: none      Diagnostic labs/tests considered but not ordered: none    ED Medications Administered:   Medications   famotidine (Pepcid) 20 mg/2mL injection 20 mg (20 mg Intravenous Given 4/5/24 8611)   iopamidol 76% (ISOVUE-370) injection for power injector (70 mL Intravenous Given 4/5/24 1326)            - pepcid improved pain - possible esophageal spasms - will trial antacids with close followup    MDM       Medical Decision Making      Differential Diagnosis: After obtaining the patient's history, performing the physical exam and reviewing the diagnostics,  multiple initial diagnoses were considered based on the presenting problem including PE, pneumonia, gastroenteritis, GERD, ACS, dissection    External document review: I personally reviewed available external medical records for any recent pertinent discharge summaries, testing, and procedures - the findings are as follows: 3/19/24 admission for SBO    Complicating Factors: The patient already  has a past medical history of Acute exacerbation of chronic obstructive pulmonary disease (COPD) ([facility]) (4/17/2017), ALLERGIC RHINITIS, and OTHER DISEASES. to contribute to the complexity of this ED evaluation.    Procedures performed: none    Discussed management with physician/appropriate source: none    Considered admission/deescalation of care for: chest pain    Social determinants of health affecting patient care: none    Prescription medications considered: pepcid, maalox, tramadol, discussed continuing current medication regimen    The patient requires continuous monitoring for: chest pain    Shared decision making: discussed possible admission        Disposition and Plan     Clinical Impression:  1. Chest pain, atypical        Disposition:  Discharge    Follow-up:  Abi Ohara MD  [address]  [place] [address]  [phone]    Follow up        Medications Prescribed:  Current Discharge Medication List        START taking these medications    Details   famotidine (PEPCID) 20 MG Oral Tab Take 1 tablet (20 mg total) by mouth 2 (two) times daily as needed for Heartburn.  Qty: 30 tablet, Refills: 0      alum-mag hydroxide-simethicone 200-200-20 MG/5ML Oral Suspension Take 10 mL by mouth 4 (four) times daily as needed.  Qty: 355 mL, Refills: 0

## 2024-04-05 NOTE — ED INITIAL ASSESSMENT (HPI)
Pt to ED for c/o CP that radiated to mid back. Reports headache. Recently discharged from hospital last week.

## 2024-12-26 RX ORDER — GLUCOSAMINE/D3/BOSWELLIA SERRA 1500MG-400
TABLET ORAL
COMMUNITY

## 2024-12-26 RX ORDER — POLYETHYLENE GLYCOL 3350 17 G/17G
17 POWDER, FOR SOLUTION ORAL DAILY
COMMUNITY

## 2024-12-31 RX ORDER — HYOSCYAMINE SULFATE 0.125 MG
0.12 TABLET,DISINTEGRATING ORAL EVERY 4 HOURS PRN
COMMUNITY

## 2025-01-02 ENCOUNTER — HOSPITAL ENCOUNTER (OUTPATIENT)
Facility: HOSPITAL | Age: 80
Setting detail: HOSPITAL OUTPATIENT SURGERY
Discharge: HOME | End: 2025-01-02
Attending: INTERNAL MEDICINE | Admitting: INTERNAL MEDICINE
Payer: MEDICARE

## 2025-01-02 ENCOUNTER — ANESTHESIA EVENT (OUTPATIENT)
Dept: ENDOSCOPY | Facility: HOSPITAL | Age: 80
End: 2025-01-02
Payer: MEDICARE

## 2025-01-02 ENCOUNTER — ANESTHESIA (OUTPATIENT)
Dept: ENDOSCOPY | Facility: HOSPITAL | Age: 80
End: 2025-01-02
Payer: MEDICARE

## 2025-01-02 DIAGNOSIS — K86.2 PANCREAS CYST (HCC): ICD-10-CM

## 2025-01-02 PROCEDURE — 0DB78ZX EXCISION OF STOMACH, PYLORUS, VIA NATURAL OR ARTIFICIAL OPENING ENDOSCOPIC, DIAGNOSTIC: ICD-10-PCS | Performed by: INTERNAL MEDICINE

## 2025-01-02 PROCEDURE — BD47ZZZ ULTRASONOGRAPHY OF GASTROINTESTINAL TRACT: ICD-10-PCS | Performed by: INTERNAL MEDICINE

## 2025-01-02 PROCEDURE — 88305 TISSUE EXAM BY PATHOLOGIST: CPT | Performed by: INTERNAL MEDICINE

## 2025-01-02 PROCEDURE — 88312 SPECIAL STAINS GROUP 1: CPT | Performed by: INTERNAL MEDICINE

## 2025-01-02 RX ORDER — NALOXONE HYDROCHLORIDE 0.4 MG/ML
0.08 INJECTION, SOLUTION INTRAMUSCULAR; INTRAVENOUS; SUBCUTANEOUS AS NEEDED
Status: DISCONTINUED | OUTPATIENT
Start: 2025-01-02 | End: 2025-01-02

## 2025-01-02 RX ORDER — LEVOFLOXACIN 5 MG/ML
500 INJECTION, SOLUTION INTRAVENOUS ONCE
Status: DISCONTINUED | OUTPATIENT
Start: 2025-01-02 | End: 2025-01-02

## 2025-01-02 RX ORDER — ONDANSETRON 2 MG/ML
INJECTION INTRAMUSCULAR; INTRAVENOUS AS NEEDED
Status: DISCONTINUED | OUTPATIENT
Start: 2025-01-02 | End: 2025-01-02 | Stop reason: SURG

## 2025-01-02 RX ORDER — SODIUM CHLORIDE, SODIUM LACTATE, POTASSIUM CHLORIDE, CALCIUM CHLORIDE 600; 310; 30; 20 MG/100ML; MG/100ML; MG/100ML; MG/100ML
INJECTION, SOLUTION INTRAVENOUS CONTINUOUS
Status: DISCONTINUED | OUTPATIENT
Start: 2025-01-02 | End: 2025-01-02

## 2025-01-02 RX ORDER — LIDOCAINE HYDROCHLORIDE 10 MG/ML
INJECTION, SOLUTION EPIDURAL; INFILTRATION; INTRACAUDAL; PERINEURAL AS NEEDED
Status: DISCONTINUED | OUTPATIENT
Start: 2025-01-02 | End: 2025-01-02 | Stop reason: SURG

## 2025-01-02 RX ADMIN — SODIUM CHLORIDE, SODIUM LACTATE, POTASSIUM CHLORIDE, CALCIUM CHLORIDE: 600; 310; 30; 20 INJECTION, SOLUTION INTRAVENOUS at 08:19:00

## 2025-01-02 RX ADMIN — LIDOCAINE HYDROCHLORIDE 50 MG: 10 INJECTION, SOLUTION EPIDURAL; INFILTRATION; INTRACAUDAL; PERINEURAL at 08:21:00

## 2025-01-02 RX ADMIN — ONDANSETRON 4 MG: 2 INJECTION INTRAMUSCULAR; INTRAVENOUS at 08:21:00

## 2025-01-02 NOTE — H&P
History & Physical Examination    Patient Name: Mayte Lucas  MRN: [id number]  CSN: [id number]  YOB: 1945    Diagnosis: Pancreas cyst (HCC)      Present Illness:  Mayte Lucas is a 79 year old female is here Pancreas cyst (HCC).    Body mass index is 16.9 kg/m².    Past Medical History:    Acute exacerbation of chronic obstructive pulmonary disease (COPD) (HCC)    ALLERGIC RHINITIS    Anemia    Arrhythmia    Heart races   - takes Metoprolol    Back problem    x2 lower back fractures , bone spur//    Calculus of kidney    COPD (chronic obstructive pulmonary disease) (HCC)    no oxygen- sees pulmonologist    Neuropathy    feet and hands    OTHER DISEASES    TMJ       Procedure: EUS    Physician Pre-Sedation Assessment    Pre-Sedation Assessment:    Sedation History: Airway Assessed    ASA Classification: 2. Patient with mild systemic disease    Cardiac:    Respiratory:    Abdomen:      Plan: MAC        Current Facility-Administered Medications   Medication Dose Route Frequency    levoFLOXacin in dextrose 5% (Levaquin) 500 mg/100mL IVPB premix 500 mg  500 mg Intravenous Once    lactated ringers infusion   Intravenous Continuous    lactated ringers infusion   Intravenous Continuous    lactated ringers IV bolus 500 mL  500 mL Intravenous Once PRN    atropine 0.1 MG/ML injection 0.5 mg  0.5 mg Intravenous PRN    naloxone (Narcan) 0.4 MG/ML injection 0.08 mg  0.08 mg Intravenous PRN    fentaNYL (Sublimaze) 50 mcg/mL injection 25 mcg  25 mcg Intravenous Q5 Min PRN    fentaNYL (Sublimaze) 50 mcg/mL injection 50 mcg  50 mcg Intravenous Q5 Min PRN       Allergies: Allergies[1]    Past Surgical History:   Procedure Laterality Date    Abdominal surgery  2024    Exploratory laparotomy, right hemicolectomy, omentoplasty of anastomosis          x2    Cataract      Colectomy      twisted bowel - recent - 2024    Colonoscopy  2013    Procedure: COLONOSCOPY, POSSIBLE BIOPSY, POSSIBLE POLYPECTOMY  10650;  Surgeon: Jakub Barr MD;  Location: [facility]    Other surgical history      3/2024  adhesions removed    Patient documented not to have experienced any of the following events  12/12/2012    Procedure: LEFT PHACOEMULSIFICATION OF CATARACT WITH INTRAOCULAR LENS IMPLANT 34694;  Surgeon: Siddhartha Vinson MD;  Location: [facility]    Patient documented not to have experienced any of the following events  11/28/2012    Procedure: RIGHT PHACOEMULSIFICATION OF CATARACT WITH INTRAOCULAR LENS IMPLANT 08720;  Surgeon: Siddhartha Vinson MD;  Location: [facility]    Patient documented not to have experienced any of the following events  02/12/2013    Procedure: COLONOSCOPY, POSSIBLE BIOPSY, POSSIBLE POLYPECTOMY 47731;  Surgeon: Jakub Barr MD;  Location: [facility]    Patient withough preoperative order for iv antibiotic surgical site infection prophylaxis.  12/12/2012    Procedure: LEFT PHACOEMULSIFICATION OF CATARACT WITH INTRAOCULAR LENS IMPLANT 08818;  Surgeon: Siddhartha Vinson MD;  Location: [facility]    Patient withough preoperative order for iv antibiotic surgical site infection prophylaxis.  11/28/2012    Procedure: RIGHT PHACOEMULSIFICATION OF CATARACT WITH INTRAOCULAR LENS IMPLANT 77166;  Surgeon: Siddhartha Vinson MD;  Location: [facility]    Patient withough preoperative order for iv antibiotic surgical site infection prophylaxis.  02/12/2013    Procedure: COLONOSCOPY, POSSIBLE BIOPSY, POSSIBLE POLYPECTOMY 77345;  Surgeon: Jakub Barr MD;  Location: [facility]    Remv cataract extracap,insert lens  12/12/2012    Procedure: LEFT PHACOEMULSIFICATION OF CATARACT WITH INTRAOCULAR LENS IMPLANT 17990;  Surgeon: Siddhartha Vinson MD;  Location: [facility]    Remv cataract extracap,insert lens  11/28/2012    Procedure: RIGHT PHACOEMULSIFICATION OF CATARACT WITH INTRAOCULAR LENS IMPLANT 37633;   Surgeon: Siddhartha Vinson MD;  Location: [facility]    Tonsillectomy       Family History   Problem Relation Age of Onset    Other (Other) Father         PD    Other (Other) Sister         cramps in body    Cancer Sister      Social History     Tobacco Use    Smoking status: Never    Smokeless tobacco: Never   Substance Use Topics    Alcohol use: Not Currently     Alcohol/week: 1.0 standard drink of alcohol     Types: 1 Standard drinks or equivalent per week     Comment: occasionally       SYSTEM Check if Review is Normal Check if Physical Exam is Normal If not normal, please explain:   HEENT [x ] [x ]    NECK & BACK [x ] [x ]    HEART [x ] [x ]    LUNGS [x ] [x ]    ABDOMEN [x ] [x ]    UROGENITAL [ ] [ ]    EXTREMITIES [ ] [ ]    OTHER        [ x ] I have discussed the risks and benefits and alternatives with the patient/family.  They understand and agree to proceed with plan of care.  [ x ] I have reviewed the History and Physical done within the last 30 days.  Any changes noted above.    Mal Santoyo MD  1/2/2025  8:46 AM             [1]   Allergies  Allergen Reactions    Penicillins HIVES     Other  40 yrs ago    Bactrim [Sulfamethoxazole W/Trimethoprim] FEVER     X 4 days    Bicillin L-A      unknown    Mucinex Coughing    Fentanyl NAUSEA ONLY

## 2025-01-02 NOTE — ANESTHESIA POSTPROCEDURE EVALUATION
Patient: Mayte Lucas    Procedure Summary       Date: 01/02/25 Room / Location: [facility] ENDOSCOPY 01 / [facility] ENDOSCOPY    Anesthesia Start: 0819 Anesthesia Stop: 0834    Procedure: ENDOSCOPIC ULTRASOUND with Fine Needle Aspiration Diagnosis:       Pancreas cyst (HCC)      (gastritis)    Surgeons: Mal Santoyo MD Anesthesiologist: Delvin Dover MD    Anesthesia Type: MAC ASA Status: 2            Anesthesia Type: No value filed.    Vitals Value Taken Time   BP 88/71 01/02/25 0840   Temp 36.8 01/02/25 0843   Pulse 80 01/02/25 0841   Resp 17 01/02/25 0841   SpO2 100 % 01/02/25 0841   Vitals shown include unfiled device data.    [facility] AN Post Evaluation:   Patient Evaluated in PACU  Patient Participation: complete - patient participated  Level of Consciousness: awake and responsive to verbal stimuli  Pain Management: adequate  Airway Patency:patent  Yes    Nausea/Vomiting: none  Cardiovascular Status: acceptable  Respiratory Status: acceptable  Postoperative Hydration euvolemic      Delvin Dover MD  1/2/2025 8:43 AM

## 2025-01-02 NOTE — OPERATIVE REPORT
[facility] OPERATIVE REPORT   PATIENT NAME: Mayte Lucas  MRN: [id number]  DATE OF OPERATION: 1/2/2025  PREOPERATIVE DIAGNOSIS: abdominal pain; abnormal CT abdomen  POSTOPERATIVE DIAGNOSIS:    1.  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  gastritis  PROCEDURE PERFORMED: upper endoscopy/ ENDOSCOPIC ultrasound  SEDATION MEDICATIONS: MAC  PREOPERATIVE MEDICATIONS:   PREPROCEDURE ASSESSMENT: The indication for this procedure is to assess for cyst. The patient was identified by myself and nursing staff in the exam room. Informed consent was obtained. The patient was seen in clinic and a full H&P was obtained. On brief physical examination, airway is patent. Chest is clear. Heart has regular rate and rhythm. Abdomen is soft, nontender with good bowel sounds. A medication list was taken by nursing today and reviewed by myself. The patient is an ASA grade 2.   PROCEDURE NOTE: The procedure was completed without difficulty. The patient tolerated the procedure well.   Upper endoscopy (EGD):  The endoscope was inserted through the mouth and advanced to the level of the duodenum, 3rd portion.  Visualized portion of the esophagus, stomach including antrum, body, fundus and cardiac, and duodenum were normal. Mild gastritis was noted and gastric biopsies were taken.  Endoscopic ultrasound (EUS):  Endoscopic ultrasound was performed using the linear echoendoscope.  Images were obtained.    LIVER: Left lobe of the liver was visualized and no mass or lesions seen.  No intrahepatic duct dilation was noted.  Portal vein was noted to come out of the liver and the portal confluence was seen and pancreas was noted.  Mild gastritis was noted and biopsies were taken.  PANCREAS:  Pancreatic neck, body, and tail were interrogated from the gastric body while the neck, head and uncinate were examined from the 1st and 2nd duodenum.  PD- normal - 2.3mm in neck   Pancreas divisum: no  Chronic  pancreatitis changes: no  Neoplasm: no   Cysts:   yes- large multiloculated cystic lesion wwas noted in body of pancreas without worrisome feature such as mural nodules, wall thickness, associated mass, or upstream pancreatic duct dilation were noted.  Additional smaller cysts 5-7mm were noted in body of pancreas.  Biliary Tree: normal   - stones: no  GALLBLADDER: visualized and was normal without stones or sludge  CELIAC AXIS:  visualized without lymphadenopathy  L ADRENAL GLAND:  visualized   L KIDNEY:  visualized   MEDIASTINUM:  visualized without lymphadenopathy  Scope was withdrawn from the patient and patient tolerated the procedure well.  FINDINGS   Cystic lesion in mid body of pancreas without worrisome features.  No FNA needed due to loculated nature.  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     Thank you very much for the consultation.  I really appreciate it.    Mla Santoyo MD

## 2025-01-02 NOTE — ANESTHESIA PREPROCEDURE EVALUATION
Anesthesia PreOp Note    HPI:     Mayte Lucas is a 79 year old female who presents for preoperative consultation requested by: Mal Santoyo MD    Date of Surgery: 1/2/2025    Procedure(s):  ENDOSCOPIC ULTRASOUND with Fine Needle Aspiration  Indication: Pancreas cyst (HCC)    Relevant Problems   No relevant active problems       NPO:  Last Liquid Consumption Date: 01/01/25  Last Liquid Consumption Time: 2100  Last Solid Consumption Date: 01/01/25  Last Solid Consumption Time: 2030  Last Liquid Consumption Date: 01/01/25          History Review:  Patient Active Problem List    Diagnosis Date Noted    Anemia 03/19/2024    Azotemia 03/19/2024    SBO (small bowel obstruction) ([facility]) 03/19/2024    Abdominal pain, acute 03/05/2024    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/03/2024    Chronic fatigue 07/11/2021    Mood insomnia ([facility]) 06/10/2021    [place] nodes ([facility]) 06/10/2021    Weight loss 06/10/2021    Mixed simple and mucopurulent chronic bronchitis ([facility]) 03/05/2020    Supraventricular tachycardia ([facility]) 03/05/2020    Anemia, chronic disease 09/25/2018    Neuropathy 09/24/2018    Iron deficiency anemia due to chronic blood loss 06/28/2018    Essential tremor 05/22/2018    Dyslipidemia, goal LDL below 160 04/20/2018    Age-related osteoporosis without current pathological fracture 04/17/2018    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    Aortic atherosclerosis ([facility]) 04/16/2018    Hand arthritis 04/17/2017    Pseudophakia of both eyes 03/22/2016    RLS (restless legs syndrome) 12/17/2013       Past Medical History:    Acute exacerbation of chronic obstructive pulmonary disease (COPD) ([facility])    ALLERGIC RHINITIS    Anemia    Arrhythmia    Heart races   - takes Metoprolol    Back problem    x2 lower back fractures , bone spur//    Calculus of kidney    COPD (chronic obstructive pulmonary disease) ([facility])    no oxygen- sees pulmonologist    Neuropathy    feet and hands    OTHER DISEASES    TMJ       Past Surgical History:    Procedure Laterality Date    Abdominal surgery  2024    Exploratory laparotomy, right hemicolectomy, omentoplasty of anastomosis          x2    Cataract      Colectomy      twisted bowel - recent - 2024    Colonoscopy  2013    Procedure: COLONOSCOPY, POSSIBLE BIOPSY, POSSIBLE POLYPECTOMY 99436;  Surgeon: Jakub Barr MD;  Location: [facility]    Other surgical history      3/2024  adhesions removed    Patient documented not to have experienced any of the following events  2012    Procedure: LEFT PHACOEMULSIFICATION OF CATARACT WITH INTRAOCULAR LENS IMPLANT 92843;  Surgeon: Siddhartha Vinson MD;  Location: [facility]    Patient documented not to have experienced any of the following events  2012    Procedure: RIGHT PHACOEMULSIFICATION OF CATARACT WITH INTRAOCULAR LENS IMPLANT 50506;  Surgeon: Siddhartha Vinson MD;  Location: [facility]    Patient documented not to have experienced any of the following events  2013    Procedure: COLONOSCOPY, POSSIBLE BIOPSY, POSSIBLE POLYPECTOMY 58962;  Surgeon: Jakub Barr MD;  Location: [facility]    Patient withough preoperative order for iv antibiotic surgical site infection prophylaxis.  2012    Procedure: LEFT PHACOEMULSIFICATION OF CATARACT WITH INTRAOCULAR LENS IMPLANT [id number];  Surgeon: Siddhartha Vinson MD;  Location: [facility]    Patient withough preoperative order for iv antibiotic surgical site infection prophylaxis.  2012    Procedure: RIGHT PHACOEMULSIFICATION OF CATARACT WITH INTRAOCULAR LENS IMPLANT 15546;  Surgeon: Siddhartha Vinson MD;  Location: [facility]    Patient withough preoperative order for iv antibiotic surgical site infection prophylaxis.  2013    Procedure: COLONOSCOPY, POSSIBLE BIOPSY, POSSIBLE POLYPECTOMY 70928;  Surgeon: Jakub Barr MD;  Location: [facility]    Remv cataract extracap,insert  lens  12/12/2012    Procedure: LEFT PHACOEMULSIFICATION OF CATARACT WITH INTRAOCULAR LENS IMPLANT 16410;  Surgeon: Siddhartha Vinson MD;  [facility]    Remv cataract extracap,insert lens  11/28/2012    Procedure: RIGHT PHACOEMULSIFICATION OF CATARACT WITH INTRAOCULAR LENS IMPLANT 18069;  Surgeon: Siddhartha Vinson MD;  Location: [facility]    Tonsillectomy         Prescriptions Prior to Admission[1]  Current Medications and Prescriptions Ordered in Epic[2]    Allergies[3]    Family History   Problem Relation Age of Onset    Other (Other) Father         PD    Other (Other) Sister         cramps in body    Cancer Sister      Social History     Socioeconomic History    Marital status:    Tobacco Use    Smoking status: Never    Smokeless tobacco: Never   Substance and Sexual Activity    Alcohol use: Yes     Alcohol/week: 1.0 standard drink of alcohol     Types: 1 Standard drinks or equivalent per week     Comment: occasionally    Drug use: No       Available pre-op labs reviewed.             Vital Signs:  Body mass index is 16.9 kg/m².   height is 1.638 m (5' 4.5\") and weight is 45.4 kg (100 lb).   Vitals:    12/26/24 1403   Weight: 45.4 kg (100 lb)   Height: 1.638 m (5' 4.5\")        Anesthesia Evaluation     Patient summary reviewed and Nursing notes reviewed    No history of anesthetic complications   Airway   Mallampati: II  TM distance: >3 FB  Neck ROM: full  Dental - Dentition appears grossly intact     Pulmonary - normal exam   (+) COPD  Cardiovascular - negative ROS  Exercise tolerance: good  (+) dysrhythmias (BB yesterday pm)    Rhythm: regular  Rate: normal    Neuro/Psych      GI/Hepatic/Renal - negative ROS     Comments: GERD controlled, hx adhesions    Endo/Other - negative ROS     Comments: TMJ left side, mouth opening appears adequate  Vertigo, had vestibular PT with improvement  Abdominal  - normal exam  (-) obese                 Anesthesia Plan:   ASA:  2  Plan:    MAC  Plan Comments: Hx SVT on BB, well controlled, no cardiopul sx,   Hx vertigo, s/p vestibular PT with improvement  Hx TMJ, mouth opening appears adequate,   Hx of adhesions and bowel surgery, able to eating/drinking without issue, NPO appropriate today,   No gerd, no regurg, non/v,   No issues w anesthesia before,   Plan MAC/IVGA  Informed Consent Plan and Risks Discussed With:  Patient      I have informed Mayte Kaplant and/or legal guardian or family member of the nature of the anesthetic plan, benefits, risks including possible dental damage if relevant, major complications, and any alternative forms of anesthetic management.   All of the patient's questions were answered to the best of my ability. The patient desires the anesthetic management as planned.  Delvin Dover MD  1/2/2025 7:20 AM  Present on Admission:  **None**           [1]   Medications Prior to Admission   Medication Sig Dispense Refill Last Dose/Taking    hyoscyamine sulfate 0.125 MG Oral Tablet Dispersible Place 1 tablet (0.125 mg total) under the tongue every 4 (four) hours as needed.   Taking As Needed    polyethylene glycol, PEG 3350, 17 g Oral Powd Pack Take 17 g by mouth daily.   Taking    Biotin 47529 MCG Oral Tab Take by mouth daily with breakfast.   Taking    alum-mag hydroxide-simethicone 200-200-20 MG/5ML Oral Suspension Take 10 mL by mouth 4 (four) times daily as needed. 355 mL 0 Taking As Needed    ondansetron 4 MG Oral Tablet Dispersible Take 1 tablet (4 mg total) by mouth 2 (two) times daily as needed for Nausea.   Taking As Needed    aspirin (ASPIRIN LOW DOSE) 81 MG Oral Tab EC Take 1 tablet (81 mg total) by mouth daily.   Taking    metoprolol succinate 12.5 mg Oral Tab Take 1 Partial Tablet (12.5 mg total) by mouth nightly.   Taking    clonazePAM 0.5 MG Oral Tab Take 1 tablet (0.5 mg total) by mouth nightly. (Patient taking differently: Take 0.5 tablets (0.25 mg total) by mouth nightly as needed for Anxiety.) 90 tablet 0  Taking Differently    OXcarbazepine 150 MG Oral Tab Take 2 tablets (300 mg total) by mouth 2 (two) times daily. 360 tablet 3 Taking    Denosumab 60 MG/ML Subcutaneous Solution Prefilled Syringe Inject 1 mL (60 mg total) into the skin once. Historical documentation - see Epic Immunization Activity for administration details 1 mL 0 Taking    alendronate 70 MG Oral Tab Take 1 tablet (70 mg total) by mouth once a week.       docusate sodium 100 MG Oral Cap Take 1 capsule (100 mg total) by mouth 2 (two) times daily.       omega-3 fatty acids 1000 MG Oral Cap Take 1,000 mg by mouth daily.       simethicone 80 MG Oral Chew Tab Chew 1 tablet (80 mg total) by mouth 3 (three) times daily. 90 tablet 0     lansoprazole 30 MG Oral Tablet Delayed Release Dispersible Take 1 tablet (30 mg total) by mouth every morning before breakfast. 30 tablet 0    [2]   Current Facility-Administered Medications Ordered in Epic   Medication Dose Route Frequency Provider Last Rate Last Admin    levoFLOXacin in dextrose 5% (Levaquin) 500 mg/100mL IVPB premix 500 mg  500 mg Intravenous Once Mal Santoyo MD        lactated ringers infusion   Intravenous Continuous Mal Santoyo MD         No current [facility]-ordered outpatient medications on file.   [3]   Allergies  Allergen Reactions    Penicillins HIVES     Other  40 yrs ago    Bactrim [Sulfamethoxazole W/Trimethoprim] FEVER     X 4 days    Bicillin L-A      unknown    Mucinex Coughing    Fentanyl NAUSEA ONLY

## 2025-01-02 NOTE — DISCHARGE INSTRUCTIONS
Home Care Instructions for Colonoscopy and/or Gastroscopy with Sedation    Diet:  - Resume your regular diet as tolerated unless otherwise instructed.  - Start with light meals to minimize bloating.  - Do not drink alcohol today.    Medication:  - If you have questions about resuming your normal medications, please contact your Primary Care Physician.    Activities:  - Take it easy today. Do not return to work today.  - Do not drive today.  - Do not operate any machinery today (including kitchen equipment).      Gastroscopy:  - You may have a sore throat for 2-3 days following the exam. This is normal. Gargling with warm salt water (1/2 tsp salt to 1 glass warm water) or using throat lozenges will help.  - If you experience any sharp pain in your neck, abdomen or chest, vomiting of blood, oral temperature over 100 degrees Fahrenheit, light-headedness or dizziness, or any other problems, contact your doctor.    **If unable to reach your doctor, please go to the [facility] Emergency Room**    - Your referring physician will receive a full report of your examination.  - If you do not hear from your doctor's office within two weeks of your biopsy, please call them for your results.    You may be able to see your laboratory results in [facility] between 4 and 7 business days.  In some cases, your physician may not have viewed the results before they are released to [facility].  If you have questions regarding your results contact the physician who ordered the test/exam by phone or via [facility] by choosing \"Ask a Medical Question.\"

## 2025-01-03 VITALS
HEIGHT: 64.5 IN | WEIGHT: 100 LBS | OXYGEN SATURATION: 96 % | BODY MASS INDEX: 16.86 KG/M2 | RESPIRATION RATE: 13 BRPM | SYSTOLIC BLOOD PRESSURE: 114 MMHG | DIASTOLIC BLOOD PRESSURE: 50 MMHG | HEART RATE: 72 BPM

## (undated) DEVICE — PACK CDS A P RESECTION

## (undated) DEVICE — Device

## (undated) DEVICE — DRAPE SHEET LAPAROTOMY

## (undated) DEVICE — GLOVE GAMMEX PI HYBRID LF 8.0

## (undated) DEVICE — RELOAD STPL L75MM OPN H3.8MM CLS 1.5MM WRE

## (undated) DEVICE — SUTURE CHRM GUT 3-0 27IN ABSRB UD 26MM SH 1/2

## (undated) DEVICE — YANKAUER,BULB TIP,W/O VENT,RIGID,STERILE: Brand: MEDLINE

## (undated) DEVICE — 60 ML SYRINGE REGULAR TIP: Brand: MONOJECT

## (undated) DEVICE — SUT SLK 0 30IN MULTPK BK

## (undated) DEVICE — SUTURE VCRL 0 L27IN ABSRB VLT L36MM CT-1 1/2

## (undated) DEVICE — SUTURE PERMAHAND SZ 2-0 L30IN 10X30IN TIE

## (undated) DEVICE — SUT SLK 3-0 18IN SH MULTPK BK

## (undated) DEVICE — TIP SUCT POOLE RIG 2-PART L LUMN BVL SL OPN

## (undated) DEVICE — KIT CLEAN ENDOKIT 1.1OZ GOWNX2

## (undated) DEVICE — CONMED SCOPE SAVER BITE BLOCK, 20X27 MM: Brand: SCOPE SAVER

## (undated) DEVICE — MEDI-VAC NON-CONDUCTIVE SUCTION TUBING 6MM X 1.8M (6FT.) L: Brand: CARDINAL HEALTH

## (undated) DEVICE — MEDI-VAC NON-CONDUCTIVE SUCTION TUBING: Brand: CARDINAL HEALTH

## (undated) DEVICE — CO2 CANNULA,SSOFT,ADLT,7O2,4CO2,FEMALE: Brand: MEDLINE

## (undated) DEVICE — SOLUTION IRRIG 1000ML 0.9% NACL USP BTL

## (undated) DEVICE — STAPLER INT L75MM CUT LN L73MM STPL LN L77MM

## (undated) DEVICE — GIJAW SINGLE-USE BIOPSY FORCEPS WITH NEEDLE: Brand: GIJAW

## (undated) DEVICE — SUTURE VICRYL 0 J906G

## (undated) DEVICE — SUTURE PDS II 0 L60IN ABSRB VLT L48MM CTX 1/2

## (undated) DEVICE — STAPLER SKIN ROTICULATING PRW3

## (undated) DEVICE — V2 SPECIMEN COLLECTION MANIFOLD KIT: Brand: NEPTUNE

## (undated) DEVICE — SUTURE VCRL SZ 2-0 L18IN ABSRB UD POLYGLACTIN

## (undated) DEVICE — DRAPE INCIS W17XL23IN FAB ANTIMIC FULL W HNDL

## (undated) DEVICE — STRIP SKIN CLSR W0.5XL4IN REINF NINVAS DSGN

## (undated) DEVICE — GLOVE SUR 8 ENCORE MIC LTX BRN PWD F

## (undated) NOTE — LETTER
[facility]  [address], [place], [place]  Authorization for Surgical Operation and Procedure                                                                                           I hereby authorize Yvan Griggs MD, my physician and his/her assistants (if applicable), which may include medical students, residents, and/or fellows, to perform the following surgical operation/ procedure and administer such anesthesia as may be determined necessary by my physician: Operation/Procedure name (s) EXPLORATORY LAPAROTOMY, POSSIBLE RIGHT HEMICOLECTOMY on Mayte Lucas   2.   I recognize that during the surgical operation/procedure, unforeseen conditions may necessitate additional or different procedures than those listed above.  I, therefore, further authorize and request that the above-named surgeon, assistants, or designees perform such procedures as are, in their judgment, necessary and desirable.    3.   My surgeon/physician has discussed prior to my surgery the potential benefits, risks and side effects of this procedure; the likelihood of achieving goals; and potential problems that might occur during recuperation.  They also discussed reasonable alternatives to the procedure, including risks, benefits, and side effects related to the alternatives and risks related to not receiving this procedure.  I have had all my questions answered and I acknowledge that no guarantee has been made as to the result that may be obtained.    4.   Should the need arise during my operation/procedure, which includes change of level of care prior to discharge, I also consent to the administration of blood and/or blood products.  Further, I understand that despite careful testing and screening of blood or blood products by collecting agencies, I may still be subject to ill effects as a result of receiving a blood transfusion and/or blood products.  The following are some, but not all, of the potential risks  that can occur: fever and allergic reactions, hemolytic reactions, transmission of diseases such as Hepatitis, AIDS and Cytomegalovirus (CMV) and fluid overload.  In the event that I wish to have an autologous transfusion of my own blood, or a directed donor transfusion, I will discuss this with my physician.  Check only if Refusing Blood or Blood Products  I understand refusal of blood or blood products as deemed necessary by my physician may have serious consequences to my condition to include possible death. I hereby assume responsibility for my refusal and release the hospital, its personnel, and my physicians from any responsibility for the consequences of my refusal.    o  Refuse   5.   I authorize the use of any specimen, organs, tissues, body parts or foreign objects that may be removed from my body during the operation/procedure for diagnosis, research or teaching purposes and their subsequent disposal by hospital authorities.  I also authorize the release of specimen test results and/or written reports to my treating physician on the hospital medical staff or other referring or consulting physicians involved in my care, at the discretion of the Pathologist or my treating physician.    6.   I consent to the photographing or videotaping of the operations or procedures to be performed, including appropriate portions of my body for medical, scientific, or educational purposes, provided my identity is not revealed by the pictures or by descriptive texts accompanying them.  If the procedure has been photographed/videotaped, the surgeon will obtain the original picture, image, videotape or CD.  The hospital will not be responsible for storage, release or maintenance of the picture, image, tape or CD.    7.   I consent to the presence of a  or observers in the operating room as deemed necessary by my physician or their designees.    8.   I recognize that in the event my procedure results in  extended X-Ray/fluoroscopy time, I may develop a skin reaction.    9. If I have a Do Not Attempt Resuscitation (DNAR) order in place, that status will be suspended while in the operating room, procedural suite, and during the recovery period unless otherwise explicitly stated by me (or a person authorized to consent on my behalf). The surgeon or my attending physician will determine when the applicable recovery period ends for purposes of reinstating the DNAR order.  10. Patients having a sterilization procedure: I understand that if the procedure is successful the results will be permanent and it will therefore be impossible for me to inseminate, conceive, or bear children.  I also understand that the procedure is intended to result in sterility, although the result has not been guaranteed.   11. I acknowledge that my physician has explained sedation/analgesia administration to me including the risk and benefits I consent to the administration of sedation/analgesia as may be necessary or desirable in the judgment of my physician.    I CERTIFY THAT I HAVE READ AND FULLY UNDERSTAND THE ABOVE CONSENT TO OPERATION and/or OTHER PROCEDURE.     _________________________________________ _________________________________     ___________________________________  Signature of Patient     Signature of Responsible Person                   Printed Name of Responsible Person                              _________________________________________ ______________________________        ___________________________________  Signature of Witness         Date  Time         Relationship to Patient    STATEMENT OF PHYSICIAN My signature below affirms that prior to the time of the procedure; I have explained to the patient and/or his/her legal representative, the risks and benefits involved in the proposed treatment and any reasonable alternative to the proposed treatment. I have also explained the risks and benefits involved in refusal of  the proposed treatment and alternatives to the proposed treatment and have answered the patient's questions. If I have a significant financial interest in a co-management agreement or a significant financial interest in any product or implant, or other significant relationship used in this procedure/surgery, I have disclosed this and had a discussion with my patient.     _______________________________________________________________ _____________________________  (Signature of Physician)                                                                                         (Date)                                   (Time)  Patient Name: Mayte Lucas    : 1945   Printed: 2/3/2024      Medical Record #: [id number]                                              Page 1 of 1

## (undated) NOTE — LETTER
[facility]  Administration of Anesthesia  IMayte agree to be cared for by a physician anesthesiologist alone and/or with a nurse anesthetist, who is specially trained to monitor me and give me medicine to put me to sleep or keep me comfortable during my procedure    I understand that my anesthesiologist and/or anesthetist is not an employee or agent of [facility] or [employer]. He or she works for [facility]    As the patient asking for anesthesia services, I agree to:  Allow the anesthesiologist (anesthesia doctor) to give me medicine and do additional procedures as necessary. Some examples are: Starting or using an “IV” to give me medicine, fluids or blood during my procedure, and having a breathing tube placed to help me breathe when I’m asleep (intubation). In the event that my heart stops working properly, I understand that my anesthesiologist will make every effort to sustain my life, unless otherwise directed by [facility] Do Not Resuscitate documents.  Tell my anesthesia doctor before my procedure:  If I am pregnant.  The last time that I ate or drank.  iii. All of the medicines I take (including prescriptions, herbal supplements, and pills I can buy without a prescription (including street drugs/illegal medications). Failure to inform my anesthesiologist about these medicines may increase my risk of anesthetic complications.  iv.If I am allergic to anything or have had a reaction to anesthesia before.  I understand how the anesthesia medicine will help me (benefits).  I understand that with any type of anesthesia medicine there are risks:  The most common risks are: nausea, vomiting, sore throat, muscle soreness, damage to my eyes, mouth, or teeth (from breathing tube placement).  Rare risks include: remembering what happened during my procedure, allergic reactions to medications, injury to my airway, heart, lungs, vision, nerves, or  muscles and in extremely rare instances death.  My doctor has explained to me other choices available to me for my care (alternatives).  Pregnant Patients (“epidural”):  I understand that the risks of having an epidural (medicine given into my back to help control pain during labor), include itching, low blood pressure, difficulty urinating, headache or slowing of the baby’s heart. Very rare risks include infection, bleeding, seizure, irregular heart rhythms and nerve injury.  Regional Anesthesia (“spinal”, “epidural”, & “nerve blocks”):  I understand that rare but potential complications include headache, bleeding, infection, seizure, irregular heart rhythms, and nerve injury.    _____________________________________________________________________________  Patient (or Representative) Signature/Relationship to Patient  Date   Time    _____________________________________________________________________________   Name (if used)    Language/Organization   Time    _____________________________________________________________________________  Nurse Anesthetist Signature     Date   Time  _____________________________________________________________________________  Anesthesiologist Signature     Date   Time  I have discussed the procedure and information above with the patient (or patient’s representative) and answered their questions. The patient or their representative has agreed to have anesthesia services.    _____________________________________________________________________________  Witness        Date   Time  I have verified that the signature is that of the patient or patient’s representative, and that it was signed before the procedure  Patient Name: Mayte Lucas     : 1945                 Printed: 2024 at 10:12 AM    Medical Record #: [id number]                                            Page 1 of 1  ----------ANESTHESIA CONSENT----------

## (undated) NOTE — LETTER
[facility]  [address], [place], [place]  Authorization for Surgical Operation and Procedure                                                                                           I hereby authorize Yvan Griggs MD, my physician and his/her assistants (if applicable), which may include medical students, residents, and/or fellows, to perform the following surgical operation/ procedure and administer such anesthesia as may be determined necessary by my physician: Operation/Procedure name (s) EXPLORATORY LAPAROTOMY, POSSIBLE RIGHT HEMICOLECTOMY on Mayte Lucas   2.   I recognize that during the surgical operation/procedure, unforeseen conditions may necessitate additional or different procedures than those listed above.  I, therefore, further authorize and request that the above-named surgeon, assistants, or designees perform such procedures as are, in their judgment, necessary and desirable.    3.   My surgeon/physician has discussed prior to my surgery the potential benefits, risks and side effects of this procedure; the likelihood of achieving goals; and potential problems that might occur during recuperation.  They also discussed reasonable alternatives to the procedure, including risks, benefits, and side effects related to the alternatives and risks related to not receiving this procedure.  I have had all my questions answered and I acknowledge that no guarantee has been made as to the result that may be obtained.    4.   Should the need arise during my operation/procedure, which includes change of level of care prior to discharge, I also consent to the administration of blood and/or blood products.  Further, I understand that despite careful testing and screening of blood or blood products by collecting agencies, I may still be subject to ill effects as a result of receiving a blood transfusion and/or blood products.  The following are some, but not all, of the potential risks  that can occur: fever and allergic reactions, hemolytic reactions, transmission of diseases such as Hepatitis, AIDS and Cytomegalovirus (CMV) and fluid overload.  In the event that I wish to have an autologous transfusion of my own blood, or a directed donor transfusion, I will discuss this with my physician.  Check only if Refusing Blood or Blood Products  I understand refusal of blood or blood products as deemed necessary by my physician may have serious consequences to my condition to include possible death. I hereby assume responsibility for my refusal and release the hospital, its personnel, and my physicians from any responsibility for the consequences of my refusal.    o  Refuse   5.   I authorize the use of any specimen, organs, tissues, body parts or foreign objects that may be removed from my body during the operation/procedure for diagnosis, research or teaching purposes and their subsequent disposal by hospital authorities.  I also authorize the release of specimen test results and/or written reports to my treating physician on the hospital medical staff or other referring or consulting physicians involved in my care, at the discretion of the Pathologist or my treating physician.    6.   I consent to the photographing or videotaping of the operations or procedures to be performed, including appropriate portions of my body for medical, scientific, or educational purposes, provided my identity is not revealed by the pictures or by descriptive texts accompanying them.  If the procedure has been photographed/videotaped, the surgeon will obtain the original picture, image, videotape or CD.  The hospital will not be responsible for storage, release or maintenance of the picture, image, tape or CD.    7.   I consent to the presence of a  or observers in the operating room as deemed necessary by my physician or their designees.    8.   I recognize that in the event my procedure results in  extended X-Ray/fluoroscopy time, I may develop a skin reaction.    9. If I have a Do Not Attempt Resuscitation (DNAR) order in place, that status will be suspended while in the operating room, procedural suite, and during the recovery period unless otherwise explicitly stated by me (or a person authorized to consent on my behalf). The surgeon or my attending physician will determine when the applicable recovery period ends for purposes of reinstating the DNAR order.  10. Patients having a sterilization procedure: I understand that if the procedure is successful the results will be permanent and it will therefore be impossible for me to inseminate, conceive, or bear children.  I also understand that the procedure is intended to result in sterility, although the result has not been guaranteed.   11. I acknowledge that my physician has explained sedation/analgesia administration to me including the risk and benefits I consent to the administration of sedation/analgesia as may be necessary or desirable in the judgment of my physician.    I CERTIFY THAT I HAVE READ AND FULLY UNDERSTAND THE ABOVE CONSENT TO OPERATION and/or OTHER PROCEDURE.     _________________________________________ _________________________________     ___________________________________  Signature of Patient     Signature of Responsible Person                   Printed Name of Responsible Person                              _________________________________________ ______________________________        ___________________________________  Signature of Witness         Date  Time         Relationship to Patient    STATEMENT OF PHYSICIAN My signature below affirms that prior to the time of the procedure; I have explained to the patient and/or his/her legal representative, the risks and benefits involved in the proposed treatment and any reasonable alternative to the proposed treatment. I have also explained the risks and benefits involved in refusal of  the proposed treatment and alternatives to the proposed treatment and have answered the patient's questions. If I have a significant financial interest in a co-management agreement or a significant financial interest in any product or implant, or other significant relationship used in this procedure/surgery, I have disclosed this and had a discussion with my patient.     _______________________________________________________________ _____________________________  (Signature of Physician)                                                                                         (Date)                                   (Time)  Patient Name: Mayte Lucas    : 1945   Printed: 2/3/2024      Medical Record #: [id number]                                              Page 1 of 1

## (undated) NOTE — LETTER
[facility]  part of [facility]     PICC INSERTION CONSENT     I agree to have a Peripherally Inserted Central Catheter (PICC) placed in my arm.   1. The PICC insertion procedure, care, maintenance, risks, benefits, and complications have been explained to me by my physician, ________________________, and I understand them.   2. I understand that this may not be the only way I can receive my medication. I understand that my physician has determined that the PICC would be the safest and most effective means of administering my medication at this time. If there are other options of giving medication into my veins those options have been explained to me by my physician and I have chosen this one.   3. I realize a nurse who has been specially trained and certified by the hospital and ’s representative to insert a PICC will perform this procedure. My catheter will be inserted by _____________________________.   4. I have been informed by my doctor of the nature and purpose of this procedure and the risks involved and the possibility of complications. I realize that this is an invasive procedure and has certain risks such as air embolism (air entering the catheter or my vein), arterial puncture (a tearing of one of my arteries), infection, irregular heartbeat and venous thrombosis (a blood clot in a vein) nerve injury and fracture of the catheter with or without migration.   5. In order to numb the area where the line will be placed, a small amount of anesthetic medication will be injected as ordered by my physician.   6. I understand that while the catheter will be placed in my upper arm the end of the catheter will come to rest in an area near my heart.     7. The person performing this procedure has discussed the potential benefits, risks, and side effects of the PICC; the likelihood of achieving goals; and potential problems that might occur during recuperation. They also discussed  reasonable alternatives to the PICC, including risks, benefits, and side effects related to the alternatives and risks related to not receiving this procedure.    8.  I have expressed any questions about this procedure to my physician or the PICC Proceduralist and he/she has answered them.  I certify that I have read and understand this consent to the insertion of a PICC.      _________________________________________________________   Date     Time     Patient/Guardian Signature       ____________________________________   Printed name of Patient/Guardian          ________________________________________________________________    Date        Time                   Witnessing RN Signature      Patient Name: Mayte Lucas     : 1945                 Printed: 2024     Medical Record #: [id number]

## (undated) NOTE — LETTER
[facility]     PICC INSERTION CONSENT     I agree to have a Peripherally Inserted Central Catheter (PICC) placed in my arm.   1. The PICC insertion procedure, care, maintenance, risks, benefits, and complications have been explained to me by my physician, ________________________, and I understand them.   2. I understand that this may not be the only way I can receive my medication. I understand that my physician has determined that the PICC would be the safest and most effective means of administering my medication at this time. If there are other options of giving medication into my veins those options have been explained to me by my physician and I have chosen this one.   3. I realize a nurse who has been specially trained and certified by the hospital and ’s representative to insert a PICC will perform this procedure. My catheter will be inserted by _____________________________.   4. I have been informed by my doctor of the nature and purpose of this procedure and the risks involved and the possibility of complications. I realize that this is an invasive procedure and has certain risks such as air embolism (air entering the catheter or my vein), arterial puncture (a tearing of one of my arteries), infection, irregular heartbeat and venous thrombosis (a blood clot in a vein) nerve injury and fracture of the catheter with or without migration.   5. In order to numb the area where the line will be placed, a small amount of anesthetic medication will be injected as ordered by my physician.   6. I understand that while the catheter will be placed in my upper arm the end of the catheter will come to rest in an area near my heart.     7. The person performing this procedure has discussed the potential benefits, risks, and side effects of the PICC; the likelihood of achieving goals; and potential problems that might occur during recuperation. They also discussed reasonable alternatives to  the PICC, including risks, benefits, and side effects related to the alternatives and risks related to not receiving this procedure.    8.  I have expressed any questions about this procedure to my physician or the PICC Proceduralist and he/she has answered them.  I certify that I have read and understand this consent to the insertion of a PICC.      _________________________________________________________   Date     Time     Patient/Guardian Signature       ____________________________________   Printed name of Patient/Guardian          ________________________________________________________________    Date        Time                   Witnessing RN Signature      Patient Name: Mayte Lucas     : 1945                 Printed: 2024     Medical Record #: [id number]

## (undated) NOTE — LETTER
[facility]  [address], [place], [place]    Authorization for Surgical Operation and Procedure                               I hereby authorize Mal Santoyo MD, my physician and his/her assistants (if applicable), which may include medical students, residents, and/or fellows, to perform the following surgical operation/ procedure and administer such anesthesia as may be determined necessary by my physician: Operation/Procedure name (s) ENDOSCOPIC ULTRASOUND with Fine Needle Aspiration on Mayte Kaplant   2.   I recognize that during the surgical operation/procedure, unforeseen conditions may necessitate additional or different procedures than those listed above.  I, therefore, further authorize and request that the above-named surgeon, assistants, or designees perform such procedures as are, in their judgment, necessary and desirable.    3.   My surgeon/physician has discussed prior to my surgery the potential benefits, risks and side effects of this procedure; the likelihood of achieving goals; and potential problems that might occur during recuperation.  They also discussed reasonable alternatives to the procedure, including risks, benefits, and side effects related to the alternatives and risks related to not receiving this procedure.  I have had all my questions answered and I acknowledge that no guarantee has been made as to the result that may be obtained.    4.   Should the need arise during my operation/procedure, which includes change of level of care prior to discharge, I also consent to the administration of blood and/or blood products.  Further, I understand that despite careful testing and screening of blood or blood products by collecting agencies, I may still be subject to ill effects as a result of receiving a blood transfusion and/or blood products.  The following are some, but not all, of the potential risks that can occur: fever and allergic reactions, hemolytic  reactions, transmission of diseases such as Hepatitis, AIDS and Cytomegalovirus (CMV) and fluid overload.  In the event that I wish to have an autologous transfusion of my own blood, or a directed donor transfusion, I will discuss this with my physician.  Check only if Refusing Blood or Blood Products  I understand refusal of blood or blood products as deemed necessary by my physician may have serious consequences to my condition to include possible death. I hereby assume responsibility for my refusal and release the hospital, its personnel, and my physicians from any responsibility for the consequences of my refusal.    o  Refuse   5.   I authorize the use of any specimen, organs, tissues, body parts or foreign objects that may be removed from my body during the operation/procedure for diagnosis, research or teaching purposes and their subsequent disposal by hospital authorities.  I also authorize the release of specimen test results and/or written reports to my treating physician on the hospital medical staff or other referring or consulting physicians involved in my care, at the discretion of the Pathologist or my treating physician.    6.   I consent to the photographing or videotaping of the operations or procedures to be performed, including appropriate portions of my body for medical, scientific, or educational purposes, provided my identity is not revealed by the pictures or by descriptive texts accompanying them.  If the procedure has been photographed/videotaped, the surgeon will obtain the original picture, image, videotape or CD.  The hospital will not be responsible for storage, release or maintenance of the picture, image, tape or CD.    7.   I consent to the presence of a  or observers in the operating room as deemed necessary by my physician or their designees.    8.   I recognize that in the event my procedure results in extended X-Ray/fluoroscopy time, I may develop a skin  reaction.    9. If I have a Do Not Attempt Resuscitation (DNAR) order in place, that status will be suspended while in the operating room, procedural suite, and during the recovery period unless otherwise explicitly stated by me (or a person authorized to consent on my behalf). The surgeon or my attending physician will determine when the applicable recovery period ends for purposes of reinstating the DNAR order.  10. Patients having a sterilization procedure: I understand that if the procedure is successful the results will be permanent and it will therefore be impossible for me to inseminate, conceive, or bear children.  I also understand that the procedure is intended to result in sterility, although the result has not been guaranteed.   11. I acknowledge that my physician has explained sedation/analgesia administration to me including the risk and benefits I consent to the administration of sedation/analgesia as may be necessary or desirable in the judgment of my physician.    I CERTIFY THAT I HAVE READ AND FULLY UNDERSTAND THE ABOVE CONSENT TO OPERATION and/or OTHER PROCEDURE.     ____________________________________  _________________________________        ______________________________  Signature of Patient    Signature of Responsible Person                Printed Name of Responsible Person                                      ____________________________________  _____________________________                ________________________________  Signature of Witness        Date  Time         Relationship to Patient    STATEMENT OF PHYSICIAN My signature below affirms that prior to the time of the procedure; I have explained to the patient and/or his/her legal representative, the risks and benefits involved in the proposed treatment and any reasonable alternative to the proposed treatment. I have also explained the risks and benefits involved in refusal of the proposed treatment and alternatives to the proposed  treatment and have answered the patient's questions. If I have a significant financial interest in a co-management agreement or a significant financial interest in any product or implant, or other significant relationship used in this procedure/surgery, I have disclosed this and had a discussion with my patient.     _____________________________________________________              _____________________________  (Signature of Physician)                                                                                         (Date)                                   (Time)  Patient Name: Mayte LOGAN Lance      : 1945      Printed: 2024     Medical Record #: [id number]                                      Page 1 of 1

## (undated) NOTE — LETTER
[facility]  Administration of Anesthesia  IMayte agree to be cared for by a physician anesthesiologist alone and/or with a nurse anesthetist, who is specially trained to monitor me and give me medicine to put me to sleep or keep me comfortable during my procedure    I understand that my anesthesiologist and/or anesthetist is not an employee or agent of [facility] or [employer]. He or she works for [facility]    As the patient asking for anesthesia services, I agree to:  Allow the anesthesiologist (anesthesia doctor) to give me medicine and do additional procedures as necessary. Some examples are: Starting or using an “IV” to give me medicine, fluids or blood during my procedure, and having a breathing tube placed to help me breathe when I’m asleep (intubation). In the event that my heart stops working properly, I understand that my anesthesiologist will make every effort to sustain my life, unless otherwise directed by [facility] Do Not Resuscitate documents.  Tell my anesthesia doctor before my procedure:  If I am pregnant.  The last time that I ate or drank.  iii. All of the medicines I take (including prescriptions, herbal supplements, and pills I can buy without a prescription (including street drugs/illegal medications). Failure to inform my anesthesiologist about these medicines may increase my risk of anesthetic complications.  iv.If I am allergic to anything or have had a reaction to anesthesia before.  I understand how the anesthesia medicine will help me (benefits).  I understand that with any type of anesthesia medicine there are risks:  The most common risks are: nausea, vomiting, sore throat, muscle soreness, damage to my eyes, mouth, or teeth (from breathing tube placement).  Rare risks include: remembering what happened during my procedure, allergic reactions to medications, injury to my airway, heart, lungs, vision, nerves, or  muscles and in extremely rare instances death.  My doctor has explained to me other choices available to me for my care (alternatives).  Pregnant Patients (“epidural”):  I understand that the risks of having an epidural (medicine given into my back to help control pain during labor), include itching, low blood pressure, difficulty urinating, headache or slowing of the baby’s heart. Very rare risks include infection, bleeding, seizure, irregular heart rhythms and nerve injury.  Regional Anesthesia (“spinal”, “epidural”, & “nerve blocks”):  I understand that rare but potential complications include headache, bleeding, infection, seizure, irregular heart rhythms, and nerve injury.    _____________________________________________________________________________  Patient (or Representative) Signature/Relationship to Patient  Date   Time    _____________________________________________________________________________   Name (if used)    Language/Organization   Time    _____________________________________________________________________________  Nurse Anesthetist Signature     Date   Time  _____________________________________________________________________________  Anesthesiologist Signature     Date   Time  I have discussed the procedure and information above with the patient (or patient’s representative) and answered their questions. The patient or their representative has agreed to have anesthesia services.    _____________________________________________________________________________  Witness        Date   Time  I have verified that the signature is that of the patient or patient’s representative, and that it was signed before the procedure  Patient Name: Mayte Lucas     : 1945                 Printed: 2/3/2024 at 4:06 AM    Medical Record #: [id number]                                            Page 1 of 1  ----------ANESTHESIA CONSENT----------

## (undated) NOTE — LETTER
[facility]  [address], [place], [place]  Authorization for Surgical Operation and Procedure                                                                                           I hereby authorize Yvan Griggs MD, my physician and his/her assistants (if applicable), which may include medical students, residents, and/or fellows, to perform the following surgical operation/ procedure and administer such anesthesia as may be determined necessary by my physician: Operation/Procedure name (s) Exploratory laparotomy, possible lysis of adhesions, possible bowel resection on Mayte LOGAN Lance   2.   I recognize that during the surgical operation/procedure, unforeseen conditions may necessitate additional or different procedures than those listed above.  I, therefore, further authorize and request that the above-named surgeon, assistants, or designees perform such procedures as are, in their judgment, necessary and desirable.    3.   My surgeon/physician has discussed prior to my surgery the potential benefits, risks and side effects of this procedure; the likelihood of achieving goals; and potential problems that might occur during recuperation.  They also discussed reasonable alternatives to the procedure, including risks, benefits, and side effects related to the alternatives and risks related to not receiving this procedure.  I have had all my questions answered and I acknowledge that no guarantee has been made as to the result that may be obtained.    4.   Should the need arise during my operation/procedure, which includes change of level of care prior to discharge, I also consent to the administration of blood and/or blood products.  Further, I understand that despite careful testing and screening of blood or blood products by collecting agencies, I may still be subject to ill effects as a result of receiving a blood transfusion and/or blood products.  The following are some, but not all,  of the potential risks that can occur: fever and allergic reactions, hemolytic reactions, transmission of diseases such as Hepatitis, AIDS and Cytomegalovirus (CMV) and fluid overload.  In the event that I wish to have an autologous transfusion of my own blood, or a directed donor transfusion, I will discuss this with my physician.  Check only if Refusing Blood or Blood Products  I understand refusal of blood or blood products as deemed necessary by my physician may have serious consequences to my condition to include possible death. I hereby assume responsibility for my refusal and release the hospital, its personnel, and my physicians from any responsibility for the consequences of my refusal.    o  Refuse   5.   I authorize the use of any specimen, organs, tissues, body parts or foreign objects that may be removed from my body during the operation/procedure for diagnosis, research or teaching purposes and their subsequent disposal by hospital authorities.  I also authorize the release of specimen test results and/or written reports to my treating physician on the hospital medical staff or other referring or consulting physicians involved in my care, at the discretion of the Pathologist or my treating physician.    6.   I consent to the photographing or videotaping of the operations or procedures to be performed, including appropriate portions of my body for medical, scientific, or educational purposes, provided my identity is not revealed by the pictures or by descriptive texts accompanying them.  If the procedure has been photographed/videotaped, the surgeon will obtain the original picture, image, videotape or CD.  The hospital will not be responsible for storage, release or maintenance of the picture, image, tape or CD.    7.   I consent to the presence of a  or observers in the operating room as deemed necessary by my physician or their designees.    8.   I recognize that in the event my  procedure results in extended X-Ray/fluoroscopy time, I may develop a skin reaction.    9. If I have a Do Not Attempt Resuscitation (DNAR) order in place, that status will be suspended while in the operating room, procedural suite, and during the recovery period unless otherwise explicitly stated by me (or a person authorized to consent on my behalf). The surgeon or my attending physician will determine when the applicable recovery period ends for purposes of reinstating the DNAR order.  10. Patients having a sterilization procedure: I understand that if the procedure is successful the results will be permanent and it will therefore be impossible for me to inseminate, conceive, or bear children.  I also understand that the procedure is intended to result in sterility, although the result has not been guaranteed.   11. I acknowledge that my physician has explained sedation/analgesia administration to me including the risk and benefits I consent to the administration of sedation/analgesia as may be necessary or desirable in the judgment of my physician.    I CERTIFY THAT I HAVE READ AND FULLY UNDERSTAND THE ABOVE CONSENT TO OPERATION and/or OTHER PROCEDURE.     _________________________________________ _________________________________     ___________________________________  Signature of Patient     Signature of Responsible Person                   Printed Name of Responsible Person                              _________________________________________ ______________________________        ___________________________________  Signature of Witness         Date  Time         Relationship to Patient    STATEMENT OF PHYSICIAN My signature below affirms that prior to the time of the procedure; I have explained to the patient and/or his/her legal representative, the risks and benefits involved in the proposed treatment and any reasonable alternative to the proposed treatment. I have also explained the risks and benefits  involved in refusal of the proposed treatment and alternatives to the proposed treatment and have answered the patient's questions. If I have a significant financial interest in a co-management agreement or a significant financial interest in any product or implant, or other significant relationship used in this procedure/surgery, I have disclosed this and had a discussion with my patient.     _______________________________________________________________ _____________________________  (Signature of Physician)                                                                                         (Date)                                   (Time)  Patient Name: Mayte Kaplant    : 1945   Printed: 3/21/2024      Medical Record #: [id number]                                              Page 1 of 1